# Patient Record
Sex: FEMALE | Race: BLACK OR AFRICAN AMERICAN | URBAN - METROPOLITAN AREA
[De-identification: names, ages, dates, MRNs, and addresses within clinical notes are randomized per-mention and may not be internally consistent; named-entity substitution may affect disease eponyms.]

---

## 2017-01-21 ENCOUNTER — INPATIENT (INPATIENT)
Facility: HOSPITAL | Age: 62
LOS: 1 days | Discharge: HOME | End: 2017-01-23
Attending: INTERNAL MEDICINE | Admitting: INTERNAL MEDICINE
Payer: MEDICARE

## 2017-01-21 VITALS
WEIGHT: 153 LBS | OXYGEN SATURATION: 100 % | TEMPERATURE: 98 F | HEART RATE: 78 BPM | SYSTOLIC BLOOD PRESSURE: 167 MMHG | RESPIRATION RATE: 210 BRPM | DIASTOLIC BLOOD PRESSURE: 70 MMHG | HEIGHT: 69 IN

## 2017-01-21 LAB
ALBUMIN SERPL ELPH-MCNC: 3.4 G/DL — SIGNIFICANT CHANGE UP (ref 3.3–5)
ALP SERPL-CCNC: 61 U/L — SIGNIFICANT CHANGE UP (ref 40–120)
ALT FLD-CCNC: 23 U/L — SIGNIFICANT CHANGE UP (ref 12–78)
ANION GAP SERPL CALC-SCNC: 8 MMOL/L — SIGNIFICANT CHANGE UP (ref 5–17)
APPEARANCE UR: CLEAR — SIGNIFICANT CHANGE UP
APTT BLD: 26.9 SEC — LOW (ref 27.5–37.4)
AST SERPL-CCNC: 15 U/L — SIGNIFICANT CHANGE UP (ref 15–37)
BACTERIA # UR AUTO: ABNORMAL
BASOPHILS # BLD AUTO: 0.1 K/UL — SIGNIFICANT CHANGE UP (ref 0–0.2)
BASOPHILS NFR BLD AUTO: 0.5 % — SIGNIFICANT CHANGE UP (ref 0–2)
BILIRUB SERPL-MCNC: 0.2 MG/DL — SIGNIFICANT CHANGE UP (ref 0.2–1.2)
BILIRUB UR-MCNC: NEGATIVE — SIGNIFICANT CHANGE UP
BUN SERPL-MCNC: 12 MG/DL — SIGNIFICANT CHANGE UP (ref 7–23)
CALCIUM SERPL-MCNC: 9 MG/DL — SIGNIFICANT CHANGE UP (ref 8.5–10.1)
CHLORIDE SERPL-SCNC: 110 MMOL/L — HIGH (ref 96–108)
CK MB BLD-MCNC: <0.6 % — SIGNIFICANT CHANGE UP (ref 0–3.5)
CK MB CFR SERPL CALC: <0.5 NG/ML — SIGNIFICANT CHANGE UP (ref 0.5–3.6)
CK MB CFR SERPL CALC: <0.5 NG/ML — SIGNIFICANT CHANGE UP (ref 0.5–3.6)
CK SERPL-CCNC: 83 U/L — SIGNIFICANT CHANGE UP (ref 26–192)
CO2 SERPL-SCNC: 26 MMOL/L — SIGNIFICANT CHANGE UP (ref 22–31)
COLOR SPEC: YELLOW — SIGNIFICANT CHANGE UP
COMMENT - URINE: SIGNIFICANT CHANGE UP
CREAT SERPL-MCNC: 0.84 MG/DL — SIGNIFICANT CHANGE UP (ref 0.5–1.3)
DIFF PNL FLD: ABNORMAL
EOSINOPHIL # BLD AUTO: 0.1 K/UL — SIGNIFICANT CHANGE UP (ref 0–0.5)
EOSINOPHIL NFR BLD AUTO: 0.5 % — SIGNIFICANT CHANGE UP (ref 0–6)
GLUCOSE SERPL-MCNC: 147 MG/DL — HIGH (ref 70–99)
GLUCOSE UR QL: NEGATIVE MG/DL — SIGNIFICANT CHANGE UP
HCT VFR BLD CALC: 37.7 % — SIGNIFICANT CHANGE UP (ref 34.5–45)
HGB BLD-MCNC: 11.7 G/DL — SIGNIFICANT CHANGE UP (ref 11.5–15.5)
INR BLD: 1.21 RATIO — HIGH (ref 0.88–1.16)
KETONES UR-MCNC: NEGATIVE — SIGNIFICANT CHANGE UP
LEUKOCYTE ESTERASE UR-ACNC: NEGATIVE — SIGNIFICANT CHANGE UP
LYMPHOCYTES # BLD AUTO: 1.2 K/UL — SIGNIFICANT CHANGE UP (ref 1–3.3)
LYMPHOCYTES # BLD AUTO: 10.1 % — LOW (ref 13–44)
MCHC RBC-ENTMCNC: 25.4 PG — LOW (ref 27–34)
MCHC RBC-ENTMCNC: 31.1 GM/DL — LOW (ref 32–36)
MCV RBC AUTO: 81.6 FL — SIGNIFICANT CHANGE UP (ref 80–100)
MONOCYTES # BLD AUTO: 0.8 K/UL — SIGNIFICANT CHANGE UP (ref 0–0.9)
MONOCYTES NFR BLD AUTO: 6.3 % — SIGNIFICANT CHANGE UP (ref 2–14)
NEUTROPHILS # BLD AUTO: 10 K/UL — HIGH (ref 1.8–7.4)
NEUTROPHILS NFR BLD AUTO: 82.5 % — HIGH (ref 43–77)
NITRITE UR-MCNC: NEGATIVE — SIGNIFICANT CHANGE UP
PH UR: 7 — SIGNIFICANT CHANGE UP (ref 4.8–8)
PLATELET # BLD AUTO: 193 K/UL — SIGNIFICANT CHANGE UP (ref 150–400)
POTASSIUM SERPL-MCNC: 3.8 MMOL/L — SIGNIFICANT CHANGE UP (ref 3.5–5.3)
POTASSIUM SERPL-SCNC: 3.8 MMOL/L — SIGNIFICANT CHANGE UP (ref 3.5–5.3)
PROT SERPL-MCNC: 8 GM/DL — SIGNIFICANT CHANGE UP (ref 6–8.3)
PROT UR-MCNC: 15 MG/DL
PROTHROM AB SERPL-ACNC: 13.6 SEC — HIGH (ref 10–13.1)
RBC # BLD: 4.62 M/UL — SIGNIFICANT CHANGE UP (ref 3.8–5.2)
RBC # FLD: 12.6 % — SIGNIFICANT CHANGE UP (ref 11–15)
RBC CASTS # UR COMP ASSIST: >50 /HPF (ref 0–4)
SODIUM SERPL-SCNC: 144 MMOL/L — SIGNIFICANT CHANGE UP (ref 135–145)
SP GR SPEC: 1.01 — SIGNIFICANT CHANGE UP (ref 1.01–1.02)
TROPONIN I SERPL-MCNC: <.015 NG/ML — SIGNIFICANT CHANGE UP (ref 0.01–0.04)
TROPONIN I SERPL-MCNC: <.015 NG/ML — SIGNIFICANT CHANGE UP (ref 0.01–0.04)
UROBILINOGEN FLD QL: NEGATIVE MG/DL — SIGNIFICANT CHANGE UP
WBC # BLD: 12.2 K/UL — HIGH (ref 3.8–10.5)
WBC # FLD AUTO: 12.2 K/UL — HIGH (ref 3.8–10.5)
WBC UR QL: ABNORMAL

## 2017-01-21 PROCEDURE — 71010: CPT | Mod: 26

## 2017-01-21 PROCEDURE — 70450 CT HEAD/BRAIN W/O DYE: CPT | Mod: 26

## 2017-01-21 PROCEDURE — 99285 EMERGENCY DEPT VISIT HI MDM: CPT

## 2017-01-21 RX ORDER — SIMVASTATIN 20 MG/1
20 TABLET, FILM COATED ORAL AT BEDTIME
Qty: 0 | Refills: 0 | Status: DISCONTINUED | OUTPATIENT
Start: 2017-01-21 | End: 2017-01-23

## 2017-01-21 RX ORDER — ACETAMINOPHEN 500 MG
650 TABLET ORAL ONCE
Qty: 0 | Refills: 0 | Status: COMPLETED | OUTPATIENT
Start: 2017-01-21 | End: 2017-01-21

## 2017-01-21 RX ADMIN — SIMVASTATIN 20 MILLIGRAM(S): 20 TABLET, FILM COATED ORAL at 22:42

## 2017-01-21 RX ADMIN — Medication 100 MILLIGRAM(S): at 17:55

## 2017-01-21 NOTE — ED PROVIDER NOTE - MEDICAL DECISION MAKING DETAILS
pt with syncope noted with aicha's chorea to admit for further care with Dr. Pina and Dr. Johnson and Dr. Tovar consulted.

## 2017-01-21 NOTE — ED PROVIDER NOTE - OBJECTIVE STATEMENT
61 year old female with PMH of HTN, [place] Chorea presenting to ED due to syncope noted at home  - as per  she was just sitting and then leaned to the side, unresponsive for 61 year old female with PMH of HTN, [place] Chorea presenting to ED due to syncope noted at home  - as per  she was just sitting and then leaned to the side, unresponsive for a minute now back to normal. Pt normaly has some R sided weakness, bilateral leg weakness at baseline.

## 2017-01-21 NOTE — CONSULT NOTE ADULT - SUBJECTIVE AND OBJECTIVE BOX
HPI:  HPI:      Chief Complaint:  Patient is a 61y old  Female who presents with a chief complaint of     Review of Systems:    General:  No wt loss, fevers, chills, night sweats  Eyes:  Good vision, no reported pain  ENT:  No sore throat, pain, runny nose, dysphagia  CV:  No pain, palpitations, hypo/hypertension, passed out  Resp:  No dyspnea, cough, tachypnea, wheezing  GI:  No pain, nausea, vomiting, diarrhea, constipation  :  No pain, bleeding, incontinence, nocturia  Muscle:  No pain, weakness           Social History/Family History  SOCHX:   tobacco,  -  alcohol    FMHX: FA/MO  - contributory       Discussed with:  PMD, Family    Physical Exam:    Vital Signs:  Vital Signs Last 24 Hrs  T(C): 37.9, Max: 37.9 ( @ 15:45)  T(F): 100.2, Max: 100.2 ( @ 15:45)  HR: 93 (78 - 93)  BP: 137/89 (133/78 - 167/70)  BP(mean): --  RR: 18 (18 - 20)  SpO2: 97% (97% - 100%)  Daily Height in cm: 175.26 (2017 09:56)    Daily   I&O's Summary      General:  Appears stated age, well-groomed, well-nourished, no distress  HEENT:  NC/AT, patent nares w/ pink mucosa, OP clear w/o lesions, PERRL, EOMI, conjunctivae clear, no thyromegaly, nodules, adenopathy, no JVD  Chest:  Full & symmetric excursion, no increased effort, breath sounds clear  Cardiovascular:  Regular rhythm, S1, S2, no murmur/rub/S3/S4, no carotid/femoral/abdominal bruit, radial/pedal pulses 2+, no edema  Abdomen:  Soft, non-tender, non-distended, normoactive bowel sounds, no HSM  Extremities:  Gait & station:   Digits:   Nails:   Joints, Bones, Muscles:   ROM:   Stability:  Skin:  No rash/erythema/ecchymoses/petechiae/wounds/abscess/warm/dry  Musculoskeletal:  Full ROM in all joints w/o swelling/tenderness/effusion  Neuro/Psych:  Alert, oriented, normal and symmetric strength in UEs, LEs, normal gait, sensation intact, affect:   mood:   appearance:       Laboratory:                          11.7   12.2  )-----------( 193      ( 2017 10:45 )             37.7     2017 10:45    144    |  110    |  12     ----------------------------<  147    3.8     |  26     |  0.84     Ca    9.0        2017 10:45    TPro  8.0    /  Alb  3.4    /  TBili  0.2    /  DBili  x      /  AST  15     /  ALT  23     /  AlkPhos  61     2017 10:45      CARDIAC MARKERS ( 2017 18:50 )  <.015 ng/mL / x     / x     / x     / <0.5 ng/mL  CARDIAC MARKERS ( 2017 10:45 )  <.015 ng/mL / x     / 83 U/L / x     / <0.5 ng/mL      CAPILLARY BLOOD GLUCOSE  121 (2017 10:03)    LIVER FUNCTIONS - ( 2017 10:45 )  Alb: 3.4 g/dL / Pro: 8.0 gm/dL / ALK PHOS: 61 U/L / ALT: 23 U/L / AST: 15 U/L / GGT: x           PT/INR - ( 2017 10:45 )   PT: 13.6 sec;   INR: 1.21 ratio         PTT - ( 2017 10:45 )  PTT:26.9 sec  Urinalysis Basic - ( 2017 14:52 )    Color: Yellow / Appearance: Clear / S.010 / pH: x  Gluc: x / Ketone: Negative  / Bili: Negative / Urobili: Negative mg/dL   Blood: x / Protein: 15 mg/dL / Nitrite: Negative   Leuk Esterase: Negative / RBC: >50 /HPF / WBC 11-25   Sq Epi: x / Non Sq Epi: x / Bacteria: Few        Assessment:  Syncope  History noted  ECho  Tele monitor  EKG noted  Serial CE

## 2017-01-21 NOTE — ED ADULT TRIAGE NOTE - CHIEF COMPLAINT QUOTE
patient BIBA , patient denied any chest pain or SOB , patient had an episode of syncope this morning as per boyfriend she had a fall 2 days ago hematoma R eye noted unknown LOC patient c/o of bilateral knee pain

## 2017-01-21 NOTE — ED PROVIDER NOTE - NIH STROKE SCALE: 10. DYSARTHRIA, QM
(1) Mild-to-moderate dysarthria; patient slurs at least some words and, at worst, can be understood with some difficulty

## 2017-01-22 DIAGNOSIS — E78.00 PURE HYPERCHOLESTEROLEMIA, UNSPECIFIED: ICD-10-CM

## 2017-01-22 DIAGNOSIS — R55 SYNCOPE AND COLLAPSE: ICD-10-CM

## 2017-01-22 LAB
CULTURE RESULTS: NO GROWTH — SIGNIFICANT CHANGE UP
HCT VFR BLD CALC: 34.7 % — SIGNIFICANT CHANGE UP (ref 34.5–45)
HGB BLD-MCNC: 11.9 G/DL — SIGNIFICANT CHANGE UP (ref 11.5–15.5)
MCHC RBC-ENTMCNC: 28.4 PG — SIGNIFICANT CHANGE UP (ref 27–34)
MCHC RBC-ENTMCNC: 34.2 GM/DL — SIGNIFICANT CHANGE UP (ref 32–36)
MCV RBC AUTO: 82.8 FL — SIGNIFICANT CHANGE UP (ref 80–100)
PLATELET # BLD AUTO: 175 K/UL — SIGNIFICANT CHANGE UP (ref 150–400)
RBC # BLD: 4.19 M/UL — SIGNIFICANT CHANGE UP (ref 3.8–5.2)
RBC # FLD: 12.9 % — SIGNIFICANT CHANGE UP (ref 11–15)
SPECIMEN SOURCE: SIGNIFICANT CHANGE UP
WBC # BLD: 7.7 K/UL — SIGNIFICANT CHANGE UP (ref 3.8–10.5)
WBC # FLD AUTO: 7.7 K/UL — SIGNIFICANT CHANGE UP (ref 3.8–10.5)

## 2017-01-22 PROCEDURE — 93880 EXTRACRANIAL BILAT STUDY: CPT | Mod: 26

## 2017-01-22 RX ORDER — DOCUSATE SODIUM 100 MG
100 CAPSULE ORAL
Qty: 0 | Refills: 0 | Status: DISCONTINUED | OUTPATIENT
Start: 2017-01-22 | End: 2017-01-23

## 2017-01-22 RX ORDER — ACETAMINOPHEN 500 MG
650 TABLET ORAL EVERY 6 HOURS
Qty: 0 | Refills: 0 | Status: DISCONTINUED | OUTPATIENT
Start: 2017-01-22 | End: 2017-01-23

## 2017-01-22 RX ADMIN — Medication 100 MILLIGRAM(S): at 05:49

## 2017-01-22 RX ADMIN — Medication 100 MILLIGRAM(S): at 23:51

## 2017-01-22 RX ADMIN — SIMVASTATIN 20 MILLIGRAM(S): 20 TABLET, FILM COATED ORAL at 21:53

## 2017-01-22 RX ADMIN — Medication 100 MILLIGRAM(S): at 18:27

## 2017-01-22 RX ADMIN — Medication 650 MILLIGRAM(S): at 00:24

## 2017-01-22 NOTE — PROGRESS NOTE ADULT - SUBJECTIVE AND OBJECTIVE BOX
Patient is a 61y old  Female who presents with a chief complaint of     INTERVAL HPI/OVERNIGHT EVENTS:    MEDICATIONS  (STANDING):  simvastatin 20milliGRAM(s) Oral at bedtime  doxycycline hyclate Capsule 100milliGRAM(s) Oral every 12 hours    MEDICATIONS  (PRN):  acetaminophen   Tablet 650milliGRAM(s) Oral every 6 hours PRN For Temp greater than 38 C (100.4 F)      Allergies    No Known Allergies    Intolerances        REVIEW OF SYSTEMS:  CONSTITUTIONAL: No fever, weight loss, or fatigue  EYES: No eye pain, visual disturbances, or discharge  ENMT:  No difficulty hearing, tinnitus, vertigo; No sinus or throat pain  NECK: No pain or stiffness  BREASTS: No pain, masses, or nipple discharge  RESPIRATORY: No cough, wheezing, chills or hemoptysis; No shortness of breath  CARDIOVASCULAR: No chest pain, palpitations, dizziness, or leg swelling  GASTROINTESTINAL: No abdominal or epigastric pain. No nausea, vomiting, or hematemesis; No diarrhea or constipation. No melena or hematochezia.  GENITOURINARY: No dysuria, frequency, hematuria, or incontinence  NEUROLOGICAL: No headaches, memory loss, loss of strength, numbness, or tremors  SKIN: No itching, burning, rashes, or lesions   LYMPH NODES: No enlarged glands  ENDOCRINE: No heat or cold intolerance; No hair loss  MUSCULOSKELETAL: No joint pain or swelling; No muscle, back, or extremity pain  PSYCHIATRIC: No depression, anxiety, mood swings, or difficulty sleeping  HEME/LYMPH: No easy bruising, or bleeding gums  ALLERGY AND IMMUNOLOGIC: No hives or eczema    Vital Signs Last 24 Hrs  T(C): 37.7, Max: 38.6 ( @ 22:50)  T(F): 99.8, Max: 101.4 ( @ 22:50)  HR: 84 (84 - 100)  BP: 125/76 (116/79 - 139/81)  BP(mean): --  RR: 18 (17 - 21)  SpO2: 97% (96% - 99%)    PHYSICAL EXAM:  GENERAL: NAD, well-groomed, well-developed  HEAD:  Atraumatic, Normocephalic  EYES: EOMI, PERRLA, conjunctiva and sclera clear  ENMT: No tonsillar erythema, exudates, or enlargement; Moist mucous membranes, Good dentition, No lesions  NECK: Supple, No JVD, Normal thyroid  NERVOUS SYSTEM:  Alert & Oriented X3, Good concentration; Motor Strength 5/5 B/L upper and lower extremities; DTRs 2+ intact and symmetric  CHEST/LUNG: Clear to percussion bilaterally; No rales, rhonchi, wheezing, or rubs  HEART: Regular rate and rhythm; No murmurs, rubs, or gallops  ABDOMEN: Soft, Nontender, Nondistended; Bowel sounds present  EXTREMITIES:  2+ Peripheral Pulses, No clubbing, cyanosis, or edema  LYMPH: No lymphadenopathy noted  SKIN: No rashes or lesions    LABS:                        11.9   7.7   )-----------( 175      ( 2017 06:34 )             34.7     2017 10:45    144    |  110    |  12     ----------------------------<  147    3.8     |  26     |  0.84     Ca    9.0        2017 10:45    TPro  8.0    /  Alb  3.4    /  TBili  0.2    /  DBili  x      /  AST  15     /  ALT  23     /  AlkPhos  61     2017 10:45    PT/INR - ( 2017 10:45 )   PT: 13.6 sec;   INR: 1.21 ratio         PTT - ( 2017 10:45 )  PTT:26.9 sec  Urinalysis Basic - ( 2017 14:52 )    Color: Yellow / Appearance: Clear / S.010 / pH: x  Gluc: x / Ketone: Negative  / Bili: Negative / Urobili: Negative mg/dL   Blood: x / Protein: 15 mg/dL / Nitrite: Negative   Leuk Esterase: Negative / RBC: >50 /HPF / WBC 11-25   Sq Epi: x / Non Sq Epi: x / Bacteria: Few      CAPILLARY BLOOD GLUCOSE    CULTURES:    HEMOGLOBIN A1C:    CHOLESTEROL:        RADIOLOGY & ADDITIONAL TESTS:

## 2017-01-23 VITALS
RESPIRATION RATE: 17 BRPM | SYSTOLIC BLOOD PRESSURE: 150 MMHG | DIASTOLIC BLOOD PRESSURE: 87 MMHG | OXYGEN SATURATION: 95 % | HEART RATE: 97 BPM | TEMPERATURE: 99 F

## 2017-01-23 DIAGNOSIS — R50.9 FEVER, UNSPECIFIED: ICD-10-CM

## 2017-01-23 DIAGNOSIS — G10 HUNTINGTON'S DISEASE: ICD-10-CM

## 2017-01-23 PROCEDURE — 93306 TTE W/DOPPLER COMPLETE: CPT | Mod: 26

## 2017-01-23 RX ADMIN — Medication 100 MILLIGRAM(S): at 06:02

## 2017-01-23 RX ADMIN — Medication 100 MILLIGRAM(S): at 17:24

## 2017-01-23 NOTE — PHYSICAL THERAPY INITIAL EVALUATION ADULT - ACTIVE RANGE OF MOTION EXAMINATION, REHAB EVAL
R Shoulder Flexion: 0 to 80, R Elbow Extension: 130 to 30, R Knee: 10 to 90/deficits as listed below

## 2017-01-23 NOTE — PHYSICAL THERAPY INITIAL EVALUATION ADULT - MODIFIED CLINICAL TEST OF SENSORY INTEGRATION IN BALANCE TEST
Barthel Index: Feeding Score _5__, Bathing Score _0__, Grooming Score _0__, Dressing Score _0__, Bowels Score _0__, Bladder Score _0__, Toilet Score _0__, Transfers Score __5_, Mobility Score _0__, Stairs Score _0__,     Total Score _10__

## 2017-01-23 NOTE — PHYSICAL THERAPY INITIAL EVALUATION ADULT - IMPAIRMENTS FOUND, PT EVAL
gross motor/posture/ROM/sensory integrity/gait, locomotion, and balance/fine motor/muscle strength/tone

## 2017-01-23 NOTE — PHYSICAL THERAPY INITIAL EVALUATION ADULT - TRANSFER SAFETY CONCERNS NOTED: SIT/STAND, REHAB EVAL
decreased weight-shifting ability/losing balance/decreased step length/decreased sequencing ability/decreased balance during turns

## 2017-01-23 NOTE — PHYSICAL THERAPY INITIAL EVALUATION ADULT - PLANNED THERAPY INTERVENTIONS, PT EVAL
transfer training/strengthening/motor coordination training/gait training/balance training/postural re-education/bed mobility training/ROM

## 2017-01-23 NOTE — PROGRESS NOTE ADULT - SUBJECTIVE AND OBJECTIVE BOX
Patient is a 61y old  Female who presents with a chief complaint of     INTERVAL HPI/OVERNIGHT EVENTS: fever    MEDICATIONS  (STANDING):  simvastatin 20milliGRAM(s) Oral at bedtime  doxycycline hyclate Capsule 100milliGRAM(s) Oral every 12 hours    MEDICATIONS  (PRN):  acetaminophen   Tablet 650milliGRAM(s) Oral every 6 hours PRN For Temp greater than 38 C (100.4 F)  docusate sodium 100milliGRAM(s) Oral two times a day PRN Constipation      Allergies    No Known Allergies    Intolerances        REVIEW OF SYSTEMS:  CONSTITUTIONAL: No fever, weight loss, or fatigue  EYES: No eye pain, visual disturbances, or discharge  ENMT:  No difficulty hearing, tinnitus, vertigo; No sinus or throat pain  NECK: No pain or stiffness  BREASTS: No pain, masses, or nipple discharge  RESPIRATORY: No cough, wheezing, chills or hemoptysis; No shortness of breath  CARDIOVASCULAR: No chest pain, palpitations, dizziness, or leg swelling  GASTROINTESTINAL: No abdominal or epigastric pain. No nausea, vomiting, or hematemesis; No diarrhea or constipation. No melena or hematochezia.  GENITOURINARY: No dysuria, frequency, hematuria, or incontinence  NEUROLOGICAL: No headaches, memory loss, loss of strength, numbness, or tremors  SKIN: No itching, burning, rashes, or lesions   LYMPH NODES: No enlarged glands  ENDOCRINE: No heat or cold intolerance; No hair loss  MUSCULOSKELETAL: No joint pain or swelling; No muscle, back, or extremity pain  PSYCHIATRIC: No depression, anxiety, mood swings, or difficulty sleeping  HEME/LYMPH: No easy bruising, or bleeding gums  ALLERGY AND IMMUNOLOGIC: No hives or eczema    Vital Signs Last 24 Hrs  T(C): 36.8, Max: 38.1 ( @ 17:50)  T(F): 98.2, Max: 100.6 ( @ 17:50)  HR: 81 (81 - 87)  BP: 122/76 (122/76 - 139/81)  BP(mean): --  RR: 18 (17 - 18)  SpO2: 98% (96% - 98%)    PHYSICAL EXAM:  GENERAL: NAD, well-groomed, well-developed  HEAD:  Atraumatic, Normocephalic  EYES: EOMI, PERRLA, conjunctiva and sclera clear  ENMT: No tonsillar erythema, exudates, or enlargement; Moist mucous membranes, Good dentition, No lesions  NECK: Supple, No JVD, Normal thyroid  NERVOUS SYSTEM:  Alert & Oriented X3, Good concentration; Motor Strength 5/5 B/L upper and lower extremities; DTRs 2+ intact and symmetric  CHEST/LUNG: Clear to percussion bilaterally; No rales, rhonchi, wheezing, or rubs  HEART: Regular rate and rhythm; No murmurs, rubs, or gallops  ABDOMEN: Soft, Nontender, Nondistended; Bowel sounds present  EXTREMITIES:  2+ Peripheral Pulses, No clubbing, cyanosis, or edema  LYMPH: No lymphadenopathy noted  SKIN: No rashes or lesions    LABS:                        11.9   7.7   )-----------( 175      ( 2017 06:34 )             34.7             Urinalysis Basic - ( 2017 14:52 )    Color: Yellow / Appearance: Clear / S.010 / pH: x  Gluc: x / Ketone: Negative  / Bili: Negative / Urobili: Negative mg/dL   Blood: x / Protein: 15 mg/dL / Nitrite: Negative   Leuk Esterase: Negative / RBC: >50 /HPF / WBC 11-25   Sq Epi: x / Non Sq Epi: x / Bacteria: Few      CAPILLARY BLOOD GLUCOSE    CULTURES:  Culture Results:   No growth to date. ( @ 10:31)  Culture Results:   No growth ( @ 18:29)    HEMOGLOBIN A1C:    CHOLESTEROL:        RADIOLOGY & ADDITIONAL TESTS:

## 2017-01-23 NOTE — PROGRESS NOTE ADULT - SUBJECTIVE AND OBJECTIVE BOX
Patient is a 61y old  Female who presents with a chief complaint of     INTERVAL HPI/OVERNIGHT EVENTS: no complaint wants to go home    MEDICATIONS  (STANDING):  simvastatin 20milliGRAM(s) Oral at bedtime  doxycycline hyclate Capsule 100milliGRAM(s) Oral every 12 hours    MEDICATIONS  (PRN):  acetaminophen   Tablet 650milliGRAM(s) Oral every 6 hours PRN For Temp greater than 38 C (100.4 F)  docusate sodium 100milliGRAM(s) Oral two times a day PRN Constipation      Allergies    No Known Allergies    Intolerances        REVIEW OF SYSTEMS:  CONSTITUTIONAL: No fever, weight loss, or fatigue  EYES: No eye pain, visual disturbances, or discharge  ENMT:  No difficulty hearing, tinnitus, vertigo; No sinus or throat pain  NECK: No pain or stiffness  BREASTS: No pain, masses, or nipple discharge  RESPIRATORY: No cough, wheezing, chills or hemoptysis; No shortness of breath  CARDIOVASCULAR: No chest pain, palpitations, dizziness, or leg swelling  GASTROINTESTINAL: No abdominal or epigastric pain. No nausea, vomiting, or hematemesis; No diarrhea or constipation. No melena or hematochezia.  GENITOURINARY: No dysuria, frequency, hematuria, or incontinence  NEUROLOGICAL: No headaches, memory loss, loss of strength, numbness, or tremors  SKIN: No itching, burning, rashes, or lesions   LYMPH NODES: No enlarged glands  ENDOCRINE: No heat or cold intolerance; No hair loss  MUSCULOSKELETAL: No joint pain or swelling; No muscle, back, or extremity pain  PSYCHIATRIC: No depression, anxiety, mood swings, or difficulty sleeping  HEME/LYMPH: No easy bruising, or bleeding gums  ALLERGY AND IMMUNOLOGIC: No hives or eczema    Vital Signs Last 24 Hrs  T(C): 37.2, Max: 38.1 ( @ 17:50)  T(F): 99, Max: 100.6 ( @ 17:50)  HR: 101 (81 - 101)  BP: 148/83 (122/76 - 148/83)  BP(mean): --  RR: 17 (17 - 18)  SpO2: 95% (95% - 98%)    PHYSICAL EXAM:  GENERAL: NAD, well-groomed, well-developed  HEAD:  Atraumatic, Normocephalic  EYES: EOMI, PERRLA, conjunctiva and sclera clear  ENMT: No tonsillar erythema, exudates, or enlargement; Moist mucous membranes, Good dentition, No lesions  NECK: Supple, No JVD, Normal thyroid  NERVOUS SYSTEM:  Alert & Oriented X3, Good concentration; Motor Strength 5/5 B/L upper and lower extremities; DTRs 2+ intact and symmetric  CHEST/LUNG: Clear to percussion bilaterally; No rales, rhonchi, wheezing, or rubs  HEART: Regular rate and rhythm; No murmurs, rubs, or gallops  ABDOMEN: Soft, Nontender, Nondistended; Bowel sounds present  EXTREMITIES:  2+ Peripheral Pulses, No clubbing, cyanosis, or edema  LYMPH: No lymphadenopathy noted  SKIN: No rashes or lesions    LABS:                        11.9   7.7   )-----------( 175      ( 2017 06:34 )             34.7             Urinalysis Basic - ( 2017 14:52 )    Color: Yellow / Appearance: Clear / S.010 / pH: x  Gluc: x / Ketone: Negative  / Bili: Negative / Urobili: Negative mg/dL   Blood: x / Protein: 15 mg/dL / Nitrite: Negative   Leuk Esterase: Negative / RBC: >50 /HPF / WBC 11-25   Sq Epi: x / Non Sq Epi: x / Bacteria: Few      CAPILLARY BLOOD GLUCOSE    CULTURES:  Culture Results:   No growth to date. ( @ 10:31)  Culture Results:   No growth ( @ 18:29)    HEMOGLOBIN A1C:    CHOLESTEROL:        RADIOLOGY & ADDITIONAL TESTS:

## 2017-01-23 NOTE — PROGRESS NOTE ADULT - SUBJECTIVE AND OBJECTIVE BOX
Assessment:  Syncope  History noted  ECho pending, await result  Tele monitor negative  EKG noted  Serial CE neg

## 2017-01-23 NOTE — DISCHARGE NOTE ADULT - HOSPITAL COURSE
61 year old female with PMH of HTN, [place] Chorea presenting to ED due to syncope noted at home  - as per  she was just sitting and then leaned to the side, unresponsive for a minute now back to normal. Pt normaly has some R sided weakness, bilateral leg weakness at baseline.

## 2017-01-23 NOTE — DISCHARGE NOTE ADULT - PATIENT PORTAL LINK FT
“You can access the [facility] Patient Portal, offered by [facility], by registering with the following website: [email or web address]”

## 2017-01-23 NOTE — DISCHARGE NOTE ADULT - CARE PLAN
Principal Discharge DX:	Syncope, unspecified syncope type  Goal:	follow up with pmd in 1 week  Instructions for follow-up, activity and diet:	take med as directed  Secondary Diagnosis:	[place] chorea

## 2017-01-23 NOTE — PHYSICAL THERAPY INITIAL EVALUATION ADULT - ADDITIONAL COMMENTS
Patient's spouse states that his spouse was not walking for approximately 1 year, however he also states that the patient was able to walk to the bathroom with assistance.

## 2017-01-23 NOTE — PHYSICAL THERAPY INITIAL EVALUATION ADULT - GENERAL OBSERVATIONS, REHAB EVAL
Patient seen supine in bed with no apparent distress with R hypertonia, choreatic movements, teeth grinding, and low phonation upon speaking.

## 2017-01-23 NOTE — PHYSICAL THERAPY INITIAL EVALUATION ADULT - TRANSFER SAFETY CONCERNS NOTED: BED/CHAIR, REHAB EVAL
decreased weight-shifting ability/decreased step length/decreased sequencing ability/decreased balance during turns/losing balance

## 2017-01-23 NOTE — PHYSICAL THERAPY INITIAL EVALUATION ADULT - BALANCE DISTURBANCE, IDENTIFIED IMPAIRMENT CONTRIBUTE, REHAB EVAL
decreased ROM/impaired postural control/abnormal muscle tone/decreased strength/impaired motor control/impaired coordination

## 2017-01-23 NOTE — PHYSICAL THERAPY INITIAL EVALUATION ADULT - IMPAIRMENTS CONTRIBUTING TO GAIT DEVIATIONS, PT EVAL
impaired coordination/narrow base of support/impaired motor control/abnormal muscle tone/decreased strength/scissoring/impaired balance/impaired postural control

## 2017-01-23 NOTE — H&P ADULT. - HISTORY OF PRESENT ILLNESS
61 year old female with PMH of HTN, [place] Chorea presenting to ED due to syncope noted at home  - as per  she was just sitting and then leaned to the side, unresponsive for a minute now back to normal. Pt normaly has some R sided weakness, bilateral leg weakness at baseline.

## 2017-01-23 NOTE — PHYSICAL THERAPY INITIAL EVALUATION ADULT - CRITERIA FOR SKILLED THERAPEUTIC INTERVENTIONS
predicted duration of therapy intervention/rehab potential/impairments found/Home with Home P.T., resumption of prior services/risk reduction/prevention/anticipated equipment needs at discharge/anticipated discharge recommendation/functional limitations in following categories/therapy frequency

## 2017-01-23 NOTE — PHYSICAL THERAPY INITIAL EVALUATION ADULT - IMPAIRED TRANSFERS: SIT/STAND, REHAB EVAL
impaired postural control/ataxic/impaired coordination/narrow base of support/impaired motor control/abnormal muscle tone/decreased strength/decreased ROM/impaired balance

## 2017-01-23 NOTE — DISCHARGE NOTE ADULT - MEDICATION SUMMARY - MEDICATIONS TO TAKE
I will START or STAY ON the medications listed below when I get home from the hospital:    simvastatin 20 mg oral tablet  -- 1 tab(s) by mouth once a day (at bedtime)  -- Indication: For High cholesterol

## 2017-01-23 NOTE — CONSULT NOTE ADULT - SUBJECTIVE AND OBJECTIVE BOX
Subjective Complaints:  Historian:       Consult requested by ER doctor:                  Attending:     HPI:  61 year old female with PMH of HTN, [place] Chorea presenting to ED due to syncope noted at home  - as per  she was just sitting and then leaned to the side, unresponsive for a minute now back to normal. Pt normaly has some R sided weakness, bilateral leg weakness at baseline. (2017 11:44)    LAISHA PALMER    PAST MEDICAL & SURGICAL HISTORY:  High cholesterol  Slope chorea  No pertinent past medical history  No significant past surgical history  61yFemale    MEDICATIONS  (STANDING):  simvastatin 20milliGRAM(s) Oral at bedtime  doxycycline hyclate Capsule 100milliGRAM(s) Oral every 12 hours    MEDICATIONS  (PRN):  acetaminophen   Tablet 650milliGRAM(s) Oral every 6 hours PRN For Temp greater than 38 C (100.4 F)  docusate sodium 100milliGRAM(s) Oral two times a day PRN Constipation      Allergies    No Known Allergies    Intolerances      FAMILY HISTORY:      REVIEW OF SYSTEMS:  General:  No wt loss, fevers, chills, night sweats  Eyes:  Good vision, no reported pain  ENT:  No sore throat, pain, runny nose, dysphagia  CV:  No pain, palpitatioins, hypo/hypertension  Resp:  No dyspnea, cough, tachypnea, wheezing  GI:  No pain, nausea, vomiting, diarrhea, constipatiion  :  No pain, bleeding, incontinence, nocturia  Muscle:  No pain, weakness  Breast:  No pain, abscess, mass, discharge  Neuro:  No weakness, tingling, memory problems  Psych:  No fatigue, insomnia, mood problems, depression  Endocrine:  No polyuria, polydypsia, cold/heat intolerance  Heme:  No petechiae, ecchymosis, easy bruisability  Skin:  No rash, tattoos, scars, edema      Vital Signs Last 24 Hrs  T(C): 37.2, Max: 38.1 ( @ 17:50)  T(F): 99, Max: 100.6 ( @ 17:50)  HR: 101 (81 - 101)  BP: 148/83 (122/76 - 148/83)  BP(mean): --  RR: 17 (17 - 18)  SpO2: 95% (95% - 98%)    GENERAL PHYSICAL EXAM:  General:  Appears stated age, well-groomed, well-nourished, no distress  HEENT:  NC/AT, patent nares w/ pink mucosa, OP clear w/o lesions, PERRL, EOMI, conjunctivae clear, no thyromegaly, nodules, adenopathy, no JVD  Chest:  Full & symmetric excursion, no increased effort, breath sounds clear  Cardiovascular:  Regular rhythm, S1, S2, no murmur/rub/S3/S4, no carotid/femoral/abdominal bruit, radial/pedal pulses 2+, no edema  Abdomen:  Soft, non-tender, non-distended, normoactive bowel sounds, no HSM  Extremities:  Gait & station:   Digits:   Nails:   Joints, Bones, Muscles:   ROM:   Stability:  Skin:  No rash/erythema/ecchymoses/petechiae/wounds/abscess/warm/dry  Musculoskeletal:  Full ROM in all joints w/o swelling/tenderness/effusion    NEUROLOGICAL EXAM:  HENT:  Normocephalic head; atraumatic head.  Neck supple.  ENT: normal looking.  Mental State:    Alert.   oriented to person only, .  Coherent.  Speech is dysarthric ,memory is impaired to long and short termst.  Cooperative. Cognition is impaired.    Cranial Nerves:  II-XII:   Pupils round and reactive to light and accommodation.  Extraocular movements full.  Visual fields full (no homonymous hemianopsia).  Visual acuity wnl.  Facial symmetry intact.  Tongue midline.  Motor Functions:  involuntary fast movements of all extremities right worse than left strength is 3-4/5    Sensory Functions:   Intact to touch and pinprick to face and extremities.    Reflexes:  Deep tendon reflexes normoactive to biceps, knees and ankles.  Babinski absent (present).  Cerebellar Testing:    Finger to nose intact. Gait: difficulty ambulating   Neurovascular: Carotid auscultation full without bruits.      LABS:                        11.9   7.7   )-----------( 175      ( 2017 06:34 )             34.7               Urinalysis Basic - ( 2017 14:52 )    Color: Yellow / Appearance: Clear / S.010 / pH: x  Gluc: x / Ketone: Negative  / Bili: Negative / Urobili: Negative mg/dL   Blood: x / Protein: 15 mg/dL / Nitrite: Negative   Leuk Esterase: Negative / RBC: >50 /HPF / WBC 11-25   Sq Epi: x / Non Sq Epi: x / Bacteria: Few        RADIOLOGY & ADDITIONAL STUDIES:    acetaminophen   Tablet: [Ordered as TYLENOL]  650 milliGRAM(s), Oral, every 6 hours, PRN for For Temp greater than 38 C (100.4 F)  Administration Instructions: MAX DAILY DOSE:  ADULT = 4,000 mG/Day  Provider&#x27;s Contact #: [phone] ( @ 19:32)  docusate sodium: [Ordered as COLACE]  100 milliGRAM(s), Oral, two times a day, PRN for Constipation  Provider&#x27;s Contact #: [phone] ( @ 20:52)  Consult- PT Evaluate and Treat:   *Reason for Consult - Must select at least one choice*     Short Term Rehab  Weight Bearing Restrictions: No ( @ 10:22)  Consult- OT Evaluate and Treat:   *Reason for Consult - Must select at least one choice*     ADL  Weight Bearing Restrictions: No ( @ 10:22) [discontinued]  Xray Chest 2 Views PA/Lat: Routine   Indication: fever and cough  Transport: Stretcher-Crib ( @ 11:32) [discontinued]  levoFLOXacin IVPB: [Ordered as LEVAQUIN IVPB]  500 milliGRAM(s) in dextrose 5% 100 milliLiter(s), IV Intermittent, every 24 hours, infuse over 60 Minute(s)  Indication: fever  Administration Instructions: DO NOT Refrigerate  Provider&#x27;s Contact #: [phone] ( @ 11:32) [discontinued]  Culture - Urine: Routine  Specimen Source: Clean Catch (Midstream) ( @ 11:32)  Complete Blood Count: AM Sched. Collection: 2017 05:00 ( @ 11:32)  Basic Metabolic Panel: AM Sched. Collection: 2017 05:00 ( @ 11:32)  Discharge Patient Now: Home  on: 2017    Time/Priority:  Routine ( @ 11:55)  Head ct: my reading atrophy of yhe head of the caudate nucleus    Assessment & Opinion:61 y o woman with history of Slope disease had an episode of loss of conciousness DX SYNCOPE     Recommendations:    Carotid doppler.  Echocardiogram.  EEG.   DVT prophylaxis as ordered.  Medications:

## 2017-01-23 NOTE — H&P ADULT. - ASSESSMENT
61 year old female with PMH of HTN, [place] Chorea presenting to ED due to syncope noted at home  - as per  she was just sitting and then leaned to the side, unresponsive for a minute now back to normal. Pt normaly has some R sided weakness, bilateral leg weakness at baseline.

## 2017-01-23 NOTE — PHYSICAL THERAPY INITIAL EVALUATION ADULT - IMPAIRED TRANSFERS: BED/CHAIR, REHAB EVAL
impaired motor control/abnormal muscle tone/impaired postural control/decreased strength/decreased ROM/narrow base of support/ataxic/scissoring/impaired coordination/impaired balance

## 2017-01-23 NOTE — PHYSICAL THERAPY INITIAL EVALUATION ADULT - GAIT DEVIATIONS NOTED, PT EVAL
decreased stride length/decreased weight-shifting ability/increased time in double stance/decreased step length/decreased christian

## 2017-01-26 DIAGNOSIS — R55 SYNCOPE AND COLLAPSE: ICD-10-CM

## 2017-01-26 DIAGNOSIS — I10 ESSENTIAL (PRIMARY) HYPERTENSION: ICD-10-CM

## 2017-01-26 DIAGNOSIS — E78.00 PURE HYPERCHOLESTEROLEMIA, UNSPECIFIED: ICD-10-CM

## 2017-01-26 DIAGNOSIS — G10 HUNTINGTON'S DISEASE: ICD-10-CM

## 2017-01-27 LAB
CULTURE RESULTS: SIGNIFICANT CHANGE UP
SPECIMEN SOURCE: SIGNIFICANT CHANGE UP

## 2017-01-30 ENCOUNTER — INPATIENT (INPATIENT)
Facility: HOSPITAL | Age: 62
LOS: 17 days | Discharge: NURSING, REHAB OR LONG-TERM CARE | End: 2017-02-17
Attending: INTERNAL MEDICINE | Admitting: INTERNAL MEDICINE
Payer: MEDICARE

## 2017-01-30 VITALS
OXYGEN SATURATION: 99 % | TEMPERATURE: 98 F | WEIGHT: 160.06 LBS | DIASTOLIC BLOOD PRESSURE: 77 MMHG | HEIGHT: 70 IN | HEART RATE: 96 BPM | RESPIRATION RATE: 18 BRPM | SYSTOLIC BLOOD PRESSURE: 133 MMHG

## 2017-01-30 DIAGNOSIS — R82.71 BACTERIURIA: ICD-10-CM

## 2017-01-30 DIAGNOSIS — R62.7 ADULT FAILURE TO THRIVE: ICD-10-CM

## 2017-01-30 DIAGNOSIS — E78.00 PURE HYPERCHOLESTEROLEMIA, UNSPECIFIED: ICD-10-CM

## 2017-01-30 DIAGNOSIS — M62.82 RHABDOMYOLYSIS: ICD-10-CM

## 2017-01-30 DIAGNOSIS — E87.0 HYPEROSMOLALITY AND HYPERNATREMIA: ICD-10-CM

## 2017-01-30 DIAGNOSIS — E86.0 DEHYDRATION: ICD-10-CM

## 2017-01-30 LAB
ALBUMIN SERPL ELPH-MCNC: 2.9 G/DL — LOW (ref 3.3–5)
ALP SERPL-CCNC: 51 U/L — SIGNIFICANT CHANGE UP (ref 40–120)
ALT FLD-CCNC: 48 U/L — SIGNIFICANT CHANGE UP (ref 12–78)
ANION GAP SERPL CALC-SCNC: 8 MMOL/L — SIGNIFICANT CHANGE UP (ref 5–17)
APPEARANCE UR: CLEAR — SIGNIFICANT CHANGE UP
AST SERPL-CCNC: 58 U/L — HIGH (ref 15–37)
BASOPHILS # BLD AUTO: 0.1 K/UL — SIGNIFICANT CHANGE UP (ref 0–0.2)
BASOPHILS NFR BLD AUTO: 0.7 % — SIGNIFICANT CHANGE UP (ref 0–2)
BILIRUB SERPL-MCNC: 0.2 MG/DL — SIGNIFICANT CHANGE UP (ref 0.2–1.2)
BILIRUB UR-MCNC: NEGATIVE — SIGNIFICANT CHANGE UP
BUN SERPL-MCNC: 31 MG/DL — HIGH (ref 7–23)
CALCIUM SERPL-MCNC: 9.3 MG/DL — SIGNIFICANT CHANGE UP (ref 8.5–10.1)
CHLORIDE SERPL-SCNC: 111 MMOL/L — HIGH (ref 96–108)
CK MB BLD-MCNC: 0.2 % — SIGNIFICANT CHANGE UP (ref 0–3.5)
CK MB CFR SERPL CALC: 2.2 NG/ML — SIGNIFICANT CHANGE UP (ref 0.5–3.6)
CK SERPL-CCNC: 1077 U/L — HIGH (ref 26–192)
CO2 SERPL-SCNC: 29 MMOL/L — SIGNIFICANT CHANGE UP (ref 22–31)
COLOR SPEC: YELLOW — SIGNIFICANT CHANGE UP
CREAT SERPL-MCNC: 0.67 MG/DL — SIGNIFICANT CHANGE UP (ref 0.5–1.3)
DIFF PNL FLD: ABNORMAL
EOSINOPHIL # BLD AUTO: 0.1 K/UL — SIGNIFICANT CHANGE UP (ref 0–0.5)
EOSINOPHIL NFR BLD AUTO: 0.7 % — SIGNIFICANT CHANGE UP (ref 0–6)
GLUCOSE SERPL-MCNC: 106 MG/DL — HIGH (ref 70–99)
GLUCOSE UR QL: NEGATIVE MG/DL — SIGNIFICANT CHANGE UP
HCT VFR BLD CALC: 35.3 % — SIGNIFICANT CHANGE UP (ref 34.5–45)
HGB BLD-MCNC: 11.9 G/DL — SIGNIFICANT CHANGE UP (ref 11.5–15.5)
KETONES UR-MCNC: NEGATIVE — SIGNIFICANT CHANGE UP
LEUKOCYTE ESTERASE UR-ACNC: NEGATIVE — SIGNIFICANT CHANGE UP
LYMPHOCYTES # BLD AUTO: 1.3 K/UL — SIGNIFICANT CHANGE UP (ref 1–3.3)
LYMPHOCYTES # BLD AUTO: 14 % — SIGNIFICANT CHANGE UP (ref 13–44)
MCHC RBC-ENTMCNC: 28.2 PG — SIGNIFICANT CHANGE UP (ref 27–34)
MCHC RBC-ENTMCNC: 33.7 GM/DL — SIGNIFICANT CHANGE UP (ref 32–36)
MCV RBC AUTO: 83.8 FL — SIGNIFICANT CHANGE UP (ref 80–100)
MONOCYTES # BLD AUTO: 0.8 K/UL — SIGNIFICANT CHANGE UP (ref 0–0.9)
MONOCYTES NFR BLD AUTO: 8.8 % — SIGNIFICANT CHANGE UP (ref 2–14)
NEUTROPHILS # BLD AUTO: 7.2 K/UL — SIGNIFICANT CHANGE UP (ref 1.8–7.4)
NEUTROPHILS NFR BLD AUTO: 75.9 % — SIGNIFICANT CHANGE UP (ref 43–77)
NITRITE UR-MCNC: NEGATIVE — SIGNIFICANT CHANGE UP
PH UR: 6 — SIGNIFICANT CHANGE UP (ref 4.8–8)
PLATELET # BLD AUTO: 154 K/UL — SIGNIFICANT CHANGE UP (ref 150–400)
POTASSIUM SERPL-MCNC: 4.1 MMOL/L — SIGNIFICANT CHANGE UP (ref 3.5–5.3)
POTASSIUM SERPL-SCNC: 4.1 MMOL/L — SIGNIFICANT CHANGE UP (ref 3.5–5.3)
PROT SERPL-MCNC: 8 GM/DL — SIGNIFICANT CHANGE UP (ref 6–8.3)
PROT UR-MCNC: 30 MG/DL
RBC # BLD: 4.21 M/UL — SIGNIFICANT CHANGE UP (ref 3.8–5.2)
RBC # FLD: 12.4 % — SIGNIFICANT CHANGE UP (ref 11–15)
SODIUM SERPL-SCNC: 148 MMOL/L — HIGH (ref 135–145)
SP GR SPEC: 1.01 — SIGNIFICANT CHANGE UP (ref 1.01–1.02)
TROPONIN I SERPL-MCNC: <.015 NG/ML — SIGNIFICANT CHANGE UP (ref 0.01–0.04)
UROBILINOGEN FLD QL: NEGATIVE MG/DL — SIGNIFICANT CHANGE UP
WBC # BLD: 9.5 K/UL — SIGNIFICANT CHANGE UP (ref 3.8–10.5)
WBC # FLD AUTO: 9.5 K/UL — SIGNIFICANT CHANGE UP (ref 3.8–10.5)

## 2017-01-30 PROCEDURE — 71010: CPT | Mod: 26

## 2017-01-30 PROCEDURE — 70450 CT HEAD/BRAIN W/O DYE: CPT | Mod: 26

## 2017-01-30 PROCEDURE — 99223 1ST HOSP IP/OBS HIGH 75: CPT

## 2017-01-30 PROCEDURE — 99284 EMERGENCY DEPT VISIT MOD MDM: CPT

## 2017-01-30 RX ORDER — SODIUM CHLORIDE 9 MG/ML
1000 INJECTION, SOLUTION INTRAVENOUS
Qty: 0 | Refills: 0 | Status: DISCONTINUED | OUTPATIENT
Start: 2017-01-30 | End: 2017-02-08

## 2017-01-30 RX ORDER — SODIUM CHLORIDE 9 MG/ML
1000 INJECTION INTRAMUSCULAR; INTRAVENOUS; SUBCUTANEOUS ONCE
Qty: 0 | Refills: 0 | Status: COMPLETED | OUTPATIENT
Start: 2017-01-30 | End: 2017-01-30

## 2017-01-30 RX ORDER — ENOXAPARIN SODIUM 100 MG/ML
40 INJECTION SUBCUTANEOUS DAILY
Qty: 0 | Refills: 0 | Status: DISCONTINUED | OUTPATIENT
Start: 2017-01-30 | End: 2017-02-08

## 2017-01-30 RX ORDER — SIMVASTATIN 20 MG/1
20 TABLET, FILM COATED ORAL AT BEDTIME
Qty: 0 | Refills: 0 | Status: DISCONTINUED | OUTPATIENT
Start: 2017-01-30 | End: 2017-02-08

## 2017-01-30 RX ORDER — SODIUM CHLORIDE 9 MG/ML
3 INJECTION INTRAMUSCULAR; INTRAVENOUS; SUBCUTANEOUS ONCE
Qty: 0 | Refills: 0 | Status: COMPLETED | OUTPATIENT
Start: 2017-01-30 | End: 2017-01-30

## 2017-01-30 RX ADMIN — SIMVASTATIN 20 MILLIGRAM(S): 20 TABLET, FILM COATED ORAL at 21:33

## 2017-01-30 RX ADMIN — SODIUM CHLORIDE 1000 MILLILITER(S): 9 INJECTION INTRAMUSCULAR; INTRAVENOUS; SUBCUTANEOUS at 17:33

## 2017-01-30 RX ADMIN — SODIUM CHLORIDE 3 MILLILITER(S): 9 INJECTION INTRAMUSCULAR; INTRAVENOUS; SUBCUTANEOUS at 15:15

## 2017-01-30 RX ADMIN — SODIUM CHLORIDE 100 MILLILITER(S): 9 INJECTION, SOLUTION INTRAVENOUS at 19:09

## 2017-01-30 RX ADMIN — ENOXAPARIN SODIUM 40 MILLIGRAM(S): 100 INJECTION SUBCUTANEOUS at 19:10

## 2017-01-30 NOTE — H&P ADULT. - ASSESSMENT
61 year old female with PMH of HTN, [place] Chorea bibems presenting to ED due to generalized weakness and family unable to take care of her.

## 2017-01-30 NOTE — H&P ADULT. - PROBLEM SELECTOR PLAN 1
-admit to med-sx  -will hydrate  -monitor bmp and electrolytes  -awaiting family to call back for more info  -sw and PT eval

## 2017-01-30 NOTE — H&P ADULT. - NEUROLOGICAL DETAILS
deep reflexes intact/strength decreased/responds to pain/cranial nerves intact/BL LE/superficial reflexes intact/sensation intact/responds to verbal commands

## 2017-01-30 NOTE — H&P ADULT. - RS GEN PE MLT RESP DETAILS PC
respirations non-labored/no chest wall tenderness/breath sounds equal/airway patent/clear to auscultation bilaterally/good air movement

## 2017-01-30 NOTE — H&P ADULT. - HISTORY OF PRESENT ILLNESS
61 year old female with PMH of HTN, [place] Chorea bibems presenting to ED due to generalized weakness and family unable to take care of her. Poor historian. History obtained from ED chart and EMS. as per previous record Pt has some R sided weakness, bilateral leg weakness at baseline.

## 2017-01-31 LAB
ANION GAP SERPL CALC-SCNC: 5 MMOL/L — SIGNIFICANT CHANGE UP (ref 5–17)
BACTERIA # UR AUTO: ABNORMAL
BUN SERPL-MCNC: 24 MG/DL — HIGH (ref 7–23)
CALCIUM SERPL-MCNC: 8.8 MG/DL — SIGNIFICANT CHANGE UP (ref 8.5–10.1)
CHLORIDE SERPL-SCNC: 115 MMOL/L — HIGH (ref 96–108)
CK SERPL-CCNC: 666 U/L — HIGH (ref 26–192)
CO2 SERPL-SCNC: 28 MMOL/L — SIGNIFICANT CHANGE UP (ref 22–31)
COMMENT - URINE: SIGNIFICANT CHANGE UP
CREAT SERPL-MCNC: 0.51 MG/DL — SIGNIFICANT CHANGE UP (ref 0.5–1.3)
EPI CELLS # UR: ABNORMAL
GLUCOSE SERPL-MCNC: 109 MG/DL — HIGH (ref 70–99)
HCT VFR BLD CALC: 31.7 % — LOW (ref 34.5–45)
HGB BLD-MCNC: 10.6 G/DL — LOW (ref 11.5–15.5)
MAGNESIUM SERPL-MCNC: 2.3 MG/DL — SIGNIFICANT CHANGE UP (ref 1.8–2.4)
MCHC RBC-ENTMCNC: 27.6 PG — SIGNIFICANT CHANGE UP (ref 27–34)
MCHC RBC-ENTMCNC: 33.5 GM/DL — SIGNIFICANT CHANGE UP (ref 32–36)
MCV RBC AUTO: 82.4 FL — SIGNIFICANT CHANGE UP (ref 80–100)
PLATELET # BLD AUTO: 170 K/UL — SIGNIFICANT CHANGE UP (ref 150–400)
POTASSIUM SERPL-MCNC: 3.6 MMOL/L — SIGNIFICANT CHANGE UP (ref 3.5–5.3)
POTASSIUM SERPL-SCNC: 3.6 MMOL/L — SIGNIFICANT CHANGE UP (ref 3.5–5.3)
RBC # BLD: 3.85 M/UL — SIGNIFICANT CHANGE UP (ref 3.8–5.2)
RBC # FLD: 11.9 % — SIGNIFICANT CHANGE UP (ref 11–15)
RBC CASTS # UR COMP ASSIST: ABNORMAL /HPF (ref 0–4)
SODIUM SERPL-SCNC: 148 MMOL/L — HIGH (ref 135–145)
WBC # BLD: 6.9 K/UL — SIGNIFICANT CHANGE UP (ref 3.8–10.5)
WBC # FLD AUTO: 6.9 K/UL — SIGNIFICANT CHANGE UP (ref 3.8–10.5)
WBC UR QL: SIGNIFICANT CHANGE UP

## 2017-01-31 PROCEDURE — 70551 MRI BRAIN STEM W/O DYE: CPT | Mod: 26

## 2017-01-31 PROCEDURE — 99232 SBSQ HOSP IP/OBS MODERATE 35: CPT

## 2017-01-31 RX ADMIN — SIMVASTATIN 20 MILLIGRAM(S): 20 TABLET, FILM COATED ORAL at 21:58

## 2017-01-31 RX ADMIN — SODIUM CHLORIDE 100 MILLILITER(S): 9 INJECTION, SOLUTION INTRAVENOUS at 10:02

## 2017-01-31 RX ADMIN — ENOXAPARIN SODIUM 40 MILLIGRAM(S): 100 INJECTION SUBCUTANEOUS at 13:52

## 2017-01-31 NOTE — DIETITIAN INITIAL EVALUATION ADULT. - NS AS NUTRI INTERV MEALS SNACK
Texture-modified diet/Regular diet soft consistency nectar thick liquids Texture-modified diet/Mechanical soft/nectar thick liquids

## 2017-01-31 NOTE — PROGRESS NOTE ADULT - SUBJECTIVE AND OBJECTIVE BOX
CHIEF COMPLAINT/INTERVAL HISTORY:    Patient is a 61y old  Female who presents with a chief complaint of Generalized weakness (2017 18:29)      HPI:  61 year old female with PMH of HTN, [place] Chorea bibems presenting to ED due to generalized weakness and family unable to take care of her. Poor historian. History obtained from ED chart and EMS. as per previous record Pt has some R sided weakness, bilateral leg weakness at baseline. (2017 18:29)    Overnight issues  as above-  patient brother says that wshe is too weak to take care of  neurology consult appreciated         SUBJECTIVE & OBJECTIVE: Pt seen and examined at bedside.   ROS:  cannot help with review of systems   ICU Vital Signs Last 24 Hrs  T(C): 36.9, Max: 37.6 ( @ 20:53)  T(F): 98.4, Max: 99.6 ( @ 20:53)  HR: 91 (80 - 96)  BP: 131/79 (118/70 - 137/77)  BP(mean): --  ABP: --  ABP(mean): --  RR: 15 (14 - 18)  SpO2: 98% (97% - 99%)        MEDICATIONS  (STANDING):  enoxaparin Injectable 40milliGRAM(s) SubCutaneous daily  simvastatin 20milliGRAM(s) Oral at bedtime  dextrose 5% + sodium chloride 0.45%. 1000milliLiter(s) IV Continuous <Continuous>    MEDICATIONS  (PRN):        PHYSICAL EXAM:    GENERAL: NAD,   HEAD:  Atraumatic, Normocephalic  EYES: EOMI, PERRLA, conjunctiva and sclera clear  ENMT: Moist mucous membranes  NECK: Supple, No JVD  NERVOUS SYSTEM:  Alert & Orientedx1 with right sided facial   CHEST/LUNG: Clear to auscultation bilaterally; No rales, rhonchi, wheezing, or rubs  HEART: Regular rate and rhythm; No murmurs, rubs, or gallops  ABDOMEN: Soft, Nontender, Nondistended; Bowel sounds present  EXTREMITIES:  2+ Peripheral Pulses, No clubbing, cyanosis, or edema    LABS:                        10.6   6.9   )-----------( 170      ( 2017 08:10 )             31.7     2017 08:10    148    |  115    |  24     ----------------------------<  109    3.6     |  28     |  0.51     Ca    8.8        2017 08:10  Mg     2.3       2017 08:10    TPro  8.0    /  Alb  2.9    /  TBili  0.2    /  DBili  x      /  AST  58     /  ALT  48     /  AlkPhos  51     2017 15:16      Urinalysis Basic - ( 2017 23:45 )    Color: Yellow / Appearance: Clear / S.015 / pH: x  Gluc: x / Ketone: Negative  / Bili: Negative / Urobili: Negative mg/dL   Blood: x / Protein: 30 mg/dL / Nitrite: Negative   Leuk Esterase: Negative / RBC: 25-50 /HPF / WBC 3-5   Sq Epi: x / Non Sq Epi: Moderate / Bacteria: Few        CAPILLARY BLOOD GLUCOSE      RECENT CULTURES:      RADIOLOGY & ADDITIONAL TESTS:  Imaging Personally Reviewed:  [ ] YES      Consultant(s) Notes Reviewed:  [ ] YES     Care Discussed with [ ] Consultants [X ] Patient [ ] Family  [x ]    [x ]  Other; RN  HEALTH ISSUES - PROBLEM Dx:  Bacteriuria, asymptomatic: Bacteriuria, asymptomatic  Non-traumatic rhabdomyolysis: Non-traumatic rhabdomyolysis  High cholesterol: High cholesterol  Failure to thrive in adult: Failure to thrive in adult  Acute hypernatremia: Acute hypernatremia  Severe dehydration: Severe dehydration        DVT/GI ppx  Discussed with pt @ bedside

## 2017-01-31 NOTE — SWALLOW BEDSIDE ASSESSMENT ADULT - COMMENTS
CXR 1/30/2017 impression: No acute pulmonary disease    CT Head 1/30/2017 impression: Diffuse cerebral volume loss, disproportionate for patient's age. No acute intracranial hemorrhage, mass effect, or evidence of acute territorial infarct.

## 2017-01-31 NOTE — DIETITIAN INITIAL EVALUATION ADULT. - ETIOLOGY
Inadequate protein-energy intake 2/2 [place] Chorea Disease Inadequate protein-energy intake related to Ashli Chorea Disease

## 2017-01-31 NOTE — SWALLOW BEDSIDE ASSESSMENT ADULT - SWALLOW EVAL: DIAGNOSIS
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 pt is a 61 y o female dx dehydration, failure to thrive, aicha's chorea presented with oropharyngeal phases of the swallow marked by reduced oral grading, delayed oral transport, lingual stasis, suspected delayed trigger of the swallow, and reduced laryngeal elevation. Inconsistent cough with thin liquids.

## 2017-01-31 NOTE — DIETITIAN INITIAL EVALUATION ADULT. - OTHER INFO
Pt was seen for failure to thrive consult.  Pt reports no N/V/D.  Treats long-term constipation c daily prune juice consumption.  Lives @ home c ;  does food shopping & cooking.  Home aid assists pt c ADL 5 days a week/8 hrs daily.  Reports to consuming 2 meals & 1 ensure daily.  Consumes soft foods @ home; chx, cooked broccoli, oatmeal, eggs, bread.  Friend reports large wt loss in pt x 3 months; unknown amount. Pt was seen for failure to thrive consult.  Pt reports no N/V/D.  Treats long-term constipation c daily prune juice consumption.  Reports chewing/swallowing difficulty 2/2 mechanical soft diet.  Lives @ home c ;  does food shopping & cooking.  Home aid assists pt c ADL 5 days a week/8 hrs daily.  Reports to consuming 2 meals & 1 ensure daily.  Consumes soft foods @ home; chx, cooked broccoli, oatmeal, eggs, bread.  Friend reports large wt loss in pt x 3 months; unknown amount. Pt was seen for RN consult 01/31/17 for failure to thrive.  Pt reports no N/V/D.  Treats long-term constipation c daily prune juice consumption.  Reports chewing/swallowing difficulty 2/2 mechanical soft diet.  Lives @ home c ;  does food shopping & cooking.  Home aid assists pt c ADL 5 days a week/8 hrs daily.  Reports to consuming 2 meals & 1 ensure daily.  Consumes soft foods @ home; chx, cooked broccoli, oatmeal, eggs, bread.  Friend reports large wt loss in pt x 3 months; unknown amount. Pt was seen for RN consult 01/31/17 for failure to thrive.  Pt reports no N/V/D.  Treats long-term constipation c daily prune juice consumption. Lives @ home c ;  does food shopping & cooking.  Home aid assists pt c ADL 5 days a week/8 hrs daily. Pt recently requires increased assist with meals due to decreased functional status & presents with +Rt sided generalized weakness & Bilateral leg weakness.   Pt consumed soft foods @ home; chx, cooked broccoli, oatmeal, eggs, bread.  Pt's sister reports large wt loss in pt x 3 months; unknown amount. S/p swallow eval 1/31 recommends Mechanical soft/Nectar thick liquids.  Pt c/o hx constipation last BM x 1(1/30).

## 2017-01-31 NOTE — DIETITIAN INITIAL EVALUATION ADULT. - PHYSICAL APPEARANCE
underweight/BMI= 20.4 (no edema noted) underweight/BMI= 20.4 (no edema noted); Nutrition focused physical exam conducted ; found signs of malnutrition [ ]absent [ ]present.  Subcutaneous fat loss: [ ] Orbital fat pads region, [ ]Buccal fat region, [ ]Triceps region,  [ ]Ribs region.  Muscle wasting: [ ]Temples region, [ ]Clavicle region, [ ]Shoulder region, [ ]Scapula region, [ ]Interosseous region,  [ ]thigh region, [ ]Calf region underweight/BMI= 20.4 (no edema noted); Nutrition focused physical exam conducted ; found signs of malnutrition [ ]absent [X ]present.  Subcutaneous fat loss: [WDL ] Orbital fat pads region, [Moderate]Buccal fat region, [WDL ]Triceps region,  [Severe ]Ribs region.  Muscle wasting: [Moderate ]Temples region, [Severe ]Clavicle region, [Severe ]Shoulder region, [Unable ]Scapula region, [WDL ]Interosseous region,  [WDL ]thigh region, [WDL ]Calf region debilitated/BMI= 20.4 (no edema noted); Nutrition focused physical exam conducted ; found signs of malnutrition [ ]absent [X ]present.  Subcutaneous fat loss: [WDL ] Orbital fat pads region, [Moderate]Buccal fat region, [WDL ]Triceps region,  [Severe ]Ribs region.  Muscle wasting: [Moderate ]Temples region, [Severe ]Clavicle region, [Severe ]Shoulder region, [Unable ]Scapula region, [WDL ]Interosseous region,  [WDL ]thigh region, [WDL ]Calf region/contracted/underweight

## 2017-01-31 NOTE — DIETITIAN INITIAL EVALUATION ADULT. - SOURCE
patient/other (specify)/family/significant other/Medical Chart other (specify)/patient/Spoke pt & pt's sister Yuli & Medical Chart/family/significant other family/significant other/other (specify)/Spoke pt & pt's sister Yuli & Medical Chart review/patient

## 2017-01-31 NOTE — DIETITIAN INITIAL EVALUATION ADULT. - ENERGY NEEDS
Ht: 69in   Wt: 62.4kg (01/31)   BMI: 20.4   IBW: 66kg +/- 10%   %IBW: 100%  UBW: unknown Ht: 69in   Wt: 62.4kg (01/31)   BMI: 20.3   IBW: 66kg +/- 10%   %IBW: 100%  UBW: unknown

## 2017-01-31 NOTE — SWALLOW BEDSIDE ASSESSMENT ADULT - PHARYNGEAL PHASE
Decreased laryngeal elevation/Delayed pharyngeal swallow Decreased laryngeal elevation Cough post oral intake

## 2017-01-31 NOTE — DIETITIAN INITIAL EVALUATION ADULT. - PT NOT SOURCE
other (specify)/Cognitive limitations 2/2 [place] Chorea disease forgetful @ times/other (specify)

## 2017-01-31 NOTE — DIETITIAN INITIAL EVALUATION ADULT. - FACTORS AFF FOOD INTAKE
other (specify)/persistent constipation/Chronic Ashli Chorea Disease/change in mental status persistent constipation/difficulty feeding self/other (specify)/difficulty chewing/difficulty swallowing/change in mental status/+ generalized weakness

## 2017-01-31 NOTE — SWALLOW BEDSIDE ASSESSMENT ADULT - SWALLOW EVAL: RECOMMENDED FEEDING/EATING TECHNIQUES
small sips/bites/maintain upright posture during/after eating for 30 mins/allow for swallow between intakes/position upright (90 degrees)

## 2017-01-31 NOTE — DIETITIAN INITIAL EVALUATION ADULT. - NS AS NUTRI INTERV MEDICAL AND FOOD SUPPLEMENTS
Modified food/Ensure pudding x 2 daily (provides 170kcal 4g pro per serving) Ensure pudding x 2 daily (provides 170kcal 4g pro per serving)/Commercial food

## 2017-01-31 NOTE — DIETITIAN INITIAL EVALUATION ADULT. - SIGNS/SYMPTOMS
severe fat wasting (ribs), severe muscle wasting (clavicle&shoulder), <75% nutritional needs >3 mo severe fat wasting (ribs), severe muscle wasting (clavicle & shoulder), <75% nutritional needs >3 mo

## 2017-01-31 NOTE — PATIENT PROFILE ADULT. - FUNCTIONAL SCREEN CURRENT LEVEL: COMMUNICATION, MLM
(0) understands/communicates without difficulty (2) difficulty speaking (not related to language barrier)

## 2017-01-31 NOTE — SWALLOW BEDSIDE ASSESSMENT ADULT - SLP PERTINENT HISTORY OF CURRENT PROBLEM
PMH of HTN, [place] Chorea bibems presenting to ED due to generalized weakness and family unable to take care of her. Poor historian. History obtained from ED chart and EMS. as per previous record Pt has some R sided weakness, bilateral leg weakness at baseline.

## 2017-01-31 NOTE — SWALLOW BEDSIDE ASSESSMENT ADULT - SLP GENERAL OBSERVATIONS
pt was seen bedside alert and oriented x2. pt followed one step directions and responded to questions in short utterances. noted involuntary movements of the oral musculature and teeth grinding. pt was seen bedside alert and oriented x2. pt followed one step directions and responded to questions with short utterances. noted involuntary movements of the oral musculature and teeth grinding.

## 2017-01-31 NOTE — CHART NOTE - NSCHARTNOTEFT_GEN_A_CORE
Upon Nutritional Assessment by the Registered Dietitian your patient was determined to meet criteria / has evidence of the following diagnosis/diagnoses:          [ ]  Mild Protein Calorie Malnutrition        [ ]  Moderate Protein Calorie Malnutrition        [x ] Severe Protein Calorie Malnutrition        [ ] Unspecified Protein Calorie Malnutrition        [ ] Underweight / BMI <19        [ ] Morbid Obesity / BMI > 40      Findings as based on:  •  Comprehensive nutrition assessment and consultation  •  Calorie counts (nutrient intake analysis)  •  Food acceptance and intake status from observations by staff  •  Follow up  •  Patient education  •  Intervention secondary to interdisciplinary rounds  •   concerns      Treatment:    The following diet has been recommended:  Mechanical soft/Nectar thick liquids/Ensure Pudding 2 x day    PROVIDER Section:     By signing this assessment you are acknowledging and agree with the diagnosis/diagnoses assigned by the Registered Dietitian    Comments:

## 2017-01-31 NOTE — DIETITIAN INITIAL EVALUATION ADULT. - PERTINENT LABORATORY DATA
01-31 Na 148 mmol/L<H> Glu 109 mg/dL<H> K+ 3.6 mmol/L Cr  0.51 mg/dL BUN 24 mg/dL<H> Phos n/a   Alb n/a   PAB n/a   Hgb 10.6 g/dL<L> Hct 31.7 %<L> WBC 6.9, CO2 28, Creat 0.51, Ca 8.8, , Mg 2.3 (01/30) AST 58 <H>, Albu 2.9 <L> 01-31 Na 148 mmol/L<H> Glu 109 mg/dL<H> K+ 3.6 mmol/L Cr  0.51 mg/dL BUN 24 mg/dL<H> Phos n/a   PAB n/a   Hgb 10.6 g/dL<L> Hct 31.7 %<L> WBC 6.9, CO2 28, Creat 0.51, Ca 8.8, , Mg 2.3 (01/30) AST 58 <H>, Albu 2.9 <L>

## 2017-02-01 ENCOUNTER — OUTPATIENT (OUTPATIENT)
Dept: OUTPATIENT SERVICES | Facility: HOSPITAL | Age: 62
LOS: 1 days | End: 2017-02-01
Payer: MEDICAID

## 2017-02-01 LAB
ANION GAP SERPL CALC-SCNC: 8 MMOL/L — SIGNIFICANT CHANGE UP (ref 5–17)
BUN SERPL-MCNC: 17 MG/DL — SIGNIFICANT CHANGE UP (ref 7–23)
CALCIUM SERPL-MCNC: 8.8 MG/DL — SIGNIFICANT CHANGE UP (ref 8.5–10.1)
CHLORIDE SERPL-SCNC: 112 MMOL/L — HIGH (ref 96–108)
CO2 SERPL-SCNC: 26 MMOL/L — SIGNIFICANT CHANGE UP (ref 22–31)
CREAT SERPL-MCNC: 0.55 MG/DL — SIGNIFICANT CHANGE UP (ref 0.5–1.3)
GLUCOSE SERPL-MCNC: 106 MG/DL — HIGH (ref 70–99)
HCT VFR BLD CALC: 31.7 % — LOW (ref 34.5–45)
HGB BLD-MCNC: 10.8 G/DL — LOW (ref 11.5–15.5)
MCHC RBC-ENTMCNC: 28.1 PG — SIGNIFICANT CHANGE UP (ref 27–34)
MCHC RBC-ENTMCNC: 34.2 GM/DL — SIGNIFICANT CHANGE UP (ref 32–36)
MCV RBC AUTO: 82.2 FL — SIGNIFICANT CHANGE UP (ref 80–100)
PLATELET # BLD AUTO: 186 K/UL — SIGNIFICANT CHANGE UP (ref 150–400)
POTASSIUM SERPL-MCNC: 3.5 MMOL/L — SIGNIFICANT CHANGE UP (ref 3.5–5.3)
POTASSIUM SERPL-SCNC: 3.5 MMOL/L — SIGNIFICANT CHANGE UP (ref 3.5–5.3)
RBC # BLD: 3.86 M/UL — SIGNIFICANT CHANGE UP (ref 3.8–5.2)
RBC # FLD: 11.8 % — SIGNIFICANT CHANGE UP (ref 11–15)
SODIUM SERPL-SCNC: 146 MMOL/L — HIGH (ref 135–145)
WBC # BLD: 9.1 K/UL — SIGNIFICANT CHANGE UP (ref 3.8–10.5)
WBC # FLD AUTO: 9.1 K/UL — SIGNIFICANT CHANGE UP (ref 3.8–10.5)

## 2017-02-01 PROCEDURE — 99233 SBSQ HOSP IP/OBS HIGH 50: CPT

## 2017-02-01 RX ORDER — ACETAMINOPHEN 500 MG
650 TABLET ORAL EVERY 6 HOURS
Qty: 0 | Refills: 0 | Status: DISCONTINUED | OUTPATIENT
Start: 2017-02-01 | End: 2017-02-08

## 2017-02-01 RX ADMIN — ENOXAPARIN SODIUM 40 MILLIGRAM(S): 100 INJECTION SUBCUTANEOUS at 11:35

## 2017-02-01 RX ADMIN — Medication 650 MILLIGRAM(S): at 17:57

## 2017-02-01 RX ADMIN — SIMVASTATIN 20 MILLIGRAM(S): 20 TABLET, FILM COATED ORAL at 22:09

## 2017-02-01 RX ADMIN — SODIUM CHLORIDE 100 MILLILITER(S): 9 INJECTION, SOLUTION INTRAVENOUS at 18:13

## 2017-02-01 RX ADMIN — SODIUM CHLORIDE 100 MILLILITER(S): 9 INJECTION, SOLUTION INTRAVENOUS at 05:42

## 2017-02-01 NOTE — PROGRESS NOTE ADULT - SUBJECTIVE AND OBJECTIVE BOX
CHIEF COMPLAINT/INTERVAL HISTORY:    Patient is a 61y old  Female who presents with a chief complaint of Generalized weakness (2017 18:29)      HPI:  61 year old female with PMH of HTN, [place] Chorea bibems presenting to ED due to generalized weakness and family unable to take care of her. Poor historian. History obtained from ED chart and EMS. as per previous record Pt has some R sided weakness, bilateral leg weakness at baseline. (2017 18:29)    Overnight issues  awaiting skilled nursing facility placement  neurology consult appreciated  remains on intravenous fluids for hydration  SUBJECTIVE & OBJECTIVE: Pt seen and examined at bedside.   ROS:  CONSTITUTIONAL: No fever, weight loss, or fatigue  RESPIRATORY: No cough, wheezing, chills or hemoptysis; No shortness of breath  CARDIOVASCULAR: No chest pain, palpitations, dizziness, or leg swelling  GASTROINTESTINAL: No abdominal or epigastric pain. No nausea, vomiting, or hematemesis; No diarrhea or constipation. No melena or hematochezia.  NEUROLOGICAL: No headaches, memory loss, loss of stren  ALLERGY AND IMMUNOLOGIC: No hives or eczema  ICU Vital Signs Last 24 Hrs  T(C): 36.7, Max: 36.9 ( @ 11:16)  T(F): 98, Max: 98.4 ( @ 11:16)  HR: 85 (80 - 91)  BP: 122/74 (107/67 - 135/75)  BP(mean): --  ABP: --  ABP(mean): --  RR: 15 (14 - 16)  SpO2: 97% (96% - 98%)        MEDICATIONS  (STANDING):  enoxaparin Injectable 40milliGRAM(s) SubCutaneous daily  simvastatin 20milliGRAM(s) Oral at bedtime  dextrose 5% + sodium chloride 0.45%. 1000milliLiter(s) IV Continuous <Continuous>    MEDICATIONS  (PRN):        PHYSICAL EXAM:    GENERAL: NAD, well-groomed, well-developed  HEAD:  Atraumatic, Normocephalic  EYES: EOMI, PERRLA, conjunctiva and sclera clear  ENMT: Moist mucous membranes  NECK: Supple, No JVD  NERVOUS SYSTEM:  Alert & Oriented X3, Motor Strength 5/5 B/L upper and lower extremities; DTRs 2+ intact and symmetric  CHEST/LUNG: Clear to auscultation bilaterally; No rales, rhonchi, wheezing, or rubs  HEART: Regular rate and rhythm; No murmurs, rubs, or gallops  ABDOMEN: Soft, Nontender, Nondistended; Bowel sounds present  EXTREMITIES:  2+ Peripheral Pulses, No clubbing, cyanosis, or edema    LABS:                        10.8   9.1   )-----------( 186      ( 2017 07:11 )             31.7     2017 07:11    146    |  112    |  17     ----------------------------<  106    3.5     |  26     |  0.55     Ca    8.8        2017 07:11  Mg     2.3       2017 08:10    TPro  8.0    /  Alb  2.9    /  TBili  0.2    /  DBili  x      /  AST  58     /  ALT  48     /  AlkPhos  51     2017 15:16      Urinalysis Basic - ( 2017 23:45 )    Color: Yellow / Appearance: Clear / S.015 / pH: x  Gluc: x / Ketone: Negative  / Bili: Negative / Urobili: Negative mg/dL   Blood: x / Protein: 30 mg/dL / Nitrite: Negative   Leuk Esterase: Negative / RBC: 25-50 /HPF / WBC 3-5   Sq Epi: x / Non Sq Epi: Moderate / Bacteria: Few        CAPILLARY BLOOD GLUCOSE      RECENT CULTURES:      RADIOLOGY & ADDITIONAL TESTS:  Imaging Personally Reviewed:  [ ] YES      Consultant(s) Notes Reviewed:  [ ] YES     Care Discussed with [ ] Consultants [X ] Patient [ ] Family  [x ]    [x ]  Other; RN  HEALTH ISSUES - PROBLEM Dx:  Bacteriuria, asymptomatic: Bacteriuria, asymptomatic  Non-traumatic rhabdomyolysis: Non-traumatic rhabdomyolysis  High cholesterol: High cholesterol  Failure to thrive in adult: Failure to thrive in adult  Acute hypernatremia: Acute hypernatremia  Severe dehydration: Severe dehydration        DVT/GI ppx  Discussed with pt @ bedside

## 2017-02-02 LAB
ANION GAP SERPL CALC-SCNC: 10 MMOL/L — SIGNIFICANT CHANGE UP (ref 5–17)
BUN SERPL-MCNC: 14 MG/DL — SIGNIFICANT CHANGE UP (ref 7–23)
CALCIUM SERPL-MCNC: 8.5 MG/DL — SIGNIFICANT CHANGE UP (ref 8.5–10.1)
CHLORIDE SERPL-SCNC: 108 MMOL/L — SIGNIFICANT CHANGE UP (ref 96–108)
CO2 SERPL-SCNC: 26 MMOL/L — SIGNIFICANT CHANGE UP (ref 22–31)
CREAT SERPL-MCNC: 0.52 MG/DL — SIGNIFICANT CHANGE UP (ref 0.5–1.3)
GLUCOSE SERPL-MCNC: 122 MG/DL — HIGH (ref 70–99)
HCT VFR BLD CALC: 31.4 % — LOW (ref 34.5–45)
HGB BLD-MCNC: 10.6 G/DL — LOW (ref 11.5–15.5)
MCHC RBC-ENTMCNC: 27.9 PG — SIGNIFICANT CHANGE UP (ref 27–34)
MCHC RBC-ENTMCNC: 34 GM/DL — SIGNIFICANT CHANGE UP (ref 32–36)
MCV RBC AUTO: 82.3 FL — SIGNIFICANT CHANGE UP (ref 80–100)
PLATELET # BLD AUTO: 187 K/UL — SIGNIFICANT CHANGE UP (ref 150–400)
POTASSIUM SERPL-MCNC: 3.7 MMOL/L — SIGNIFICANT CHANGE UP (ref 3.5–5.3)
POTASSIUM SERPL-SCNC: 3.7 MMOL/L — SIGNIFICANT CHANGE UP (ref 3.5–5.3)
PROCALCITONIN SERPL-MCNC: <0.05 NG/ML — SIGNIFICANT CHANGE UP (ref 0–0.05)
RBC # BLD: 3.81 M/UL — SIGNIFICANT CHANGE UP (ref 3.8–5.2)
RBC # FLD: 11.7 % — SIGNIFICANT CHANGE UP (ref 11–15)
SODIUM SERPL-SCNC: 144 MMOL/L — SIGNIFICANT CHANGE UP (ref 135–145)
TSH SERPL-MCNC: 0.18 UIU/ML — LOW (ref 0.36–3.74)
VIT B12 SERPL-MCNC: 1065 PG/ML — HIGH (ref 243–894)
WBC # BLD: 9.7 K/UL — SIGNIFICANT CHANGE UP (ref 3.8–10.5)
WBC # FLD AUTO: 9.7 K/UL — SIGNIFICANT CHANGE UP (ref 3.8–10.5)

## 2017-02-02 PROCEDURE — 99233 SBSQ HOSP IP/OBS HIGH 50: CPT

## 2017-02-02 PROCEDURE — 99221 1ST HOSP IP/OBS SF/LOW 40: CPT

## 2017-02-02 RX ORDER — ACETAMINOPHEN 500 MG
2 TABLET ORAL
Qty: 0 | Refills: 0 | COMMUNITY
Start: 2017-02-02

## 2017-02-02 RX ADMIN — Medication 650 MILLIGRAM(S): at 18:24

## 2017-02-02 RX ADMIN — SIMVASTATIN 20 MILLIGRAM(S): 20 TABLET, FILM COATED ORAL at 21:38

## 2017-02-02 RX ADMIN — ENOXAPARIN SODIUM 40 MILLIGRAM(S): 100 INJECTION SUBCUTANEOUS at 11:10

## 2017-02-02 NOTE — DISCHARGE NOTE ADULT - MEDICATION SUMMARY - MEDICATIONS TO TAKE
I will START or STAY ON the medications listed below when I get home from the hospital:    acetaminophen 325 mg oral tablet  -- 2 tab(s) by mouth every 6 hours, As needed, For Temp greater than 38 C (100.4 F)  -- Indication: For Bacteriuria, asymptomatic    simvastatin 20 mg oral tablet  -- 1 tab(s) by mouth once a day (at bedtime)  -- Indication: For High cholesterol I will START or STAY ON the medications listed below when I get home from the hospital:    metroNIDAZOLE 500 mg/100 mL intravenous solution  -- 500 milligram(s) intravenous every 8 hours x 14 days  -- Indication: For Antibiotic    acetaminophen 325 mg oral tablet  -- 2 tab(s) by mouth every 6 hours, As needed, For Temp greater than 38 C (100.4 F)  -- Indication: For pain, fever    acetaminophen-oxyCODONE 325 mg-5 mg oral tablet  -- 1 tab(s) by mouth every 6 hours, As needed, Moderate Pain (4 - 6)  -- Indication: For pain    acetaminophen-oxyCODONE 325 mg-5 mg oral tablet  -- 2 tab(s) by mouth every 6 hours, As needed, Severe Pain (7 - 10)  -- Indication: For pain    simvastatin 20 mg oral tablet  -- 1 tab(s) by mouth once a day (at bedtime)  -- Indication: For Cholesterol    piperacillin-tazobactam 2 g-0.25 g intravenous injection  -- 2.25 gram(s) intravenous every 8 hours x 14 days  -- Indication: For Antibiotic    lactobacillus acidophilus oral capsule  -- 1 tab(s) by mouth once a day  -- Indication: For Stomach

## 2017-02-02 NOTE — DISCHARGE NOTE ADULT - PATIENT PORTAL LINK FT
“You can access the [facility] Patient Portal, offered by [facility], by registering with the following website: [email or web address]”

## 2017-02-02 NOTE — DISCHARGE NOTE ADULT - HOSPITAL COURSE
61 year old female with PMH of HTN, [place] Chorea bibems presenting to ED due to generalized weakness and family unable to take care of her. Poor historian. History obtained from ED chart and EMS. as per previous record Pt has some R sided weakness, bilateral leg weakness at baseline.   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  Wound vac placed; midline placed, and plan is for additional 2 weeks of IV antibiotics (at least until 3/2/17) cvrg w/Zosyn and Flagyl.  Recurrent low-grade fevers despite resolution of leukocytosis was thought secondary to [place]'s effects, and less likely 2/2 acute infection.  Pt also w/urinary retention of 1100mL (2/4/17) s/p Mcfarland.  Urology consulted, and impression was likely neurogenic bladder; recomms were for continued Mcfarland catheter.  Pt also w/chronic anemia, with stable hemoglobin. Pt also w/nontraumatic rhabdomyolysis w/o associated renal dysfunction; CK measures normalized w/IVFs.  Pt eval by PT, and recomms were for SHANIQUE.  Family in agreement and arrangements made.               61 year old female with PMH of HTN, [place] Chorea bibems presenting to ED due to generalized weakness and family unable to take care of her. Poor historian. History obtained from ED chart and EMS. as per previous record Pt has some R sided weakness, bilateral leg weakness at baseline.   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  Wound vac placed; midline placed, and plan is for additional 2 weeks of IV antibiotics (at least until 3/2/17) cvrg w/Zosyn and Flagyl.  Recurrent low-grade fevers despite resolution of leukocytosis was thought secondary to [place]'s effects, and less likely 2/2 acute infection.  Pt also w/urinary retention of 1100mL (2/4/17) s/p Mcfarland.  Urology consulted, and impression was likely neurogenic bladder; recomms were for continued Mcfarland catheter.  Pt also w/chronic anemia, with stable hemoglobin. Pt also w/nontraumatic rhabdomyolysis w/o associated renal dysfunction; CK measures normalized w/IVFs.  Pt eval by PT, and recomms were for SHANIQUE.  Family in agreement and arrangements made.

## 2017-02-02 NOTE — DISCHARGE NOTE ADULT - PLAN OF CARE
resolved monitor diet likely due to [place]'s chorea; less likely due to infection; continue antibiotics for treatment of decub infection likely due to [place]'s chorea; less likely due to infection; continue antibiotics for treatment of decub infection continue Mcfarland catheter; followup with MD at rehab, and with Urology, Dr Cleaning after discharge followup with MD at rehab, and with Urology, Dr Cleaning after discharge continue IV antibiotics for another 2 weeks; continue wound vac; followup with MD at rehab, and ID Dr Sun after discharge

## 2017-02-02 NOTE — DISCHARGE NOTE ADULT - CARE PLAN
Principal Discharge DX:	Dehydration  Goal:	resolved  Instructions for follow-up, activity and diet:	monitor diet  Secondary Diagnosis:	Failure to thrive in adult  Secondary Diagnosis:	[place] chorea  Secondary Diagnosis:	Non-traumatic rhabdomyolysis Principal Discharge DX:	Dehydration  Goal:	resolved  Instructions for follow-up, activity and diet:	monitor diet  Secondary Diagnosis:	Failure to thrive in adult  Secondary Diagnosis:	[place] chorea  Secondary Diagnosis:	Non-traumatic rhabdomyolysis Principal Discharge DX:	Dehydration  Goal:	resolved  Instructions for follow-up, activity and diet:	monitor diet  Secondary Diagnosis:	Failure to thrive in adult  Secondary Diagnosis:	[place] chorea  Secondary Diagnosis:	Non-traumatic rhabdomyolysis Principal Discharge DX:	Decubitus ulcer, stage 4 with infection  Goal:	continue IV antibiotics for another 2 weeks; continue wound vac; followup with MD at rehab, and ID Dr Sun after discharge  Instructions for follow-up, activity and diet:	continue IV antibiotics for another 2 weeks; continue wound vac; followup with MD at rehab, and ID Dr Sun after discharge  Secondary Diagnosis:	Leukocytosis, unspecified type  Goal:	resolved  Instructions for follow-up, activity and diet:	resolved  Secondary Diagnosis:	Recurrent fever  Goal:	likely due to [place]'s chorea; less likely due to infection; continue antibiotics for treatment of decub infection  Instructions for follow-up, activity and diet:	likely due to [place]'s chorea; less likely due to infection; continue antibiotics for treatment of decub infection  Secondary Diagnosis:	Urinary retention  Goal:	continue Mcfarland catheter; followup with MD at rehab, and with UrologyDr Cleaning after discharge  Instructions for follow-up, activity and diet:	continue Mcfarland catheter; followup with MD at rehab, and with UrologyDr Cleaning after discharge  Secondary Diagnosis:	Mcfarland catheter in place  Goal:	followup with MD at rehab, and with UrologyDr Cleaning after discharge  Instructions for follow-up, activity and diet:	followup with MD at rehab, and with UrologyDr Cleaning after discharge  Secondary Diagnosis:	Non-traumatic rhabdomyolysis  Goal:	resolved  Instructions for follow-up, activity and diet:	resolved  Secondary Diagnosis:	Volume depletion  Goal:	resolved  Instructions for follow-up, activity and diet:	resolved Principal Discharge DX:	Decubitus ulcer, stage 4 with infection  Goal:	continue IV antibiotics for another 2 weeks; continue wound vac; followup with MD at rehab, and ID Dr Sun after discharge  Instructions for follow-up, activity and diet:	continue IV antibiotics for another 2 weeks; continue wound vac; followup with MD at rehab, and ID Dr Sun after discharge  Secondary Diagnosis:	Leukocytosis, unspecified type  Goal:	resolved  Instructions for follow-up, activity and diet:	resolved  Secondary Diagnosis:	Recurrent fever  Goal:	likely due to [place]'s chorea; less likely due to infection; continue antibiotics for treatment of decub infection  Instructions for follow-up, activity and diet:	likely due to [place]'s chorea; less likely due to infection; continue antibiotics for treatment of decub infection  Secondary Diagnosis:	Urinary retention  Goal:	continue Mcfarland catheter; followup with MD at rehab, and with UrologyDr Cleaning after discharge  Instructions for follow-up, activity and diet:	continue Mcfarland catheter; followup with MD at rehab, and with UrologyDr Cleaning after discharge  Secondary Diagnosis:	Mcfarland catheter in place  Goal:	followup with MD at rehab, and with UrologyDr Cleaning after discharge  Instructions for follow-up, activity and diet:	followup with MD at rehab, and with UrologyDr Cleaning after discharge  Secondary Diagnosis:	Non-traumatic rhabdomyolysis  Goal:	resolved  Instructions for follow-up, activity and diet:	resolved  Secondary Diagnosis:	Volume depletion  Goal:	resolved  Instructions for follow-up, activity and diet:	resolved Principal Discharge DX:	Decubitus ulcer, stage 4 with infection  Goal:	continue IV antibiotics for another 2 weeks; continue wound vac; followup with MD at rehab, and ID Dr Sun after discharge  Instructions for follow-up, activity and diet:	continue IV antibiotics for another 2 weeks; continue wound vac; followup with MD at rehab, and ID Dr Sun after discharge  Secondary Diagnosis:	Leukocytosis, unspecified type  Goal:	resolved  Instructions for follow-up, activity and diet:	resolved  Secondary Diagnosis:	Recurrent fever  Goal:	likely due to [place]'s chorea; less likely due to infection; continue antibiotics for treatment of decub infection  Instructions for follow-up, activity and diet:	likely due to [place]'s chorea; less likely due to infection; continue antibiotics for treatment of decub infection  Secondary Diagnosis:	Urinary retention  Goal:	continue Mcfarland catheter; followup with MD at rehab, and with UrologyDr Cleaning after discharge  Instructions for follow-up, activity and diet:	continue Mcfarland catheter; followup with MD at rehab, and with UrologyDr Cleaning after discharge  Secondary Diagnosis:	Mcfarland catheter in place  Goal:	followup with MD at rehab, and with UrologyDr Cleaning after discharge  Instructions for follow-up, activity and diet:	followup with MD at rehab, and with UrologyDr Cleaning after discharge  Secondary Diagnosis:	Non-traumatic rhabdomyolysis  Goal:	resolved  Instructions for follow-up, activity and diet:	resolved  Secondary Diagnosis:	Volume depletion  Goal:	resolved  Instructions for follow-up, activity and diet:	resolved Principal Discharge DX:	Decubitus ulcer, stage 4 with infection  Goal:	continue IV antibiotics for another 2 weeks; continue wound vac; followup with MD at rehab, and ID Dr Sun after discharge  Instructions for follow-up, activity and diet:	continue IV antibiotics for another 2 weeks; continue wound vac; followup with MD at rehab, and ID Dr Sun after discharge  Secondary Diagnosis:	Leukocytosis, unspecified type  Goal:	resolved  Instructions for follow-up, activity and diet:	resolved  Secondary Diagnosis:	Recurrent fever  Goal:	likely due to [place]'s chorea; less likely due to infection; continue antibiotics for treatment of decub infection  Instructions for follow-up, activity and diet:	likely due to [place]'s chorea; less likely due to infection; continue antibiotics for treatment of decub infection  Secondary Diagnosis:	Urinary retention  Goal:	continue Mcfarland catheter; followup with MD at rehab, and with UrologyDr Cleaning after discharge  Instructions for follow-up, activity and diet:	continue Mcfarland catheter; followup with MD at rehab, and with UrologyDr Cleaning after discharge  Secondary Diagnosis:	Mcfarland catheter in place  Goal:	followup with MD at rehab, and with UrologyDr Cleaning after discharge  Instructions for follow-up, activity and diet:	followup with MD at rehab, and with UrologyDr Cleaning after discharge  Secondary Diagnosis:	Non-traumatic rhabdomyolysis  Goal:	resolved  Instructions for follow-up, activity and diet:	resolved  Secondary Diagnosis:	Volume depletion  Goal:	resolved  Instructions for follow-up, activity and diet:	resolved

## 2017-02-02 NOTE — DISCHARGE NOTE ADULT - PROVIDER TOKENS
FREE:[LAST:[Followup with MD at rehab; followup with ID, Dr Sun, and Urology, Dr Cleaning after discharge],PHONE:[(   )    -],FAX:[(   )    -]],TOKEN:'[id number]',TOKEN:'[id number]'

## 2017-02-02 NOTE — DISCHARGE NOTE ADULT - CARE PROVIDER_API CALL
Followup with MD at rehab; followup with ID, Dr Sun, and Urology, Dr Cleaning after discharge,   Phone: (   )    -  Fax: (   )    -    Ainsley Sun (GIOVANY), Infectious Disease; Internal Medicine  [address] [place] [address]  Phone: [phone]  Fax: [phone]    Tushar Cleaning), Urology  [address] [place] [address]  Phone: [phone]  Fax: [phone]

## 2017-02-02 NOTE — DISCHARGE NOTE ADULT - SECONDARY DIAGNOSIS.
Failure to thrive in adult [place] chorea Non-traumatic rhabdomyolysis Mcfarland catheter in place Volume depletion Leukocytosis, unspecified type Recurrent fever Urinary retention

## 2017-02-03 DIAGNOSIS — R50.9 FEVER, UNSPECIFIED: ICD-10-CM

## 2017-02-03 DIAGNOSIS — L89.150 PRESSURE ULCER OF SACRAL REGION, UNSTAGEABLE: ICD-10-CM

## 2017-02-03 DIAGNOSIS — G10 HUNTINGTON'S DISEASE: ICD-10-CM

## 2017-02-03 PROCEDURE — 99233 SBSQ HOSP IP/OBS HIGH 50: CPT

## 2017-02-03 RX ORDER — PIPERACILLIN AND TAZOBACTAM 4; .5 G/20ML; G/20ML
3.38 INJECTION, POWDER, LYOPHILIZED, FOR SOLUTION INTRAVENOUS EVERY 8 HOURS
Qty: 0 | Refills: 0 | Status: DISCONTINUED | OUTPATIENT
Start: 2017-02-03 | End: 2017-02-05

## 2017-02-03 RX ORDER — VANCOMYCIN HCL 1 G
1250 VIAL (EA) INTRAVENOUS ONCE
Qty: 0 | Refills: 0 | Status: COMPLETED | OUTPATIENT
Start: 2017-02-03 | End: 2017-02-03

## 2017-02-03 RX ORDER — PIPERACILLIN AND TAZOBACTAM 4; .5 G/20ML; G/20ML
3.38 INJECTION, POWDER, LYOPHILIZED, FOR SOLUTION INTRAVENOUS ONCE
Qty: 0 | Refills: 0 | Status: COMPLETED | OUTPATIENT
Start: 2017-02-03 | End: 2017-02-03

## 2017-02-03 RX ORDER — VANCOMYCIN HCL 1 G
1250 VIAL (EA) INTRAVENOUS EVERY 12 HOURS
Qty: 0 | Refills: 0 | Status: DISCONTINUED | OUTPATIENT
Start: 2017-02-04 | End: 2017-02-08

## 2017-02-03 RX ORDER — VANCOMYCIN HCL 1 G
VIAL (EA) INTRAVENOUS
Qty: 0 | Refills: 0 | Status: DISCONTINUED | OUTPATIENT
Start: 2017-02-03 | End: 2017-02-08

## 2017-02-03 RX ORDER — VANCOMYCIN HCL 1 G
VIAL (EA) INTRAVENOUS
Qty: 0 | Refills: 0 | Status: DISCONTINUED | OUTPATIENT
Start: 2017-02-03 | End: 2017-02-03

## 2017-02-03 RX ADMIN — SIMVASTATIN 20 MILLIGRAM(S): 20 TABLET, FILM COATED ORAL at 21:59

## 2017-02-03 RX ADMIN — PIPERACILLIN AND TAZOBACTAM 200 GRAM(S): 4; .5 INJECTION, POWDER, LYOPHILIZED, FOR SOLUTION INTRAVENOUS at 17:30

## 2017-02-03 RX ADMIN — ENOXAPARIN SODIUM 40 MILLIGRAM(S): 100 INJECTION SUBCUTANEOUS at 11:20

## 2017-02-03 RX ADMIN — Medication 166.67 MILLIGRAM(S): at 17:30

## 2017-02-03 RX ADMIN — PIPERACILLIN AND TAZOBACTAM 25 GRAM(S): 4; .5 INJECTION, POWDER, LYOPHILIZED, FOR SOLUTION INTRAVENOUS at 21:59

## 2017-02-03 NOTE — PROGRESS NOTE ADULT - SUBJECTIVE AND OBJECTIVE BOX
CHIEF COMPLAINT/INTERVAL HISTORY:    Patient is a 61y old  Female who presents with a chief complaint of Generalized weakness (02 Feb 2017 12:58)      HPI:  61 year old female with PMH of HTN, [place] Chorea bibems presenting to ED due to generalized weakness and family unable to take care of her. Poor historian. History obtained from ED chart and EMS. as per previous record Pt has some R sided weakness, bilateral leg weakness at baseline. (30 Jan 2017 18:29)    Overnight issues  had an isolated temp last niight     seen by infectious disease who feels that it is not infectious but from aicha chorea   SUBJECTIVE & OBJECTIVE: Pt seen and examined at bedside.   ROS:  no new complaints   ICU Vital Signs Last 24 Hrs  T(C): 37.6, Max: 38.3 (02-02 @ 17:40)  T(F): 99.7, Max: 101 (02-02 @ 17:40)  HR: 86 (81 - 86)  BP: 109/68 (109/68 - 140/84)  BP(mean): --  ABP: --  ABP(mean): --  RR: 15 (15 - 18)  SpO2: 97% (97% - 99%)        MEDICATIONS  (STANDING):  enoxaparin Injectable 40milliGRAM(s) SubCutaneous daily  simvastatin 20milliGRAM(s) Oral at bedtime  dextrose 5% + sodium chloride 0.45%. 1000milliLiter(s) IV Continuous <Continuous>    MEDICATIONS  (PRN):  acetaminophen   Tablet 650milliGRAM(s) Oral every 6 hours PRN For Temp greater than 38 C (100.4 F)        PHYSICAL EXAM:    GENERAL: NAD, well-groomed, well-developed  HEAD:  Atraumatic, Normocephalic  EYES: EOMI, PERRLA, conjunctiva and sclera clear  ENMT: Moist mucous membranes  NECK: Supple, No JVD  NERVOUS SYSTEM:  Alert & Orientedx1  Motor Strength4/5 B/L upper and lower extremities; DTRs 2+ intact and symmetric  CHEST/LUNG: Clear to auscultation bilaterally; No rales, rhonchi, wheezing, or rubs  HEART: Regular rate and rhythm; No murmurs, rubs, or gallops  ABDOMEN: Soft, Nontender, Nondistended; Bowel sounds present  EXTREMITIES:  2+ Peripheral Pulses, No clubbing, cyanosis, or edema    LABS:                        10.6   9.7   )-----------( 187      ( 02 Feb 2017 06:26 )             31.4     02 Feb 2017 06:26    144    |  108    |  14     ----------------------------<  122    3.7     |  26     |  0.52     Ca    8.5        02 Feb 2017 06:26            CAPILLARY BLOOD GLUCOSE      RECENT CULTURES:      RADIOLOGY & ADDITIONAL TESTS:  Imaging Personally Reviewed:  [ ] YES      Consultant(s) Notes Reviewed:  [ ] YES     Care Discussed with [ ] Consultants [X ] Patient [ ] Family  [x ]    [x ]  Other; RN  HEALTH ISSUES - PROBLEM Dx:  Bacteriuria, asymptomatic: Bacteriuria, asymptomatic  Non-traumatic rhabdomyolysis: Non-traumatic rhabdomyolysis  High cholesterol: High cholesterol  Failure to thrive in adult: Failure to thrive in adult  Acute hypernatremia: Acute hypernatremia  Severe dehydration: Severe dehydration        DVT/GI ppx  Discussed with pt @ bedside

## 2017-02-03 NOTE — CONSULT NOTE ADULT - ATTENDING COMMENTS
start vanco + zosyn  wound care consult for sacral decub infection for a possible debridement  vanco level

## 2017-02-04 DIAGNOSIS — E86.9 VOLUME DEPLETION, UNSPECIFIED: ICD-10-CM

## 2017-02-04 DIAGNOSIS — R33.9 RETENTION OF URINE, UNSPECIFIED: ICD-10-CM

## 2017-02-04 LAB
ANION GAP SERPL CALC-SCNC: 10 MMOL/L — SIGNIFICANT CHANGE UP (ref 5–17)
BUN SERPL-MCNC: 10 MG/DL — SIGNIFICANT CHANGE UP (ref 7–23)
CALCIUM SERPL-MCNC: 9.4 MG/DL — SIGNIFICANT CHANGE UP (ref 8.5–10.1)
CHLORIDE SERPL-SCNC: 107 MMOL/L — SIGNIFICANT CHANGE UP (ref 96–108)
CO2 SERPL-SCNC: 25 MMOL/L — SIGNIFICANT CHANGE UP (ref 22–31)
CREAT SERPL-MCNC: 0.67 MG/DL — SIGNIFICANT CHANGE UP (ref 0.5–1.3)
GLUCOSE SERPL-MCNC: 126 MG/DL — HIGH (ref 70–99)
POTASSIUM SERPL-MCNC: 3.7 MMOL/L — SIGNIFICANT CHANGE UP (ref 3.5–5.3)
POTASSIUM SERPL-SCNC: 3.7 MMOL/L — SIGNIFICANT CHANGE UP (ref 3.5–5.3)
SODIUM SERPL-SCNC: 142 MMOL/L — SIGNIFICANT CHANGE UP (ref 135–145)

## 2017-02-04 PROCEDURE — 99233 SBSQ HOSP IP/OBS HIGH 50: CPT

## 2017-02-04 RX ADMIN — Medication 650 MILLIGRAM(S): at 00:24

## 2017-02-04 RX ADMIN — SODIUM CHLORIDE 100 MILLILITER(S): 9 INJECTION, SOLUTION INTRAVENOUS at 05:51

## 2017-02-04 RX ADMIN — PIPERACILLIN AND TAZOBACTAM 25 GRAM(S): 4; .5 INJECTION, POWDER, LYOPHILIZED, FOR SOLUTION INTRAVENOUS at 13:23

## 2017-02-04 RX ADMIN — PIPERACILLIN AND TAZOBACTAM 25 GRAM(S): 4; .5 INJECTION, POWDER, LYOPHILIZED, FOR SOLUTION INTRAVENOUS at 21:35

## 2017-02-04 RX ADMIN — Medication 166.67 MILLIGRAM(S): at 17:18

## 2017-02-04 RX ADMIN — PIPERACILLIN AND TAZOBACTAM 25 GRAM(S): 4; .5 INJECTION, POWDER, LYOPHILIZED, FOR SOLUTION INTRAVENOUS at 05:51

## 2017-02-04 RX ADMIN — Medication 650 MILLIGRAM(S): at 23:20

## 2017-02-04 RX ADMIN — Medication 650 MILLIGRAM(S): at 11:50

## 2017-02-04 RX ADMIN — SIMVASTATIN 20 MILLIGRAM(S): 20 TABLET, FILM COATED ORAL at 21:35

## 2017-02-04 RX ADMIN — ENOXAPARIN SODIUM 40 MILLIGRAM(S): 100 INJECTION SUBCUTANEOUS at 11:50

## 2017-02-04 RX ADMIN — Medication 166.67 MILLIGRAM(S): at 05:51

## 2017-02-04 NOTE — CONSULT NOTE ADULT - PROBLEM SELECTOR RECOMMENDATION 3
in view of fever may not be asymptomatic and causing fever with urinary retention
UC neg,pt denies symptoms

## 2017-02-04 NOTE — CONSULT NOTE ADULT - ASSESSMENT
co nsult dict lethargic arousable open eyes hx opf htn huntingtons disease  ct head no acute apth  hydrocephalus  bed bound for eeg tsh b12 roppr mri brain r/p nph  discuss wioth family
urinary retention most likely secondary to neurological disorder affecting lower extremities, continue Mcfarland catheter will get CT scan to evaluate kidneys
61 yr old bedridden female seen with

## 2017-02-04 NOTE — CONSULT NOTE ADULT - PROBLEM SELECTOR RECOMMENDATION 9
most likely neurogenic bladder will require Mcfarland Catheter  will get CT sacn to evaluate kidneys
can be sec to sacral decub infection   HC also affects hypothalmus and thus pt's can have fever due to that reason   will stat vVanco /zoysn  wound care md consult for debridement  follw temp curve  vanco level  wound care

## 2017-02-04 NOTE — PROGRESS NOTE ADULT - SUBJECTIVE AND OBJECTIVE BOX
CC/F/U for:    INTERVAL HPI/OVERNIGHT EVENTS:  Pt seen and examined at bedside.     Allergies/Intolerance: No Known Allergies      MEDICATIONS  (STANDING):  enoxaparin Injectable 40milliGRAM(s) SubCutaneous daily  simvastatin 20milliGRAM(s) Oral at bedtime  dextrose 5% + sodium chloride 0.45%. 1000milliLiter(s) IV Continuous <Continuous>  piperacillin/tazobactam IVPB. 3.375Gram(s) IV Intermittent every 8 hours  vancomycin  IVPB  IV Intermittent   vancomycin  IVPB 1250milliGRAM(s) IV Intermittent every 12 hours    MEDICATIONS  (PRN):  acetaminophen   Tablet 650milliGRAM(s) Oral every 6 hours PRN For Temp greater than 38 C (100.4 F)        ROS: all systems reviewed and wnl      PHYSICAL EXAMINATION:  Vital Signs Last 24 Hrs  T(C): 37.7, Max: 38.9 (02-03 @ 23:57)  T(F): 99.8, Max: 102.1 (02-03 @ 23:57)  HR: 103 (84 - 105)  BP: 143/85 (106/81 - 156/97)  BP(mean): --  RR: 17 (16 - 18)  SpO2: 98% (96% - 98%)  CAPILLARY BLOOD GLUCOSE      GENERAL: NAD, well-groomed, well-developed  HEAD:  atraumatic, normocephalic  EYES: sclera anicteric  ENMT: mucous membranes moist  NECK: supple, No JVD  CHEST/LUNG: clear to auscultation bilaterally; no rales, rhonchi, or wheezing b/l  HEART: normal S1, S2  ABDOMEN: BS+, soft, ND, NT   EXTREMITIES:  pulses palpable; no clubbing, cyanosis, or edema b/l LEs  NEURO: awake, alert, interactive; moves all extremities  SKIN: no rashes or lesions      LABS:    04 Feb 2017 07:52    142    |  107    |  10     ----------------------------<  126    3.7     |  25     |  0.67     Ca    9.4        04 Feb 2017 07:52              RADIOLOGY & ADDITIONAL TESTS:      ASSESSMENT AND PLAN: CC/F/U for: Wickliffe's chorea, sacral decub, fevers    INTERVAL HPI/OVERNIGHT EVENTS:  Pt seen and examined at bedside. Had abd pain, lower abd fullness on exam -> bladder scan ordered and showed >999mL urine; Mcfarland placed, and returned 1100mL urine    Allergies/Intolerance: No Known Allergies      MEDICATIONS  (STANDING):  enoxaparin Injectable 40milliGRAM(s) SubCutaneous daily  simvastatin 20milliGRAM(s) Oral at bedtime  dextrose 5% + sodium chloride 0.45%. 1000milliLiter(s) IV Continuous <Continuous>  piperacillin/tazobactam IVPB. 3.375Gram(s) IV Intermittent every 8 hours  vancomycin  IVPB  IV Intermittent   vancomycin  IVPB 1250milliGRAM(s) IV Intermittent every 12 hours    MEDICATIONS  (PRN):  acetaminophen   Tablet 650milliGRAM(s) Oral every 6 hours PRN For Temp greater than 38 C (100.4 F)        ROS: all systems reviewed and wnl      PHYSICAL EXAMINATION:  Vital Signs Last 24 Hrs  T(C): 37.7, Max: 38.9 (02-03 @ 23:57)  T(F): 99.8, Max: 102.1 (02-03 @ 23:57)  HR: 103 (84 - 105)  BP: 143/85 (106/81 - 156/97)  BP(mean): --  RR: 17 (16 - 18)  SpO2: 98% (96% - 98%)  CAPILLARY BLOOD GLUCOSE      GENERAL: frail, chronically-ill appearing, middle-aged female  HEAD:  atraumatic, normocephalic  EYES: sclera anicteric  ENMT: mucous membranes dry  NECK: supple, No JVD  CHEST/LUNG: respirations unlabored; BS decr bases, air entry symmetric  HEART: normal S1, S2  ABDOMEN: BS+, soft, firmness lower abd, +ttp over area  EXTREMITIES:  pulses palpable; no clubbing, cyanosis, or edema b/l LEs  NEURO: awake, alert, interactive; moves all extremities  SKIN: no rashes or lesions      LABS:    04 Feb 2017 07:52    142    |  107    |  10     ----------------------------<  126    3.7     |  25     |  0.67     Ca    9.4        04 Feb 2017 07:52        ASSESSMENT AND PLAN:  61F, HTN, HL, Ashli's chorea, sacral decub, being tx'd for generalized weakness in setting of adult failure to thrive, volume depletion, and possible acute infection 2/2 sacral decub; now also w/urinary retention    ID/MSK:  -IV abxs cvrg as per ID consult w/zosyn/vanco  -follow vancomycin levels  -f/u blood cxs  -eval of sacral decub by Wound Care/Dr Galeana pending    : urinary retention - Mcfarland placed; consult Urology for input    CV: cardioprotective statin for hyperlipidemia    OTHER:  -dvt prophylaxis  -PT eval/SW for likely STR placement CC/F/U for: Hemphill's chorea, sacral decub, fevers    INTERVAL HPI/OVERNIGHT EVENTS:  Pt seen and examined at bedside. Had abd pain, lower abd fullness on exam -> bladder scan ordered and showed >999mL urine; Mcfarland placed, and returned 1100mL urine    Allergies/Intolerance: No Known Allergies      MEDICATIONS  (STANDING):  enoxaparin Injectable 40milliGRAM(s) SubCutaneous daily  simvastatin 20milliGRAM(s) Oral at bedtime  dextrose 5% + sodium chloride 0.45%. 1000milliLiter(s) IV Continuous <Continuous>  piperacillin/tazobactam IVPB. 3.375Gram(s) IV Intermittent every 8 hours  vancomycin  IVPB  IV Intermittent   vancomycin  IVPB 1250milliGRAM(s) IV Intermittent every 12 hours    MEDICATIONS  (PRN):  acetaminophen   Tablet 650milliGRAM(s) Oral every 6 hours PRN For Temp greater than 38 C (100.4 F)        ROS: all systems reviewed and wnl      PHYSICAL EXAMINATION:  Vital Signs Last 24 Hrs  T(C): 37.7, Max: 38.9 (02-03 @ 23:57)  T(F): 99.8, Max: 102.1 (02-03 @ 23:57)  HR: 103 (84 - 105)  BP: 143/85 (106/81 - 156/97)  BP(mean): --  RR: 17 (16 - 18)  SpO2: 98% (96% - 98%)  CAPILLARY BLOOD GLUCOSE      GENERAL: frail, chronically-ill appearing, middle-aged female  HEAD:  atraumatic, normocephalic  EYES: sclera anicteric  ENMT: mucous membranes dry  NECK: supple, No JVD  CHEST/LUNG: respirations unlabored; BS decr bases, air entry symmetric  HEART: normal S1, S2  ABDOMEN: BS+, soft, firmness lower abd, +ttp over area  EXTREMITIES:  pulses palpable; no clubbing, cyanosis, or edema b/l LEs  NEURO: awake, alert, interactive; moves all extremities  SKIN: no rashes or lesions      LABS:    04 Feb 2017 07:52    142    |  107    |  10     ----------------------------<  126    3.7     |  25     |  0.67     Ca    9.4        04 Feb 2017 07:52        ASSESSMENT AND PLAN:  61F, HTN, HL, Ashli's chorea, sacral decub, being tx'd for generalized weakness in setting of adult failure to thrive, volume depletion, and recurrent fevers in setting of possible acute infection 2/2 sacral decub on IV abxs vs 2/2 Hemphill chorea; UA neg; now also w/urinary retention    ID/MSK:  -IV abxs cvrg as per ID consult w/zosyn/vanco  -follow vancomycin levels  -f/u blood cxs  -eval of sacral decub by Wound Care/Dr Galeana pending    : urinary retention - Mcfarland placed; consult Urology for input    CV: cardioprotective statin for hyperlipidemia    OTHER:  -dvt prophylaxis  -PT eval/SW for likely STR placement CC/F/U for: Albany's chorea, sacral decub, fevers    INTERVAL HPI/OVERNIGHT EVENTS:  Pt seen and examined at bedside. Had abd pain, lower abd fullness on exam -> bladder scan ordered and showed >999mL urine; Mcfarland placed, and returned 1100mL urine    Allergies/Intolerance: No Known Allergies      MEDICATIONS  (STANDING):  enoxaparin Injectable 40milliGRAM(s) SubCutaneous daily  simvastatin 20milliGRAM(s) Oral at bedtime  dextrose 5% + sodium chloride 0.45%. 1000milliLiter(s) IV Continuous <Continuous>  piperacillin/tazobactam IVPB. 3.375Gram(s) IV Intermittent every 8 hours  vancomycin  IVPB  IV Intermittent   vancomycin  IVPB 1250milliGRAM(s) IV Intermittent every 12 hours    MEDICATIONS  (PRN):  acetaminophen   Tablet 650milliGRAM(s) Oral every 6 hours PRN For Temp greater than 38 C (100.4 F)        ROS: all systems reviewed and wnl      PHYSICAL EXAMINATION:  Vital Signs Last 24 Hrs  T(C): 37.7, Max: 38.9 (02-03 @ 23:57)  T(F): 99.8, Max: 102.1 (02-03 @ 23:57)  HR: 103 (84 - 105)  BP: 143/85 (106/81 - 156/97)  BP(mean): --  RR: 17 (16 - 18)  SpO2: 98% (96% - 98%)  CAPILLARY BLOOD GLUCOSE      GENERAL: frail, chronically-ill appearing, middle-aged female  HEAD:  atraumatic, normocephalic  EYES: sclera anicteric  ENMT: mucous membranes dry  NECK: supple, No JVD  CHEST/LUNG: respirations unlabored; BS decr bases, air entry symmetric  HEART: normal S1, S2  ABDOMEN: BS+, soft, firmness lower abd, +ttp over area  EXTREMITIES:  pulses palpable; no clubbing, cyanosis, or edema b/l LEs  NEURO: awake, alert, interactive; moves all extremities  SKIN: no rashes or lesions      LABS:    04 Feb 2017 07:52    142    |  107    |  10     ----------------------------<  126    3.7     |  25     |  0.67     Ca    9.4        04 Feb 2017 07:52        ASSESSMENT AND PLAN:  61F, HTN, HL, Ashli's chorea, sacral decub, being tx'd for generalized weakness in setting of adult failure to thrive, volume depletion, rhabdo, and recurrent fevers in setting of possible acute infection 2/2 sacral decub on IV abxs vs 2/2 [place] chorea; UA neg; now also w/urinary retention    ID/MSK:  -IV abxs cvrg as per ID consult w/zosyn/vanco  -follow vancomycin levels  -f/u blood cxs  -eval of sacral decub by Wound Care/Dr Galeana pending  -rhabdo - CK downtrending 1077->666; no e/o renal dysfunction; continue IVFs    : urinary retention - Mcfarland placed; consult Urology for input    CV: cardioprotective statin for hyperlipidemia    OTHER:  -dvt prophylaxis  -PT eval/SW for likely STR placement

## 2017-02-05 LAB
ANION GAP SERPL CALC-SCNC: 8 MMOL/L — SIGNIFICANT CHANGE UP (ref 5–17)
BUN SERPL-MCNC: 16 MG/DL — SIGNIFICANT CHANGE UP (ref 7–23)
CALCIUM SERPL-MCNC: 9.1 MG/DL — SIGNIFICANT CHANGE UP (ref 8.5–10.1)
CHLORIDE SERPL-SCNC: 109 MMOL/L — HIGH (ref 96–108)
CO2 SERPL-SCNC: 28 MMOL/L — SIGNIFICANT CHANGE UP (ref 22–31)
CREAT SERPL-MCNC: 1 MG/DL — SIGNIFICANT CHANGE UP (ref 0.5–1.3)
GLUCOSE SERPL-MCNC: 124 MG/DL — HIGH (ref 70–99)
GRAM STN FLD: SIGNIFICANT CHANGE UP
POTASSIUM SERPL-MCNC: 4 MMOL/L — SIGNIFICANT CHANGE UP (ref 3.5–5.3)
POTASSIUM SERPL-SCNC: 4 MMOL/L — SIGNIFICANT CHANGE UP (ref 3.5–5.3)
SODIUM SERPL-SCNC: 145 MMOL/L — SIGNIFICANT CHANGE UP (ref 135–145)
SPECIMEN SOURCE: SIGNIFICANT CHANGE UP
VANCOMYCIN TROUGH SERPL-MCNC: 18.2 UG/ML — SIGNIFICANT CHANGE UP (ref 10–20)

## 2017-02-05 PROCEDURE — 74176 CT ABD & PELVIS W/O CONTRAST: CPT | Mod: 26

## 2017-02-05 PROCEDURE — 99233 SBSQ HOSP IP/OBS HIGH 50: CPT

## 2017-02-05 PROCEDURE — 99232 SBSQ HOSP IP/OBS MODERATE 35: CPT

## 2017-02-05 RX ORDER — MEROPENEM 1 G/30ML
2000 INJECTION INTRAVENOUS EVERY 8 HOURS
Qty: 0 | Refills: 0 | Status: DISCONTINUED | OUTPATIENT
Start: 2017-02-05 | End: 2017-02-08

## 2017-02-05 RX ORDER — IBUPROFEN 200 MG
200 TABLET ORAL EVERY 8 HOURS
Qty: 0 | Refills: 0 | Status: DISCONTINUED | OUTPATIENT
Start: 2017-02-05 | End: 2017-02-08

## 2017-02-05 RX ADMIN — PIPERACILLIN AND TAZOBACTAM 25 GRAM(S): 4; .5 INJECTION, POWDER, LYOPHILIZED, FOR SOLUTION INTRAVENOUS at 05:55

## 2017-02-05 RX ADMIN — Medication 166.67 MILLIGRAM(S): at 05:55

## 2017-02-05 RX ADMIN — PIPERACILLIN AND TAZOBACTAM 25 GRAM(S): 4; .5 INJECTION, POWDER, LYOPHILIZED, FOR SOLUTION INTRAVENOUS at 14:00

## 2017-02-05 RX ADMIN — Medication 650 MILLIGRAM(S): at 17:44

## 2017-02-05 RX ADMIN — MEROPENEM 200 MILLIGRAM(S): 1 INJECTION INTRAVENOUS at 22:01

## 2017-02-05 RX ADMIN — Medication 166.67 MILLIGRAM(S): at 17:11

## 2017-02-05 RX ADMIN — SODIUM CHLORIDE 100 MILLILITER(S): 9 INJECTION, SOLUTION INTRAVENOUS at 05:55

## 2017-02-05 RX ADMIN — SIMVASTATIN 20 MILLIGRAM(S): 20 TABLET, FILM COATED ORAL at 22:01

## 2017-02-05 NOTE — PROGRESS NOTE ADULT - SUBJECTIVE AND OBJECTIVE BOX
CC/F/U for:    INTERVAL HPI/OVERNIGHT EVENTS:  Pt seen and examined at bedside.     Allergies/Intolerance: No Known Allergies      MEDICATIONS  (STANDING):  enoxaparin Injectable 40milliGRAM(s) SubCutaneous daily  simvastatin 20milliGRAM(s) Oral at bedtime  dextrose 5% + sodium chloride 0.45%. 1000milliLiter(s) IV Continuous <Continuous>  piperacillin/tazobactam IVPB. 3.375Gram(s) IV Intermittent every 8 hours  vancomycin  IVPB  IV Intermittent   vancomycin  IVPB 1250milliGRAM(s) IV Intermittent every 12 hours    MEDICATIONS  (PRN):  acetaminophen   Tablet 650milliGRAM(s) Oral every 6 hours PRN For Temp greater than 38 C (100.4 F)        ROS: all systems reviewed and wnl      PHYSICAL EXAMINATION:  Vital Signs Last 24 Hrs  T(C): 37.1, Max: 38.8 (02-04 @ 23:10)  T(F): 98.8, Max: 101.8 (02-04 @ 23:10)  HR: 89 (89 - 106)  BP: 132/75 (103/60 - 132/75)  BP(mean): --  RR: 16 (16 - 16)  SpO2: 98% (97% - 98%)  CAPILLARY BLOOD GLUCOSE      GENERAL: NAD, well-groomed, well-developed  HEAD:  atraumatic, normocephalic  EYES: sclera anicteric  ENMT: mucous membranes moist  NECK: supple, No JVD  CHEST/LUNG: clear to auscultation bilaterally; no rales, rhonchi, or wheezing b/l  HEART: normal S1, S2  ABDOMEN: BS+, soft, ND, NT   EXTREMITIES:  pulses palpable; no clubbing, cyanosis, or edema b/l LEs  NEURO: awake, alert, interactive; moves all extremities  SKIN: no rashes or lesions      LABS:    05 Feb 2017 04:11    145    |  109    |  16     ----------------------------<  124    4.0     |  28     |  1.00     Ca    9.1        05 Feb 2017 04:11              RADIOLOGY & ADDITIONAL TESTS:      ASSESSMENT AND PLAN: CC/F/U for: Rincon's chorea, sacral decub, fevers    INTERVAL HPI/OVERNIGHT EVENTS:  Pt seen and examined at bedside. Feels better today; no abd pain.    Allergies/Intolerance: No Known Allergies      MEDICATIONS  (STANDING):  enoxaparin Injectable 40milliGRAM(s) SubCutaneous daily  simvastatin 20milliGRAM(s) Oral at bedtime  dextrose 5% + sodium chloride 0.45%. 1000milliLiter(s) IV Continuous <Continuous>  piperacillin/tazobactam IVPB. 3.375Gram(s) IV Intermittent every 8 hours  vancomycin  IVPB  IV Intermittent   vancomycin  IVPB 1250milliGRAM(s) IV Intermittent every 12 hours    MEDICATIONS  (PRN):  acetaminophen   Tablet 650milliGRAM(s) Oral every 6 hours PRN For Temp greater than 38 C (100.4 F)        ROS: all systems reviewed and wnl      PHYSICAL EXAMINATION:  Vital Signs Last 24 Hrs  T(C): 37.1, Max: 38.8 (02-04 @ 23:10)  T(F): 98.8, Max: 101.8 (02-04 @ 23:10)  HR: 89 (89 - 106)  BP: 132/75 (103/60 - 132/75)  BP(mean): --  RR: 16 (16 - 16)  SpO2: 98% (97% - 98%)  CAPILLARY BLOOD GLUCOSE      GENERAL: frail, chronically-ill appearing, middle-aged female  HEAD:  atraumatic, normocephalic  EYES: sclera anicteric  ENMT: mucous membranes dry  NECK: supple, No JVD  CHEST/LUNG: respirations unlabored; BS decr bases, air entry symmetric  HEART: normal S1, S2  ABDOMEN: BS+, soft, ND, NT  EXTREMITIES:  pulses palpable; no clubbing, cyanosis, or edema b/l LEs  NEURO: awake, alert, interactive; moves all extremities  SKIN: no rashes or lesions      LABS:    05 Feb 2017 04:11    145    |  109    |  16     ----------------------------<  124    4.0     |  28     |  1.00     Ca    9.1        05 Feb 2017 04:11        ASSESSMENT AND PLAN:  61F, HTN, HL, Rincon's chorea, sacral decub, being tx'd for generalized weakness in setting of adult failure to thrive, volume depletion, rhabdo, and recurrent fevers in setting of possible acute infection 2/2 sacral decub on IV abxs vs 2/2 Rincon chorea; UA neg; also w/urinary retention of 1100mL (2/4/17) now s/p Mcfarland     ID/MSK:  -IV abxs cvrg as per ID consult w/zosyn/vanco  -follow vancomycin levels  -f/u blood cxs  -sacral decub to be eval/debrided by Wound Care/Dr Galeana  -rhabdo - CK downtrending 1077->666; no e/o renal dysfunction; continue IVFs    : urinary retention, s/p Mcfarland placement; per  (Dr Cleaning), likely neurogenic bladder  - continue Mcfarland     CV:   -cardioprotective statin for hyperlipidemia  -volume depletion - continue IVFs  -monitor CK levels, if increases, will d/c statin    OTHER:  -dvt prophylaxis  --generalized weakness/failure to thrive in setting of acute infection, debility; PT eval/SW for likely rehab placement at discharge

## 2017-02-05 NOTE — PROGRESS NOTE ADULT - SUBJECTIVE AND OBJECTIVE BOX
Patient seen and examined bedside resting comfortably. Patient is without complaints. Admits to fever overnight.   Denies abdominal pain, chest pain, dyspnea, cough.    T(F): 100, Max: 101.8 (02-04 @ 23:10)  HR: 96 (89 - 106)  BP: 102/63 (102/63 - 132/75)  RR: 17 (16 - 17)  SpO2: 98% (97% - 98%)    General: Awake, NAD  CV: +S1S2 regular rate and rhythm  Lung: respirations nonlabored  Abdomen: soft, NTND. Normactive BS  Extremities: no pedal edema or calf tenderness noted   : obrien in situ draining clear, yellow urine, output: 2750cc/24hrs, no suprapubic tenderness     LABS:    05 Feb 2017 04:11    145    |  109    |  16     ----------------------------<  124    4.0     |  28     |  1.00     Ca    9.1        05 Feb 2017 04:11    CT A/P 2/5: IMPRESSION: No evidence of renal calculus or hydronephrosis. Nondistended urinary bladder with a Obrien catheter in place. Correlation with urinalysis is recommended. Thickening of the wall of the retrorectal/presacral space as well as stranding of the perirectal fat,, more pronounced posteriorly, worrisome for proctitis. Please correlate clinically.     Impression: 61 year old female PMH aicha chorea, HLD a/w dehydration, urinary retention likely 2/2 neurogenic bladder, fever likely 2/2 sacral ulcer now s/p bedside debridement  Plan:    - continue with obrien catheter, monitor output  - f/u labs, trend renal function  - monitor vitals   - continue with merrem and vanco per ID  - pain management PRN  - c/w DVT ppx, lovenox  - continue with all medical management  - will discuss with Dr Cleaning for further recommendations

## 2017-02-05 NOTE — PROGRESS NOTE ADULT - SUBJECTIVE AND OBJECTIVE BOX
Patient is a 61y old  Female who presents with a chief complaint of Generalized weakness (02 Feb 2017 12:58)      INTERVAL HPI / OVERNIGHT EVENTS: s/p debridement of sacral decub  today, fever +     MEDICATIONS  (STANDING):  enoxaparin Injectable 40milliGRAM(s) SubCutaneous daily  simvastatin 20milliGRAM(s) Oral at bedtime  dextrose 5% + sodium chloride 0.45%. 1000milliLiter(s) IV Continuous <Continuous>  vancomycin  IVPB  IV Intermittent   vancomycin  IVPB 1250milliGRAM(s) IV Intermittent every 12 hours  meropenem IVPB 2000milliGRAM(s) IV Intermittent every 8 hours    MEDICATIONS  (PRN):  acetaminophen   Tablet 650milliGRAM(s) Oral every 6 hours PRN For Temp greater than 38 C (100.4 F)      Vital Signs Last 24 Hrs  T(C): 38.9, Max: 38.9 (02-05 @ 17:38)  T(F): 102, Max: 102 (02-05 @ 17:38)  HR: 104 (89 - 106)  BP: 125/71 (102/63 - 132/75)  BP(mean): --  RR: 16 (16 - 17)  SpO2: 98% (97% - 98%)    PHYSICAL EXAM:    Constitutional:NAD.thin built.cachectic  Respiratory: CTAB/L  Cardiovascular: S1 and S2, RRR, no M/G/R  Gastrointestinal: BS+, soft, NT/ND  Extremities: No peripheral edema  Vascular: 2+ peripheral pulses  Skin: s/p debridement of sacral decub with removal od pus pockets. dressing +      LABS:    05 Feb 2017 04:11    145    |  109    |  16     ----------------------------<  124    4.0     |  28     |  1.00     Ca    9.1        05 Feb 2017 04:11              MICROBIOLOGY:  RECENT CULTURES:  02-02 .Blood Blood XXXX XXXX   No growth to date.          RADIOLOGY & ADDITIONAL STUDIES:

## 2017-02-06 DIAGNOSIS — Z92.89 PERSONAL HISTORY OF OTHER MEDICAL TREATMENT: ICD-10-CM

## 2017-02-06 LAB
ANION GAP SERPL CALC-SCNC: 7 MMOL/L — SIGNIFICANT CHANGE UP (ref 5–17)
APTT BLD: 26.4 SEC — LOW (ref 27.5–37.4)
BUN SERPL-MCNC: 14 MG/DL — SIGNIFICANT CHANGE UP (ref 7–23)
CALCIUM SERPL-MCNC: 8.9 MG/DL — SIGNIFICANT CHANGE UP (ref 8.5–10.1)
CHLORIDE SERPL-SCNC: 108 MMOL/L — SIGNIFICANT CHANGE UP (ref 96–108)
CK SERPL-CCNC: 34 U/L — SIGNIFICANT CHANGE UP (ref 26–192)
CO2 SERPL-SCNC: 28 MMOL/L — SIGNIFICANT CHANGE UP (ref 22–31)
CREAT SERPL-MCNC: 0.87 MG/DL — SIGNIFICANT CHANGE UP (ref 0.5–1.3)
CULTURE RESULTS: NO GROWTH — SIGNIFICANT CHANGE UP
GLUCOSE SERPL-MCNC: 116 MG/DL — HIGH (ref 70–99)
HCT VFR BLD CALC: 29.8 % — LOW (ref 34.5–45)
HGB BLD-MCNC: 10.3 G/DL — LOW (ref 11.5–15.5)
INR BLD: 1.45 RATIO — HIGH (ref 0.88–1.16)
MCHC RBC-ENTMCNC: 28 PG — SIGNIFICANT CHANGE UP (ref 27–34)
MCHC RBC-ENTMCNC: 34.4 GM/DL — SIGNIFICANT CHANGE UP (ref 32–36)
MCV RBC AUTO: 81.5 FL — SIGNIFICANT CHANGE UP (ref 80–100)
PLATELET # BLD AUTO: 263 K/UL — SIGNIFICANT CHANGE UP (ref 150–400)
POTASSIUM SERPL-MCNC: 4 MMOL/L — SIGNIFICANT CHANGE UP (ref 3.5–5.3)
POTASSIUM SERPL-SCNC: 4 MMOL/L — SIGNIFICANT CHANGE UP (ref 3.5–5.3)
PROTHROM AB SERPL-ACNC: 16.3 SEC — HIGH (ref 10–13.1)
RBC # BLD: 3.66 M/UL — LOW (ref 3.8–5.2)
RBC # FLD: 11.4 % — SIGNIFICANT CHANGE UP (ref 11–15)
SODIUM SERPL-SCNC: 143 MMOL/L — SIGNIFICANT CHANGE UP (ref 135–145)
SPECIMEN SOURCE: SIGNIFICANT CHANGE UP
WBC # BLD: 10.2 K/UL — SIGNIFICANT CHANGE UP (ref 3.8–10.5)
WBC # FLD AUTO: 10.2 K/UL — SIGNIFICANT CHANGE UP (ref 3.8–10.5)

## 2017-02-06 PROCEDURE — 99232 SBSQ HOSP IP/OBS MODERATE 35: CPT

## 2017-02-06 PROCEDURE — 99233 SBSQ HOSP IP/OBS HIGH 50: CPT

## 2017-02-06 RX ADMIN — Medication 200 MILLIGRAM(S): at 17:36

## 2017-02-06 RX ADMIN — Medication 166.67 MILLIGRAM(S): at 05:39

## 2017-02-06 RX ADMIN — ENOXAPARIN SODIUM 40 MILLIGRAM(S): 100 INJECTION SUBCUTANEOUS at 11:16

## 2017-02-06 RX ADMIN — Medication 200 MILLIGRAM(S): at 17:38

## 2017-02-06 RX ADMIN — SIMVASTATIN 20 MILLIGRAM(S): 20 TABLET, FILM COATED ORAL at 21:45

## 2017-02-06 RX ADMIN — Medication 200 MILLIGRAM(S): at 00:19

## 2017-02-06 RX ADMIN — Medication 166.67 MILLIGRAM(S): at 17:36

## 2017-02-06 RX ADMIN — MEROPENEM 200 MILLIGRAM(S): 1 INJECTION INTRAVENOUS at 21:44

## 2017-02-06 RX ADMIN — MEROPENEM 200 MILLIGRAM(S): 1 INJECTION INTRAVENOUS at 14:08

## 2017-02-06 RX ADMIN — SODIUM CHLORIDE 100 MILLILITER(S): 9 INJECTION, SOLUTION INTRAVENOUS at 05:40

## 2017-02-06 RX ADMIN — MEROPENEM 200 MILLIGRAM(S): 1 INJECTION INTRAVENOUS at 05:39

## 2017-02-06 NOTE — PROGRESS NOTE ADULT - SUBJECTIVE AND OBJECTIVE BOX
Patient is a 61y old  Female who presents with a chief complaint of Generalized weakness (02 Feb 2017 12:58)      INTERVAL HPI / OVERNIGHT EVENTS:fever,pt jane any c/o    MEDICATIONS  (STANDING):  enoxaparin Injectable 40milliGRAM(s) SubCutaneous daily  simvastatin 20milliGRAM(s) Oral at bedtime  dextrose 5% + sodium chloride 0.45%. 1000milliLiter(s) IV Continuous <Continuous>  vancomycin  IVPB  IV Intermittent   vancomycin  IVPB 1250milliGRAM(s) IV Intermittent every 12 hours  meropenem IVPB 2000milliGRAM(s) IV Intermittent every 8 hours    MEDICATIONS  (PRN):  acetaminophen   Tablet 650milliGRAM(s) Oral every 6 hours PRN For Temp greater than 38 C (100.4 F)  ibuprofen  Tablet 200milliGRAM(s) Oral every 8 hours PRN Temp >/=102      Vital Signs Last 24 Hrs  Tmax -102.5    PHYSICAL EXAM:    Constitutional: NAD cachectic  Respiratory: CTAB/L  Cardiovascular: S1 and S2, RRR, no M/G/R  Gastrointestinal: BS+, soft, NT/ND  Extremities: No peripheral edema  Vascular: 2+ peripheral pulses  Skin: sacral decub ,s/p debridement with dressing      LABS:             Vanco level 18.2  WBC 10.2  Cr 0.87           MICROBIOLOGY:  RECENT CULTURES:  02-05 .Tissue Other, sacrum XXXX   Upon re-evaluation of gram stain:  Rare White blood cells  Numerous Gram positive cocci in pairs, chains and clusters  Moderate Gram Negative Rods   Moderate Gram Negative Rods          02-05 .Urine Catheterized XXXX XXXX   No growth    02-02 .Blood Blood XXXX XXXX   No growth at 5 days.          RADIOLOGY & ADDITIONAL STUDIES:

## 2017-02-06 NOTE — PROGRESS NOTE ADULT - SUBJECTIVE AND OBJECTIVE BOX
CC/F/U for:    INTERVAL HPI/OVERNIGHT EVENTS:  Pt seen and examined at bedside.     Allergies/Intolerance: No Known Allergies      MEDICATIONS  (STANDING):  enoxaparin Injectable 40milliGRAM(s) SubCutaneous daily  simvastatin 20milliGRAM(s) Oral at bedtime  dextrose 5% + sodium chloride 0.45%. 1000milliLiter(s) IV Continuous <Continuous>  vancomycin  IVPB  IV Intermittent   vancomycin  IVPB 1250milliGRAM(s) IV Intermittent every 12 hours  meropenem IVPB 2000milliGRAM(s) IV Intermittent every 8 hours    MEDICATIONS  (PRN):  acetaminophen   Tablet 650milliGRAM(s) Oral every 6 hours PRN For Temp greater than 38 C (100.4 F)  ibuprofen  Tablet 200milliGRAM(s) Oral every 8 hours PRN Temp >/=102        ROS: as above; all other systems reviewed and wnl      PHYSICAL EXAMINATION:  Vital Signs Last 24 Hrs  T(C): 37.2, Max: 39.3 (02-05 @ 19:49)  T(F): 99, Max: 102.8 (02-05 @ 19:49)  HR: 86 (81 - 104)  BP: 123/77 (97/67 - 125/71)  BP(mean): --  RR: 18 (16 - 18)  SpO2: 98% (97% - 99%)  CAPILLARY BLOOD GLUCOSE      GENERAL: NAD, well-groomed, well-developed  HEAD:  atraumatic, normocephalic  EYES: sclera anicteric  ENMT: mucous membranes moist  NECK: supple, No JVD  CHEST/LUNG: clear to auscultation bilaterally; no rales, rhonchi, or wheezing b/l  HEART: normal S1, S2  ABDOMEN: BS+, soft, ND, NT   EXTREMITIES:  pulses palpable; no clubbing, cyanosis, or edema b/l LEs  NEURO: awake, alert, interactive; moves all extremities  SKIN: no rashes or lesions      LABS:                        10.3   10.2  )-----------( 263      ( 06 Feb 2017 06:49 )             29.8     06 Feb 2017 06:49    143    |  108    |  14     ----------------------------<  116    4.0     |  28     |  0.87     Ca    8.9        06 Feb 2017 06:49      PT/INR - ( 06 Feb 2017 06:49 )   PT: 16.3 sec;   INR: 1.45 ratio         PTT - ( 06 Feb 2017 06:49 )  PTT:26.4 sec        RADIOLOGY & ADDITIONAL TESTS:      ASSESSMENT AND PLAN: CC/F/U for: Becker's chorea, sacral decub, fevers    INTERVAL HPI/OVERNIGHT EVENTS:  Pt seen and examined at bedside. No complaints; no pain. Pt is NPO for ?procedure    Allergies/Intolerance: No Known Allergies      MEDICATIONS  (STANDING):  enoxaparin Injectable 40milliGRAM(s) SubCutaneous daily  simvastatin 20milliGRAM(s) Oral at bedtime  dextrose 5% + sodium chloride 0.45%. 1000milliLiter(s) IV Continuous <Continuous>  vancomycin  IVPB  IV Intermittent   vancomycin  IVPB 1250milliGRAM(s) IV Intermittent every 12 hours  meropenem IVPB 2000milliGRAM(s) IV Intermittent every 8 hours    MEDICATIONS  (PRN):  acetaminophen   Tablet 650milliGRAM(s) Oral every 6 hours PRN For Temp greater than 38 C (100.4 F)  ibuprofen  Tablet 200milliGRAM(s) Oral every 8 hours PRN Temp >/=102        ROS: as above; all other systems reviewed and wnl      PHYSICAL EXAMINATION:  Vital Signs Last 24 Hrs  T(C): 37.2, Max: 39.3 (02-05 @ 19:49)  T(F): 99, Max: 102.8 (02-05 @ 19:49)  HR: 86 (81 - 104)  BP: 123/77 (97/67 - 125/71)  BP(mean): --  RR: 18 (16 - 18)  SpO2: 98% (97% - 99%)  CAPILLARY BLOOD GLUCOSE      GENERAL: frail, chronically-ill appearing, middle-aged female  HEAD:  atraumatic, normocephalic  EYES: sclera anicteric  ENMT: mucous membranes dry  NECK: supple, No JVD  CHEST/LUNG: respirations unlabored; BS decr bases, air entry symmetric  HEART: normal S1, S2  ABDOMEN: BS+, soft, ND, NT  EXTREMITIES:  pulses palpable; no clubbing, cyanosis, or edema b/l LEs  NEURO: awake, alert, interactive; moves all extremities  SKIN: no rashes or lesions      LABS:                        10.3   10.2  )-----------( 263      ( 06 Feb 2017 06:49 )             29.8     06 Feb 2017 06:49    143    |  108    |  14     ----------------------------<  116    4.0     |  28     |  0.87     Ca    8.9        06 Feb 2017 06:49      PT/INR - ( 06 Feb 2017 06:49 )   PT: 16.3 sec;   INR: 1.45 ratio         PTT - ( 06 Feb 2017 06:49 )  PTT:26.4 sec      ASSESSMENT AND PLAN:  61F, HTN, HL, Becker's chorea, sacral decub, being tx'd for generalized weakness in setting of adult failure to thrive, volume depletion, rhabdo, and recurrent fevers in setting of possible acute infection 2/2 sacral decub on IV abxs vs 2/2 Becker chorea; UA neg; also w/urinary retention of 1100mL (2/4/17) s/p Mcfarland; s/p bedside debridement of sacral decub 2/5/17    ID/MSK:   -IV abxs cvrg as per ID consult w/zosyn/vanco  -following vancomycin levels  -blood cxs neg x1 (2/2), urine cx neg  -sacral decub debrided by Wound Care/Dr Galeana; f/u wound cxs  -rhabdo - CK downtrending 1077->666; no e/o renal dysfunction; continue IVFs; repeat CK level, add on to labs from today    : urinary retention, s/p Mcfarland placement; per  (Dr Cleaning), likely neurogenic bladder  - continue Mcfarland     CV:   -cardioprotective statin for hyperlipidemia  -volume depletion - continue IVFs as currently  -monitor CK levels, if increases, will d/c statin    OTHER:  -dvt prophylaxis  --generalized weakness/failure to thrive in setting of acute infection, debility; PT eval/SW for likely rehab placement at discharge

## 2017-02-06 NOTE — CONSULT NOTE ADULT - SUBJECTIVE AND OBJECTIVE BOX
HPI:  61 year old female with PMH of HTN, Ash[place] Chorea bibems presenting to ED due to generalized weakness and family unable to take care of her. Poor historian. History obtained from ED chart and EMS. as per previous record Pt has some R sided weakness, bilateral leg weakness at baseline. (30 Jan 2017 18:29).    today pt was noted to have distension of lower abdomen and urinary retention of more than 1100 ccs of urine , managed with Mcfarland Catheter.      PAST MEDICAL & SURGICAL HISTORY:  High cholesterol  [place] chorea    Scar of surgery in lower mid abdomen , nature of surgery not known. pt cannot give history.      Allergies    No Known Allergies            SOCIAL HISTORY    · Marital Status		  · Lives With	spouse	    Substance Use History:  · Substance Use	never used	    Alcohol Use History:  · Have you ever consumed alcohol	never	    Tobacco Usage:  · Tobacco Usage: Never smoker	    Passive Smoke Exposure:  · Passive Smoke Exposure	Unknown	      FAMILY HISTORY:  No pertinent family history in first degree relatives      MEDICATIONS  (STANDING):  enoxaparin Injectable 40milliGRAM(s) SubCutaneous daily  simvastatin 20milliGRAM(s) Oral at bedtime  dextrose 5% + sodium chloride 0.45%. 1000milliLiter(s) IV Continuous <Continuous>  piperacillin/tazobactam IVPB. 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 No wt loss, fevers, chills, night sweats  Eyes:  Good vision, no reported pain  ENT:  No  runny nose, dysphagia  CV:  No palpitatioins, hypo/hypertension  Resp:  No dyspnea, cough, tachypnea, wheezing  GI:  No  nausea, vomiting, diarrhea, constipatiion  :  No bleeding,Has Mcfarland Catheter for urinary retention  Muscle:   weakness both lower exremities  Neuro:  +weakness, +memory problems  Endocrine:  No polyuria, polydypsia, cold/heat intolerance  Heme:  No petechiae, ecchymosis, easy bruisability  Skin:  No rash, tattoos, scars, edema      Physical Exam:    Vital Signs:  Vital Signs Last 24 Hrs  T(C): 36.8, Max: 38.9 (02-03 @ 23:57)  T(F): 98.2, Max: 102.1 (02-03 @ 23:57)  HR: 102 (94 - 105)  BP: 103/60 (103/60 - 156/97)  BP(mean): --  RR: 16 (16 - 18)  SpO2: 98% (96% - 98%)  Daily     Daily   I&O's Summary  I & Os for 24h ending 04 Feb 2017 07:00  =============================================  IN: 4025 ml / OUT: 0 ml / NET: 4025 ml    I & Os for current day (as of 04 Feb 2017 21:20)  =============================================  IN: 1720 ml / OUT: 0 ml / NET: 1720 ml      General:  Appears stated age,  well-nourished, no distress  HEENT:  NC/AT,conjunctivae clear, no thyromegaly, nodules, adenopathy, no JVD  Chest:  Full & symmetric excursion, no increased effort.   Cardiovascular:  Regular rhythm, S1, S2,   Abdomen:  Soft, non-tender, non-distended, normoactive bowel sounds.old healed lower midline scar  Pelvic Exam: deferrred.  Rectal Examination: Deferred.  Extremities:  no edema, pedal pusation are present, no calf tenderness. marked weakness+  Skin:  No rash/erythema/ecchymoses/petechiae/wounds/abscess/warm/dry  Musculoskeletal:  Marked weakness both lower extremities  Neuro/Psych:  [place]'s chorea  LABS:    04 Feb 2017 07:52    142    |  107    |  10     ----------------------------<  126    3.7     |  25     |  0.67     Ca    9.4        04 Feb 2017 07:52            RADIOLOGY & ADDITIONAL STUDIES:
Vascular Attending:        HPI:  61 year old female with PMH of HTN, [place] Chorea bibems presenting to ED due to generalized weakness and family unable to take care of her. Poor historian. History obtained from ED chart and EMS. as per previous record Pt has some R sided weakness, bilateral leg weakness at baseline. (30 Jan 2017 18:29)      PAST MEDICAL & SURGICAL HISTORY:  High cholesterol  [place] chorea  No pertinent past medical history  No significant past surgical history        MEDICATIONS  (STANDING):  enoxaparin Injectable 40milliGRAM(s) SubCutaneous daily  simvastatin 20milliGRAM(s) Oral at bedtime  dextrose 5% + sodium chloride 0.45%. 1000milliLiter(s) IV Continuous <Continuous>  vancomycin  IVPB  IV Intermittent   vancomycin  IVPB 1250milliGRAM(s) IV Intermittent every 12 hours  meropenem IVPB 2000milliGRAM(s) IV Intermittent every 8 hours    MEDICATIONS  (PRN):  acetaminophen   Tablet 650milliGRAM(s) Oral every 6 hours PRN For Temp greater than 38 C (100.4 F)  ibuprofen  Tablet 200milliGRAM(s) Oral every 8 hours PRN Temp >/=102      Allergies    No Known Allergies    Intolerances        SOCIAL HISTORY:      Vital Signs Last 24 Hrs  T(C): 37.2, Max: 39.3 (02-05 @ 19:49)  T(F): 99, Max: 102.8 (02-05 @ 19:49)  HR: 86 (81 - 104)  BP: 123/77 (97/67 - 125/71)  BP(mean): --  RR: 18 (16 - 18)  SpO2: 98% (97% - 99%)    P/E:-   CAROTIDS:- Bilateral carotids with no Bruits. No scars of previous catheterisation.  UPPER EXTREMITIES:- Bilateral radial artery pulses are normal and no ischemia of the Hands. No edema of the arms.  ABDOMEN:- No pulsatile mass in the abdomen and no ascites.  LOWER EXTREMITIES:- Bilateral LE with No Edema and no CVI, No varicose veins, no ulcers.The arterial pulse are examined with palpation and Dopplers and the findings are as follows,    Wounds:- The pt has following wounds and measurements.  Sacrum:- Stage   Unstageable   12x5x0 cms, malodorous, central softening and, suspicion of underlying necrosis  and tissue liquefecation.  Gluteal:-  Legs:-  Hips:-   Feet:-     Pulses:   Right:                                                                          Left:  FEM [ ]2+ [ ]1+ [ ]doppler                                             FEM [ ]2+ [ ]1+ [ ]doppler    POP [ ]2+ [ ]1+ [ ]doppler                                             POP [ ]2+ [ ]1+ [ ]doppler    DP [ ]2+ [ ]1+ [ ]doppler                                                DP [ ]2+ [ ]1+ [ ]doppler  PT[ ]2+ [ ]1+ [ ]doppler                                                  PT [ ]2+ [ ]1+ [ ]doppler      LABS:                        10.3   10.2  )-----------( 263      ( 06 Feb 2017 06:49 )             29.8     06 Feb 2017 06:49    143    |  108    |  14     ----------------------------<  116    4.0     |  28     |  0.87     Ca    8.9        06 Feb 2017 06:49      PT/INR - ( 06 Feb 2017 06:49 )   PT: 16.3 sec;   INR: 1.45 ratio         PTT - ( 06 Feb 2017 06:49 )  PTT:26.4 sec      RADIOLOGY & ADDITIONAL STUDIES    Impression and Plan: pt has unstageable sacral pressure ulcer with infn and cellulitis, needs I&D and debridement.
Infectious Diseases - Attending at Dr. Alfaro    HPI:  61 year old female with PMH of HTN, [place] Chorea bibems presenting to ED due to generalized weakness and family unable to take care of her. Poor historian. History obtained from ED chart and EMS. as per previous record Pt has some R sided weakness, bilateral leg weakness at baseline. (30 Jan 2017 18:29)  pt is having intermittent fever in the last few days. Blood culture, wbc, pct all wnl .she denies cough,cheat pain,no vomitting diarrhea  abdominla pain ,dysuira or shortness of breath.She has a sacral decub.      PAST MEDICAL & SURGICAL HISTORY:  High cholesterol  [place] chorea  No pertinent past medical history  No significant past surgical history      Allergies    No Known Allergies    Intolerances        FAMILY HISTORY:  No pertinent family history in first degree relatives      SOCIAL HISTORY: no smoking,alcohol and substance abuse    REVIEW OF SYSTEMS:    pt's speech not clear as per [place]    MEDICATIONS  (STANDING):  enoxaparin Injectable 40milliGRAM(s) SubCutaneous daily  simvastatin 20milliGRAM(s) Oral at bedtime  dextrose 5% + sodium chloride 0.45%. 1000milliLiter(s) IV Continuous <Continuous>  piperacillin/tazobactam IVPB. 3.375Gram(s) IV Intermittent every 8 hours  vancomycin  IVPB  IV Intermittent     MEDICATIONS  (PRN):  acetaminophen   Tablet 650milliGRAM(s) Oral every 6 hours PRN For Temp greater than 38 C (100.4 F)      Vital Signs Last 24 Hrs  T(C): 37.6, Max: 37.9 (02-02 @ 22:51)  T(F): 99.6, Max: 100.2 (02-02 @ 22:51)  HR: 84 (81 - 86)  BP: 106/81 (106/81 - 135/77)  BP(mean): --  RR: 16 (15 - 17)  SpO2: 98% (97% - 99%)    PHYSICAL EXAM:    Constitutional: NAD, well-groomed, well-developed  HEENT: PERRLA, EOMI, Normal Hearing, MMM  Neck: No LAD, No JVD  Back: Normal spine flexure, No CVA tenderness  Respiratory: CTAB/L  Cardiovascular: S1 and S2, RRR, no M/G/R  Gastrointestinal: BS+, soft, NT/ND  Extremities: No peripheral edema  Vascular: 2+ peripheral pulses  Skin: unstable sacral decub with fluctuance+      LABS:                        10.6   9.7   )-----------( 187      ( 02 Feb 2017 06:26 )             31.4     02 Feb 2017 06:26    144    |  108    |  14     ----------------------------<  122    3.7     |  26     |  0.52     Ca    8.5        02 Feb 2017 06:26            MICROBIOLOGY:  RECENT CULTURES:  02-02 .Blood Blood XXXX XXXX   No growth to date.          RADIOLOGY & ADDITIONAL STUDIES:

## 2017-02-06 NOTE — PROGRESS NOTE ADULT - SUBJECTIVE AND OBJECTIVE BOX
SURGERY PROGRESS HPI:  Pt seen and examined at bedside. Pain is improved. Pt denies complaints.  No acute events overnight.  Pt tolerating diet. Pt denies nausea and vomiting.   Pt denies chest pain, SOB, dizziness.      Vital Signs Last 24 Hrs  T(C): 37.2, Max: 39.3 (02-05 @ 19:49)  T(F): 99, Max: 102.8 (02-05 @ 19:49)  HR: 86 (81 - 104)  BP: 123/77 (97/67 - 125/71)  BP(mean): --  RR: 18 (16 - 18)  SpO2: 98% (97% - 99%)      PHYSICAL EXAM:    GENERAL: NAD  HEAD:  Atraumatic, Normocephalic  CHEST/LUNG: Clear to ausculation, bilaterally   HEART: RRR S1S2  ABDOMEN: non distended, +BS, soft, non tender, no guarding  : Obrien intact draining clear, yellow urine, output: 2100cc/24hrs, no suprapubic tenderness  BACK: Midline sacram ulcer and dressing clean/dry/intact. No pus/blood. No active drainage. Minimal tenderness. Dressing removed and irrigated with Normal Saline. Packing replaced and 4x4 placed over. Abdominal pads place on top and tape placed for pressure. Pt tolerated dressing change well.   EXTREMITIES:  calf soft, non tender     I&O's Detail  I & Os for 24h ending 06 Feb 2017 07:00  =============================================  IN:    dextrose 5% + sodium chloride 0.45%.: 2400 ml    Solution: 500 ml    Oral Fluid: 486 ml    Solution: 200 ml    Solution: 100 ml    Total IN: 3686 ml  ---------------------------------------------  OUT:    Indwelling Catheter - Urethral: 2100 ml    Total OUT: 2100 ml  ---------------------------------------------  Total NET: 1586 ml    I & Os for current day (as of 06 Feb 2017 12:25)  =============================================  IN:    Total IN: 0 ml  ---------------------------------------------  OUT:    Total OUT: 0 ml  ---------------------------------------------  Total NET: 0 ml      LABS:                        10.3   10.2  )-----------( 263      ( 06 Feb 2017 06:49 )             29.8     06 Feb 2017 06:49    143    |  108    |  14     ----------------------------<  116    4.0     |  28     |  0.87     Ca    8.9        06 Feb 2017 06:49      PT/INR - ( 06 Feb 2017 06:49 )   PT: 16.3 sec;   INR: 1.45 ratio         PTT - ( 06 Feb 2017 06:49 )  PTT:26.4 sec    Sacrum cx: gram + cocci in clusters, gram negative rods      Assessment:61 year old female PMH [place]ich[place] chorea, HLD a/w dehydration, urinary retention likely 2/2 neurogenic bladder s/p bedside debridement of sacral ulcer 02/05/17. Fever resolved after debridement.     Plan:  - continue with obrien catheter, monitor output  - f/u labs, trend renal function  - monitor vitals   - continue with merrem and vanco per ID  - pain management PRN  - c/w DVT ppx, lovenox  - continue with all medical management  -will discuss pt with surgical attending

## 2017-02-07 DIAGNOSIS — A68.9 RELAPSING FEVER, UNSPECIFIED: ICD-10-CM

## 2017-02-07 LAB
-  AMIKACIN: SIGNIFICANT CHANGE UP
-  AMPICILLIN/SULBACTAM: SIGNIFICANT CHANGE UP
-  AMPICILLIN: SIGNIFICANT CHANGE UP
-  AZTREONAM: SIGNIFICANT CHANGE UP
-  CEFAZOLIN: SIGNIFICANT CHANGE UP
-  CEFEPIME: SIGNIFICANT CHANGE UP
-  CEFOXITIN: SIGNIFICANT CHANGE UP
-  CEFTAZIDIME: SIGNIFICANT CHANGE UP
-  CEFTRIAXONE: SIGNIFICANT CHANGE UP
-  CIPROFLOXACIN: SIGNIFICANT CHANGE UP
-  ERTAPENEM: SIGNIFICANT CHANGE UP
-  GENTAMICIN: SIGNIFICANT CHANGE UP
-  IMIPENEM: SIGNIFICANT CHANGE UP
-  LEVOFLOXACIN: SIGNIFICANT CHANGE UP
-  MEROPENEM: SIGNIFICANT CHANGE UP
-  PIPERACILLIN/TAZOBACTAM: SIGNIFICANT CHANGE UP
-  TOBRAMYCIN: SIGNIFICANT CHANGE UP
-  TRIMETHOPRIM/SULFAMETHOXAZOLE: SIGNIFICANT CHANGE UP
ALBUMIN SERPL ELPH-MCNC: 2 G/DL — LOW (ref 3.3–5)
ALP SERPL-CCNC: 47 U/L — SIGNIFICANT CHANGE UP (ref 40–120)
ALT FLD-CCNC: 30 U/L — SIGNIFICANT CHANGE UP (ref 12–78)
ANION GAP SERPL CALC-SCNC: 7 MMOL/L — SIGNIFICANT CHANGE UP (ref 5–17)
AST SERPL-CCNC: 24 U/L — SIGNIFICANT CHANGE UP (ref 15–37)
BILIRUB SERPL-MCNC: 0.2 MG/DL — SIGNIFICANT CHANGE UP (ref 0.2–1.2)
BLD GP AB SCN SERPL QL: SIGNIFICANT CHANGE UP
BUN SERPL-MCNC: 10 MG/DL — SIGNIFICANT CHANGE UP (ref 7–23)
CALCIUM SERPL-MCNC: 8.8 MG/DL — SIGNIFICANT CHANGE UP (ref 8.5–10.1)
CHLORIDE SERPL-SCNC: 105 MMOL/L — SIGNIFICANT CHANGE UP (ref 96–108)
CO2 SERPL-SCNC: 28 MMOL/L — SIGNIFICANT CHANGE UP (ref 22–31)
CREAT SERPL-MCNC: 0.77 MG/DL — SIGNIFICANT CHANGE UP (ref 0.5–1.3)
CULTURE RESULTS: SIGNIFICANT CHANGE UP
GLUCOSE SERPL-MCNC: 114 MG/DL — HIGH (ref 70–99)
HCT VFR BLD CALC: 29.6 % — LOW (ref 34.5–45)
HGB BLD-MCNC: 10.1 G/DL — LOW (ref 11.5–15.5)
MCHC RBC-ENTMCNC: 27.5 PG — SIGNIFICANT CHANGE UP (ref 27–34)
MCHC RBC-ENTMCNC: 34.1 GM/DL — SIGNIFICANT CHANGE UP (ref 32–36)
MCV RBC AUTO: 80.6 FL — SIGNIFICANT CHANGE UP (ref 80–100)
METHOD TYPE: SIGNIFICANT CHANGE UP
PLATELET # BLD AUTO: 305 K/UL — SIGNIFICANT CHANGE UP (ref 150–400)
POTASSIUM SERPL-MCNC: 3.9 MMOL/L — SIGNIFICANT CHANGE UP (ref 3.5–5.3)
POTASSIUM SERPL-SCNC: 3.9 MMOL/L — SIGNIFICANT CHANGE UP (ref 3.5–5.3)
PROT SERPL-MCNC: 6.8 GM/DL — SIGNIFICANT CHANGE UP (ref 6–8.3)
RBC # BLD: 3.67 M/UL — LOW (ref 3.8–5.2)
RBC # FLD: 11.5 % — SIGNIFICANT CHANGE UP (ref 11–15)
SODIUM SERPL-SCNC: 140 MMOL/L — SIGNIFICANT CHANGE UP (ref 135–145)
SPECIMEN SOURCE: SIGNIFICANT CHANGE UP
VANCOMYCIN TROUGH SERPL-MCNC: 17.4 UG/ML — SIGNIFICANT CHANGE UP (ref 10–20)
WBC # BLD: 8.9 K/UL — SIGNIFICANT CHANGE UP (ref 3.8–10.5)
WBC # FLD AUTO: 8.9 K/UL — SIGNIFICANT CHANGE UP (ref 3.8–10.5)

## 2017-02-07 PROCEDURE — 99232 SBSQ HOSP IP/OBS MODERATE 35: CPT

## 2017-02-07 PROCEDURE — 99233 SBSQ HOSP IP/OBS HIGH 50: CPT

## 2017-02-07 RX ADMIN — SODIUM CHLORIDE 100 MILLILITER(S): 9 INJECTION, SOLUTION INTRAVENOUS at 05:22

## 2017-02-07 RX ADMIN — Medication 200 MILLIGRAM(S): at 17:08

## 2017-02-07 RX ADMIN — MEROPENEM 200 MILLIGRAM(S): 1 INJECTION INTRAVENOUS at 13:43

## 2017-02-07 RX ADMIN — Medication 166.67 MILLIGRAM(S): at 17:08

## 2017-02-07 RX ADMIN — SIMVASTATIN 20 MILLIGRAM(S): 20 TABLET, FILM COATED ORAL at 21:48

## 2017-02-07 RX ADMIN — ENOXAPARIN SODIUM 40 MILLIGRAM(S): 100 INJECTION SUBCUTANEOUS at 11:21

## 2017-02-07 RX ADMIN — Medication 166.67 MILLIGRAM(S): at 05:21

## 2017-02-07 RX ADMIN — Medication 200 MILLIGRAM(S): at 17:11

## 2017-02-07 RX ADMIN — MEROPENEM 200 MILLIGRAM(S): 1 INJECTION INTRAVENOUS at 05:21

## 2017-02-07 RX ADMIN — MEROPENEM 200 MILLIGRAM(S): 1 INJECTION INTRAVENOUS at 21:48

## 2017-02-07 NOTE — PROGRESS NOTE ADULT - SUBJECTIVE AND OBJECTIVE BOX
Patient is a 61y old  Female who presents with a chief complaint of Generalized weakness (02 Feb 2017 12:58)      INTERVAL HPI / OVERNIGHT EVENTS:fever resolving    MEDICATIONS  (STANDING):  enoxaparin Injectable 40milliGRAM(s) SubCutaneous daily  simvastatin 20milliGRAM(s) Oral at bedtime  dextrose 5% + sodium chloride 0.45%. 1000milliLiter(s) IV Continuous <Continuous>  vancomycin  IVPB  IV Intermittent   vancomycin  IVPB 1250milliGRAM(s) IV Intermittent every 12 hours  meropenem IVPB 2000milliGRAM(s) IV Intermittent every 8 hours    MEDICATIONS  (PRN):  acetaminophen   Tablet 650milliGRAM(s) Oral every 6 hours PRN For Temp greater than 38 C (100.4 F)  ibuprofen  Tablet 200milliGRAM(s) Oral every 8 hours PRN Temp >/=102      Vital Signs Last 24 Hrs  T(C): 39.1, Max: 39.1 (02-07 @ 17:15)  T(F): 102.4, Max: 102.4 (02-07 @ 17:15)  HR: 100 (87 - 100)  BP: 155/95 (111/67 - 155/95)  BP(mean): --  RR: 18 (16 - 19)  SpO2: 100% (95% - 100%)    PHYSICAL EXAM:    Constitutional: NAD, well-groomed, well-developed  Respiratory: CTAB/L  Cardiovascular: S1 and S2, RRR, no M/G/R  Gastrointestinal: BS+, soft, NT/ND  Extremities: No peripheral edema  Vascular: 2+ peripheral pulses  Skin: sacral decub s/p debridement      LABS:                        10.1   8.9   )-----------( 305      ( 07 Feb 2017 08:03 )             29.6     07 Feb 2017 08:04    140    |  105    |  10     ----------------------------<  114    3.9     |  28     |  0.77     Ca    8.8        07 Feb 2017 08:04    TPro  6.8    /  Alb  2.0    /  TBili  0.2    /  DBili  x      /  AST  24     /  ALT  30     /  AlkPhos  47     07 Feb 2017 08:04    PT/INR - ( 06 Feb 2017 06:49 )   PT: 16.3 sec;   INR: 1.45 ratio         PTT - ( 06 Feb 2017 06:49 )  PTT:26.4 sec        MICROBIOLOGY:  RECENT CULTURES:  02-05 .Tissue Other, sacrum Escherichia coli   Upon re-evaluation of gram stain:  Rare White blood cells  Numerous Gram positive cocci in pairs, chains and clusters  Moderate Gram Negative Rods   Moderate Escherichia coli  Moderate Streptococcus agalactiae (Group B)    02-05 .Surgical Swab sacrum XXXX XXXX   Few Escherichia coli  Few Streptococcus agalactiae (Group B)  See previous culture [id number]    02-05 .Urine Catheterized XXXX XXXX   No growth    02-02 .Blood Blood XXXX XXXX   No growth at 5 days.          RADIOLOGY & ADDITIONAL STUDIES:

## 2017-02-07 NOTE — PROGRESS NOTE ADULT - SUBJECTIVE AND OBJECTIVE BOX
CC/F/U for:    INTERVAL HPI/OVERNIGHT EVENTS:  Pt seen and examined at bedside.     Allergies/Intolerance: No Known Allergies      MEDICATIONS  (STANDING):  enoxaparin Injectable 40milliGRAM(s) SubCutaneous daily  simvastatin 20milliGRAM(s) Oral at bedtime  dextrose 5% + sodium chloride 0.45%. 1000milliLiter(s) IV Continuous <Continuous>  vancomycin  IVPB  IV Intermittent   vancomycin  IVPB 1250milliGRAM(s) IV Intermittent every 12 hours  meropenem IVPB 2000milliGRAM(s) IV Intermittent every 8 hours    MEDICATIONS  (PRN):  acetaminophen   Tablet 650milliGRAM(s) Oral every 6 hours PRN For Temp greater than 38 C (100.4 F)  ibuprofen  Tablet 200milliGRAM(s) Oral every 8 hours PRN Temp >/=102        ROS: as above; all other systems reviewed and wnl      PHYSICAL EXAMINATION:  Vital Signs Last 24 Hrs  T(C): 37.2, Max: 38.8 (02-06 @ 18:43)  T(F): 99, Max: 101.8 (02-06 @ 18:43)  HR: 91 (87 - 102)  BP: 111/67 (110/71 - 139/85)  BP(mean): --  RR: 19 (16 - 19)  SpO2: 95% (95% - 100%)  CAPILLARY BLOOD GLUCOSE      GENERAL: NAD, well-groomed, well-developed  HEAD:  atraumatic, normocephalic  EYES: sclera anicteric  ENMT: mucous membranes moist  NECK: supple, No JVD  CHEST/LUNG: clear to auscultation bilaterally; no rales, rhonchi, or wheezing b/l  HEART: normal S1, S2  ABDOMEN: BS+, soft, ND, NT   EXTREMITIES:  pulses palpable; no clubbing, cyanosis, or edema b/l LEs  NEURO: awake, alert, interactive; moves all extremities  SKIN: no rashes or lesions      LABS:                        10.1   8.9   )-----------( 305      ( 07 Feb 2017 08:03 )             29.6     07 Feb 2017 08:04    140    |  105    |  10     ----------------------------<  114    3.9     |  28     |  0.77     Ca    8.8        07 Feb 2017 08:04    TPro  6.8    /  Alb  2.0    /  TBili  0.2    /  DBili  x      /  AST  24     /  ALT  30     /  AlkPhos  47     07 Feb 2017 08:04    PT/INR - ( 06 Feb 2017 06:49 )   PT: 16.3 sec;   INR: 1.45 ratio         PTT - ( 06 Feb 2017 06:49 )  PTT:26.4 sec        RADIOLOGY & ADDITIONAL TESTS:      ASSESSMENT AND PLAN: CC/F/U for: [place]'s chorea, sacral decub, fevers    INTERVAL HPI/OVERNIGHT EVENTS:  Pt seen and examined at bedside. No complaints; surgery did not debride yesterday; febrile yesterday to 101.8, and 101.4    Allergies/Intolerance: No Known Allergies      MEDICATIONS  (STANDING):  enoxaparin Injectable 40milliGRAM(s) SubCutaneous daily  simvastatin 20milliGRAM(s) Oral at bedtime  dextrose 5% + sodium chloride 0.45%. 1000milliLiter(s) IV Continuous <Continuous>  vancomycin  IVPB  IV Intermittent   vancomycin  IVPB 1250milliGRAM(s) IV Intermittent every 12 hours  meropenem IVPB 2000milliGRAM(s) IV Intermittent every 8 hours    MEDICATIONS  (PRN):  acetaminophen   Tablet 650milliGRAM(s) Oral every 6 hours PRN For Temp greater than 38 C (100.4 F)  ibuprofen  Tablet 200milliGRAM(s) Oral every 8 hours PRN Temp >/=102        ROS: as above; all other systems reviewed and wnl      PHYSICAL EXAMINATION:  Vital Signs Last 24 Hrs  T(C): 37.2, Max: 38.8 (02-06 @ 18:43)  T(F): 99, Max: 101.8 (02-06 @ 18:43)  HR: 91 (87 - 102)  BP: 111/67 (110/71 - 139/85)  BP(mean): --  RR: 19 (16 - 19)  SpO2: 95% (95% - 100%)  CAPILLARY BLOOD GLUCOSE      GENERAL: frail, chronically-ill appearing, middle-aged female  HEAD:  atraumatic, normocephalic  EYES: sclera anicteric  ENMT: mucous membranes dry  NECK: supple, No JVD  CHEST/LUNG: respirations unlabored; BS decr bases, air entry symmetric  HEART: normal S1, S2  ABDOMEN: BS+, soft, ND, NT  EXTREMITIES:  pulses palpable; no clubbing, cyanosis, or edema b/l LEs  NEURO: awake, alert, interactive; moves all extremities  SKIN: sacral decub      LABS:                        10.1   8.9   )-----------( 305      ( 07 Feb 2017 08:03 )             29.6     07 Feb 2017 08:04    140    |  105    |  10     ----------------------------<  114    3.9     |  28     |  0.77     Ca    8.8        07 Feb 2017 08:04    TPro  6.8    /  Alb  2.0    /  TBili  0.2    /  DBili  x      /  AST  24     /  ALT  30     /  AlkPhos  47     07 Feb 2017 08:04    PT/INR - ( 06 Feb 2017 06:49 )   PT: 16.3 sec;   INR: 1.45 ratio         PTT - ( 06 Feb 2017 06:49 )  PTT:26.4 sec        ASSESSMENT AND PLAN:  61F, HTN, HL, [place]'s chorea, sacral decub, being tx'd for generalized weakness in setting of adult failure to thrive, volume depletion, rhabdo w/CK 1077 on admit, and recurrent fevers in setting of possible acute infection 2/2 sacral decub on IV abxs vs 2/2 [place] chorea; UA neg; also w/urinary retention of 1100mL (2/4/17) s/p Mcfarland; s/p bedside debridement of sacral decub 2/5/17; pending possible further debridement as having recurrent fevers    ID/MSK: sacral decub infection  -IV abxs cvrg as per ID consult w/zosyn/vanco  -following vancomycin levels, last 17.4 (2/7)  -blood cxs neg x1 (2/2), urine cx neg (2/5); obtain repeat blood cxs today given recurrent fevers  -sacral decub debrided by Wound Care/Dr Galeana at bedside 2/5; f/u wound cxs; may need further debridement given ongoing fevers  -rhabdo, now resolved; no e/o renal dysfunction    : urinary retention, s/p Mcfarland placement; per  (Dr Cleaning), likely neurogenic bladder  - continue Mcfarland     CV:   -cardioprotective statin for hyperlipidemia; rhabdo resolved, CKs have normalized  -volume depletion - continue IVFs as currently    OTHER:  -dvt prophylaxis  --generalized weakness/failure to thrive in setting of acute infection, debility; PT eval/SW for likely rehab placement at discharge

## 2017-02-07 NOTE — PROGRESS NOTE ADULT - SUBJECTIVE AND OBJECTIVE BOX
61y Female admitted with DEHYDRATION, FALIURE TO THRIVE IN ADULT, [place] CHOREA  DEHYDRATION, FALIURE TO THRIVE IN ADULT, [place] CHOREA  BODY PAIN    Patient seen and examined bedside resting comfortably.  No complaints offered.   Obrien catheter in place.    T(F): 99.6, Max: 101.8 (02-06 @ 18:43)  HR: 89 (89 - 102)  BP: 139/85 (110/71 - 139/85)  RR: 16 (16 - 16)  SpO2: 96% (96% - 100%)    PHYSICAL EXAM:  General: NAD, WDWN, alert  HEENT: NCAT, EOMI, conjunctiva clear  CV: +S1S2 regular rate and rhythm  Lung: clear to ausculation bilaterally, respirations nonlabored, good inspiratory effort  Abdomen: soft, NTND. Normactive BS  Sacrum: Dressings over midline sacral decubitus ulcer removed. Wound was deep, open, moist, and malodorous with gray slough firmly adherent to tissue. No active bleeding visualized. Surrounding skin edge eschar. Wound was irrigated with normal saline and then dried. Packed with 1" iodoform and covered with sacral foam dressing.  Extremities: no pedal edema or calf tenderness noted   : Obrien catheter in place draining clear yellow urine.    LABS:                        10.1   8.9   )-----------( 305      ( 07 Feb 2017 08:03 )             29.6     07 Feb 2017 08:04    140    |  105    |  10     ----------------------------<  114    3.9     |  28     |  0.77     Ca    8.8        07 Feb 2017 08:04    TPro  6.8    /  Alb  2.0    /  TBili  0.2    /  DBili  x      /  AST  24     /  ALT  30     /  AlkPhos  47     07 Feb 2017 08:04    PT/INR - ( 06 Feb 2017 06:49 )   PT: 16.3 sec;   INR: 1.45 ratio      PTT - ( 06 Feb 2017 06:49 )  PTT:26.4 sec    I&O's Detail  I & Os for 24h ending 07 Feb 2017 07:00  =============================================  IN:    Oral Fluid: 450 ml    Total IN: 450 ml  ---------------------------------------------  OUT:    Total OUT: 0 ml  ---------------------------------------------  Total NET: 450 ml    I & Os for current day (as of 07 Feb 2017 11:11)  =============================================  IN:    Oral Fluid: 350 ml    Total IN: 350 ml  ---------------------------------------------  OUT:    Total OUT: 0 ml  ---------------------------------------------  Total NET: 350 ml      Impression: 61y Female with PMHx [place] chorea and HLD a/w dehydration, urinary retention likely 2/2 neurogenic bladder s/p bedside debridement of sacral ulcer 2/5/17    Plan:  -possible repeat debridement of sacral ulcer vs. collagenase  - continue local wound care  -continue abx as per ID  -continue obrien catheter, urine output monitoring and trend renal function  -continue VTE with lovenox  -continue medical management/supportive care  -will discuss with surgical attendings for further recommendations

## 2017-02-07 NOTE — PROVIDER CONTACT NOTE (CRITICAL VALUE NOTIFICATION) - TEST AND RESULT REPORTED:
[id number]
Surgical tissuecx gram stain: rare WBC, numerous gram (+) cocci in clusters, moderate gram (-) rods
tissue cx from 2/5 gram stain: rare WBC, numerous gram (+) cocci in pairs, chains and clusters, moderate gram (-) rods

## 2017-02-07 NOTE — PROGRESS NOTE ADULT - PROBLEM SELECTOR PLAN 1
sec to sacral decub infection  repaet vanco level in am  cont vanco and meropenem  F/u on c/s and change to oral antibiotics soon

## 2017-02-08 ENCOUNTER — RESULT REVIEW (OUTPATIENT)
Age: 62
End: 2017-02-08

## 2017-02-08 ENCOUNTER — TRANSCRIPTION ENCOUNTER (OUTPATIENT)
Age: 62
End: 2017-02-08

## 2017-02-08 DIAGNOSIS — T14.8 OTHER INJURY OF UNSPECIFIED BODY REGION: ICD-10-CM

## 2017-02-08 LAB
-  AMPICILLIN: SIGNIFICANT CHANGE UP
-  CIPROFLOXACIN: SIGNIFICANT CHANGE UP
-  ERYTHROMYCIN: SIGNIFICANT CHANGE UP
-  TETRACYCLINE: SIGNIFICANT CHANGE UP
-  VANCOMYCIN: SIGNIFICANT CHANGE UP
ANION GAP SERPL CALC-SCNC: 8 MMOL/L — SIGNIFICANT CHANGE UP (ref 5–17)
BUN SERPL-MCNC: 9 MG/DL — SIGNIFICANT CHANGE UP (ref 7–23)
CALCIUM SERPL-MCNC: 8.6 MG/DL — SIGNIFICANT CHANGE UP (ref 8.5–10.1)
CHLORIDE SERPL-SCNC: 104 MMOL/L — SIGNIFICANT CHANGE UP (ref 96–108)
CO2 SERPL-SCNC: 27 MMOL/L — SIGNIFICANT CHANGE UP (ref 22–31)
CREAT SERPL-MCNC: 0.72 MG/DL — SIGNIFICANT CHANGE UP (ref 0.5–1.3)
GLUCOSE SERPL-MCNC: 116 MG/DL — HIGH (ref 70–99)
HCG SERPL-ACNC: 5 MIU/ML — SIGNIFICANT CHANGE UP
HCT VFR BLD CALC: 29 % — LOW (ref 34.5–45)
HGB BLD-MCNC: 10 G/DL — LOW (ref 11.5–15.5)
MCHC RBC-ENTMCNC: 28.3 PG — SIGNIFICANT CHANGE UP (ref 27–34)
MCHC RBC-ENTMCNC: 34.6 GM/DL — SIGNIFICANT CHANGE UP (ref 32–36)
MCV RBC AUTO: 81.6 FL — SIGNIFICANT CHANGE UP (ref 80–100)
METHOD TYPE: SIGNIFICANT CHANGE UP
PLATELET # BLD AUTO: 289 K/UL — SIGNIFICANT CHANGE UP (ref 150–400)
POTASSIUM SERPL-MCNC: 3.7 MMOL/L — SIGNIFICANT CHANGE UP (ref 3.5–5.3)
POTASSIUM SERPL-SCNC: 3.7 MMOL/L — SIGNIFICANT CHANGE UP (ref 3.5–5.3)
RBC # BLD: 3.56 M/UL — LOW (ref 3.8–5.2)
RBC # FLD: 11.6 % — SIGNIFICANT CHANGE UP (ref 11–15)
SODIUM SERPL-SCNC: 139 MMOL/L — SIGNIFICANT CHANGE UP (ref 135–145)
WBC # BLD: 8.4 K/UL — SIGNIFICANT CHANGE UP (ref 3.8–10.5)
WBC # FLD AUTO: 8.4 K/UL — SIGNIFICANT CHANGE UP (ref 3.8–10.5)

## 2017-02-08 PROCEDURE — 99232 SBSQ HOSP IP/OBS MODERATE 35: CPT

## 2017-02-08 PROCEDURE — 99233 SBSQ HOSP IP/OBS HIGH 50: CPT

## 2017-02-08 RX ORDER — ONDANSETRON 8 MG/1
4 TABLET, FILM COATED ORAL ONCE
Qty: 0 | Refills: 0 | Status: DISCONTINUED | OUTPATIENT
Start: 2017-02-08 | End: 2017-02-08

## 2017-02-08 RX ORDER — ACETAMINOPHEN 500 MG
650 TABLET ORAL ONCE
Qty: 0 | Refills: 0 | Status: COMPLETED | OUTPATIENT
Start: 2017-02-08 | End: 2017-02-08

## 2017-02-08 RX ORDER — SODIUM CHLORIDE 9 MG/ML
1000 INJECTION, SOLUTION INTRAVENOUS
Qty: 0 | Refills: 0 | Status: DISCONTINUED | OUTPATIENT
Start: 2017-02-08 | End: 2017-02-08

## 2017-02-08 RX ORDER — FENTANYL CITRATE 50 UG/ML
50 INJECTION INTRAVENOUS
Qty: 0 | Refills: 0 | Status: DISCONTINUED | OUTPATIENT
Start: 2017-02-08 | End: 2017-02-08

## 2017-02-08 RX ORDER — ENOXAPARIN SODIUM 100 MG/ML
40 INJECTION SUBCUTANEOUS DAILY
Qty: 0 | Refills: 0 | Status: DISCONTINUED | OUTPATIENT
Start: 2017-02-09 | End: 2017-02-17

## 2017-02-08 RX ORDER — SIMVASTATIN 20 MG/1
20 TABLET, FILM COATED ORAL AT BEDTIME
Qty: 0 | Refills: 0 | Status: DISCONTINUED | OUTPATIENT
Start: 2017-02-08 | End: 2017-02-17

## 2017-02-08 RX ORDER — ACETAMINOPHEN 500 MG
650 TABLET ORAL EVERY 6 HOURS
Qty: 0 | Refills: 0 | Status: DISCONTINUED | OUTPATIENT
Start: 2017-02-08 | End: 2017-02-17

## 2017-02-08 RX ORDER — MEROPENEM 1 G/30ML
2000 INJECTION INTRAVENOUS EVERY 8 HOURS
Qty: 0 | Refills: 0 | Status: DISCONTINUED | OUTPATIENT
Start: 2017-02-08 | End: 2017-02-09

## 2017-02-08 RX ORDER — VANCOMYCIN HCL 1 G
1250 VIAL (EA) INTRAVENOUS EVERY 12 HOURS
Qty: 0 | Refills: 0 | Status: DISCONTINUED | OUTPATIENT
Start: 2017-02-08 | End: 2017-02-10

## 2017-02-08 RX ORDER — SODIUM CHLORIDE 9 MG/ML
1000 INJECTION, SOLUTION INTRAVENOUS
Qty: 0 | Refills: 0 | Status: DISCONTINUED | OUTPATIENT
Start: 2017-02-08 | End: 2017-02-17

## 2017-02-08 RX ORDER — IBUPROFEN 200 MG
200 TABLET ORAL EVERY 8 HOURS
Qty: 0 | Refills: 0 | Status: DISCONTINUED | OUTPATIENT
Start: 2017-02-08 | End: 2017-02-17

## 2017-02-08 RX ADMIN — Medication 166.67 MILLIGRAM(S): at 06:01

## 2017-02-08 RX ADMIN — Medication 650 MILLIGRAM(S): at 11:47

## 2017-02-08 RX ADMIN — SODIUM CHLORIDE 100 MILLILITER(S): 9 INJECTION, SOLUTION INTRAVENOUS at 18:27

## 2017-02-08 RX ADMIN — MEROPENEM 200 MILLIGRAM(S): 1 INJECTION INTRAVENOUS at 06:01

## 2017-02-08 RX ADMIN — Medication 650 MILLIGRAM(S): at 20:11

## 2017-02-08 RX ADMIN — SODIUM CHLORIDE 100 MILLILITER(S): 9 INJECTION, SOLUTION INTRAVENOUS at 04:22

## 2017-02-08 RX ADMIN — SIMVASTATIN 20 MILLIGRAM(S): 20 TABLET, FILM COATED ORAL at 21:56

## 2017-02-08 RX ADMIN — SODIUM CHLORIDE 75 MILLILITER(S): 9 INJECTION, SOLUTION INTRAVENOUS at 15:32

## 2017-02-08 RX ADMIN — MEROPENEM 200 MILLIGRAM(S): 1 INJECTION INTRAVENOUS at 21:56

## 2017-02-08 RX ADMIN — Medication 166.67 MILLIGRAM(S): at 18:25

## 2017-02-08 NOTE — PROGRESS NOTE ADULT - SUBJECTIVE AND OBJECTIVE BOX
Patient is a 61y old  Female who presents with a chief complaint of Generalized weakness (02 Feb 2017 12:58)      INTERVAL HPI / OVERNIGHT EVENTS: fever +, denies any c/o    MEDICATIONS  (STANDING):  enoxaparin Injectable 40milliGRAM(s) SubCutaneous daily  simvastatin 20milliGRAM(s) Oral at bedtime  dextrose 5% + sodium chloride 0.45%. 1000milliLiter(s) IV Continuous <Continuous>  vancomycin  IVPB  IV Intermittent   vancomycin  IVPB 1250milliGRAM(s) IV Intermittent every 12 hours  meropenem IVPB 2000milliGRAM(s) IV Intermittent every 8 hours    MEDICATIONS  (PRN):  acetaminophen   Tablet 650milliGRAM(s) Oral every 6 hours PRN For Temp greater than 38 C (100.4 F)  ibuprofen  Tablet 200milliGRAM(s) Oral every 8 hours PRN Temp >/=102      Vital Signs Last 24 Hrs  T(C): 37.9, Max: 39.1 (02-07 @ 17:15)  T(F): 100.2, Max: 102.4 (02-07 @ 17:15)  HR: 92 (83 - 100)  BP: 135/80 (115/64 - 155/95)  BP(mean): --  RR: 18 (16 - 18)  SpO2: 97% (97% - 100%)    PHYSICAL EXAM:    Constitutional: NAD, well-groomed, well-developed  Respiratory: CTAB/L  Cardiovascular: S1 and S2, RRR, no M/G/R  Gastrointestinal: BS+, soft, NT/ND  Extremities: No peripheral edema  Vascular: 2+ peripheral pulses  Skin: sacral decub+      LABS:                        10.0   8.4   )-----------( 289      ( 08 Feb 2017 08:35 )             29.0     08 Feb 2017 08:35    139    |  104    |  9      ----------------------------<  116    3.7     |  27     |  0.72     Ca    8.6        08 Feb 2017 08:35    TPro  6.8    /  Alb  2.0    /  TBili  0.2    /  DBili  x      /  AST  24     /  ALT  30     /  AlkPhos  47     07 Feb 2017 08:04            MICROBIOLOGY:  RECENT CULTURES:  02-05 .Tissue Other, sacrum Escherichia coli   Upon re-evaluation of gram stain:  Rare White blood cells  Numerous Gram positive cocci in pairs, chains and clusters  Moderate Gram Negative Rods   Moderate Escherichia coli  Moderate Streptococcus agalactiae (Group B)    02-05 .Surgical Swab sacrum XXXX XXXX   Few Escherichia coli  Few Streptococcus agalactiae (Group B)  See previous culture [id number]    02-05 .Urine Catheterized XXXX XXXX   No growth    02-02 .Blood Blood XXXX XXXX   No growth at 5 days.          RADIOLOGY & ADDITIONAL STUDIES:

## 2017-02-08 NOTE — PROGRESS NOTE ADULT - SUBJECTIVE AND OBJECTIVE BOX
CC/F/U for:    INTERVAL HPI/OVERNIGHT EVENTS:  Pt seen and examined at bedside.     Allergies/Intolerance: No Known Allergies      MEDICATIONS  (STANDING):  enoxaparin Injectable 40milliGRAM(s) SubCutaneous daily  simvastatin 20milliGRAM(s) Oral at bedtime  dextrose 5% + sodium chloride 0.45%. 1000milliLiter(s) IV Continuous <Continuous>  vancomycin  IVPB  IV Intermittent   vancomycin  IVPB 1250milliGRAM(s) IV Intermittent every 12 hours  meropenem IVPB 2000milliGRAM(s) IV Intermittent every 8 hours    MEDICATIONS  (PRN):  acetaminophen   Tablet 650milliGRAM(s) Oral every 6 hours PRN For Temp greater than 38 C (100.4 F)  ibuprofen  Tablet 200milliGRAM(s) Oral every 8 hours PRN Temp >/=102        ROS: as above; all other systems reviewed and wnl      PHYSICAL EXAMINATION:  Vital Signs Last 24 Hrs  T(C): 37.9, Max: 39.1 (02-07 @ 17:15)  T(F): 100.2, Max: 102.4 (02-07 @ 17:15)  HR: 92 (83 - 100)  BP: 135/80 (115/64 - 155/95)  BP(mean): --  RR: 18 (16 - 18)  SpO2: 97% (97% - 100%)  CAPILLARY BLOOD GLUCOSE      GENERAL: NAD, well-groomed, well-developed  HEAD:  atraumatic, normocephalic  EYES: sclera anicteric  ENMT: mucous membranes moist  NECK: supple, No JVD  CHEST/LUNG: clear to auscultation bilaterally; no rales, rhonchi, or wheezing b/l  HEART: normal S1, S2  ABDOMEN: BS+, soft, ND, NT   EXTREMITIES:  pulses palpable; no clubbing, cyanosis, or edema b/l LEs  NEURO: awake, alert, interactive; moves all extremities  SKIN: no rashes or lesions      LABS:                        10.0   8.4   )-----------( 289      ( 08 Feb 2017 08:35 )             29.0     08 Feb 2017 08:35    139    |  104    |  9      ----------------------------<  116    3.7     |  27     |  0.72     Ca    8.6        08 Feb 2017 08:35    TPro  6.8    /  Alb  2.0    /  TBili  0.2    /  DBili  x      /  AST  24     /  ALT  30     /  AlkPhos  47     07 Feb 2017 08:04            RADIOLOGY & ADDITIONAL TESTS:      ASSESSMENT AND PLAN: CC/F/U for: Nobles's chorea, sacral decub, fevers    INTERVAL HPI/OVERNIGHT EVENTS:  Pt seen and examined at bedside. No complaints; febrile yesterday to 102.4; for debridement sacral wound today.    Allergies/Intolerance: No Known Allergies      MEDICATIONS  (STANDING):  enoxaparin Injectable 40milliGRAM(s) SubCutaneous daily  simvastatin 20milliGRAM(s) Oral at bedtime  dextrose 5% + sodium chloride 0.45%. 1000milliLiter(s) IV Continuous <Continuous>  vancomycin  IVPB  IV Intermittent   vancomycin  IVPB 1250milliGRAM(s) IV Intermittent every 12 hours  meropenem IVPB 2000milliGRAM(s) IV Intermittent every 8 hours    MEDICATIONS  (PRN):  acetaminophen   Tablet 650milliGRAM(s) Oral every 6 hours PRN For Temp greater than 38 C (100.4 F)  ibuprofen  Tablet 200milliGRAM(s) Oral every 8 hours PRN Temp >/=102        ROS: as above; all other systems reviewed and wnl      PHYSICAL EXAMINATION:  Vital Signs Last 24 Hrs  T(C): 37.9, Max: 39.1 (02-07 @ 17:15)  T(F): 100.2, Max: 102.4 (02-07 @ 17:15)  HR: 92 (83 - 100)  BP: 135/80 (115/64 - 155/95)  BP(mean): --  RR: 18 (16 - 18)  SpO2: 97% (97% - 100%)  CAPILLARY BLOOD GLUCOSE      GENERAL: frail, chronically-ill appearing, middle-aged female  HEAD:  atraumatic, normocephalic  EYES: sclera anicteric  ENMT: mucous membranes dry  NECK: supple, No JVD  CHEST/LUNG: respirations unlabored; BS decr bases, air entry symmetric  HEART: normal S1, S2  ABDOMEN: BS+, soft, ND, NT  EXTREMITIES:  pulses palpable; no clubbing, cyanosis, or edema b/l LEs  NEURO: awake, alert, interactive; verbal output efforful  SKIN: sacral decub      LABS:                        10.0   8.4   )-----------( 289      ( 08 Feb 2017 08:35 )             29.0     08 Feb 2017 08:35    139    |  104    |  9      ----------------------------<  116    3.7     |  27     |  0.72     Ca    8.6        08 Feb 2017 08:35    TPro  6.8    /  Alb  2.0    /  TBili  0.2    /  DBili  x      /  AST  24     /  ALT  30     /  AlkPhos  47     07 Feb 2017 08:04        ASSESSMENT AND PLAN:  61F, HTN, HL, Ashli's chorea, sacral decub, being tx'd for generalized weakness in setting of adult failure to thrive, volume depletion, rhabdo w/CK 1077 on admit, and recurrent fevers in setting of possible acute infection 2/2 sacral decub on IV abxs vs 2/2 Nobles chorea; UA neg; also w/urinary retention of 1100mL (2/4/17) s/p Mcfarland; s/p bedside debridement of sacral decub 2/5/17; for further debridement as having recurrent fevers    ID/MSK: sacral decub infection  -IV abxs cvrg as per ID consult w/zosyn/vanco  -following vancomycin levels, last 17.4 (2/7)  -blood cxs neg x1 (2/2), urine cx neg (2/5); obtain repeat blood cxs today given recurrent fevers  -sacral decub debrided by Wound Care/Dr Galeana at bedside 2/5; f/u wound cxs; may need further debridement given ongoing fevers  -rhabdo, now resolved; no e/o renal dysfunction    : urinary retention, s/p Mcfarland placement; per  (Dr Cleaning), likely neurogenic bladder  - continue Mcfarland     CV:   -cardioprotective statin for hyperlipidemia; rhabdo resolved, CKs have normalized  -volume depletion - continue IVFs as currently    OTHER:  -dvt prophylaxis  --generalized weakness/failure to thrive in setting of acute infection, debility; PT eval/SW for likely rehab placement at [facility]/F/U for: Austin's chorea, sacral decub, fevers    INTERVAL HPI/OVERNIGHT EVENTS:  Pt seen and examined at bedside. No complaints; febrile yesterday to 102.4; for debridement sacral wound today.    Allergies/Intolerance: No Known Allergies      MEDICATIONS  (STANDING):  enoxaparin Injectable 40milliGRAM(s) SubCutaneous daily  simvastatin 20milliGRAM(s) Oral at bedtime  dextrose 5% + sodium chloride 0.45%. 1000milliLiter(s) IV Continuous <Continuous>  vancomycin  IVPB  IV Intermittent   vancomycin  IVPB 1250milliGRAM(s) IV Intermittent every 12 hours  meropenem IVPB 2000milliGRAM(s) IV Intermittent every 8 hours    MEDICATIONS  (PRN):  acetaminophen   Tablet 650milliGRAM(s) Oral every 6 hours PRN For Temp greater than 38 C (100.4 F)  ibuprofen  Tablet 200milliGRAM(s) Oral every 8 hours PRN Temp >/=102        ROS: as above; all other systems reviewed and wnl      PHYSICAL EXAMINATION:  Vital Signs Last 24 Hrs  T(C): 37.9, Max: 39.1 (02-07 @ 17:15)  T(F): 100.2, Max: 102.4 (02-07 @ 17:15)  HR: 92 (83 - 100)  BP: 135/80 (115/64 - 155/95)  BP(mean): --  RR: 18 (16 - 18)  SpO2: 97% (97% - 100%)  CAPILLARY BLOOD GLUCOSE      GENERAL: frail, chronically-ill appearing, middle-aged female  HEAD:  atraumatic, normocephalic  EYES: sclera anicteric  ENMT: mucous membranes dry  NECK: supple, No JVD  CHEST/LUNG: respirations unlabored; BS decr bases, air entry symmetric  HEART: normal S1, S2  ABDOMEN: BS+, soft, ND, NT  EXTREMITIES:  pulses palpable; no clubbing, cyanosis, or edema b/l LEs  NEURO: awake, alert, interactive; verbal output efforful  SKIN: sacral decub      LABS:                        10.0   8.4   )-----------( 289      ( 08 Feb 2017 08:35 )             29.0     08 Feb 2017 08:35    139    |  104    |  9      ----------------------------<  116    3.7     |  27     |  0.72     Ca    8.6        08 Feb 2017 08:35    TPro  6.8    /  Alb  2.0    /  TBili  0.2    /  DBili  x      /  AST  24     /  ALT  30     /  AlkPhos  47     07 Feb 2017 08:04        ASSESSMENT AND PLAN:  61F, HTN, HL, Ashli's chorea, sacral decub, being tx'd for generalized weakness in setting of adult failure to thrive, volume depletion, rhabdo w/CK 1077 on admit, and recurrent fevers in setting of possible acute infection 2/2 sacral decub on IV abxs vs 2/2 Austin chorea; UA neg; also w/urinary retention of 1100mL (2/4/17) s/p Mcfarland; s/p bedside debridement of sacral decub 2/5/17; for further debridement as having recurrent fevers    ID/MSK: sacral decub infection  -IV abxs cvrg as per ID consult w/zosyn/vanco  -following vancomycin levels, last 17.4 (2/7)  -blood cxs neg x1 (2/2), urine cx neg (2/5)  -sacral decub debrided by Wound Care/Dr Galeana at bedside 2/5; wound cxs w/Ecoli and Group B Strep; for further debridement today  -rhabdo, now resolved; no e/o renal dysfunction    : urinary retention, s/p Mcfarland placement; per  (Dr Cleaning), likely neurogenic bladder - continue Mcfarland as currently    CV:   -cardioprotective statin for hyperlipidemia; rhabdo resolved, CKs normalized  -volume depletion - continue IVFs as currently    OTHER:  -dvt prophylaxis w/lovenox SQ  --generalized weakness/failure to thrive in setting of acute infection, debility; PT eval[facility]

## 2017-02-08 NOTE — PROGRESS NOTE ADULT - SUBJECTIVE AND OBJECTIVE BOX
Patient seen and examined bedside resting comfortably.  Want to eat s/p debridement of sacral decub  Mcfarland draining clear urine  Denies nausea and vomiting. Tolerating diet.  Denies chest pain, dyspnea, cough.    T(F): 98.6, Max: 102.4 (02-07 @ 17:15)  HR: 82 (78 - 100)  BP: 132/79 (115/64 - 155/95)  RR: 20 (12 - 20)  SpO2: 97% (97% - 100%)  Wt(kg): --  CAPILLARY BLOOD GLUCOSE      PHYSICAL EXAM:  General: NAD, WDWN  Neuro:  Alert & oriented x 3  HEENT: NCAT, EOMI, conjunctiva clear  CV: +S1S2 regular rate and rhythm  Lung:  respirations nonlabored, good inspiratory effort  Abdomen: soft, NTND. Normactive BS  Extremities: no pedal edema or calf tenderness noted     LABS:                        10.0   8.4   )-----------( 289      ( 08 Feb 2017 08:35 )             29.0     08 Feb 2017 08:35    139    |  104    |  9      ----------------------------<  116    3.7     |  27     |  0.72     Ca    8.6        08 Feb 2017 08:35    TPro  6.8    /  Alb  2.0    /  TBili  0.2    /  DBili  x      /  AST  24     /  ALT  30     /  AlkPhos  47     07 Feb 2017 08:04      I&O's Detail  I & Os for 24h ending 08 Feb 2017 07:00  =============================================  IN:    dextrose 5% + sodium chloride 0.45%.: 1200 ml    Oral Fluid: 1040 ml    Solution: 250 ml    Solution: 200 ml    Total IN: 2690 ml  ---------------------------------------------  OUT:    Indwelling Catheter - Urethral: 3300 ml    Total OUT: 3300 ml  ---------------------------------------------  Total NET: -610 ml    I & Os for current day (as of 08 Feb 2017 16:59)  =============================================  IN:    lactated ringers.: 75 ml    Total IN: 75 ml  ---------------------------------------------  OUT:    Indwelling Catheter - Urethral: 1775 ml    Total OUT: 1775 ml  ---------------------------------------------  Total NET: -1700 ml

## 2017-02-09 LAB
ALBUMIN SERPL ELPH-MCNC: 2 G/DL — LOW (ref 3.3–5)
ALP SERPL-CCNC: 44 U/L — SIGNIFICANT CHANGE UP (ref 40–120)
ALT FLD-CCNC: 40 U/L — SIGNIFICANT CHANGE UP (ref 12–78)
ANION GAP SERPL CALC-SCNC: 8 MMOL/L — SIGNIFICANT CHANGE UP (ref 5–17)
AST SERPL-CCNC: 34 U/L — SIGNIFICANT CHANGE UP (ref 15–37)
BILIRUB SERPL-MCNC: 0.3 MG/DL — SIGNIFICANT CHANGE UP (ref 0.2–1.2)
BUN SERPL-MCNC: 11 MG/DL — SIGNIFICANT CHANGE UP (ref 7–23)
CALCIUM SERPL-MCNC: 8.8 MG/DL — SIGNIFICANT CHANGE UP (ref 8.5–10.1)
CHLORIDE SERPL-SCNC: 104 MMOL/L — SIGNIFICANT CHANGE UP (ref 96–108)
CO2 SERPL-SCNC: 27 MMOL/L — SIGNIFICANT CHANGE UP (ref 22–31)
CREAT SERPL-MCNC: 0.7 MG/DL — SIGNIFICANT CHANGE UP (ref 0.5–1.3)
GLUCOSE SERPL-MCNC: 97 MG/DL — SIGNIFICANT CHANGE UP (ref 70–99)
HCT VFR BLD CALC: 28.9 % — LOW (ref 34.5–45)
HGB BLD-MCNC: 9.8 G/DL — LOW (ref 11.5–15.5)
MAGNESIUM SERPL-MCNC: 2.3 MG/DL — SIGNIFICANT CHANGE UP (ref 1.8–2.4)
MCHC RBC-ENTMCNC: 27.1 PG — SIGNIFICANT CHANGE UP (ref 27–34)
MCHC RBC-ENTMCNC: 34 GM/DL — SIGNIFICANT CHANGE UP (ref 32–36)
MCV RBC AUTO: 79.9 FL — LOW (ref 80–100)
PHOSPHATE SERPL-MCNC: 2.3 MG/DL — LOW (ref 2.5–4.5)
PLATELET # BLD AUTO: 313 K/UL — SIGNIFICANT CHANGE UP (ref 150–400)
POTASSIUM SERPL-MCNC: 3.9 MMOL/L — SIGNIFICANT CHANGE UP (ref 3.5–5.3)
POTASSIUM SERPL-SCNC: 3.9 MMOL/L — SIGNIFICANT CHANGE UP (ref 3.5–5.3)
PROT SERPL-MCNC: 7 GM/DL — SIGNIFICANT CHANGE UP (ref 6–8.3)
RBC # BLD: 3.62 M/UL — LOW (ref 3.8–5.2)
RBC # FLD: 11.2 % — SIGNIFICANT CHANGE UP (ref 11–15)
SODIUM SERPL-SCNC: 139 MMOL/L — SIGNIFICANT CHANGE UP (ref 135–145)
WBC # BLD: 9.8 K/UL — SIGNIFICANT CHANGE UP (ref 3.8–10.5)
WBC # FLD AUTO: 9.8 K/UL — SIGNIFICANT CHANGE UP (ref 3.8–10.5)

## 2017-02-09 PROCEDURE — 88304 TISSUE EXAM BY PATHOLOGIST: CPT | Mod: 26

## 2017-02-09 PROCEDURE — 99232 SBSQ HOSP IP/OBS MODERATE 35: CPT

## 2017-02-09 PROCEDURE — 99233 SBSQ HOSP IP/OBS HIGH 50: CPT

## 2017-02-09 RX ORDER — PIPERACILLIN AND TAZOBACTAM 4; .5 G/20ML; G/20ML
3.38 INJECTION, POWDER, LYOPHILIZED, FOR SOLUTION INTRAVENOUS EVERY 8 HOURS
Qty: 0 | Refills: 0 | Status: DISCONTINUED | OUTPATIENT
Start: 2017-02-09 | End: 2017-02-17

## 2017-02-09 RX ADMIN — SIMVASTATIN 20 MILLIGRAM(S): 20 TABLET, FILM COATED ORAL at 22:25

## 2017-02-09 RX ADMIN — Medication 200 MILLIGRAM(S): at 11:40

## 2017-02-09 RX ADMIN — Medication 166.67 MILLIGRAM(S): at 05:43

## 2017-02-09 RX ADMIN — Medication 166.67 MILLIGRAM(S): at 17:45

## 2017-02-09 RX ADMIN — Medication 63.75 MILLIMOLE(S): at 12:00

## 2017-02-09 RX ADMIN — ENOXAPARIN SODIUM 40 MILLIGRAM(S): 100 INJECTION SUBCUTANEOUS at 11:40

## 2017-02-09 RX ADMIN — SODIUM CHLORIDE 100 MILLILITER(S): 9 INJECTION, SOLUTION INTRAVENOUS at 17:49

## 2017-02-09 RX ADMIN — Medication 650 MILLIGRAM(S): at 22:25

## 2017-02-09 RX ADMIN — Medication 200 MILLIGRAM(S): at 12:20

## 2017-02-09 RX ADMIN — PIPERACILLIN AND TAZOBACTAM 25 GRAM(S): 4; .5 INJECTION, POWDER, LYOPHILIZED, FOR SOLUTION INTRAVENOUS at 22:25

## 2017-02-09 RX ADMIN — MEROPENEM 200 MILLIGRAM(S): 1 INJECTION INTRAVENOUS at 05:43

## 2017-02-09 RX ADMIN — MEROPENEM 200 MILLIGRAM(S): 1 INJECTION INTRAVENOUS at 14:06

## 2017-02-09 NOTE — PROGRESS NOTE ADULT - SUBJECTIVE AND OBJECTIVE BOX
SURGERY PROGRESS HPI:  Pt seen and examined at bedside. Pt states she feels better and pain improved after the OR debridement yesterday.  No acute events overnight. Pt denies complaints.   Pt tolerating diet. Pt denies nausea and vomiting.  +BM/flatus. Voiding well.  Pt denies chest pain, SOB, dizziness.      Vital Signs Last 24 Hrs  T(C): 37.8, Max: 38.3 (02-08 @ 20:00)  T(F): 100, Max: 101 (02-08 @ 20:00)  HR: 89 (78 - 96)  BP: 107/68 (107/68 - 134/86)  BP(mean): --  RR: 18 (12 - 20)  SpO2: 100% (96% - 100%)      PHYSICAL EXAM:    GENERAL: NAD  HEAD:  Atraumatic, Normocephalic  CHEST/LUNG: Clear to ausculation, bilaterally   HEART: RRR S1S2  ABDOMEN: non distended, +BS, soft, non tender, no guarding  BACK: Midline dressing intact.   EXTREMITIES:  calf soft, non tender     I&O's Detail  I & Os for 24h ending 09 Feb 2017 07:00  =============================================  IN:    dextrose 5% + sodium chloride 0.45%.: 1200 ml    Solution: 250 ml    lactated ringers.: 75 ml    Total IN: 1525 ml  ---------------------------------------------  OUT:    Indwelling Catheter - Urethral: 3325 ml    Total OUT: 3325 ml  ---------------------------------------------  Total NET: -1800 ml    I & Os for current day (as of 09 Feb 2017 13:17)  =============================================  IN:    Oral Fluid: 60 ml    Total IN: 60 ml  ---------------------------------------------  OUT:    Total OUT: 0 ml  ---------------------------------------------  Total NET: 60 ml      LABS:                        9.8    9.8   )-----------( 313      ( 09 Feb 2017 08:33 )             28.9     09 Feb 2017 08:33    139    |  104    |  11     ----------------------------<  97     3.9     |  27     |  0.70     Ca    8.8        09 Feb 2017 08:33  Phos  2.3       09 Feb 2017 08:33  Mg     2.3       09 Feb 2017 08:33    TPro  7.0    /  Alb  2.0    /  TBili  0.3    /  DBili  x      /  AST  34     /  ALT  40     /  AlkPhos  44     09 Feb 2017 08:33      Assessment: 61 year old female s/p sacral ulcer debridement in OR POD#1 PMH aicha chorea, HLD a/w dehydration, urinary retention likely 2/2 neurogenic bladder s/p bedside debridement of sacral ulcer 02/05/17. Fever improving after debridement.       Plan:  - dressing change tomorrow  - continue with obrien catheter, monitor output  - f/u labs, trend renal function  - monitor vitals   - continue with merrem and vanco per ID  - pain management PRN  - c/w DVT ppx, lovenox SURGERY PROGRESS HPI:  Pt seen and examined at bedside. Pt states she feels better and pain improved after the OR debridement yesterday.  No acute events overnight. Pt denies complaints.   Pt tolerating diet. Pt denies nausea and vomiting.  +BM/flatus. Voiding well.  Pt denies chest pain, SOB, dizziness.      Vital Signs Last 24 Hrs  T(C): 37.8, Max: 38.3 (02-08 @ 20:00)  T(F): 100, Max: 101 (02-08 @ 20:00)  HR: 89 (78 - 96)  BP: 107/68 (107/68 - 134/86)  BP(mean): --  RR: 18 (12 - 20)  SpO2: 100% (96% - 100%)      PHYSICAL EXAM:    GENERAL: NAD  HEAD:  Atraumatic, Normocephalic  CHEST/LUNG: Clear to ausculation, bilaterally   HEART: RRR S1S2  ABDOMEN: non distended, +BS, soft, non tender, no guarding  BACK: Midline dressing intact.   EXTREMITIES:  calf soft, non tender     I&O's Detail  I & Os for 24h ending 09 Feb 2017 07:00  =============================================  IN:    dextrose 5% + sodium chloride 0.45%.: 1200 ml    Solution: 250 ml    lactated ringers.: 75 ml    Total IN: 1525 ml  ---------------------------------------------  OUT:    Indwelling Catheter - Urethral: 3325 ml    Total OUT: 3325 ml  ---------------------------------------------  Total NET: -1800 ml    I & Os for current day (as of 09 Feb 2017 13:17)  =============================================  IN:    Oral Fluid: 60 ml    Total IN: 60 ml  ---------------------------------------------  OUT:    Total OUT: 0 ml  ---------------------------------------------  Total NET: 60 ml      LABS:                        9.8    9.8   )-----------( 313      ( 09 Feb 2017 08:33 )             28.9     09 Feb 2017 08:33    139    |  104    |  11     ----------------------------<  97     3.9     |  27     |  0.70     Ca    8.8        09 Feb 2017 08:33  Phos  2.3       09 Feb 2017 08:33  Mg     2.3       09 Feb 2017 08:33    TPro  7.0    /  Alb  2.0    /  TBili  0.3    /  DBili  x      /  AST  34     /  ALT  40     /  AlkPhos  44     09 Feb 2017 08:33      Assessment: 61 year old female s/p sacral ulcer debridement in OR POD#1 PMH aicha chorea, HLD a/w dehydration, urinary retention likely 2/2 neurogenic bladder s/p bedside debridement of sacral ulcer 02/05/17. Fever improving after debridement.       Plan:  - dressing change tomorrow  - continue with obrien catheter, monitor output  - f/u labs, trend renal function  - pending blood culture results  - monitor vitals   - continue medical management  - continue with merrem and vanco per ID  - pain management PRN  - c/w DVT ppx, lovenox

## 2017-02-09 NOTE — PROGRESS NOTE ADULT - PROBLEM SELECTOR PLAN 1
S/P OR debridement yesterday   T amx 100.4 so improving  Cont Vanco   change meropenem to zosyn  May require rehab for sacral decub healing and IV antibiotics, spoke with family  Will discuss with Team tomorrow

## 2017-02-09 NOTE — PROGRESS NOTE ADULT - SUBJECTIVE AND OBJECTIVE BOX
Patient is a 61y old  Female who presents with a chief complaint of Generalized weakness (02 Feb 2017 12:58)      INTERVAL HPI / OVERNIGHT EVENTS:fever spikes reducing ,pt denies c/o    MEDICATIONS  (STANDING):  simvastatin 20milliGRAM(s) Oral at bedtime  dextrose 5% + sodium chloride 0.45%. 1000milliLiter(s) IV Continuous <Continuous>  vancomycin  IVPB 1250milliGRAM(s) IV Intermittent every 12 hours  enoxaparin Injectable 40milliGRAM(s) SubCutaneous daily  piperacillin/tazobactam IVPB. 3.375Gram(s) IV Intermittent every 8 hours    MEDICATIONS  (PRN):  acetaminophen   Tablet 650milliGRAM(s) Oral every 6 hours PRN For Temp greater than 38 C (100.4 F)  ibuprofen  Tablet 200milliGRAM(s) Oral every 8 hours PRN Temp >/=102  oxyCODONE  5 mG/acetaminophen 325 mG 1Tablet(s) Oral every 6 hours PRN Moderate Pain (4 - 6)  oxyCODONE  5 mG/acetaminophen 325 mG 2Tablet(s) Oral every 6 hours PRN Severe Pain (7 - 10)      Vital Signs Last 24 Hrs  T(C): 38, Max: 38 (02-08 @ 23:51)  T(F): 100.4, Max: 100.4 (02-08 @ 23:51)  HR: 87 (86 - 93)  BP: 100/61 (100/61 - 121/73)  BP(mean): --  RR: 16 (16 - 18)  SpO2: 100% (97% - 100%)    PHYSICAL EXAM:    Constitutional: NAD, well-groomed, well-developed  Respiratory: CTAB/L  Cardiovascular: S1 and S2, RRR, no M/G/R  Gastrointestinal: BS+, soft, NT/ND  Extremities: No peripheral edema  Vascular: 2+ peripheral pulses  Skin: sacral decub with dressing+      LABS:                        9.8    9.8   )-----------( 313      ( 09 Feb 2017 08:33 )             28.9     09 Feb 2017 08:33    139    |  104    |  11     ----------------------------<  97     3.9     |  27     |  0.70     Ca    8.8        09 Feb 2017 08:33  Phos  2.3       09 Feb 2017 08:33  Mg     2.3       09 Feb 2017 08:33    TPro  7.0    /  Alb  2.0    /  TBili  0.3    /  DBili  x      /  AST  34     /  ALT  40     /  AlkPhos  44     09 Feb 2017 08:33            MICROBIOLOGY:  RECENT CULTURES:  02-09 .Surgical Swab SACRAL ULCER XXXX XXXX   No growth    02-05 .Tissue Other, sacrum Escherichia coli  Enterococcus faecalis   Upon re-evaluation of gram stain:  Rare White blood cells  Numerous Gram positive cocci in pairs, chains and clusters  Moderate Gram Negative Rods   Moderate Escherichia coli  Moderate Streptococcus agalactiae (Group B)  Growth in fluid media only Enterococcus faecalis  Rule Out Anaerobes    02-05 .Surgical Swab sacrum XXXX XXXX   Few Escherichia coli  Few Streptococcus agalactiae (Group B)  Few Enterococcus faecalis  Rule Out Anaerobes  See previous culture [id number]    02-05 .Urine Catheterized XXXX XXXX   No growth          RADIOLOGY & ADDITIONAL STUDIES:

## 2017-02-09 NOTE — PROGRESS NOTE ADULT - SUBJECTIVE AND OBJECTIVE BOX
CC/F/U for: Floyd's chorea, sacral decub, fevers    INTERVAL HPI/OVERNIGHT EVENTS:  Pt seen and examined at bedside; s/p debridement sacral decub yesterday, but postop still w/recurrent fevers. No complaints this AM.    Allergies/Intolerance: No Known Allergies      MEDICATIONS  (STANDING):  simvastatin 20milliGRAM(s) Oral at bedtime  dextrose 5% + sodium chloride 0.45%. 1000milliLiter(s) IV Continuous <Continuous>  meropenem IVPB 2000milliGRAM(s) IV Intermittent every 8 hours  vancomycin  IVPB 1250milliGRAM(s) IV Intermittent every 12 hours  enoxaparin Injectable 40milliGRAM(s) SubCutaneous daily    MEDICATIONS  (PRN):  acetaminophen   Tablet 650milliGRAM(s) Oral every 6 hours PRN For Temp greater than 38 C (100.4 F)  ibuprofen  Tablet 200milliGRAM(s) Oral every 8 hours PRN Temp >/=102  oxyCODONE  5 mG/acetaminophen 325 mG 1Tablet(s) Oral every 6 hours PRN Moderate Pain (4 - 6)  oxyCODONE  5 mG/acetaminophen 325 mG 2Tablet(s) Oral every 6 hours PRN Severe Pain (7 - 10)        ROS: as above; all other systems reviewed and wnl      PHYSICAL EXAMINATION:  Vital Signs Last 24 Hrs  T(C): 37.8, Max: 38.3 (02-08 @ 20:00)  T(F): 100, Max: 101 (02-08 @ 20:00)  HR: 89 (78 - 96)  BP: 107/68 (107/68 - 134/86)  BP(mean): --  RR: 18 (12 - 20)  SpO2: 100% (96% - 100%)  CAPILLARY BLOOD GLUCOSE      GENERAL: frail, chronically-ill appearing, middle-aged female  HEAD:  atraumatic, normocephalic  EYES: sclera anicteric  ENMT: mucous membranes dry  NECK: supple, No JVD  CHEST/LUNG: respirations unlabored; BS decr bases, air entry symmetric  HEART: normal S1, S2  ABDOMEN: BS+, soft, ND, NT; +Mcfarland in place  EXTREMITIES:  pulses palpable; no clubbing, cyanosis, or edema b/l LEs  NEURO: awake, alert, interactive; verbal output efforful  SKIN: sacral decub      LABS:                        9.8    9.8   )-----------( 313      ( 09 Feb 2017 08:33 )             28.9     09 Feb 2017 08:33    139    |  104    |  11     ----------------------------<  97     3.9     |  27     |  0.70     Ca    8.8        09 Feb 2017 08:33  Phos  2.3       09 Feb 2017 08:33  Mg     2.3       09 Feb 2017 08:33    TPro  7.0    /  Alb  2.0    /  TBili  0.3    /  DBili  x      /  AST  34     /  ALT  40     /  AlkPhos  44     09 Feb 2017 08:33      ASSESSMENT AND PLAN:  61F, HTN, HL, Ashli's chorea, sacral decub, being tx'd for generalized weakness in setting of adult failure to thrive, volume depletion, rhabdo w/CK 1077 on admit, and recurrent fevers in setting of possible acute infection 2/2 sacral decub on IV abxs vs 2/2 [place] chorea; UA neg; also w/urinary retention of 1100mL (2/4/17) s/p Mcfarland; s/p bedside debridement of sacral decub 2/5/17, then further debridement 2/8/17; but still w/recurrent fevers    ID/MSK: sacral decub infection  -continue IV abxs cvrg as per ID consult w/zosyn/vanco  -following vancomycin levels, last 17.4 (2/7); repeat w/AM labs  -repeat blood cxs today given ongoing fevers; blood cxs neg x1 (2/2), urine cx neg (2/5)  -sacral decub debrided by Wound Care/Dr Galeana at bedside 2/5, then again 2/7; wound cxs 2/5 w/Ecoli, Group B Strep, and Enterococcus  -rhabdo, resolved; no e/o renal dysfunction    : urinary retention, s/p Mcfarland placement; per  (Dr Cleaning), likely neurogenic bladder - continue Mcfarland as currently    HEME: chronic anemia, likely 2/2 chronic dz - hgb stable    CV:   -cardioprotective statin for hyperlipidemia; rhabdo resolved, CKs normalized  -volume depletion - continue IVFs for volume repletion    OTHER:  -dvt prophylaxis w/lovenox SQ  --generalized weakness/failure to thrive in setting of acute infection, debility; PT eval/SW for likely rehab placement at dispo

## 2017-02-10 DIAGNOSIS — D72.829 ELEVATED WHITE BLOOD CELL COUNT, UNSPECIFIED: ICD-10-CM

## 2017-02-10 DIAGNOSIS — A41.9 SEPSIS, UNSPECIFIED ORGANISM: ICD-10-CM

## 2017-02-10 LAB
ALBUMIN SERPL ELPH-MCNC: 2 G/DL — LOW (ref 3.3–5)
ALP SERPL-CCNC: 45 U/L — SIGNIFICANT CHANGE UP (ref 40–120)
ALT FLD-CCNC: 37 U/L — SIGNIFICANT CHANGE UP (ref 12–78)
ANION GAP SERPL CALC-SCNC: 9 MMOL/L — SIGNIFICANT CHANGE UP (ref 5–17)
AST SERPL-CCNC: 27 U/L — SIGNIFICANT CHANGE UP (ref 15–37)
BILIRUB SERPL-MCNC: 0.3 MG/DL — SIGNIFICANT CHANGE UP (ref 0.2–1.2)
BUN SERPL-MCNC: 13 MG/DL — SIGNIFICANT CHANGE UP (ref 7–23)
CALCIUM SERPL-MCNC: 9 MG/DL — SIGNIFICANT CHANGE UP (ref 8.5–10.1)
CHLORIDE SERPL-SCNC: 102 MMOL/L — SIGNIFICANT CHANGE UP (ref 96–108)
CO2 SERPL-SCNC: 27 MMOL/L — SIGNIFICANT CHANGE UP (ref 22–31)
CREAT SERPL-MCNC: 0.78 MG/DL — SIGNIFICANT CHANGE UP (ref 0.5–1.3)
GLUCOSE SERPL-MCNC: 105 MG/DL — HIGH (ref 70–99)
HCT VFR BLD CALC: 29.8 % — LOW (ref 34.5–45)
HGB BLD-MCNC: 10.2 G/DL — LOW (ref 11.5–15.5)
MCHC RBC-ENTMCNC: 27.8 PG — SIGNIFICANT CHANGE UP (ref 27–34)
MCHC RBC-ENTMCNC: 34.1 GM/DL — SIGNIFICANT CHANGE UP (ref 32–36)
MCV RBC AUTO: 81.6 FL — SIGNIFICANT CHANGE UP (ref 80–100)
PLATELET # BLD AUTO: 310 K/UL — SIGNIFICANT CHANGE UP (ref 150–400)
POTASSIUM SERPL-MCNC: 3.9 MMOL/L — SIGNIFICANT CHANGE UP (ref 3.5–5.3)
POTASSIUM SERPL-SCNC: 3.9 MMOL/L — SIGNIFICANT CHANGE UP (ref 3.5–5.3)
PROT SERPL-MCNC: 7.1 GM/DL — SIGNIFICANT CHANGE UP (ref 6–8.3)
RBC # BLD: 3.65 M/UL — LOW (ref 3.8–5.2)
RBC # FLD: 11.7 % — SIGNIFICANT CHANGE UP (ref 11–15)
SODIUM SERPL-SCNC: 138 MMOL/L — SIGNIFICANT CHANGE UP (ref 135–145)
WBC # BLD: 12.1 K/UL — HIGH (ref 3.8–10.5)
WBC # FLD AUTO: 12.1 K/UL — HIGH (ref 3.8–10.5)

## 2017-02-10 PROCEDURE — 99232 SBSQ HOSP IP/OBS MODERATE 35: CPT

## 2017-02-10 PROCEDURE — 99233 SBSQ HOSP IP/OBS HIGH 50: CPT

## 2017-02-10 RX ORDER — LINEZOLID 600 MG/300ML
600 INJECTION, SOLUTION INTRAVENOUS EVERY 12 HOURS
Qty: 0 | Refills: 0 | Status: DISCONTINUED | OUTPATIENT
Start: 2017-02-10 | End: 2017-02-15

## 2017-02-10 RX ADMIN — SODIUM CHLORIDE 100 MILLILITER(S): 9 INJECTION, SOLUTION INTRAVENOUS at 04:19

## 2017-02-10 RX ADMIN — PIPERACILLIN AND TAZOBACTAM 25 GRAM(S): 4; .5 INJECTION, POWDER, LYOPHILIZED, FOR SOLUTION INTRAVENOUS at 06:05

## 2017-02-10 RX ADMIN — SIMVASTATIN 20 MILLIGRAM(S): 20 TABLET, FILM COATED ORAL at 22:03

## 2017-02-10 RX ADMIN — PIPERACILLIN AND TAZOBACTAM 25 GRAM(S): 4; .5 INJECTION, POWDER, LYOPHILIZED, FOR SOLUTION INTRAVENOUS at 22:03

## 2017-02-10 RX ADMIN — SODIUM CHLORIDE 100 MILLILITER(S): 9 INJECTION, SOLUTION INTRAVENOUS at 11:37

## 2017-02-10 RX ADMIN — PIPERACILLIN AND TAZOBACTAM 25 GRAM(S): 4; .5 INJECTION, POWDER, LYOPHILIZED, FOR SOLUTION INTRAVENOUS at 14:06

## 2017-02-10 RX ADMIN — ENOXAPARIN SODIUM 40 MILLIGRAM(S): 100 INJECTION SUBCUTANEOUS at 11:37

## 2017-02-10 RX ADMIN — Medication 650 MILLIGRAM(S): at 06:04

## 2017-02-10 RX ADMIN — LINEZOLID 600 MILLIGRAM(S): 600 INJECTION, SOLUTION INTRAVENOUS at 17:42

## 2017-02-10 RX ADMIN — Medication 166.67 MILLIGRAM(S): at 06:04

## 2017-02-10 RX ADMIN — Medication 650 MILLIGRAM(S): at 23:37

## 2017-02-10 NOTE — PROGRESS NOTE ADULT - PROBLEM SELECTOR PLAN 1
S/P OR debridement on friday  change vanco to Zyvox as pt still spiking fever ,tmax 101 last night with leukocytosis   cot zosyn  plan for wound vac placement  Follow temp curve

## 2017-02-10 NOTE — PROGRESS NOTE ADULT - SUBJECTIVE AND OBJECTIVE BOX
Postoperative Day #: 2 s/p debridement of sacral decubitus ulcer.      Patient seen and examined bedside resting comfortably.  No complaints offered.   Dressing of the back soaked with serosanguenous fluid.    T(F): 100.6, Max: 101 (02-09 @ 22:55)  HR: 96 (87 - 104)  BP: 116/73 (100/61 - 116/77)  RR: 18 (16 - 18)  SpO2: 98% (96% - 100%)  Wt(kg): --  CAPILLARY BLOOD GLUCOSE      PHYSICAL EXAM:  General: NAD, WDWN  Neuro:  Alert & oriented x 3  Dressing removed from sacral area. No active bleeding noted. No necrotic tissue seen. Wound cleansed with NS. DSD reapplied. Would was shown to [facility].      LABS:                        10.2   12.1  )-----------( 310      ( 10 Feb 2017 08:52 )             29.8     10 Feb 2017 08:52    138    |  102    |  13     ----------------------------<  105    3.9     |  27     |  0.78     Ca    9.0        10 Feb 2017 08:52  Phos  2.3       09 Feb 2017 08:33  Mg     2.3       09 Feb 2017 08:33    TPro  7.1    /  Alb  2.0    /  TBili  0.3    /  DBili  x      /  AST  27     /  ALT  37     /  AlkPhos  45     10 Feb 2017 08:52      I&O's Detail  I & Os for 24h ending 10 Feb 2017 07:00  =============================================  IN:    dextrose 5% + sodium chloride 0.45%.: 2400 ml    Solution: 500 ml    Solution: 200 ml    Oral Fluid: 100 ml    Solution: 100 ml    Total IN: 3300 ml  ---------------------------------------------  OUT:    Indwelling Catheter - Urethral: 1600 ml    Voided: 1200 ml    Total OUT: 2800 ml  ---------------------------------------------  Total NET: 500 ml    I & Os for current day (as of 10 Feb 2017 12:41)  =============================================  IN:    Oral Fluid: 100 ml    Total IN: 100 ml  ---------------------------------------------  OUT:    Voided: 200 ml    Total OUT: 200 ml  ---------------------------------------------  Total NET: -100 ml      Impression: 61y Female admitted with DEHYDRATION, FALIURE TO THRIVE IN ADULT, [place] CHOREA  DEHYDRATION, FALIURE TO THRIVE IN ADULT, [place] CHOREA  2 days s/p debridement of sacral decubitus/        Plan:   - continue IVAB per ID  - continue medical f/u  -continue local wound care : Dr. Gibson would like a Wound Vac to be applied, starting in AM. Order entered.  will continue to follow.

## 2017-02-10 NOTE — PROGRESS NOTE ADULT - SUBJECTIVE AND OBJECTIVE BOX
CC/F/U for: Ashli's chorea, sacral decub, fevers    INTERVAL HPI/OVERNIGHT EVENTS:  Pt seen and examined at bedside. Still w/recurrent fevers; denies pain, sob.    Allergies/Intolerance: No Known Allergies      MEDICATIONS  (STANDING):  simvastatin 20milliGRAM(s) Oral at bedtime  dextrose 5% + sodium chloride 0.45%. 1000milliLiter(s) IV Continuous <Continuous>  vancomycin  IVPB 1250milliGRAM(s) IV Intermittent every 12 hours  enoxaparin Injectable 40milliGRAM(s) SubCutaneous daily  piperacillin/tazobactam IVPB. 3.375Gram(s) IV Intermittent every 8 hours    MEDICATIONS  (PRN):  acetaminophen   Tablet 650milliGRAM(s) Oral every 6 hours PRN For Temp greater than 38 C (100.4 F)  ibuprofen  Tablet 200milliGRAM(s) Oral every 8 hours PRN Temp >/=102  oxyCODONE  5 mG/acetaminophen 325 mG 1Tablet(s) Oral every 6 hours PRN Moderate Pain (4 - 6)  oxyCODONE  5 mG/acetaminophen 325 mG 2Tablet(s) Oral every 6 hours PRN Severe Pain (7 - 10)        ROS: as above; all other systems reviewed and wnl      PHYSICAL EXAMINATION:  Vital Signs Last 24 Hrs  T(C): 38.1, Max: 38.3 (02-09 @ 22:55)  T(F): 100.6, Max: 101 (02-09 @ 22:55)  HR: 96 (87 - 104)  BP: 116/73 (100/61 - 116/77)  BP(mean): --  RR: 18 (16 - 18)  SpO2: 98% (96% - 100%)  CAPILLARY BLOOD GLUCOSE      GENERAL: frail, chronically-ill appearing, middle-aged female  HEAD:  atraumatic, normocephalic  EYES: sclera anicteric  ENMT: mucous membranes dry  NECK: supple, No JVD  CHEST/LUNG: respirations unlabored; BS decr bases, air entry symmetric  HEART: normal S1, S2  ABDOMEN: BS+, soft, ND, NT; +Mcfarland in place  EXTREMITIES:  pulses palpable; no clubbing, cyanosis, or edema b/l LEs  NEURO: awake, alert, interactive; verbal output efforful  SKIN: sacral decub      LABS:                        10.2   12.1  )-----------( 310      ( 10 Feb 2017 08:52 )             29.8     10 Feb 2017 08:52    138    |  102    |  13     ----------------------------<  105    3.9     |  27     |  0.78     Ca    9.0        10 Feb 2017 08:52  Phos  2.3       09 Feb 2017 08:33  Mg     2.3       09 Feb 2017 08:33    TPro  7.1    /  Alb  2.0    /  TBili  0.3    /  DBili  x      /  AST  27     /  ALT  37     /  AlkPhos  45     10 Feb 2017 08:52            RADIOLOGY & ADDITIONAL TESTS:      ASSESSMENT AND PLAN: CC/F/U for: [place]'s chorea, sacral decub, fevers    INTERVAL HPI/OVERNIGHT EVENTS:  Pt seen and examined at bedside. Still w/recurrent fevers; denies pain, sob.    Allergies/Intolerance: No Known Allergies      MEDICATIONS  (STANDING):  simvastatin 20milliGRAM(s) Oral at bedtime  dextrose 5% + sodium chloride 0.45%. 1000milliLiter(s) IV Continuous <Continuous>  vancomycin  IVPB 1250milliGRAM(s) IV Intermittent every 12 hours  enoxaparin Injectable 40milliGRAM(s) SubCutaneous daily  piperacillin/tazobactam IVPB. 3.375Gram(s) IV Intermittent every 8 hours    MEDICATIONS  (PRN):  acetaminophen   Tablet 650milliGRAM(s) Oral every 6 hours PRN For Temp greater than 38 C (100.4 F)  ibuprofen  Tablet 200milliGRAM(s) Oral every 8 hours PRN Temp >/=102  oxyCODONE  5 mG/acetaminophen 325 mG 1Tablet(s) Oral every 6 hours PRN Moderate Pain (4 - 6)  oxyCODONE  5 mG/acetaminophen 325 mG 2Tablet(s) Oral every 6 hours PRN Severe Pain (7 - 10)        ROS: as above; all other systems reviewed and wnl      PHYSICAL EXAMINATION:  Vital Signs Last 24 Hrs  T(C): 38.1, Max: 38.3 (02-09 @ 22:55)  T(F): 100.6, Max: 101 (02-09 @ 22:55)  HR: 96 (87 - 104)  BP: 116/73 (100/61 - 116/77)  BP(mean): --  RR: 18 (16 - 18)  SpO2: 98% (96% - 100%)  CAPILLARY BLOOD GLUCOSE      GENERAL: frail, chronically-ill appearing, middle-aged female  HEAD:  atraumatic, normocephalic  EYES: sclera anicteric  ENMT: mucous membranes dry  NECK: supple, No JVD  CHEST/LUNG: respirations unlabored; BS decr bases, air entry symmetric  HEART: normal S1, S2  ABDOMEN: BS+, soft, ND, NT; +Mcfarland in place  EXTREMITIES:  pulses palpable; no clubbing, cyanosis, or edema b/l LEs  NEURO: awake, alert, interactive; verbal output efforful  SKIN: sacral decub      LABS:                        10.2   12.1  )-----------( 310      ( 10 Feb 2017 08:52 )             29.8     10 Feb 2017 08:52    138    |  102    |  13     ----------------------------<  105    3.9     |  27     |  0.78     Ca    9.0        10 Feb 2017 08:52  Phos  2.3       09 Feb 2017 08:33  Mg     2.3       09 Feb 2017 08:33    TPro  7.1    /  Alb  2.0    /  TBili  0.3    /  DBili  x      /  AST  27     /  ALT  37     /  AlkPhos  45     10 Feb 2017 08:52      ASSESSMENT AND PLAN:  61F, HTN, HL, Ashli's chorea, sacral decub, being tx'd for generalized weakness in setting of adult failure to thrive, volume depletion, rhabdo w/CK 1077 on admit, and recurrent fevers in setting of possible acute infection 2/2 sacral decub on IV abxs vs 2/2 [place] chorea; UA neg; also w/urinary retention of 1100mL (2/4/17) s/p Mcfarland; s/p bedside debridement of sacral decub 2/5/17, then further debridement 2/8/17; but still w/recurrent fevers    ID/MSK: sacral decub infection, leukocytosis, recurrent fevers  -continue IV abxs cvrg as per ID consult w/zosyn/vanco; d/w ID re adjusting abxs given ongoing fevers  -following vancomycin levels  -repeat blood cxs pending; blood cxs neg x1 (2/2), urine cx neg (2/5)  -sacral decub debrided by Wound Care/Dr Galeana at bedside 2/5, then again 2/7; wound cxs 2/5 w/Ecoli, Group B Strep, and Enterococcus  -rhabdo, resolved; no e/o renal dysfunction    : urinary retention, s/p Mcfarland placement; per  (Dr Cleaning), likely neurogenic bladder - continue Mcfarland as currently    HEME: chronic anemia, likely 2/2 chronic dz - hgb stable    CV:   -cardioprotective statin for hyperlipidemia; rhabdo resolved, CKs normalized  -volume depletion - continue IVFs for volume repletion    OTHER:  -dvt prophylaxis w/lovenox SQ  --generalized weakness/failure to thrive in setting of acute infection, debility; PT eval/SW for likely rehab placement at dispo

## 2017-02-10 NOTE — PROGRESS NOTE ADULT - SUBJECTIVE AND OBJECTIVE BOX
Patient is a 61y old  Female who presents with a chief complaint of Generalized weakness (02 Feb 2017 12:58)      INTERVAL HPI / OVERNIGHT EVENTS: fever spikes again today    MEDICATIONS  (STANDING):  simvastatin 20milliGRAM(s) Oral at bedtime  dextrose 5% + sodium chloride 0.45%. 1000milliLiter(s) IV Continuous <Continuous>  vancomycin  IVPB 1250milliGRAM(s) IV Intermittent every 12 hours  enoxaparin Injectable 40milliGRAM(s) SubCutaneous daily  piperacillin/tazobactam IVPB. 3.375Gram(s) IV Intermittent every 8 hours    MEDICATIONS  (PRN):  acetaminophen   Tablet 650milliGRAM(s) Oral every 6 hours PRN For Temp greater than 38 C (100.4 F)  ibuprofen  Tablet 200milliGRAM(s) Oral every 8 hours PRN Temp >/=102  oxyCODONE  5 mG/acetaminophen 325 mG 1Tablet(s) Oral every 6 hours PRN Moderate Pain (4 - 6)  oxyCODONE  5 mG/acetaminophen 325 mG 2Tablet(s) Oral every 6 hours PRN Severe Pain (7 - 10)      Vital Signs Last 24 Hrs  T(C): 37.3, Max: 38.3 (02-09 @ 22:55)  T(F): 99.2, Max: 101 (02-09 @ 22:55)  HR: 96 (87 - 104)  BP: 116/73 (100/61 - 116/77)  BP(mean): --  RR: 16 (16 - 18)  SpO2: 97% (96% - 100%)    PHYSICAL EXAM:    Constitutional: NAD, well-groomed, well-developed  Respiratory: CTAB/L  Cardiovascular: S1 and S2, RRR, no M/G/R  Gastrointestinal: BS+, soft, NT/ND  Extremities: No peripheral edema  Vascular: 2+ peripheral pulses  Skin: sacral decub, s/p debridment -granular base ,wound clean, surrounding area - no cellulitis.    LABS:                        10.2   12.1  )-----------( 310      ( 10 Feb 2017 08:52 )             29.8     10 Feb 2017 08:52    138    |  102    |  13     ----------------------------<  105    3.9     |  27     |  0.78     Ca    9.0        10 Feb 2017 08:52  Phos  2.3       09 Feb 2017 08:33  Mg     2.3       09 Feb 2017 08:33    TPro  7.1    /  Alb  2.0    /  TBili  0.3    /  DBili  x      /  AST  27     /  ALT  37     /  AlkPhos  45     10 Feb 2017 08:52            MICROBIOLOGY:  RECENT CULTURES:  02-09 .Surgical Swab SACRAL ULCER XXXX XXXX   No growth    02-05 .Tissue Other, sacrum Escherichia coli  Enterococcus faecalis   Upon re-evaluation of gram stain:  Rare White blood cells  Numerous Gram positive cocci in pairs, chains and clusters  Moderate Gram Negative Rods   Moderate Escherichia coli  Moderate Streptococcus agalactiae (Group B)  Growth in fluid media only Enterococcus faecalis  Rule Out Anaerobes    02-05 .Surgical Swab sacrum XXXX XXXX   Few Escherichia coli  Few Streptococcus agalactiae (Group B)  Few Enterococcus faecalis  Rule Out Anaerobes  See previous culture [id number]    02-05 .Urine Catheterized XXXX XXXX   No growth          RADIOLOGY & ADDITIONAL STUDIES:

## 2017-02-11 DIAGNOSIS — E78.2 MIXED HYPERLIPIDEMIA: ICD-10-CM

## 2017-02-11 DIAGNOSIS — L89.94 PRESSURE ULCER OF UNSPECIFIED SITE, STAGE 4: ICD-10-CM

## 2017-02-11 DIAGNOSIS — R13.19 OTHER DYSPHAGIA: ICD-10-CM

## 2017-02-11 DIAGNOSIS — R33.8 OTHER RETENTION OF URINE: ICD-10-CM

## 2017-02-11 DIAGNOSIS — L89.154 PRESSURE ULCER OF SACRAL REGION, STAGE 4: ICD-10-CM

## 2017-02-11 LAB
HCT VFR BLD CALC: 28.3 % — LOW (ref 34.5–45)
HGB BLD-MCNC: 9.7 G/DL — LOW (ref 11.5–15.5)
MCHC RBC-ENTMCNC: 27.3 PG — SIGNIFICANT CHANGE UP (ref 27–34)
MCHC RBC-ENTMCNC: 34.1 GM/DL — SIGNIFICANT CHANGE UP (ref 32–36)
MCV RBC AUTO: 79.8 FL — LOW (ref 80–100)
PLATELET # BLD AUTO: 321 K/UL — SIGNIFICANT CHANGE UP (ref 150–400)
RBC # BLD: 3.55 M/UL — LOW (ref 3.8–5.2)
RBC # FLD: 11.1 % — SIGNIFICANT CHANGE UP (ref 11–15)
WBC # BLD: 9.7 K/UL — SIGNIFICANT CHANGE UP (ref 3.8–10.5)
WBC # FLD AUTO: 9.7 K/UL — SIGNIFICANT CHANGE UP (ref 3.8–10.5)

## 2017-02-11 PROCEDURE — 99233 SBSQ HOSP IP/OBS HIGH 50: CPT

## 2017-02-11 RX ADMIN — Medication 650 MILLIGRAM(S): at 17:19

## 2017-02-11 RX ADMIN — PIPERACILLIN AND TAZOBACTAM 25 GRAM(S): 4; .5 INJECTION, POWDER, LYOPHILIZED, FOR SOLUTION INTRAVENOUS at 05:35

## 2017-02-11 RX ADMIN — LINEZOLID 600 MILLIGRAM(S): 600 INJECTION, SOLUTION INTRAVENOUS at 05:35

## 2017-02-11 RX ADMIN — LINEZOLID 600 MILLIGRAM(S): 600 INJECTION, SOLUTION INTRAVENOUS at 17:19

## 2017-02-11 RX ADMIN — SODIUM CHLORIDE 100 MILLILITER(S): 9 INJECTION, SOLUTION INTRAVENOUS at 05:35

## 2017-02-11 RX ADMIN — ENOXAPARIN SODIUM 40 MILLIGRAM(S): 100 INJECTION SUBCUTANEOUS at 11:17

## 2017-02-11 RX ADMIN — PIPERACILLIN AND TAZOBACTAM 25 GRAM(S): 4; .5 INJECTION, POWDER, LYOPHILIZED, FOR SOLUTION INTRAVENOUS at 13:26

## 2017-02-11 RX ADMIN — SODIUM CHLORIDE 100 MILLILITER(S): 9 INJECTION, SOLUTION INTRAVENOUS at 16:20

## 2017-02-11 RX ADMIN — SIMVASTATIN 20 MILLIGRAM(S): 20 TABLET, FILM COATED ORAL at 22:20

## 2017-02-11 RX ADMIN — PIPERACILLIN AND TAZOBACTAM 25 GRAM(S): 4; .5 INJECTION, POWDER, LYOPHILIZED, FOR SOLUTION INTRAVENOUS at 22:20

## 2017-02-11 NOTE — PROGRESS NOTE ADULT - SUBJECTIVE AND OBJECTIVE BOX
Patient seen and examined bedside resting comfortably.  No complaints offered.     T(F): 99, Max: 101.8 (02-10 @ 23:34)  HR: 95 (88 - 100)  BP: 128/79 (118/70 - 148/80)  RR: 16 (16 - 17)  SpO2: 97% (97% - 99%)  Wt(kg): --  CAPILLARY BLOOD GLUCOSE      PHYSICAL EXAM:  General: NAD, WDWN.   Neuro:  Alert & oriented x 3  HEENT: NCAT, EOMI, conjunctiva clear  CV: +S1+S2 regular rate and rhythm  Lung: clear to ausculation bilaterally, respirations nonlabored, good inspiratory effort  Abdomen: soft, NTND.  : No CVA or SP tenderness, obrien catheter in SITU   Sacral Wound vac in SITU on suction  LABS:                        9.7    9.7   )-----------( 321      ( 11 Feb 2017 08:02 )             28.3     10 Feb 2017 08:52    138    |  102    |  13     ----------------------------<  105    3.9     |  27     |  0.78     Ca    9.0        10 Feb 2017 08:52    TPro  7.1    /  Alb  2.0    /  TBili  0.3    /  DBili  x      /  AST  27     /  ALT  37     /  AlkPhos  45     10 Feb 2017 08:52      I&O's Detail  I & Os for 24h ending 11 Feb 2017 07:00  =============================================  IN:    dextrose 5% + sodium chloride 0.45%.: 2400 ml    Solution: 300 ml    Oral Fluid: 100 ml    Total IN: 2800 ml  ---------------------------------------------  OUT:    Indwelling Catheter - Urethral: 2200 ml    Voided: 200 ml    Total OUT: 2400 ml  ---------------------------------------------  Total NET: 400 ml    I & Os for current day (as of 11 Feb 2017 14:29)  =============================================  IN:    Oral Fluid: 360 ml    Total IN: 360 ml  ---------------------------------------------  OUT:    Indwelling Catheter - Urethral: 1000 ml    Total OUT: 1000 ml  ---------------------------------------------  Total NET: -640 ml          Impression: 61y Female admitted with DEHYDRATION, FALIURE TO THRIVE IN ADULT, [place] CHOREA  Urinary retention 2/2 neurogenic bladder 2 days s/p debridement of sacral decubitus/now with wound Vac        Plan:   - Continue obrien catheter  - continue IVAB per ID  - continue medical f/u  -continue Wound Vac managed by physical therapy  -will discuss with attending for further intervention

## 2017-02-11 NOTE — PHYSICAL THERAPY INITIAL EVALUATION ADULT - BED MOBILITY LIMITATIONS, REHAB EVAL
decreased ability to use arms for pushing/pulling/decreased ability to use legs for bridging/pushing/impaired ability to control trunk for mobility
decreased ability to use legs for bridging/pushing/impaired ability to control trunk for mobility/decreased ability to use arms for pushing/pulling

## 2017-02-11 NOTE — PHYSICAL THERAPY INITIAL EVALUATION ADULT - ADDITIONAL COMMENTS
AS per last evaluation 1/23/17, spouse states that his spouse was not walking for approximately 1 year, however he also states that the patient was able to walk to [facility]essed[facility] bathroom with assistance.
AS per last evaluation 1/23/17, spouse states that his spouse was not walking for approximately 1 year, however he also states that the patient was able to walk to the bathroom with assistance.

## 2017-02-11 NOTE — PROGRESS NOTE ADULT - PROBLEM SELECTOR PLAN 1
-cont on wound care with wound vac  -change position Q2 hrs  -piperacillin/tazobactam IVPB. 3.375Gram(s) IV Intermittent every 8 hours  -linezolid    Tablet 600milliGRAM(s) Oral every 12 hours  -follow up ID

## 2017-02-11 NOTE — PHYSICAL THERAPY INITIAL EVALUATION ADULT - IMPAIRMENTS FOUND, PT EVAL
tone/gait, locomotion, and balance/sensory integrity/muscle strength/gross motor/fine motor/ROM/posture
gait, locomotion, and balance/gross motor/fine motor/tone/posture/ROM/sensory integrity/muscle strength

## 2017-02-11 NOTE — PHYSICAL THERAPY INITIAL EVALUATION ADULT - REFERRAL TO ANOTHER SERVICE NEEDED, PT EVAL
neurology/psychology/speech language pathology/occupational therapy/social work
social work/Wound Care

## 2017-02-11 NOTE — PHYSICAL THERAPY INITIAL EVALUATION ADULT - ACTIVE RANGE OF MOTION EXAMINATION, REHAB EVAL
deficits as listed below/R Shoulder Flexion: 0 to 80, R Elbow Extension: 130 to 30, R Knee: 10 to 90
R Shoulder Flexion: 0 to 80, R Elbow Extension: 130 to 30, R Knee: 10 to 90/deficits as listed below

## 2017-02-11 NOTE — PHYSICAL THERAPY INITIAL EVALUATION ADULT - DIAGNOSIS, PT EVAL
Decreased functional mobility secondary to progression of aicha's disease
Decreased functional mobility s/p sacral debridement

## 2017-02-11 NOTE — PHYSICAL THERAPY INITIAL EVALUATION ADULT - PHYSICAL ASSIST/NONPHYSICAL ASSIST, REHAB EVAL
1 person assist/verbal cues/nonverbal cues (demo/gestures)
verbal cues/1 person assist/nonverbal cues (demo/gestures)

## 2017-02-11 NOTE — PHYSICAL THERAPY INITIAL EVALUATION ADULT - MODIFIED CLINICAL TEST OF SENSORY INTEGRATION IN BALANCE TEST
Barthel Index: Feeding Score _5__, Bathing Score _0__, Grooming Score _0__, Dressing Score _0__, Bowels Score _0__, Bladder Score _0__, Toilet Score _0__, Transfers Score __0_, Mobility Score _0__, Stairs Score _0__,     Total Score _5__
Barthel Index: Feeding Score _5__, Bathing Score _0__, Grooming Score _0__, Dressing Score _0__, Bowels Score _0__, Bladder Score _0__, Toilet Score _0__, Transfers Score __5_, Mobility Score _0__, Stairs Score _0__,     Total Score _10__

## 2017-02-11 NOTE — PHYSICAL THERAPY INITIAL EVALUATION ADULT - IMPAIRMENTS CONTRIBUTING IMPAIRED BED MOBILITY, REHAB EVAL
impaired postural control/decreased strength/impaired balance
impaired postural control/abnormal muscle tone/impaired balance/impaired motor control/decreased strength

## 2017-02-11 NOTE — PROGRESS NOTE ADULT - SUBJECTIVE AND OBJECTIVE BOX
Patient is a 61y old  Female who presents with a chief complaint of Generalized weakness (02 Feb 2017 12:58)      OVERNIGHT EVENTS: no acute overnight event. Sacral decubiti dressing done by wound care team this am. No acute pain or distress noted    REVIEW OF SYSTEMS: denies chest pain/SOB, diaphoresis, cough, dizziness, headache, blurry vision, nausea, vomiting, abdominal pain. Rest unremarkable     MEDICATIONS  (STANDING):  simvastatin 20milliGRAM(s) Oral at bedtime  dextrose 5% + sodium chloride 0.45%. 1000milliLiter(s) IV Continuous <Continuous>  enoxaparin Injectable 40milliGRAM(s) SubCutaneous daily  piperacillin/tazobactam IVPB. 3.375Gram(s) IV Intermittent every 8 hours  linezolid    Tablet 600milliGRAM(s) Oral every 12 hours    MEDICATIONS  (PRN):  acetaminophen   Tablet 650milliGRAM(s) Oral every 6 hours PRN For Temp greater than 38 C (100.4 F)  ibuprofen  Tablet 200milliGRAM(s) Oral every 8 hours PRN Temp >/=102  oxyCODONE  5 mG/acetaminophen 325 mG 1Tablet(s) Oral every 6 hours PRN Moderate Pain (4 - 6)  oxyCODONE  5 mG/acetaminophen 325 mG 2Tablet(s) Oral every 6 hours PRN Severe Pain (7 - 10)      Allergies    No Known Allergies    Intolerances        T(F): 98.2, Max: 101.8 (02-10 @ 23:34)  HR: 88 (88 - 100)  BP: 148/80 (116/73 - 148/80)  RR: 16 (16 - 18)  SpO2: 97% (97% - 99%)  Wt(kg): --    PHYSICAL EXAM:  GENERAL: NAD, frail, chronically-ill appearing  HEAD:  Atraumatic, Normocephalic  EYES: EOMI, PERRLA, conjunctiva and sclera clear  ENMT: Moist mucous membranes  NECK: Supple, No JVD, Normal thyroid  NERVOUS SYSTEM:  awake, Alert, Good concentration; slurred speech, Motor Strength 3/5 B/L upper and lower extremities  CHEST/LUNG: Clear to percussion bilaterally; No rales, rhonchi, wheezing, or rubs BL  HEART: Regular rate and rhythm; No murmurs, rubs, or gallops  ABDOMEN: Soft, Nontender, Nondistended; Bowel sounds present  EXTREMITIES:  2+ Peripheral Pulses, No clubbing, cyanosis, or edema BL LE  LYMPH: No lymphadenopathy noted  SKIN: sacral stage 4 decubiti with wound vac in situ     LABS:                        9.7    9.7   )-----------( 321      ( 11 Feb 2017 08:02 )             28.3     10 Feb 2017 08:52    138    |  102    |  13     ----------------------------<  105    3.9     |  27     |  0.78     Ca    9.0        10 Feb 2017 08:52    TPro  7.1    /  Alb  2.0    /  TBili  0.3    /  DBili  x      /  AST  27     /  ALT  37     /  AlkPhos  45     10 Feb 2017 08:52        Cultures;   bcx- Culture Results:   No growth to date. (02.09.17 @ 16:56)    wound cx- Culture Results:   Moderate Escherichia coli  Moderate Streptococcus agalactiae (Group B)  Growth in fluid media only Enterococcus faecalis  Rule Out Anaerobes (02.05.17 @ 18:00)      CAPILLARY BLOOD GLUCOSE    Lipid panel:       RADIOLOGY & ADDITIONAL TESTS:    Imaging Personally Reviewed:  [X ] YES      Consultant(s) Notes Reviewed:  [ X] YES     Care Discussed with [X ] Consultants [X ] Patient [ ] Family  [x ]    [x ]  Other; RN

## 2017-02-11 NOTE — PHYSICAL THERAPY INITIAL EVALUATION ADULT - PHYSICAL ASSIST/NONPHYSICAL ASSIST: SCOOT/BRIDGE, REHAB EVAL
1 person assist/nonverbal cues (demo/gestures)/verbal cues
nonverbal cues (demo/gestures)/1 person assist/verbal cues

## 2017-02-11 NOTE — PHYSICAL THERAPY INITIAL EVALUATION ADULT - PLANNED THERAPY INTERVENTIONS, PT EVAL
strengthening/transfer training/gait training/motor coordination training/balance training/postural re-education/ROM/bed mobility training
motor coordination training/strengthening/postural re-education/bed mobility training/gait training/balance training/ROM/transfer training

## 2017-02-11 NOTE — PHYSICAL THERAPY INITIAL EVALUATION ADULT - CRITERIA FOR SKILLED THERAPEUTIC INTERVENTIONS
functional limitations in following categories/Home with Home P.T., resumption of prior services/impairments found/rehab potential/anticipated discharge recommendation/therapy frequency/anticipated equipment needs at discharge/risk reduction/prevention/predicted duration of therapy intervention
risk reduction/prevention/anticipated discharge recommendation/anticipated equipment needs at discharge/impairments found/rehab potential/therapy frequency/predicted duration of therapy intervention/functional limitations in following categories/Subacute Rehab

## 2017-02-11 NOTE — PHYSICAL THERAPY INITIAL EVALUATION ADULT - PHYSICAL ASSIST/NONPHYSICAL ASSIST: SUPINE/SIT, REHAB EVAL
nonverbal cues (demo/gestures)/1 person assist/verbal cues
1 person assist/nonverbal cues (demo/gestures)/verbal cues

## 2017-02-11 NOTE — PHYSICAL THERAPY INITIAL EVALUATION ADULT - MUSCLE TONE ASSESSMENT, REHAB EVAL
severely increased tone/bilateral upper extremities/bilateral lower extremities
bilateral lower extremities/severely increased tone/bilateral upper extremities

## 2017-02-11 NOTE — PHYSICAL THERAPY INITIAL EVALUATION ADULT - PERTINENT HX OF CURRENT PROBLEM, REHAB EVAL
Patient admitted to hospital secondary to dehydration and failure to thrive
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

## 2017-02-11 NOTE — PHYSICAL THERAPY INITIAL EVALUATION ADULT - PATIENT/FAMILY AGREES WITH PLAN
no/Home with Home P.T. and resumption of prior services, however according to documentation pt's family is unable to care for her
no/Subacute Rehab

## 2017-02-11 NOTE — PHYSICAL THERAPY INITIAL EVALUATION ADULT - GENERAL OBSERVATIONS, REHAB EVAL
Patient seen supine in bed with no apparent distress with R hypertonia, choreatic movements, teeth grinding, and low phonation upon speaking.
"I want to go home."

## 2017-02-12 PROCEDURE — 99232 SBSQ HOSP IP/OBS MODERATE 35: CPT

## 2017-02-12 RX ORDER — IBUPROFEN 200 MG
400 TABLET ORAL ONCE
Qty: 0 | Refills: 0 | Status: COMPLETED | OUTPATIENT
Start: 2017-02-12 | End: 2017-02-12

## 2017-02-12 RX ADMIN — LINEZOLID 600 MILLIGRAM(S): 600 INJECTION, SOLUTION INTRAVENOUS at 05:47

## 2017-02-12 RX ADMIN — ENOXAPARIN SODIUM 40 MILLIGRAM(S): 100 INJECTION SUBCUTANEOUS at 11:24

## 2017-02-12 RX ADMIN — LINEZOLID 600 MILLIGRAM(S): 600 INJECTION, SOLUTION INTRAVENOUS at 17:31

## 2017-02-12 RX ADMIN — SIMVASTATIN 20 MILLIGRAM(S): 20 TABLET, FILM COATED ORAL at 22:00

## 2017-02-12 RX ADMIN — SODIUM CHLORIDE 100 MILLILITER(S): 9 INJECTION, SOLUTION INTRAVENOUS at 17:37

## 2017-02-12 RX ADMIN — PIPERACILLIN AND TAZOBACTAM 25 GRAM(S): 4; .5 INJECTION, POWDER, LYOPHILIZED, FOR SOLUTION INTRAVENOUS at 05:47

## 2017-02-12 RX ADMIN — Medication 650 MILLIGRAM(S): at 17:36

## 2017-02-12 RX ADMIN — Medication 400 MILLIGRAM(S): at 22:00

## 2017-02-12 RX ADMIN — PIPERACILLIN AND TAZOBACTAM 25 GRAM(S): 4; .5 INJECTION, POWDER, LYOPHILIZED, FOR SOLUTION INTRAVENOUS at 22:01

## 2017-02-12 RX ADMIN — SODIUM CHLORIDE 100 MILLILITER(S): 9 INJECTION, SOLUTION INTRAVENOUS at 05:50

## 2017-02-12 RX ADMIN — PIPERACILLIN AND TAZOBACTAM 25 GRAM(S): 4; .5 INJECTION, POWDER, LYOPHILIZED, FOR SOLUTION INTRAVENOUS at 14:25

## 2017-02-12 NOTE — PROGRESS NOTE ADULT - SUBJECTIVE AND OBJECTIVE BOX
Patient seen and examined bedside resting comfortably.  No complaints offered.     T(F): 97.3, Max: 101 (02-11 @ 17:17)  HR: 93 (88 - 100)  BP: 131/65 (116/72 - 143/86)  RR: 16 (16 - 18)  SpO2: 98% (96% - 98%)  Wt(kg): --  CAPILLARY BLOOD GLUCOSE    PHYSICAL EXAM:  General: NAD, WDWN.   Neuro:  Alert & oriented x 3  HEENT: NCAT, EOMI, conjunctiva clear  CV: +S1+S2 regular rate and rhythm  Lung: clear to ausculation bilaterally, respirations nonlabored, good inspiratory effort  Abdomen: soft, NTND.  : No CVA or SP tenderness, obrien catheter in SITU   Sacral Wound vac in SITU on suction  LABS:                 LABS:                        9.7    9.7   )-----------( 321      ( 11 Feb 2017 08:02 )             28.3             I&O's Detail  I & Os for 24h ending 12 Feb 2017 07:00  =============================================  IN:    dextrose 5% + sodium chloride 0.45%.: 1200 ml    Oral Fluid: 600 ml    Solution: 100 ml    Total IN: 1900 ml  ---------------------------------------------  OUT:    Indwelling Catheter - Urethral: 2700 ml    Total OUT: 2700 ml  ---------------------------------------------  Total NET: -800 ml    I & Os for current day (as of 12 Feb 2017 13:51)  =============================================  IN:    Total IN: 0 ml  ---------------------------------------------  OUT:    Indwelling Catheter - Urethral: 1000 ml    Total OUT: 1000 ml  ---------------------------------------------  Total NET: -1000 ml        Impression: 61y Female admitted with DEHYDRATION, FALIURE TO THRIVE IN ADULT, [place] CHOREA  Urinary retention 2/2 neurogenic bladder 2 days s/p debridement of sacral decubitus/now with wound Vac    Plan:   - Continue obrien catheter  - continue IVAB per ID  - continue medical f/u  -continue Wound Vac managed by physical therapy  -will discuss with attending for further intervention Patient seen and examined bedside resting comfortably.  No complaints offered.     T(F): 97.3, Max: 101 (02-11 @ 17:17)  HR: 93 (88 - 100)  BP: 131/65 (116/72 - 143/86)  RR: 16 (16 - 18)  SpO2: 98% (96% - 98%)  Wt(kg): --  CAPILLARY BLOOD GLUCOSE    PHYSICAL EXAM:  General: NAD, WDWN.   Neuro:  Alert & oriented x 3  HEENT: NCAT, EOMI, conjunctiva clear  CV: +S1+S2 regular rate and rhythm  Lung: clear to ausculation bilaterally, respirations nonlabored, good inspiratory effort  Abdomen: soft, NTND.  : No CVA or SP tenderness, obrien catheter in SITU   Sacral Wound vac in SITU on suction    LABS:                        9.7    9.7   )-----------( 321      ( 11 Feb 2017 08:02 )             28.3     I&O's Detail  I & Os for 24h ending 12 Feb 2017 07:00  =============================================  IN:    dextrose 5% + sodium chloride 0.45%.: 1200 ml    Oral Fluid: 600 ml    Solution: 100 ml    Total IN: 1900 ml  ---------------------------------------------  OUT:    Indwelling Catheter - Urethral: 2700 ml    Total OUT: 2700 ml  ---------------------------------------------  Total NET: -800 ml    I & Os for current day (as of 12 Feb 2017 13:51)  =============================================  IN:    Total IN: 0 ml  ---------------------------------------------  OUT:    Indwelling Catheter - Urethral: 1000 ml    Total OUT: 1000 ml  ---------------------------------------------  Total NET: -1000 ml        Impression: 61y Female admitted with DEHYDRATION, FALIURE TO THRIVE IN ADULT, [place] CHOREA  Urinary retention 2/2 neurogenic bladder  s/p debridement of sacral decubitus/now with wound Vac    Plan:   - Continue obrien catheter  - continue IVAB per ID  - continue medical f/u  -continue Wound Vac managed by physical therapy  -will discuss with attending for further intervention

## 2017-02-12 NOTE — PROGRESS NOTE ADULT - SUBJECTIVE AND OBJECTIVE BOX
Patient is a 61y old  Female who presents with a chief complaint of Generalized weakness (02 Feb 2017 12:58)      OVERNIGHT EVENTS: none    REVIEW OF SYSTEMS: denies chest pain/SOB, diaphoresis, no F/C, cough, dizziness, headache, blurry vision, nausea, vomiting, abdominal pain. Rest unremarkable     MEDICATIONS  (STANDING):  simvastatin 20milliGRAM(s) Oral at bedtime  dextrose 5% + sodium chloride 0.45%. 1000milliLiter(s) IV Continuous <Continuous>  enoxaparin Injectable 40milliGRAM(s) SubCutaneous daily  piperacillin/tazobactam IVPB. 3.375Gram(s) IV Intermittent every 8 hours  linezolid    Tablet 600milliGRAM(s) Oral every 12 hours    MEDICATIONS  (PRN):  acetaminophen   Tablet 650milliGRAM(s) Oral every 6 hours PRN For Temp greater than 38 C (100.4 F)  ibuprofen  Tablet 200milliGRAM(s) Oral every 8 hours PRN Temp >/=102  oxyCODONE  5 mG/acetaminophen 325 mG 1Tablet(s) Oral every 6 hours PRN Moderate Pain (4 - 6)  oxyCODONE  5 mG/acetaminophen 325 mG 2Tablet(s) Oral every 6 hours PRN Severe Pain (7 - 10)      Allergies    No Known Allergies    Intolerances        T(F): 99.9, Max: 101 (02-11 @ 17:17)  HR: 91 (88 - 100)  BP: 116/72 (116/72 - 143/86)  RR: 16 (16 - 18)  SpO2: 96% (96% - 98%)  Wt(kg): --    PHYSICAL EXAM:  GENERAL: NAD, frail, chronically-ill appearing  HEAD:  Atraumatic, Normocephalic  EYES: EOMI, PERRLA, conjunctiva and sclera clear  ENMT: Moist mucous membranes  NECK: Supple, No JVD, Normal thyroid  NERVOUS SYSTEM:  awake, Alert, Good concentration; slurred speech, Motor Strength 3/5 B/L upper and lower extremities  CHEST/LUNG: Clear to percussion bilaterally; No rales, rhonchi, wheezing, or rubs BL  HEART: Regular rate and rhythm; No murmurs, rubs, or gallops  ABDOMEN: Soft, Nontender, Nondistended; Bowel sounds present  EXTREMITIES:  2+ Peripheral Pulses, No clubbing, cyanosis, or edema BL LE  LYMPH: No lymphadenopathy noted  SKIN: sacral stage 4 decubiti with wound vac in situ     LABS:                        9.7    9.7   )-----------( 321      ( 11 Feb 2017 08:02 )             28.3     Cultures;   bcx- Culture Results:   No growth to date. (02.09.17 @ 16:56)    wound cx- Culture Results:   Moderate Escherichia coli  Moderate Streptococcus agalactiae (Group B)  Growth in fluid media only Enterococcus faecalis  Rule Out Anaerobes (02.05.17 @ 18:00)      CAPILLARY BLOOD GLUCOSE    Lipid panel:       RADIOLOGY & ADDITIONAL TESTS:    Imaging Personally Reviewed:  [X ] YES      Consultant(s) Notes Reviewed:  [ X] YES     Care Discussed with [X ] Consultants [X ] Patient [ ] Family  [x ]    [x ]  Other; RN

## 2017-02-12 NOTE — PROGRESS NOTE ADULT - PROBLEM SELECTOR PLAN 8
-on Dysphagia diet  -aspiration precaution maintained
-on Dysphagia diet  -aspiration precaution maintained

## 2017-02-13 DIAGNOSIS — D64.9 ANEMIA, UNSPECIFIED: ICD-10-CM

## 2017-02-13 LAB
ANION GAP SERPL CALC-SCNC: 8 MMOL/L — SIGNIFICANT CHANGE UP (ref 5–17)
BASOPHILS # BLD AUTO: 0.1 K/UL — SIGNIFICANT CHANGE UP (ref 0–0.2)
BASOPHILS NFR BLD AUTO: 1.3 % — SIGNIFICANT CHANGE UP (ref 0–2)
BUN SERPL-MCNC: 17 MG/DL — SIGNIFICANT CHANGE UP (ref 7–23)
CALCIUM SERPL-MCNC: 8.6 MG/DL — SIGNIFICANT CHANGE UP (ref 8.5–10.1)
CHLORIDE SERPL-SCNC: 106 MMOL/L — SIGNIFICANT CHANGE UP (ref 96–108)
CO2 SERPL-SCNC: 27 MMOL/L — SIGNIFICANT CHANGE UP (ref 22–31)
CREAT SERPL-MCNC: 0.8 MG/DL — SIGNIFICANT CHANGE UP (ref 0.5–1.3)
CULTURE RESULTS: SIGNIFICANT CHANGE UP
EOSINOPHIL # BLD AUTO: 0.2 K/UL — SIGNIFICANT CHANGE UP (ref 0–0.5)
EOSINOPHIL NFR BLD AUTO: 3.3 % — SIGNIFICANT CHANGE UP (ref 0–6)
GLUCOSE SERPL-MCNC: 97 MG/DL — SIGNIFICANT CHANGE UP (ref 70–99)
GRAM STN FLD: SIGNIFICANT CHANGE UP
HCT VFR BLD CALC: 27.5 % — LOW (ref 34.5–45)
HGB BLD-MCNC: 9.5 G/DL — LOW (ref 11.5–15.5)
LYMPHOCYTES # BLD AUTO: 1.7 K/UL — SIGNIFICANT CHANGE UP (ref 1–3.3)
LYMPHOCYTES # BLD AUTO: 33 % — SIGNIFICANT CHANGE UP (ref 13–44)
MAGNESIUM SERPL-MCNC: 2.4 MG/DL — SIGNIFICANT CHANGE UP (ref 1.8–2.4)
MCHC RBC-ENTMCNC: 27.4 PG — SIGNIFICANT CHANGE UP (ref 27–34)
MCHC RBC-ENTMCNC: 34.4 GM/DL — SIGNIFICANT CHANGE UP (ref 32–36)
MCV RBC AUTO: 79.5 FL — LOW (ref 80–100)
MONOCYTES # BLD AUTO: 0.8 K/UL — SIGNIFICANT CHANGE UP (ref 0–0.9)
MONOCYTES NFR BLD AUTO: 15 % — HIGH (ref 2–14)
NEUTROPHILS # BLD AUTO: 2.4 K/UL — SIGNIFICANT CHANGE UP (ref 1.8–7.4)
NEUTROPHILS NFR BLD AUTO: 47.3 % — SIGNIFICANT CHANGE UP (ref 43–77)
ORGANISM # SPEC MICROSCOPIC CNT: SIGNIFICANT CHANGE UP
PHOSPHATE SERPL-MCNC: 3 MG/DL — SIGNIFICANT CHANGE UP (ref 2.5–4.5)
PLATELET # BLD AUTO: 301 K/UL — SIGNIFICANT CHANGE UP (ref 150–400)
POTASSIUM SERPL-MCNC: 3.8 MMOL/L — SIGNIFICANT CHANGE UP (ref 3.5–5.3)
POTASSIUM SERPL-SCNC: 3.8 MMOL/L — SIGNIFICANT CHANGE UP (ref 3.5–5.3)
RBC # BLD: 3.46 M/UL — LOW (ref 3.8–5.2)
RBC # FLD: 11.3 % — SIGNIFICANT CHANGE UP (ref 11–15)
SODIUM SERPL-SCNC: 141 MMOL/L — SIGNIFICANT CHANGE UP (ref 135–145)
SPECIMEN SOURCE: SIGNIFICANT CHANGE UP
SURGICAL PATHOLOGY FINAL REPORT - CH: SIGNIFICANT CHANGE UP
WBC # BLD: 5.2 K/UL — SIGNIFICANT CHANGE UP (ref 3.8–10.5)
WBC # FLD AUTO: 5.2 K/UL — SIGNIFICANT CHANGE UP (ref 3.8–10.5)

## 2017-02-13 PROCEDURE — 99233 SBSQ HOSP IP/OBS HIGH 50: CPT

## 2017-02-13 PROCEDURE — 99232 SBSQ HOSP IP/OBS MODERATE 35: CPT

## 2017-02-13 RX ORDER — METRONIDAZOLE 500 MG
500 TABLET ORAL EVERY 8 HOURS
Qty: 0 | Refills: 0 | Status: DISCONTINUED | OUTPATIENT
Start: 2017-02-13 | End: 2017-02-17

## 2017-02-13 RX ORDER — METRONIDAZOLE 500 MG
TABLET ORAL
Qty: 0 | Refills: 0 | Status: DISCONTINUED | OUTPATIENT
Start: 2017-02-13 | End: 2017-02-17

## 2017-02-13 RX ORDER — METRONIDAZOLE 500 MG
500 TABLET ORAL ONCE
Qty: 0 | Refills: 0 | Status: COMPLETED | OUTPATIENT
Start: 2017-02-13 | End: 2017-02-13

## 2017-02-13 RX ADMIN — SIMVASTATIN 20 MILLIGRAM(S): 20 TABLET, FILM COATED ORAL at 22:13

## 2017-02-13 RX ADMIN — ENOXAPARIN SODIUM 40 MILLIGRAM(S): 100 INJECTION SUBCUTANEOUS at 11:32

## 2017-02-13 RX ADMIN — SODIUM CHLORIDE 100 MILLILITER(S): 9 INJECTION, SOLUTION INTRAVENOUS at 22:13

## 2017-02-13 RX ADMIN — SODIUM CHLORIDE 100 MILLILITER(S): 9 INJECTION, SOLUTION INTRAVENOUS at 11:32

## 2017-02-13 RX ADMIN — Medication 100 MILLIGRAM(S): at 22:13

## 2017-02-13 RX ADMIN — PIPERACILLIN AND TAZOBACTAM 25 GRAM(S): 4; .5 INJECTION, POWDER, LYOPHILIZED, FOR SOLUTION INTRAVENOUS at 05:40

## 2017-02-13 RX ADMIN — Medication 650 MILLIGRAM(S): at 17:39

## 2017-02-13 RX ADMIN — PIPERACILLIN AND TAZOBACTAM 25 GRAM(S): 4; .5 INJECTION, POWDER, LYOPHILIZED, FOR SOLUTION INTRAVENOUS at 13:40

## 2017-02-13 RX ADMIN — Medication 10 MILLIGRAM(S): at 17:38

## 2017-02-13 RX ADMIN — SODIUM CHLORIDE 100 MILLILITER(S): 9 INJECTION, SOLUTION INTRAVENOUS at 05:40

## 2017-02-13 RX ADMIN — LINEZOLID 600 MILLIGRAM(S): 600 INJECTION, SOLUTION INTRAVENOUS at 05:40

## 2017-02-13 RX ADMIN — Medication 100 MILLIGRAM(S): at 11:40

## 2017-02-13 RX ADMIN — PIPERACILLIN AND TAZOBACTAM 25 GRAM(S): 4; .5 INJECTION, POWDER, LYOPHILIZED, FOR SOLUTION INTRAVENOUS at 22:13

## 2017-02-13 RX ADMIN — LINEZOLID 600 MILLIGRAM(S): 600 INJECTION, SOLUTION INTRAVENOUS at 17:39

## 2017-02-13 NOTE — PROGRESS NOTE ADULT - SUBJECTIVE AND OBJECTIVE BOX
CC/F/U for: [place]'s chorea, sacral decub infection, fevers    INTERVAL HPI/OVERNIGHT EVENTS:  Pt seen and examined at bedside. Still w/fevers, Tm yesterday 102.6; c/o constipation    Allergies/Intolerance: No Known Allergies      MEDICATIONS  (STANDING):  simvastatin 20milliGRAM(s) Oral at bedtime  dextrose 5% + sodium chloride 0.45%. 1000milliLiter(s) IV Continuous <Continuous>  enoxaparin Injectable 40milliGRAM(s) SubCutaneous daily  piperacillin/tazobactam IVPB. 3.375Gram(s) IV Intermittent every 8 hours  linezolid    Tablet 600milliGRAM(s) Oral every 12 hours  metroNIDAZOLE  IVPB  IV Intermittent   metroNIDAZOLE  IVPB 500milliGRAM(s) IV Intermittent once  metroNIDAZOLE  IVPB 500milliGRAM(s) IV Intermittent every 8 hours    MEDICATIONS  (PRN):  acetaminophen   Tablet 650milliGRAM(s) Oral every 6 hours PRN For Temp greater than 38 C (100.4 F)  ibuprofen  Tablet 200milliGRAM(s) Oral every 8 hours PRN Temp >/=102  oxyCODONE  5 mG/acetaminophen 325 mG 1Tablet(s) Oral every 6 hours PRN Moderate Pain (4 - 6)  oxyCODONE  5 mG/acetaminophen 325 mG 2Tablet(s) Oral every 6 hours PRN Severe Pain (7 - 10)        ROS: as above; all other systems reviewed and wnl      PHYSICAL EXAMINATION:  Vital Signs Last 24 Hrs  T(C): 36.1, Max: 39.2 (02-12 @ 18:18)  T(F): 97, Max: 102.6 (02-12 @ 18:18)  HR: 87 (76 - 99)  BP: 118/66 (96/59 - 132/70)  BP(mean): --  RR: 14 (14 - 17)  SpO2: 98% (95% - 99%)  CAPILLARY BLOOD GLUCOSE      GENERAL: frail, chronically-ill appearing, middle-aged female  HEAD:  atraumatic, normocephalic  EYES: sclera anicteric  ENMT: mucous membranes dry  NECK: supple, No JVD  CHEST/LUNG: respirations unlabored; BS decr bases, air entry symmetric  HEART: normal S1, S2  ABDOMEN: BS+, soft, ND, NT; +Mcfarland in place  EXTREMITIES:  pulses palpable; no clubbing, cyanosis, or edema b/l LEs  NEURO: awake, alert, interactive; verbal output efforful  SKIN: sacral decub      LABS:                        9.5    5.2   )-----------( 301      ( 13 Feb 2017 07:09 )             27.5     13 Feb 2017 07:10    141    |  106    |  17     ----------------------------<  97     3.8     |  27     |  0.80     Ca    8.6        13 Feb 2017 07:10  Phos  3.0       13 Feb 2017 07:10  Mg     2.4       13 Feb 2017 07:10      ASSESSMENT AND PLAN:  61F, HTN, HL, Ashli's chorea, sacral decub, being tx'd for generalized weakness in setting of adult failure to thrive, volume depletion, rhabdo w/CK 1077 on admit, and recurrent fevers in setting of possible acute infection 2/2 sacral decub on IV abxs vs 2/2 [place] chorea; UA neg; also w/urinary retention of 1100mL (2/4/17) s/p Mcfarland; s/p bedside debridement of sacral decub 2/5/17, then further debridement 2/8/17; but still w/recurrent fevers    ID/MSK: sacral decub infection, leukocytosis, recurrent fevers  -continue IV abxs cvrg as per ID consult w/zosyn/vanco; d/w ID re adjusting abxs given ongoing fevers  -following vancomycin levels  -repeat blood cxs pending; blood cxs neg x1 (2/2), urine cx neg (2/5)  -sacral decub debrided by Wound Care/Dr Galeana at bedside 2/5, then again 2/7; wound cxs 2/5 w/Ecoli, Group B Strep, and Enterococcus  -rhabdo, resolved; no e/o renal dysfunction    : urinary retention, s/p Mcfarland placement; per  (Dr Cleaning), likely neurogenic bladder - continue Mcfarland as currently    HEME: chronic anemia, likely 2/2 chronic dz - hgb stable    CV:   -cardioprotective statin for hyperlipidemia; rhabdo resolved, CKs normalized  -volume depletion - continue IVFs for volume repletion    OTHER:  -dvt prophylaxis w/lovenox SQ  --generalized weakness/failure to thrive in setting of acute infection, debility; PT eval/SW for likely rehab placement at dispo CC/F/U for: [place]'s chorea, sacral decub infection, fevers    INTERVAL HPI/OVERNIGHT EVENTS:  Pt seen and examined at bedside. Still w/fevers, Tm yesterday 102.6; c/o constipation    Allergies/Intolerance: No Known Allergies      MEDICATIONS  (STANDING):  simvastatin 20milliGRAM(s) Oral at bedtime  dextrose 5% + sodium chloride 0.45%. 1000milliLiter(s) IV Continuous <Continuous>  enoxaparin Injectable 40milliGRAM(s) SubCutaneous daily  piperacillin/tazobactam IVPB. 3.375Gram(s) IV Intermittent every 8 hours  linezolid    Tablet 600milliGRAM(s) Oral every 12 hours  metroNIDAZOLE  IVPB  IV Intermittent   metroNIDAZOLE  IVPB 500milliGRAM(s) IV Intermittent once  metroNIDAZOLE  IVPB 500milliGRAM(s) IV Intermittent every 8 hours    MEDICATIONS  (PRN):  acetaminophen   Tablet 650milliGRAM(s) Oral every 6 hours PRN For Temp greater than 38 C (100.4 F)  ibuprofen  Tablet 200milliGRAM(s) Oral every 8 hours PRN Temp >/=102  oxyCODONE  5 mG/acetaminophen 325 mG 1Tablet(s) Oral every 6 hours PRN Moderate Pain (4 - 6)  oxyCODONE  5 mG/acetaminophen 325 mG 2Tablet(s) Oral every 6 hours PRN Severe Pain (7 - 10)        ROS: as above; all other systems reviewed and wnl      PHYSICAL EXAMINATION:  Vital Signs Last 24 Hrs  T(C): 36.1, Max: 39.2 (02-12 @ 18:18)  T(F): 97, Max: 102.6 (02-12 @ 18:18)  HR: 87 (76 - 99)  BP: 118/66 (96/59 - 132/70)  BP(mean): --  RR: 14 (14 - 17)  SpO2: 98% (95% - 99%)  CAPILLARY BLOOD GLUCOSE      GENERAL: frail, chronically-ill appearing, middle-aged female  HEAD:  atraumatic, normocephalic  EYES: sclera anicteric  ENMT: mucous membranes dry  NECK: supple, No JVD  CHEST/LUNG: respirations unlabored; BS decr bases, air entry symmetric  HEART: normal S1, S2  ABDOMEN: BS+, soft, ND, NT; +Mcfarland in place  EXTREMITIES:  pulses palpable; no clubbing, cyanosis, or edema b/l LEs  NEURO: awake, alert, interactive; verbal output efforful  SKIN: sacral decub      LABS:                        9.5    5.2   )-----------( 301      ( 13 Feb 2017 07:09 )             27.5     13 Feb 2017 07:10    141    |  106    |  17     ----------------------------<  97     3.8     |  27     |  0.80     Ca    8.6        13 Feb 2017 07:10  Phos  3.0       13 Feb 2017 07:10  Mg     2.4       13 Feb 2017 07:10      ASSESSMENT AND PLAN:  61F, HTN, HL, Ashli's chorea, sacral decub, being tx'd for generalized weakness in setting of adult failure to thrive, volume depletion, rhabdo w/CK 1077 on admit, and recurrent fevers in setting of possible acute infection 2/2 sacral decub on IV abxs vs 2/2 [place] chorea; UA neg; also w/urinary retention of 1100mL (2/4/17) s/p Mcfarland; s/p bedside debridement of sacral decub 2/5/17, then further debridement 2/8/17; still w/recurrent fevers    ID/MSK: sacral decub infection, leukocytosis, recurrent fevers  -IV abxs cvrg as per ID consult, initially w/zosyn/vanco, then vanco d/c'd, changed to Linezolid; still w/recurrent fevers, now Flagyl added  -repeat blood cxs pending; blood cxs neg 2/2, 2/9 x2, urine cx neg 2/5  -sacral decub debrided by Wound Care/Dr Galeana at bedside 2/5, then again 2/7; wound cxs 2/5 w/Ecoli, Group B Strep, and Enterococcus  -rhabdo, resolved; no renal dysfunction    : urinary retention, Mcfarland placed; per  (Dr Cleaning), likely neurogenic bladder - continue Mcfarland     HEME: chronic anemia, likely 2/2 chronic dz - hgb stable    CV:   -cardioprotective statin for hyperlipidemia; rhabdo resolved, CKs normalized  -volume depletion - continue IVFs for volume repletion    OTHER:  -dvt prophylaxis w/lovenox SQ  --generalized weakness/failure to thrive in setting of acute infection, debility; PT eval/SW for likely rehab placement at dispo

## 2017-02-13 NOTE — PROGRESS NOTE ADULT - PROBLEM SELECTOR PLAN 1
S/P OR debridement and now wound vac placement  cont zyvox   cont zosyn  add flagyl  f/u on temp curve  Follow temp curve

## 2017-02-13 NOTE — PROGRESS NOTE ADULT - SUBJECTIVE AND OBJECTIVE BOX
Patient seen and examined bedside resting comfortably.  Admits to continued sacral pain 2/2 to sacral decubitus ulcer. Pain is tolerable.  Denies flatus/BM for past two days. Denies abdominal pain.  Obrien catheter in place.   Denies nausea and vomiting. Tolerating diet.  Denies chest pain, dyspnea, cough.    T(F): 97, Max: 102.6 (02-12 @ 18:18)  HR: 87 (76 - 99)  BP: 118/66 (96/59 - 132/70)  RR: 14 (14 - 17)  SpO2: 98% (95% - 99%)    PHYSICAL EXAM:  General: NAD  Neuro:  Alert  HEENT: NCAT, EOMI, conjunctiva clear  CV: +S1+S2 regular rate and rhythm  Lung: clear to ausculation bilaterally, respirations nonlabored, good inspiratory effort  Abdomen: soft, NTND. Normoactive BS  Sacral: wound vac in situ, on suction. Dressing clean/dry/intact. Wound suction draining serosanguinous fluid, ~125cc.   Extremities: no pedal edema or calf tenderness noted. Contracted  : no suprapubic tenderness or CVA tenderness. Obrien catheter in place, draining clear yellow urine. output: 2000cc/24hr    LABS:                        9.5    5.2   )-----------( 301      ( 13 Feb 2017 07:09 )             27.5     13 Feb 2017 07:10    141    |  106    |  17     ----------------------------<  97     3.8     |  27     |  0.80     Ca    8.6        13 Feb 2017 07:10  Phos  3.0       13 Feb 2017 07:10  Mg     2.4       13 Feb 2017 07:10    I&O 1875cc/2000c    Culture Results:   No growth to date. (02-09 @ 16:56)  Culture Results:   No growth to date. (02-09 @ 16:56)  Culture Results:   No growth (02-09 @ 00:52)    Impression: 61y Female with PMHx HLD, [place] chorea, now with urinary retention, stage 4 sacral decubitus ulcer s/p debridement on 2/8/17, s/p wound vac placement 2/11/17, and recurrent fevers.    Plan:  -continue obrien catheter for urinary retention per Dr Cleaning, with output monitoring  -continue wound vac and local wound care by PT  -H/H trending down. continue trending CBC  -recurrent fevers controlled with tylenol PRN  -IV ABX per ID   -DVT prophylaxis c lovenox  -continue medical management/supportive care  -will discuss with Dr Cleaning for further recommendation    KILO Kendrick-S    I have seen and examined the patient and agree with exam and plan above.

## 2017-02-13 NOTE — PROGRESS NOTE ADULT - SUBJECTIVE AND OBJECTIVE BOX
Patient is a 61y old  Female who presents with a chief complaint of Generalized weakness (02 Feb 2017 12:58)      INTERVAL HPI / OVERNIGHT EVENTS: pt still having high fever,deneis complains ,no diarrhea,no cough,no vomitting    MEDICATIONS  (STANDING):  simvastatin 20milliGRAM(s) Oral at bedtime  dextrose 5% + sodium chloride 0.45%. 1000milliLiter(s) IV Continuous <Continuous>  enoxaparin Injectable 40milliGRAM(s) SubCutaneous daily  piperacillin/tazobactam IVPB. 3.375Gram(s) IV Intermittent every 8 hours  linezolid    Tablet 600milliGRAM(s) Oral every 12 hours  metroNIDAZOLE  IVPB  IV Intermittent   metroNIDAZOLE  IVPB 500milliGRAM(s) IV Intermittent every 8 hours    MEDICATIONS  (PRN):  acetaminophen   Tablet 650milliGRAM(s) Oral every 6 hours PRN For Temp greater than 38 C (100.4 F)  ibuprofen  Tablet 200milliGRAM(s) Oral every 8 hours PRN Temp >/=102  oxyCODONE  5 mG/acetaminophen 325 mG 1Tablet(s) Oral every 6 hours PRN Moderate Pain (4 - 6)  oxyCODONE  5 mG/acetaminophen 325 mG 2Tablet(s) Oral every 6 hours PRN Severe Pain (7 - 10)  bisacodyl 10milliGRAM(s) Oral every 1 hour PRN Constipation      Vital Signs Last 24 Hrs  T(C): 37.6, Max: 38.6 (02-13 @ 20:58)  T(F): 99.6, Max: 101.4 (02-13 @ 20:58)  HR: 102 (76 - 102)  BP: 125/82 (96/59 - 125/82)  BP(mean): --  RR: 16 (14 - 16)  SpO2: 96% (96% - 99%)    PHYSICAL EXAM:    Constitutional: NAD, well-groomed, well-developed  Respiratory: CTAB/L  Cardiovascular: S1 and S2, RRR, no M/G/R  Gastrointestinal: BS+, soft, NT/ND  Extremities: No peripheral edema  Vascular: 2+ peripheral pulses  Skin: sacral decubwound with  wound vac      LABS:                        9.5    5.2   )-----------( 301      ( 13 Feb 2017 07:09 )             27.5     13 Feb 2017 07:10    141    |  106    |  17     ----------------------------<  97     3.8     |  27     |  0.80     Ca    8.6        13 Feb 2017 07:10  Phos  3.0       13 Feb 2017 07:10  Mg     2.4       13 Feb 2017 07:10              MICROBIOLOGY:  RECENT CULTURES:  02-09 .Blood Blood-Peripheral XXXX XXXX   No growth to date.    02-09 .Surgical Swab SACRAL ULCER XXXX XXXX   No growth at 5 days          RADIOLOGY & ADDITIONAL STUDIES:

## 2017-02-14 DIAGNOSIS — K59.00 CONSTIPATION, UNSPECIFIED: ICD-10-CM

## 2017-02-14 LAB
ALBUMIN SERPL ELPH-MCNC: 2.1 G/DL — LOW (ref 3.3–5)
ALP SERPL-CCNC: 43 U/L — SIGNIFICANT CHANGE UP (ref 40–120)
ALT FLD-CCNC: 37 U/L — SIGNIFICANT CHANGE UP (ref 12–78)
ANION GAP SERPL CALC-SCNC: 9 MMOL/L — SIGNIFICANT CHANGE UP (ref 5–17)
AST SERPL-CCNC: 25 U/L — SIGNIFICANT CHANGE UP (ref 15–37)
BILIRUB SERPL-MCNC: 0.3 MG/DL — SIGNIFICANT CHANGE UP (ref 0.2–1.2)
BUN SERPL-MCNC: 13 MG/DL — SIGNIFICANT CHANGE UP (ref 7–23)
CALCIUM SERPL-MCNC: 8.6 MG/DL — SIGNIFICANT CHANGE UP (ref 8.5–10.1)
CHLORIDE SERPL-SCNC: 105 MMOL/L — SIGNIFICANT CHANGE UP (ref 96–108)
CO2 SERPL-SCNC: 27 MMOL/L — SIGNIFICANT CHANGE UP (ref 22–31)
CREAT SERPL-MCNC: 0.82 MG/DL — SIGNIFICANT CHANGE UP (ref 0.5–1.3)
CULTURE RESULTS: SIGNIFICANT CHANGE UP
CULTURE RESULTS: SIGNIFICANT CHANGE UP
GLUCOSE SERPL-MCNC: 97 MG/DL — SIGNIFICANT CHANGE UP (ref 70–99)
HCT VFR BLD CALC: 28.8 % — LOW (ref 34.5–45)
HGB BLD-MCNC: 9.9 G/DL — LOW (ref 11.5–15.5)
MAGNESIUM SERPL-MCNC: 2.3 MG/DL — SIGNIFICANT CHANGE UP (ref 1.8–2.4)
MCHC RBC-ENTMCNC: 28.1 PG — SIGNIFICANT CHANGE UP (ref 27–34)
MCHC RBC-ENTMCNC: 34.5 GM/DL — SIGNIFICANT CHANGE UP (ref 32–36)
MCV RBC AUTO: 81.3 FL — SIGNIFICANT CHANGE UP (ref 80–100)
PHOSPHATE SERPL-MCNC: 2.5 MG/DL — SIGNIFICANT CHANGE UP (ref 2.5–4.5)
PLATELET # BLD AUTO: 281 K/UL — SIGNIFICANT CHANGE UP (ref 150–400)
POTASSIUM SERPL-MCNC: 3.8 MMOL/L — SIGNIFICANT CHANGE UP (ref 3.5–5.3)
POTASSIUM SERPL-SCNC: 3.8 MMOL/L — SIGNIFICANT CHANGE UP (ref 3.5–5.3)
PROT SERPL-MCNC: 7 GM/DL — SIGNIFICANT CHANGE UP (ref 6–8.3)
RBC # BLD: 3.54 M/UL — LOW (ref 3.8–5.2)
RBC # FLD: 11.6 % — SIGNIFICANT CHANGE UP (ref 11–15)
SODIUM SERPL-SCNC: 141 MMOL/L — SIGNIFICANT CHANGE UP (ref 135–145)
SPECIMEN SOURCE: SIGNIFICANT CHANGE UP
SPECIMEN SOURCE: SIGNIFICANT CHANGE UP
WBC # BLD: 6.4 K/UL — SIGNIFICANT CHANGE UP (ref 3.8–10.5)
WBC # FLD AUTO: 6.4 K/UL — SIGNIFICANT CHANGE UP (ref 3.8–10.5)

## 2017-02-14 PROCEDURE — 99233 SBSQ HOSP IP/OBS HIGH 50: CPT

## 2017-02-14 PROCEDURE — 99232 SBSQ HOSP IP/OBS MODERATE 35: CPT

## 2017-02-14 RX ADMIN — Medication 100 MILLIGRAM(S): at 22:15

## 2017-02-14 RX ADMIN — PIPERACILLIN AND TAZOBACTAM 25 GRAM(S): 4; .5 INJECTION, POWDER, LYOPHILIZED, FOR SOLUTION INTRAVENOUS at 13:34

## 2017-02-14 RX ADMIN — LINEZOLID 600 MILLIGRAM(S): 600 INJECTION, SOLUTION INTRAVENOUS at 17:35

## 2017-02-14 RX ADMIN — SODIUM CHLORIDE 100 MILLILITER(S): 9 INJECTION, SOLUTION INTRAVENOUS at 22:18

## 2017-02-14 RX ADMIN — Medication 100 MILLIGRAM(S): at 05:50

## 2017-02-14 RX ADMIN — PIPERACILLIN AND TAZOBACTAM 25 GRAM(S): 4; .5 INJECTION, POWDER, LYOPHILIZED, FOR SOLUTION INTRAVENOUS at 22:15

## 2017-02-14 RX ADMIN — SIMVASTATIN 20 MILLIGRAM(S): 20 TABLET, FILM COATED ORAL at 22:15

## 2017-02-14 RX ADMIN — LINEZOLID 600 MILLIGRAM(S): 600 INJECTION, SOLUTION INTRAVENOUS at 05:50

## 2017-02-14 RX ADMIN — Medication 100 MILLIGRAM(S): at 13:34

## 2017-02-14 RX ADMIN — ENOXAPARIN SODIUM 40 MILLIGRAM(S): 100 INJECTION SUBCUTANEOUS at 12:24

## 2017-02-14 RX ADMIN — PIPERACILLIN AND TAZOBACTAM 25 GRAM(S): 4; .5 INJECTION, POWDER, LYOPHILIZED, FOR SOLUTION INTRAVENOUS at 05:50

## 2017-02-14 RX ADMIN — Medication 650 MILLIGRAM(S): at 17:35

## 2017-02-14 NOTE — PROGRESS NOTE ADULT - SUBJECTIVE AND OBJECTIVE BOX
Patient is a 61y old  Female who presents with a chief complaint of Generalized weakness (02 Feb 2017 12:58)      INTERVAL HPI / OVERNIGHT EVENTS:no new events,jane complaints,fever still present    MEDICATIONS  (STANDING):  simvastatin 20milliGRAM(s) Oral at bedtime  dextrose 5% + sodium chloride 0.45%. 1000milliLiter(s) IV Continuous <Continuous>  enoxaparin Injectable 40milliGRAM(s) SubCutaneous daily  piperacillin/tazobactam IVPB. 3.375Gram(s) IV Intermittent every 8 hours  linezolid    Tablet 600milliGRAM(s) Oral every 12 hours  metroNIDAZOLE  IVPB  IV Intermittent   metroNIDAZOLE  IVPB 500milliGRAM(s) IV Intermittent every 8 hours    MEDICATIONS  (PRN):  acetaminophen   Tablet 650milliGRAM(s) Oral every 6 hours PRN For Temp greater than 38 C (100.4 F)  ibuprofen  Tablet 200milliGRAM(s) Oral every 8 hours PRN Temp >/=102  oxyCODONE  5 mG/acetaminophen 325 mG 1Tablet(s) Oral every 6 hours PRN Moderate Pain (4 - 6)  oxyCODONE  5 mG/acetaminophen 325 mG 2Tablet(s) Oral every 6 hours PRN Severe Pain (7 - 10)  bisacodyl 10milliGRAM(s) Oral every 1 hour PRN Constipation      Vital Signs Last 24 Hrs  T(C): 38, Max: 38.6 (02-13 @ 20:58)  T(F): 100.4, Max: 101.4 (02-13 @ 20:58)  HR: 97 (87 - 97)  BP: 109/67 (107/64 - 131/81)  BP(mean): --  RR: 18 (16 - 18)  SpO2: 96% (96% - 98%)    PHYSICAL EXAM:    Constitutional: NAD, well-groomed, well-developed  Respiratory: CTAB/L  Cardiovascular: S1 and S2, RRR, no M/G/R  Gastrointestinal: BS+, soft, NT/ND  Extremities: No peripheral edema  back:sacral decub+      LABS:                        9.9    6.4   )-----------( 281      ( 14 Feb 2017 07:44 )             28.8     14 Feb 2017 07:44    141    |  105    |  13     ----------------------------<  97     3.8     |  27     |  0.82     Ca    8.6        14 Feb 2017 07:44  Phos  2.5       14 Feb 2017 07:44  Mg     2.3       14 Feb 2017 07:44    TPro  7.0    /  Alb  2.1    /  TBili  0.3    /  DBili  x      /  AST  25     /  ALT  37     /  AlkPhos  43     14 Feb 2017 07:44            MICROBIOLOGY:  RECENT CULTURES:  02-09 .Blood Blood-Peripheral XXXX XXXX   No growth at 5 days.    02-09 .Surgical Swab SACRAL ULCER XXXX XXXX   No growth at 5 days          RADIOLOGY & ADDITIONAL STUDIES:

## 2017-02-14 NOTE — PROGRESS NOTE ADULT - SUBJECTIVE AND OBJECTIVE BOX
CC/F/U for: [place]'s chorea, sacral decub infection, fevers    INTERVAL HPI/OVERNIGHT EVENTS:  Pt seen and examined at bedside.     Allergies/Intolerance: No Known Allergies      MEDICATIONS  (STANDING):  simvastatin 20milliGRAM(s) Oral at bedtime  dextrose 5% + sodium chloride 0.45%. 1000milliLiter(s) IV Continuous <Continuous>  enoxaparin Injectable 40milliGRAM(s) SubCutaneous daily  piperacillin/tazobactam IVPB. 3.375Gram(s) IV Intermittent every 8 hours  linezolid    Tablet 600milliGRAM(s) Oral every 12 hours  metroNIDAZOLE  IVPB  IV Intermittent   metroNIDAZOLE  IVPB 500milliGRAM(s) IV Intermittent every 8 hours    MEDICATIONS  (PRN):  acetaminophen   Tablet 650milliGRAM(s) Oral every 6 hours PRN For Temp greater than 38 C (100.4 F)  ibuprofen  Tablet 200milliGRAM(s) Oral every 8 hours PRN Temp >/=102  oxyCODONE  5 mG/acetaminophen 325 mG 1Tablet(s) Oral every 6 hours PRN Moderate Pain (4 - 6)  oxyCODONE  5 mG/acetaminophen 325 mG 2Tablet(s) Oral every 6 hours PRN Severe Pain (7 - 10)  bisacodyl 10milliGRAM(s) Oral every 1 hour PRN Constipation        ROS: as above; all other systems reviewed and wnl      PHYSICAL EXAMINATION:  Vital Signs Last 24 Hrs  T(C): 37.1, Max: 38.6 (02-13 @ 20:58)  T(F): 98.8, Max: 101.4 (02-13 @ 20:58)  HR: 91 (79 - 102)  BP: 123/76 (107/64 - 131/81)  BP(mean): --  RR: 18 (15 - 18)  SpO2: 97% (96% - 98%)  CAPILLARY BLOOD GLUCOSE      NERAL: frail, chronically-ill appearing, middle-aged female  HEAD:  atraumatic, normocephalic  EYES: sclera anicteric  ENMT: mucous membranes dry  NECK: supple, No JVD  CHEST/LUNG: respirations unlabored; BS decr bases, air entry symmetric  HEART: normal S1, S2  ABDOMEN: BS+, soft, ND, NT; +Mcfarland in place  EXTREMITIES:  pulses palpable; no clubbing, cyanosis, or edema b/l LEs  NEURO: awake, alert, interactive; verbal output efforful  SKIN: sacral decub      LABS:                        9.9    6.4   )-----------( 281      ( 14 Feb 2017 07:44 )             28.8     14 Feb 2017 07:44    141    |  105    |  13     ----------------------------<  97     3.8     |  27     |  0.82     Ca    8.6        14 Feb 2017 07:44  Phos  2.5       14 Feb 2017 07:44  Mg     2.3       14 Feb 2017 07:44    TPro  7.0    /  Alb  2.1    /  TBili  0.3    /  DBili  x      /  AST  25     /  ALT  37     /  AlkPhos  43     14 Feb 2017 07:44            RADIOLOGY & ADDITIONAL TESTS:      ASSESSMENT AND PLAN: CC/F/U for: [place]'s chorea, sacral decub infection, fevers    INTERVAL HPI/OVERNIGHT EVENTS:  Pt seen and examined at bedside.     Allergies/Intolerance: No Known Allergies      MEDICATIONS  (STANDING):  simvastatin 20milliGRAM(s) Oral at bedtime  dextrose 5% + sodium chloride 0.45%. 1000milliLiter(s) IV Continuous <Continuous>  enoxaparin Injectable 40milliGRAM(s) SubCutaneous daily  piperacillin/tazobactam IVPB. 3.375Gram(s) IV Intermittent every 8 hours  linezolid    Tablet 600milliGRAM(s) Oral every 12 hours  metroNIDAZOLE  IVPB  IV Intermittent   metroNIDAZOLE  IVPB 500milliGRAM(s) IV Intermittent every 8 hours    MEDICATIONS  (PRN):  acetaminophen   Tablet 650milliGRAM(s) Oral every 6 hours PRN For Temp greater than 38 C (100.4 F)  ibuprofen  Tablet 200milliGRAM(s) Oral every 8 hours PRN Temp >/=102  oxyCODONE  5 mG/acetaminophen 325 mG 1Tablet(s) Oral every 6 hours PRN Moderate Pain (4 - 6)  oxyCODONE  5 mG/acetaminophen 325 mG 2Tablet(s) Oral every 6 hours PRN Severe Pain (7 - 10)  bisacodyl 10milliGRAM(s) Oral every 1 hour PRN Constipation        ROS: as above; all other systems reviewed and wnl      PHYSICAL EXAMINATION:  Vital Signs Last 24 Hrs  T(C): 37.1, Max: 38.6 (02-13 @ 20:58)  T(F): 98.8, Max: 101.4 (02-13 @ 20:58)  HR: 91 (79 - 102)  BP: 123/76 (107/64 - 131/81)  BP(mean): --  RR: 18 (15 - 18)  SpO2: 97% (96% - 98%)  CAPILLARY BLOOD GLUCOSE      NERAL: frail, chronically-ill appearing, middle-aged female  HEAD:  atraumatic, normocephalic  EYES: sclera anicteric  ENMT: mucous membranes dry  NECK: supple, No JVD  CHEST/LUNG: respirations unlabored; BS decr bases, air entry symmetric  HEART: normal S1, S2  ABDOMEN: BS+, soft, ND, NT; +Mcfarland in place  EXTREMITIES:  pulses palpable; no clubbing, cyanosis, or edema b/l LEs  NEURO: awake, alert, interactive; verbal output efforful  SKIN: sacral decub      LABS:                        9.9    6.4   )-----------( 281      ( 14 Feb 2017 07:44 )             28.8     14 Feb 2017 07:44    141    |  105    |  13     ----------------------------<  97     3.8     |  27     |  0.82     Ca    8.6        14 Feb 2017 07:44  Phos  2.5       14 Feb 2017 07:44  Mg     2.3       14 Feb 2017 07:44    TPro  7.0    /  Alb  2.1    /  TBili  0.3    /  DBili  x      /  AST  25     /  ALT  37     /  AlkPhos  43     14 Feb 2017 07:44      ASSESSMENT AND PLAN:  61F, HTN, HL, [place]'s chorea, sacral decub, being tx'd for generalized weakness in setting of adult failure to thrive, volume depletion, rhabdo w/CK 1077 on admit, and recurrent fevers in setting of possible acute infection 2/2 sacral decub on IV abxs vs 2/2 [place] chorea; UA neg; also w/urinary retention of 1100mL (2/4/17) s/p Mcfarland; s/p bedside debridement of sacral decub 2/5/17, then further debridement 2/8/17; still w/recurrent fevers    ID/MSK: sacral decub infection, leukocytosis, recurrent fevers  -IV abxs cvrg as per ID consult, initially w/zosyn/vanco, then vanco d/c'd, changed to Linezolid; still w/recurrent fevers, now Flagyl added  -repeat blood cxs pending; blood cxs neg 2/2, 2/9 x2, urine cx neg 2/5  -sacral decub debrided by Wound Care/Dr Galeana at bedside 2/5, then again 2/7; wound cxs 2/5 w/Ecoli, Group B Strep, and Enterococcus  -rhabdo, resolved; no renal dysfunction    : urinary retention, Mcfarland placed; per  (Dr Cleaning), likely neurogenic bladder - continue Mcfarland     HEME: chronic anemia, likely 2/2 chronic dz - hgb stable    CV:   -cardioprotective statin for hyperlipidemia; rhabdo resolved, CKs normalized  -volume depletion - continue IVFs for volume repletion    OTHER:  -dvt prophylaxis w/lovenox SQ  --generalized weakness/failure to thrive in setting of acute infection, debility; PT eval/SW for likely rehab placement at dispoASSESSMENT AND PLAN: CC/F/U for: [place]'s chorea, sacral decub infection, fevers    INTERVAL HPI/OVERNIGHT EVENTS:  Pt seen and examined at bedside. Still w/fevers, Tm yesterday 101.4; also still c/o constipation    Allergies/Intolerance: No Known Allergies      MEDICATIONS  (STANDING):  simvastatin 20milliGRAM(s) Oral at bedtime  dextrose 5% + sodium chloride 0.45%. 1000milliLiter(s) IV Continuous <Continuous>  enoxaparin Injectable 40milliGRAM(s) SubCutaneous daily  piperacillin/tazobactam IVPB. 3.375Gram(s) IV Intermittent every 8 hours  linezolid    Tablet 600milliGRAM(s) Oral every 12 hours  metroNIDAZOLE  IVPB  IV Intermittent   metroNIDAZOLE  IVPB 500milliGRAM(s) IV Intermittent every 8 hours    MEDICATIONS  (PRN):  acetaminophen   Tablet 650milliGRAM(s) Oral every 6 hours PRN For Temp greater than 38 C (100.4 F)  ibuprofen  Tablet 200milliGRAM(s) Oral every 8 hours PRN Temp >/=102  oxyCODONE  5 mG/acetaminophen 325 mG 1Tablet(s) Oral every 6 hours PRN Moderate Pain (4 - 6)  oxyCODONE  5 mG/acetaminophen 325 mG 2Tablet(s) Oral every 6 hours PRN Severe Pain (7 - 10)  bisacodyl 10milliGRAM(s) Oral every 1 hour PRN Constipation        ROS: as above; all other systems reviewed and wnl      PHYSICAL EXAMINATION:  Vital Signs Last 24 Hrs  T(C): 37.1, Max: 38.6 (02-13 @ 20:58)  T(F): 98.8, Max: 101.4 (02-13 @ 20:58)  HR: 91 (79 - 102)  BP: 123/76 (107/64 - 131/81)  BP(mean): --  RR: 18 (15 - 18)  SpO2: 97% (96% - 98%)  CAPILLARY BLOOD GLUCOSE      NERAL: frail, chronically-ill appearing, middle-aged female  HEAD:  atraumatic, normocephalic  EYES: sclera anicteric  ENMT: mucous membranes dry  NECK: supple, No JVD  CHEST/LUNG: respirations unlabored; BS decr bases, air entry symmetric  HEART: normal S1, S2  ABDOMEN: BS+, soft, ND, NT; +Mcfarland in place  EXTREMITIES:  pulses palpable; no clubbing, cyanosis, or edema b/l LEs  NEURO: awake, alert, interactive; verbal output effortful  SKIN: sacral decub      LABS:                        9.9    6.4   )-----------( 281      ( 14 Feb 2017 07:44 )             28.8     14 Feb 2017 07:44    141    |  105    |  13     ----------------------------<  97     3.8     |  27     |  0.82     Ca    8.6        14 Feb 2017 07:44  Phos  2.5       14 Feb 2017 07:44  Mg     2.3       14 Feb 2017 07:44    TPro  7.0    /  Alb  2.1    /  TBili  0.3    /  DBili  x      /  AST  25     /  ALT  37     /  AlkPhos  43     14 Feb 2017 07:44      ASSESSMENT AND PLAN:  61F, HTN, HL, [place]'s chorea, sacral decub, being tx'd for generalized weakness in setting of adult failure to thrive, volume depletion, rhabdo w/CK 1077 on admit, and recurrent fevers in setting of possible acute infection 2/2 sacral decub on IV abxs vs 2/2 [place] chorea; UA neg; also w/urinary retention of 1100mL (2/4/17) s/p Mcfarland; s/p bedside debridement of sacral decub 2/5/17, then further debridement 2/8/17; still w/recurrent fevers    ID/MSK: sacral decub infection, leukocytosis, recurrent fevers  -continues on triple abxs cvrg as per ID consult w/zosyn, Linezolid, Flagyl; vanco d/c'd. still w/recurrent fevers  -blood cxs neg 2/2, 2/9 x2, urine cx neg 2/5  -sacral decub debrided by Wound Care/Dr Galeana at bedside 2/5, then again 2/7; wound cxs 2/5 w/Ecoli, Group B Strep, and Enterococcus  -rhabdo, resolved; no renal dysfunction    : urinary retention, Mcfarland placed; per  (Dr Cleaning), likely neurogenic bladder - continue Mcfarland     GI: constipation - prescribe laxative, then place on maintenance bowel regimen    HEME: chronic anemia, likely 2/2 chronic dz - hgb stable    CV:   -cardioprotective statin for hyperlipidemia; rhabdo resolved, CKs normalized  -volume depletion - continue IVFs for volume repletion    OTHER:  -dvt prophylaxis w/lovenox SQ  --generalized weakness/failure to thrive in setting of acute infection, debility; PT eval/SW for likely rehab placement at [facility] [place]

## 2017-02-14 NOTE — PROGRESS NOTE ADULT - PROBLEM SELECTOR PLAN 1
S/P Or debridement and now wound vac placement  improving  cont zyvox   cont zosyn and flagyl  Awaiting midline placement    add flagyl  f/u on temp curve  Follow temp curve

## 2017-02-14 NOTE — PROGRESS NOTE ADULT - PROBLEM SELECTOR PROBLEM 8
Dysphagia, neurologic
Dysphagia, neurologic
Anemia, unspecified type
[place] chorea
[place] chorea
Urinary retention

## 2017-02-14 NOTE — PROGRESS NOTE ADULT - SUBJECTIVE AND OBJECTIVE BOX
Patient seen and examined bedside resting comfortably.  No complaints offered.   Tolerating diet.    T(F): 98.8, Max: 101.4 (02-13 @ 20:58)  HR: 91 (79 - 102)  BP: 123/76 (107/64 - 131/81)  RR: 18 (14 - 18)  SpO2: 97% (96% - 98%)    PHYSICAL EXAM:  General: NAD  HEENT: NCAT, EOMI, conjunctiva clear  CV: +S1S2 regular rate and rhythm  Lung: clear to ausculation bilaterally, respirations nonlabored, good inspiratory effort  Abdomen: soft, NTND. Normoactive BS  : obrien catheter indwelling with clear yellow urine    LABS:                        9.9    6.4   )-----------( 281      ( 14 Feb 2017 07:44 )             28.8   14 Feb 2017 07:44    141    |  105    |  13     ----------------------------<  97     3.8     |  27     |  0.82     Ca    8.6        14 Feb 2017 07:44  Phos  2.5       14 Feb 2017 07:44  Mg     2.3       14 Feb 2017 07:44    TPro  7.0    /  Alb  2.1    /  TBili  0.3    /  DBili  x      /  AST  25     /  ALT  37     /  AlkPhos  43     14 Feb 2017 07:44    I&O's 2700/3300    Impression: 61y Female with PMHx HLD, [place] chorea, now with urinary retention, stage 4 sacral decubitus ulcer s/p debridement on 2/8/17, s/p wound vac placement 2/11/17, and recurrent fevers.    Plan:  -continue obrien catheter for urinary retention per Dr Cleaning, with output monitoring  -continue wound vac and local wound care by PT  -H/H trending down. continue trending CBC  -recurrent fevers controlled with tylenol PRN  -IV ABX per ID   -DVT prophylaxis c lovenox  -continue medical management/supportive care  -will discuss with Dr Cleaning for further recommendation Patient seen and examined bedside resting comfortably.  No complaints offered.   Tolerating diet.    T(F): 98.8, Max: 101.4 (02-13 @ 20:58)  HR: 91 (79 - 102)  BP: 123/76 (107/64 - 131/81)  RR: 18 (14 - 18)  SpO2: 97% (96% - 98%)    PHYSICAL EXAM:  General: NAD  HEENT: NCAT, EOMI, conjunctiva clear  CV: +S1S2 regular rate and rhythm  Lung: clear to ausculation bilaterally, respirations nonlabored, good inspiratory effort  Abdomen: soft, NTND. Normoactive BS  : obrien catheter indwelling with clear yellow urine    LABS:                        9.9    6.4   )-----------( 281      ( 14 Feb 2017 07:44 )             28.8   14 Feb 2017 07:44    141    |  105    |  13     ----------------------------<  97     3.8     |  27     |  0.82     Ca    8.6        14 Feb 2017 07:44  Phos  2.5       14 Feb 2017 07:44  Mg     2.3       14 Feb 2017 07:44    TPro  7.0    /  Alb  2.1    /  TBili  0.3    /  DBili  x      /  AST  25     /  ALT  37     /  AlkPhos  43     14 Feb 2017 07:44    I&O's 2700/3300    Impression: 61y Female with PMHx HLD, [place] chorea, with urinary retention, stage 4 sacral decubitus ulcer s/p debridement on 2/8/17, s/p wound vac placement 2/11/17, and recurrent fevers.    Plan:  -continue obrien catheter for urinary retention per Dr Cleaning, with output monitoring  -continue wound vac and local wound care by PT  -IV ABX per ID   -continue medical management/supportive care  -will discuss with Dr Cleaning for further recommendations  -will attempt to obtain consent for midline from pt's significant other

## 2017-02-15 PROCEDURE — 99233 SBSQ HOSP IP/OBS HIGH 50: CPT

## 2017-02-15 RX ORDER — LACTOBACILLUS ACIDOPHILUS 100MM CELL
1 CAPSULE ORAL DAILY
Qty: 0 | Refills: 0 | Status: DISCONTINUED | OUTPATIENT
Start: 2017-02-15 | End: 2017-02-17

## 2017-02-15 RX ADMIN — SIMVASTATIN 20 MILLIGRAM(S): 20 TABLET, FILM COATED ORAL at 21:08

## 2017-02-15 RX ADMIN — Medication 100 MILLIGRAM(S): at 21:08

## 2017-02-15 RX ADMIN — PIPERACILLIN AND TAZOBACTAM 25 GRAM(S): 4; .5 INJECTION, POWDER, LYOPHILIZED, FOR SOLUTION INTRAVENOUS at 21:08

## 2017-02-15 RX ADMIN — PIPERACILLIN AND TAZOBACTAM 25 GRAM(S): 4; .5 INJECTION, POWDER, LYOPHILIZED, FOR SOLUTION INTRAVENOUS at 05:31

## 2017-02-15 RX ADMIN — ENOXAPARIN SODIUM 40 MILLIGRAM(S): 100 INJECTION SUBCUTANEOUS at 11:57

## 2017-02-15 RX ADMIN — PIPERACILLIN AND TAZOBACTAM 25 GRAM(S): 4; .5 INJECTION, POWDER, LYOPHILIZED, FOR SOLUTION INTRAVENOUS at 14:23

## 2017-02-15 RX ADMIN — Medication 100 MILLIGRAM(S): at 05:30

## 2017-02-15 RX ADMIN — LINEZOLID 600 MILLIGRAM(S): 600 INJECTION, SOLUTION INTRAVENOUS at 05:31

## 2017-02-15 RX ADMIN — SODIUM CHLORIDE 100 MILLILITER(S): 9 INJECTION, SOLUTION INTRAVENOUS at 21:08

## 2017-02-15 RX ADMIN — Medication 100 MILLIGRAM(S): at 14:17

## 2017-02-15 RX ADMIN — Medication 650 MILLIGRAM(S): at 17:17

## 2017-02-15 RX ADMIN — Medication 1 TABLET(S): at 21:08

## 2017-02-15 NOTE — PROGRESS NOTE ADULT - PROBLEM SELECTOR PLAN 1
S/P Or debridement and now wound vac placement  resolved   cont zosyn and flagyl  D/c zyvox and monitor.  Midline couldn't be placed, planned for Midline retrial vs PICC line  anticipating 2 more weeks of IV antibiotics   Cleared to transfer to rehab once iv access done  Awaiting midline placement    add flagyl  f/u on temp curve  Follow temp curve

## 2017-02-15 NOTE — PROGRESS NOTE ADULT - SUBJECTIVE AND OBJECTIVE BOX
Patient is a 61y old  Female who presents with a chief complaint of Generalized weakness (02 Feb 2017 12:58)      INTERVAL HPI / OVERNIGHT EVENTS:no new events,jane complaints,afebrile    MEDICATIONS  (STANDING):  simvastatin 20milliGRAM(s) Oral at bedtime  dextrose 5% + sodium chloride 0.45%. 1000milliLiter(s) IV Continuous <Continuous>  enoxaparin Injectable 40milliGRAM(s) SubCutaneous daily  piperacillin/tazobactam IVPB. 3.375Gram(s) IV Intermittent every 8 hours  linezolid    Tablet 600milliGRAM(s) Oral every 12 hours  metroNIDAZOLE  IVPB  IV Intermittent   metroNIDAZOLE  IVPB 500milliGRAM(s) IV Intermittent every 8 hours    MEDICATIONS  (PRN):  acetaminophen   Tablet 650milliGRAM(s) Oral every 6 hours PRN For Temp greater than 38 C (100.4 F)  ibuprofen  Tablet 200milliGRAM(s) Oral every 8 hours PRN Temp >/=102  oxyCODONE  5 mG/acetaminophen 325 mG 1Tablet(s) Oral every 6 hours PRN Moderate Pain (4 - 6)  oxyCODONE  5 mG/acetaminophen 325 mG 2Tablet(s) Oral every 6 hours PRN Severe Pain (7 - 10)  bisacodyl 10milliGRAM(s) Oral every 1 hour PRN Constipation      Vital Signs Last 24 Hrs  Tmax -afebrile    PHYSICAL EXAM:    Constitutional: NAD, well-groomed, well-developed  Respiratory: CTAB/L  Cardiovascular: S1 and S2, RRR, no M/G/R  Gastrointestinal: BS+, soft, NT/ND  Extremities: No peripheral edema  back:sacral decub+      LABS:                        9.9    6.4   )-----------( 281      ( 14 Feb 2017 07:44 )             28.8     14 Feb 2017 07:44    141    |  105    |  13     ----------------------------<  97     3.8     |  27     |  0.82     Ca    8.6        14 Feb 2017 07:44  Phos  2.5       14 Feb 2017 07:44  Mg     2.3       14 Feb 2017 07:44    TPro  7.0    /  Alb  2.1    /  TBili  0.3    /  DBili  x      /  AST  25     /  ALT  37     /  AlkPhos  43     14 Feb 2017 07:44            MICROBIOLOGY:  RECENT CULTURES:  02-09 .Blood Blood-Peripheral XXXX XXXX   No growth at 5 days.    02-09 .Surgical Swab SACRAL ULCER XXXX XXXX   No growth at 5 days          RADIOLOGY & ADDITIONAL STUDIES:

## 2017-02-15 NOTE — PROGRESS NOTE ADULT - SUBJECTIVE AND OBJECTIVE BOX
CC/F/U for: [place]'s chorea, sacral decub infection, fevers    INTERVAL HPI/OVERNIGHT EVENTS:  Pt seen and examined at bedside. Afebrile past 24h; this AM, no complaints    Allergies/Intolerance: No Known Allergies      MEDICATIONS  (STANDING):  simvastatin 20milliGRAM(s) Oral at bedtime  dextrose 5% + sodium chloride 0.45%. 1000milliLiter(s) IV Continuous <Continuous>  enoxaparin Injectable 40milliGRAM(s) SubCutaneous daily  piperacillin/tazobactam IVPB. 3.375Gram(s) IV Intermittent every 8 hours  linezolid    Tablet 600milliGRAM(s) Oral every 12 hours  metroNIDAZOLE  IVPB  IV Intermittent   metroNIDAZOLE  IVPB 500milliGRAM(s) IV Intermittent every 8 hours    MEDICATIONS  (PRN):  acetaminophen   Tablet 650milliGRAM(s) Oral every 6 hours PRN For Temp greater than 38 C (100.4 F)  ibuprofen  Tablet 200milliGRAM(s) Oral every 8 hours PRN Temp >/=102  oxyCODONE  5 mG/acetaminophen 325 mG 1Tablet(s) Oral every 6 hours PRN Moderate Pain (4 - 6)  oxyCODONE  5 mG/acetaminophen 325 mG 2Tablet(s) Oral every 6 hours PRN Severe Pain (7 - 10)  bisacodyl 10milliGRAM(s) Oral every 1 hour PRN Constipation        ROS: as above; all other systems reviewed and wnl      PHYSICAL EXAMINATION:  Vital Signs Last 24 Hrs  T(C): 37.4, Max: 38 (02-14 @ 17:20)  T(F): 99.4, Max: 100.4 (02-14 @ 17:20)  HR: 98 (79 - 98)  BP: 137/72 (99/60 - 143/98)  BP(mean): --  RR: 18 (15 - 18)  SpO2: 98% (96% - 98%)  CAPILLARY BLOOD GLUCOSE      GENERAL: frail, chronically-ill appearing, middle-aged female  HEAD:  atraumatic, normocephalic  EYES: sclera anicteric  ENMT: mucous membranes dry  NECK: supple, No JVD  CHEST/LUNG: respirations unlabored; BS decr bases, air entry symmetric  HEART: normal S1, S2  ABDOMEN: BS+, soft, ND, NT; +Mcfarland in place  EXTREMITIES:  pulses palpable; no clubbing, cyanosis, or edema b/l LEs  NEURO: awake, alert, interactive; verbal output effortful  SKIN: sacral decub      LABS:                        9.9    6.4   )-----------( 281      ( 14 Feb 2017 07:44 )             28.8     14 Feb 2017 07:44    141    |  105    |  13     ----------------------------<  97     3.8     |  27     |  0.82     Ca    8.6        14 Feb 2017 07:44  Phos  2.5       14 Feb 2017 07:44  Mg     2.3       14 Feb 2017 07:44    TPro  7.0    /  Alb  2.1    /  TBili  0.3    /  DBili  x      /  AST  25     /  ALT  37     /  AlkPhos  43     14 Feb 2017 07:44            RADIOLOGY & ADDITIONAL TESTS:      ASSESSMENT AND PLAN: CC/F/U for: [place]'s chorea, sacral decub infection, fevers    INTERVAL HPI/OVERNIGHT EVENTS:  Pt seen and examined at bedside. Afebrile past 24h; this AM, no complaints. Midline catheter attempted, but unsuccessful.    Allergies/Intolerance: No Known Allergies      MEDICATIONS  (STANDING):  simvastatin 20milliGRAM(s) Oral at bedtime  dextrose 5% + sodium chloride 0.45%. 1000milliLiter(s) IV Continuous <Continuous>  enoxaparin Injectable 40milliGRAM(s) SubCutaneous daily  piperacillin/tazobactam IVPB. 3.375Gram(s) IV Intermittent every 8 hours  linezolid    Tablet 600milliGRAM(s) Oral every 12 hours  metroNIDAZOLE  IVPB  IV Intermittent   metroNIDAZOLE  IVPB 500milliGRAM(s) IV Intermittent every 8 hours    MEDICATIONS  (PRN):  acetaminophen   Tablet 650milliGRAM(s) Oral every 6 hours PRN For Temp greater than 38 C (100.4 F)  ibuprofen  Tablet 200milliGRAM(s) Oral every 8 hours PRN Temp >/=102  oxyCODONE  5 mG/acetaminophen 325 mG 1Tablet(s) Oral every 6 hours PRN Moderate Pain (4 - 6)  oxyCODONE  5 mG/acetaminophen 325 mG 2Tablet(s) Oral every 6 hours PRN Severe Pain (7 - 10)  bisacodyl 10milliGRAM(s) Oral every 1 hour PRN Constipation        ROS: as above; all other systems reviewed and wnl      PHYSICAL EXAMINATION:  Vital Signs Last 24 Hrs  T(C): 37.4, Max: 38 (02-14 @ 17:20)  T(F): 99.4, Max: 100.4 (02-14 @ 17:20)  HR: 98 (79 - 98)  BP: 137/72 (99/60 - 143/98)  BP(mean): --  RR: 18 (15 - 18)  SpO2: 98% (96% - 98%)  CAPILLARY BLOOD GLUCOSE      GENERAL: frail, chronically-ill appearing, middle-aged female  HEAD:  atraumatic, normocephalic  EYES: sclera anicteric  ENMT: mucous membranes dry  NECK: supple, No JVD  CHEST/LUNG: respirations unlabored; BS decr bases, air entry symmetric  HEART: normal S1, S2  ABDOMEN: BS+, soft, ND, NT; +Mcfarland in place  EXTREMITIES:  pulses palpable; no clubbing, cyanosis, or edema b/l LEs  NEURO: awake, alert, interactive; verbal output effortful  SKIN: sacral decub      LABS:                        9.9    6.4   )-----------( 281      ( 14 Feb 2017 07:44 )             28.8     14 Feb 2017 07:44    141    |  105    |  13     ----------------------------<  97     3.8     |  27     |  0.82     Ca    8.6        14 Feb 2017 07:44  Phos  2.5       14 Feb 2017 07:44  Mg     2.3       14 Feb 2017 07:44    TPro  7.0    /  Alb  2.1    /  TBili  0.3    /  DBili  x      /  AST  25     /  ALT  37     /  AlkPhos  43     14 Feb 2017 07:44      ASSESSMENT AND PLAN:  61F, HTN, HL, [place]'s chorea, sacral decub, being tx'd for generalized weakness in setting of adult failure to thrive, volume depletion, rhabdo w/CK 1077 on admit, and recurrent fevers in setting of possible acute infection 2/2 sacral decub on IV abxs vs 2/2 [place] chorea; UA neg; also w/urinary retention of 1100mL (2/4/17) s/p Mcfarland; s/p bedside debridement of sacral decub 2/5/17, then further debridement 2/8/17; IV abxs expanded for recurrent fevers, neg blood cxs (2/2, 2/9x2), neg Ucx; wound vac placed; planned for midline placement for longterm abxs    ID/MSK: sacral decub infection, leukocytosis, recurrent fevers  -continues on triple abxs cvrg as per ID consult w/zosyn, Linezolid, Flagyl; vanco d/c'd; planned for midline catheter placement; unsuccessful this AM; may need placement by IR of midline or PICC  -sacral decub debrided by Wound Care/Dr Galeana at bedside 2/5, then again in OR 2/7; wound cxs 2/5 w/Ecoli, Group B Strep, and Enterococcus  -continue wound vac as per Surgery team  -rhabdo, resolved; no renal dysfunction    : urinary retention, Mcfarland placed; per  (Dr Cleaning), likely neurogenic bladder - continue Mcfarland     GI: constipation - prescribe laxative, then place on maintenance bowel regimen    HEME: chronic anemia, likely 2/2 chronic dz - hgb stable    CV:   -cardioprotective statin for hyperlipidemia; rhabdo resolved, CKs normalized  -volume depletion - continue IVFs for volume repletion, henry given losses via wound vac    OTHER:  -dvt prophylaxis w/lovenox SQ  --generalized weakness/failure to thrive in setting of acute infection, debility; PT eval[facility]

## 2017-02-15 NOTE — PROGRESS NOTE ADULT - SUBJECTIVE AND OBJECTIVE BOX
Attempted to insert midline cath into pt's left arm.  Very difficult to position arm because of her contractures.  Under aseptic technique, was unable to pass catheter after being able to pass guidewire.  Will attempt again later.  Pt is saying that she does not want to have this done again.

## 2017-02-16 LAB
ALBUMIN SERPL ELPH-MCNC: 2.1 G/DL — LOW (ref 3.3–5)
ALP SERPL-CCNC: 37 U/L — LOW (ref 40–120)
ALT FLD-CCNC: 36 U/L — SIGNIFICANT CHANGE UP (ref 12–78)
ANION GAP SERPL CALC-SCNC: 6 MMOL/L — SIGNIFICANT CHANGE UP (ref 5–17)
AST SERPL-CCNC: 27 U/L — SIGNIFICANT CHANGE UP (ref 15–37)
BILIRUB SERPL-MCNC: 0.2 MG/DL — SIGNIFICANT CHANGE UP (ref 0.2–1.2)
BUN SERPL-MCNC: 10 MG/DL — SIGNIFICANT CHANGE UP (ref 7–23)
CALCIUM SERPL-MCNC: 8.6 MG/DL — SIGNIFICANT CHANGE UP (ref 8.5–10.1)
CHLORIDE SERPL-SCNC: 108 MMOL/L — SIGNIFICANT CHANGE UP (ref 96–108)
CO2 SERPL-SCNC: 27 MMOL/L — SIGNIFICANT CHANGE UP (ref 22–31)
CREAT SERPL-MCNC: 0.67 MG/DL — SIGNIFICANT CHANGE UP (ref 0.5–1.3)
GLUCOSE SERPL-MCNC: 104 MG/DL — HIGH (ref 70–99)
HCT VFR BLD CALC: 27.5 % — LOW (ref 34.5–45)
HGB BLD-MCNC: 9.5 G/DL — LOW (ref 11.5–15.5)
MAGNESIUM SERPL-MCNC: 2.1 MG/DL — SIGNIFICANT CHANGE UP (ref 1.8–2.4)
MCHC RBC-ENTMCNC: 28.1 PG — SIGNIFICANT CHANGE UP (ref 27–34)
MCHC RBC-ENTMCNC: 34.7 GM/DL — SIGNIFICANT CHANGE UP (ref 32–36)
MCV RBC AUTO: 80.9 FL — SIGNIFICANT CHANGE UP (ref 80–100)
PHOSPHATE SERPL-MCNC: 2.5 MG/DL — SIGNIFICANT CHANGE UP (ref 2.5–4.5)
PLATELET # BLD AUTO: 264 K/UL — SIGNIFICANT CHANGE UP (ref 150–400)
POTASSIUM SERPL-MCNC: 3.7 MMOL/L — SIGNIFICANT CHANGE UP (ref 3.5–5.3)
POTASSIUM SERPL-SCNC: 3.7 MMOL/L — SIGNIFICANT CHANGE UP (ref 3.5–5.3)
PROT SERPL-MCNC: 6.8 GM/DL — SIGNIFICANT CHANGE UP (ref 6–8.3)
RBC # BLD: 3.4 M/UL — LOW (ref 3.8–5.2)
RBC # FLD: 11.8 % — SIGNIFICANT CHANGE UP (ref 11–15)
SODIUM SERPL-SCNC: 141 MMOL/L — SIGNIFICANT CHANGE UP (ref 135–145)
WBC # BLD: 6.8 K/UL — SIGNIFICANT CHANGE UP (ref 3.8–10.5)
WBC # FLD AUTO: 6.8 K/UL — SIGNIFICANT CHANGE UP (ref 3.8–10.5)

## 2017-02-16 PROCEDURE — 36571 INSERT PICVAD CATH: CPT | Mod: AS

## 2017-02-16 PROCEDURE — 99233 SBSQ HOSP IP/OBS HIGH 50: CPT

## 2017-02-16 PROCEDURE — 76937 US GUIDE VASCULAR ACCESS: CPT | Mod: 26

## 2017-02-16 RX ORDER — PIPERACILLIN AND TAZOBACTAM 4; .5 G/20ML; G/20ML
2.25 INJECTION, POWDER, LYOPHILIZED, FOR SOLUTION INTRAVENOUS
Qty: 0 | Refills: 0 | COMMUNITY
Start: 2017-02-16 | End: 2017-03-02

## 2017-02-16 RX ORDER — OXYCODONE HYDROCHLORIDE 5 MG/1
2 TABLET ORAL
Qty: 0 | Refills: 0 | COMMUNITY
Start: 2017-02-16

## 2017-02-16 RX ORDER — OXYCODONE HYDROCHLORIDE 5 MG/1
1 TABLET ORAL
Qty: 0 | Refills: 0 | COMMUNITY
Start: 2017-02-16

## 2017-02-16 RX ORDER — LACTOBACILLUS ACIDOPHILUS 100MM CELL
1 CAPSULE ORAL
Qty: 0 | Refills: 0 | COMMUNITY
Start: 2017-02-16

## 2017-02-16 RX ORDER — METRONIDAZOLE 500 MG
500 TABLET ORAL
Qty: 0 | Refills: 0 | COMMUNITY
Start: 2017-02-16 | End: 2017-03-02

## 2017-02-16 RX ADMIN — SODIUM CHLORIDE 100 MILLILITER(S): 9 INJECTION, SOLUTION INTRAVENOUS at 21:35

## 2017-02-16 RX ADMIN — Medication 200 MILLIGRAM(S): at 17:06

## 2017-02-16 RX ADMIN — Medication 200 MILLIGRAM(S): at 17:08

## 2017-02-16 RX ADMIN — Medication 100 MILLIGRAM(S): at 13:37

## 2017-02-16 RX ADMIN — Medication 1 TABLET(S): at 12:01

## 2017-02-16 RX ADMIN — ENOXAPARIN SODIUM 40 MILLIGRAM(S): 100 INJECTION SUBCUTANEOUS at 12:00

## 2017-02-16 RX ADMIN — SIMVASTATIN 20 MILLIGRAM(S): 20 TABLET, FILM COATED ORAL at 21:34

## 2017-02-16 RX ADMIN — Medication 100 MILLIGRAM(S): at 21:34

## 2017-02-16 RX ADMIN — PIPERACILLIN AND TAZOBACTAM 25 GRAM(S): 4; .5 INJECTION, POWDER, LYOPHILIZED, FOR SOLUTION INTRAVENOUS at 06:10

## 2017-02-16 RX ADMIN — PIPERACILLIN AND TAZOBACTAM 25 GRAM(S): 4; .5 INJECTION, POWDER, LYOPHILIZED, FOR SOLUTION INTRAVENOUS at 13:38

## 2017-02-16 RX ADMIN — PIPERACILLIN AND TAZOBACTAM 25 GRAM(S): 4; .5 INJECTION, POWDER, LYOPHILIZED, FOR SOLUTION INTRAVENOUS at 21:34

## 2017-02-16 RX ADMIN — Medication 100 MILLIGRAM(S): at 06:10

## 2017-02-16 NOTE — PROGRESS NOTE ADULT - PROBLEM SELECTOR PLAN 5
-maintain Mcfarland
-maintain Mcfarland
obrien present

## 2017-02-16 NOTE — PROGRESS NOTE ADULT - SUBJECTIVE AND OBJECTIVE BOX
Patient is a 61y old  Female who presents with a chief complaint of Generalized weakness (02 Feb 2017 12:58)      INTERVAL HPI / OVERNIGHT EVENTS:Had fever X 1 yesterday    MEDICATIONS  (STANDING):  simvastatin 20milliGRAM(s) Oral at bedtime  dextrose 5% + sodium chloride 0.45%. 1000milliLiter(s) IV Continuous <Continuous>  enoxaparin Injectable 40milliGRAM(s) SubCutaneous daily  piperacillin/tazobactam IVPB. 3.375Gram(s) IV Intermittent every 8 hours  metroNIDAZOLE  IVPB  IV Intermittent   metroNIDAZOLE  IVPB 500milliGRAM(s) IV Intermittent every 8 hours  lactobacillus acidophilus 1Tablet(s) Oral daily    MEDICATIONS  (PRN):  acetaminophen   Tablet 650milliGRAM(s) Oral every 6 hours PRN For Temp greater than 38 C (100.4 F)  ibuprofen  Tablet 200milliGRAM(s) Oral every 8 hours PRN Temp >/=102  oxyCODONE  5 mG/acetaminophen 325 mG 1Tablet(s) Oral every 6 hours PRN Moderate Pain (4 - 6)  oxyCODONE  5 mG/acetaminophen 325 mG 2Tablet(s) Oral every 6 hours PRN Severe Pain (7 - 10)  bisacodyl 10milliGRAM(s) Oral every 1 hour PRN Constipation      Vital Signs Last 24 Hrs  T(C): 36.8, Max: 38.2 (02-15 @ 17:32)  T(F): 98.2, Max: 100.8 (02-15 @ 17:32)  HR: 84 (84 - 95)  BP: 119/77 (11/74 - 119/77)  BP(mean): --  RR: 16 (15 - 18)  SpO2: 98% (97% - 98%)    PHYSICAL EXAM:    Constitutional: NAD, well-groomed, well-developed  Respiratory: CTAB/L  Cardiovascular: S1 and S2, RRR, no M/G/R  Gastrointestinal: BS+, soft, NT/ND  Extremities: No peripheral edema  Vascular: 2+ peripheral pulses  Skin: sacral decub +    LABS:                        9.5    6.8   )-----------( 264      ( 16 Feb 2017 08:18 )             27.5     16 Feb 2017 08:18    141    |  108    |  10     ----------------------------<  104    3.7     |  27     |  0.67     Ca    8.6        16 Feb 2017 08:18  Phos  2.5       16 Feb 2017 08:18  Mg     2.1       16 Feb 2017 08:18    TPro  6.8    /  Alb  2.1    /  TBili  0.2    /  DBili  x      /  AST  27     /  ALT  36     /  AlkPhos  37     16 Feb 2017 08:18            MICROBIOLOGY:  RECENT CULTURES:  02-09 .Blood Blood-Peripheral XXXX XXXX   No growth at 5 days.          RADIOLOGY & ADDITIONAL STUDIES:

## 2017-02-16 NOTE — PROCEDURE NOTE - NSPOSTCAREGUIDE_GEN_A_CORE
Keep the cast/splint/dressing clean and dry/Verbal/written post procedure instructions were given to patient/caregiver
Verbal/written post procedure instructions were given to patient/caregiver

## 2017-02-16 NOTE — PROGRESS NOTE ADULT - SUBJECTIVE AND OBJECTIVE BOX
CC/F/U for:     INTERVAL HPI/OVERNIGHT EVENTS:  Pt seen and examined at bedside.     Allergies/Intolerance: No Known Allergies      MEDICATIONS  (STANDING):  simvastatin 20milliGRAM(s) Oral at bedtime  dextrose 5% + sodium chloride 0.45%. 1000milliLiter(s) IV Continuous <Continuous>  enoxaparin Injectable 40milliGRAM(s) SubCutaneous daily  piperacillin/tazobactam IVPB. 3.375Gram(s) IV Intermittent every 8 hours  metroNIDAZOLE  IVPB  IV Intermittent   metroNIDAZOLE  IVPB 500milliGRAM(s) IV Intermittent every 8 hours  lactobacillus acidophilus 1Tablet(s) Oral daily    MEDICATIONS  (PRN):  acetaminophen   Tablet 650milliGRAM(s) Oral every 6 hours PRN For Temp greater than 38 C (100.4 F)  ibuprofen  Tablet 200milliGRAM(s) Oral every 8 hours PRN Temp >/=102  oxyCODONE  5 mG/acetaminophen 325 mG 1Tablet(s) Oral every 6 hours PRN Moderate Pain (4 - 6)  oxyCODONE  5 mG/acetaminophen 325 mG 2Tablet(s) Oral every 6 hours PRN Severe Pain (7 - 10)  bisacodyl 10milliGRAM(s) Oral every 1 hour PRN Constipation    ROS: as above; all other systems reviewed and wnl    PHYSICAL EXAMINATION:  Vital Signs Last 24 Hrs  T(C): 36.8, Max: 38.2 (02-15 @ 17:32)  T(F): 98.2, Max: 100.8 (02-15 @ 17:32)  HR: 84 (84 - 95)  BP: 119/77 (11/74 - 119/77)  BP(mean): --  RR: 16 (15 - 18)  SpO2: 98% (97% - 98%)  CAPILLARY BLOOD GLUCOSE    GENERAL: frail, chronically-ill appearing, middle-aged female  HEAD:  atraumatic, normocephalic  EYES: sclera anicteric  ENMT: mucous membranes dry  NECK: supple, No JVD  CHEST/LUNG: respirations unlabored; BS decr bases, air entry symmetric  HEART: normal S1, S2  ABDOMEN: BS+, soft, ND, NT; +Mcfarland in place  EXTREMITIES:  pulses palpable; no clubbing, cyanosis, or edema b/l LEs  NEURO: awake, alert, interactive; verbal output effortful  SKIN: sacral decub    LABS:                        9.5    6.8   )-----------( 264      ( 16 Feb 2017 08:18 )             27.5     16 Feb 2017 08:18    141    |  108    |  10     ----------------------------<  104    3.7     |  27     |  0.67     Ca    8.6        16 Feb 2017 08:18  Phos  2.5       16 Feb 2017 08:18  Mg     2.1       16 Feb 2017 08:18    TPro  6.8    /  Alb  2.1    /  TBili  0.2    /  DBili  x      /  AST  27     /  ALT  36     /  AlkPhos  37     16 Feb 2017 08:18      ASSESSMENT AND PLAN:  61F, HTN, HL, 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  Wound vac placed; midline placed, and plan is for additional 2 weeks of IV antibiotics (at least until 3/2/17) cvrg w/Zosyn and Flagyl.  Recurrent low-grade fevers despite resolution of leukocytosis was thought secondary to McRae [place]'s effects, and less likely 2/2 acute infection.  Pt also w/urinary retention of 1100mL (2/4/17) s/p Mcfarland.  Urology consulted, and impression was likely neurogenic bladder; recomms were for continued Mcfarland catheter.  Pt also w/chronic anemia, with stable hemoglobin. Pt also w/nontraumatic rhabdomyolysis w/o associated renal dysfunction; CK measures normalized w/IVFs.  Pt eval by PT, and recomms were for SHANIQUE.  Family in agreement and arrangements made. For d/c today. CC/F/U for: [place]cl[place]'s chorea, sacral decub infection, fevers    INTERVAL HPI/OVERNIGHT EVENTS:  Pt seen and examined at bedside. Midline placed this AM.    Allergies/Intolerance: No Known Allergies      MEDICATIONS  (STANDING):  simvastatin 20milliGRAM(s) Oral at bedtime  dextrose 5% + sodium chloride 0.45%. 1000milliLiter(s) IV Continuous <Continuous>  enoxaparin Injectable 40milliGRAM(s) SubCutaneous daily  piperacillin/tazobactam IVPB. 3.375Gram(s) IV Intermittent every 8 hours  metroNIDAZOLE  IVPB  IV Intermittent   metroNIDAZOLE  IVPB 500milliGRAM(s) IV Intermittent every 8 hours  lactobacillus acidophilus 1Tablet(s) Oral daily    MEDICATIONS  (PRN):  acetaminophen   Tablet 650milliGRAM(s) Oral every 6 hours PRN For Temp greater than 38 C (100.4 F)  ibuprofen  Tablet 200milliGRAM(s) Oral every 8 hours PRN Temp >/=102  oxyCODONE  5 mG/acetaminophen 325 mG 1Tablet(s) Oral every 6 hours PRN Moderate Pain (4 - 6)  oxyCODONE  5 mG/acetaminophen 325 mG 2Tablet(s) Oral every 6 hours PRN Severe Pain (7 - 10)  bisacodyl 10milliGRAM(s) Oral every 1 hour PRN Constipation    ROS: as above; all other systems reviewed and wnl    PHYSICAL EXAMINATION:  Vital Signs Last 24 Hrs  T(C): 36.8, Max: 38.2 (02-15 @ 17:32)  T(F): 98.2, Max: 100.8 (02-15 @ 17:32)  HR: 84 (84 - 95)  BP: 119/77 (11/74 - 119/77)  BP(mean): --  RR: 16 (15 - 18)  SpO2: 98% (97% - 98%)  CAPILLARY BLOOD GLUCOSE    GENERAL: frail, chronically-ill appearing, middle-aged female  HEAD:  atraumatic, normocephalic  EYES: sclera anicteric  ENMT: mucous membranes dry  NECK: supple, No JVD  CHEST/LUNG: respirations unlabored; BS decr bases, air entry symmetric  HEART: normal S1, S2  ABDOMEN: BS+, soft, ND, NT; +Mcfarland in place  EXTREMITIES:  pulses palpable; no clubbing, cyanosis, or edema b/l LEs  NEURO: awake, alert, interactive; verbal output effortful  SKIN: sacral decub    LABS:                        9.5    6.8   )-----------( 264      ( 16 Feb 2017 08:18 )             27.5     16 Feb 2017 08:18    141    |  108    |  10     ----------------------------<  104    3.7     |  27     |  0.67     Ca    8.6        16 Feb 2017 08:18  Phos  2.5       16 Feb 2017 08:18  Mg     2.1       16 Feb 2017 08:18    TPro  6.8    /  Alb  2.1    /  TBili  0.2    /  DBili  x      /  AST  27     /  ALT  36     /  AlkPhos  37     16 Feb 2017 08:18      ASSESSMENT AND PLAN:  61F, HTN, HL, [place]'s chorea w/baseline R-sided and b/l LE weakness, sacral decub, tx'd for generalized weakness in setting of adult failure to thrive, volume depletion, rhabdo w/CK 1077 on admit, and recurrent fevers; found to have acute infection of stage 4 sacral decub; ID, Wound Care/Vascular Surgery consults obtained; pt managed w/IV abxs, and s/p bedside debridement of sacral decub 2/5/17, then further debridement in OR 2/8/17; IV abxs expanded to zosyn, flagyl, linezolid for recurrent fevers; cx results showed neg blood cxs (2/2, 2/9x2), neg UA, neg Ucx; wound cxs 2/5 w/Ecoli, Group B Strep, and Enterococcus.  Wound vac placed; midline placed, and plan is for additional 2 weeks of IV antibiotics (at least until 3/2/17) cvrg w/Zosyn and Flagyl.  Recurrent low-grade fevers despite resolution of leukocytosis was thought secondary to Ashli's effects, and less likely 2/2 acute infection.  Pt also w/urinary retention of 1100mL (2/4/17) s/p Mcfarland.  Urology consulted, and impression was likely neurogenic bladder; recomms were for continued Mcfarland catheter.  Pt also w/chronic anemia, with stable hemoglobin. Pt also w/nontraumatic rhabdomyolysis w/o associated renal dysfunction; CK measures normalized w/IVFs.  Pt eval by PT, and recomms were for SHANIQUE.  Family in agreement and arrangements made. For d/c today.

## 2017-02-16 NOTE — PROGRESS NOTE ADULT - PROBLEM SELECTOR PLAN 4
-sp Mcfarland catheter  -monitor urine output
-sp Mcfarland catheter  -monitor urine output
could be contributing to fever as it affects hypothalmus
secondary to huntingtons chorea
infectious disease consult appreciated   wound consult  empiric antibiotics

## 2017-02-16 NOTE — PROCEDURE NOTE - ADDITIONAL PROCEDURE DETAILS
Patient seen at bedside for midline catheter placement.  Consent obtained from patient after risks/benefits/alternatives explained.   Upper extremity prepped and draped in usual sterile fashion. Time out performed with RN. 4Fr 1 cm single lumen midline catheter placed using ultrasound guided seldinger technique, R basilic vein. 13cm inserted. Flushes well, with good venous return. Patient tolerated procedure well without complication and was left in no acute distress. Upper arm circumference noted to be 24cm

## 2017-02-16 NOTE — PROGRESS NOTE ADULT - PROBLEM SELECTOR PLAN 2
-as above  -acetaminophen   Tablet 650milliGRAM(s) Oral every 6 hours PRN For Temp greater than 38 C (100.4 F)  -ibuprofen  Tablet 200milliGRAM(s) Oral every 8 hours PRN Temp >/=102  -oxyCODONE  5 mG/acetaminophen 325 mG 1Tablet(s) Oral every 6 hours PRN Moderate Pain (4 - 6)  -oxyCODONE  5 mG/acetaminophen 325 mG 2Tablet(s) Oral every 6 hours PRN Severe Pain (7 - 10)
stable
stable
-as above  -acetaminophen   Tablet 650milliGRAM(s) Oral every 6 hours PRN For Temp greater than 38 C (100.4 F)  -ibuprofen  Tablet 200milliGRAM(s) Oral every 8 hours PRN Temp >/=102  -oxyCODONE  5 mG/acetaminophen 325 mG 1Tablet(s) Oral every 6 hours PRN Moderate Pain (4 - 6)  -oxyCODONE  5 mG/acetaminophen 325 mG 2Tablet(s) Oral every 6 hours PRN Severe Pain (7 - 10)
as above  F/U on surgical cultures  wound care per wound care MD recommendations
as above  F/U on surgical cultures  wound care per wound care MD recommendations
cont antibiotics  Plan as above
cont antibiotics  Plan as above
s/p debirdement and wound vac placement   cont antibiotics  Plan as above
s/p debridement and wound vac placement   cont antibiotics  Plan as above
stable
cont antibiotics
as above  F/U on surgical cultures  wound care per wound care MD recommendations

## 2017-02-16 NOTE — PROGRESS NOTE ADULT - PROBLEM SELECTOR PLAN 1
S/P Or debridement and now wound vac placement  resolved   cont zosyn and flagyl x 2 weeks  Midline placed   D/c zyvox and monitor.  Midline couldn't be placed, planned for Midline retrial vs PICC line  anticipating 2 more weeks of IV antibiotics   Cleared to transfer to rehab once iv access done  Awaiting midline placement    add flagyl  f/u on temp curve  Follow temp curve

## 2017-02-16 NOTE — PROGRESS NOTE ADULT - PROBLEM SELECTOR PLAN 3
-monitor vitals and pulse oxymetry  -Tylenol PRN for fever, cooling blanket
intravenous hydration
-monitor vitals and pulse oxymetry  -Tylenol PRN for fever, cooling blanket
intravenous hydration
intravenous hydration
leukocytosis resolved but still having high fever
leukocytosis resolved but still having high fever
obrien present
resolved
resolved
sec to above  plan as above
obrien present

## 2017-02-17 VITALS
RESPIRATION RATE: 16 BRPM | DIASTOLIC BLOOD PRESSURE: 767 MMHG | HEART RATE: 80 BPM | OXYGEN SATURATION: 96 % | SYSTOLIC BLOOD PRESSURE: 130 MMHG | TEMPERATURE: 98 F

## 2017-02-17 DIAGNOSIS — N31.9 NEUROMUSCULAR DYSFUNCTION OF BLADDER, UNSPECIFIED: ICD-10-CM

## 2017-02-17 LAB
ALBUMIN SERPL ELPH-MCNC: 2.1 G/DL — LOW (ref 3.3–5)
ALP SERPL-CCNC: 41 U/L — SIGNIFICANT CHANGE UP (ref 40–120)
ALT FLD-CCNC: 46 U/L — SIGNIFICANT CHANGE UP (ref 12–78)
ANION GAP SERPL CALC-SCNC: 7 MMOL/L — SIGNIFICANT CHANGE UP (ref 5–17)
AST SERPL-CCNC: 45 U/L — HIGH (ref 15–37)
BILIRUB SERPL-MCNC: 0.3 MG/DL — SIGNIFICANT CHANGE UP (ref 0.2–1.2)
BUN SERPL-MCNC: 10 MG/DL — SIGNIFICANT CHANGE UP (ref 7–23)
CALCIUM SERPL-MCNC: 9 MG/DL — SIGNIFICANT CHANGE UP (ref 8.5–10.1)
CHLORIDE SERPL-SCNC: 110 MMOL/L — HIGH (ref 96–108)
CO2 SERPL-SCNC: 28 MMOL/L — SIGNIFICANT CHANGE UP (ref 22–31)
CREAT SERPL-MCNC: 0.66 MG/DL — SIGNIFICANT CHANGE UP (ref 0.5–1.3)
GLUCOSE SERPL-MCNC: 94 MG/DL — SIGNIFICANT CHANGE UP (ref 70–99)
HCT VFR BLD CALC: 27.7 % — LOW (ref 34.5–45)
HGB BLD-MCNC: 9.4 G/DL — LOW (ref 11.5–15.5)
MAGNESIUM SERPL-MCNC: 2.1 MG/DL — SIGNIFICANT CHANGE UP (ref 1.8–2.4)
MCHC RBC-ENTMCNC: 27 PG — SIGNIFICANT CHANGE UP (ref 27–34)
MCHC RBC-ENTMCNC: 34.1 GM/DL — SIGNIFICANT CHANGE UP (ref 32–36)
MCV RBC AUTO: 79.2 FL — LOW (ref 80–100)
PHOSPHATE SERPL-MCNC: 2.7 MG/DL — SIGNIFICANT CHANGE UP (ref 2.5–4.5)
PLATELET # BLD AUTO: 276 K/UL — SIGNIFICANT CHANGE UP (ref 150–400)
POTASSIUM SERPL-MCNC: 3.7 MMOL/L — SIGNIFICANT CHANGE UP (ref 3.5–5.3)
POTASSIUM SERPL-SCNC: 3.7 MMOL/L — SIGNIFICANT CHANGE UP (ref 3.5–5.3)
PROT SERPL-MCNC: 7.1 GM/DL — SIGNIFICANT CHANGE UP (ref 6–8.3)
RBC # BLD: 3.5 M/UL — LOW (ref 3.8–5.2)
RBC # FLD: 11.6 % — SIGNIFICANT CHANGE UP (ref 11–15)
SODIUM SERPL-SCNC: 145 MMOL/L — SIGNIFICANT CHANGE UP (ref 135–145)
WBC # BLD: 7.5 K/UL — SIGNIFICANT CHANGE UP (ref 3.8–10.5)
WBC # FLD AUTO: 7.5 K/UL — SIGNIFICANT CHANGE UP (ref 3.8–10.5)

## 2017-02-17 PROCEDURE — 99233 SBSQ HOSP IP/OBS HIGH 50: CPT

## 2017-02-17 RX ADMIN — PIPERACILLIN AND TAZOBACTAM 25 GRAM(S): 4; .5 INJECTION, POWDER, LYOPHILIZED, FOR SOLUTION INTRAVENOUS at 05:41

## 2017-02-17 RX ADMIN — ENOXAPARIN SODIUM 40 MILLIGRAM(S): 100 INJECTION SUBCUTANEOUS at 11:38

## 2017-02-17 RX ADMIN — Medication 100 MILLIGRAM(S): at 13:45

## 2017-02-17 RX ADMIN — PIPERACILLIN AND TAZOBACTAM 25 GRAM(S): 4; .5 INJECTION, POWDER, LYOPHILIZED, FOR SOLUTION INTRAVENOUS at 13:45

## 2017-02-17 RX ADMIN — Medication 1 TABLET(S): at 11:38

## 2017-02-17 RX ADMIN — Medication 100 MILLIGRAM(S): at 05:42

## 2017-02-17 NOTE — PROGRESS NOTE ADULT - ASSESSMENT
awaearle alert speech fluent arm 3/5 l leg weakness hx of dementia for sub acute  rehab  hx of huntingtons disease
awaek alert speech fluen t arm 3.5 stiffness legs weaker  hx of huintingtons disease  for sub acute rehab
awaek alert speech fluent arm 3/5 leg moves to stimuli hx of huntingtons disease    for sub acute rehab no mike
awaek alert speech fluent arm 3/5 leg weakness for sub acute rehab hx of huntingtons disease
awaek alert speechnfleunt  arm 3/5 leg weakness  dementia for sub acute rehab
awake alert speech fluent  arm 3/5 legs weakness hx of huntingtons disaese  dementia for subacutre rehab
awake alert speechfleunt arm 3/5 leh weakness hx of huntingtons  disease  for sub acute rehab
awake laert spech fluent arm 3/5 l;eg weakness  dementia hx of hunting tons  disease  no chorea for sub acute rehab
awake open eyes hx if hunting tons disease  no seziure depressed legs weakness
lethargic arousable open eyes arm 3/5 leg weakness hx of huntingtons dosease  for sub acute rehab .
lethargic arousable open eyes hx of huntingtons disease no seziure for sub acute rehab
lethargic arousable openeyes  arm 3./5 leg moves to stimuli hx of huntingtons disease  for rehab  depression
open eyes mri brain noted hydrocephalus hx of dementia leg weakness not walking  for sub acute rehab tsh b12
pt is sleeping  arousable open eyes   arm 3/5 leg weakness hx of huntingtons disease  for sub acute rehab
pt is sleeping dementia   for sib acute rehab
pt is sleeping hx of huntingtons diesease arm 3/5 leg weakness no seziure for rehab
pt is sleeping hx of huntingtons disease  dementia arm 3/5 leg weakness  for sub acute rejhab
61 yr old bedridden female seen with
61 yr old female seen with
61 yrs old female has urinary retention most likely secondary to hypotonic bladder secondary Chorea. Will continue Mcfarland.
61F, HTN, HL, [place]'s chorea, sacral decub, being tx'd for generalized weakness, infected Sacral decubiti, rhabdo and recurrent fevers, also w/urinary retention s/p Mcfarland; s/p bedside debridement of sacral decub 2/5/17, then further debridement 2/8/17. She is still febrile
s/p second debridement of sacral decubitus because of fever.   Urinary retention has Mcfarland catheter most likely secondary to neurogenic bladder
61F, HTN, HL, [place]'s chorea, sacral decub, being tx'd for generalized weakness, infected Sacral decubiti, rhabdo and recurrent fevers, also w/urinary retention s/p Mcfarland; s/p bedside debridement of sacral decub 2/5/17, then further debridement 2/8/17

## 2017-02-17 NOTE — PROGRESS NOTE ADULT - PROBLEM SELECTOR PROBLEM 7
Mixed hyperlipidemia
Mixed hyperlipidemia
Alger chorea
Fever, unspecified fever cause
[place] chorea
Volume depletion
Constipation, unspecified constipation type
Volume depletion

## 2017-02-17 NOTE — PROGRESS NOTE ADULT - SUBJECTIVE AND OBJECTIVE BOX
Patient seen and examined bedside resting comfortably.  No complaints offered.   Voiding spontaenously without difficulty.  Denies hematuria and dysuria.  Denies nausea and vomiting. Tolerating diet.  Denies chest pain, dyspnea, cough.    T(F): 98.2, Max: 101 (02-16 @ 17:17)  HR: 88 (88 - 91)  BP: 126/82 (116/78 - 129/71)  RR: 15 (14 - 16)  SpO2: 97% (96% - 98%)  Wt(kg): --  CAPILLARY BLOOD GLUCOSE      PHYSICAL EXAM:  General: NAD, WDWN  Neuro:  Alert & oriented x 3  HEENT: NCAT, EOMI, conjunctiva clear  CV: +S1S2 regular rate and rhythm  Lung: respirations nonlabored, good inspiratory effort  Abdomen: soft, NTND. Normactive BS  Extremities: no pedal edema or calf tenderness noted   : Mcfarland Catheter draining clear urine    LABS:                        9.4    7.5   )-----------( 276      ( 17 Feb 2017 07:43 )             27.7     17 Feb 2017 07:43    145    |  110    |  10     ----------------------------<  94     3.7     |  28     |  0.66     Ca    9.0        17 Feb 2017 07:43  Phos  2.7       17 Feb 2017 07:43  Mg     2.1       17 Feb 2017 07:43    TPro  7.1    /  Alb  2.1    /  TBili  0.3    /  DBili  x      /  AST  45     /  ALT  46     /  AlkPhos  41     17 Feb 2017 07:43      I&O's Detail  I & Os for 24h ending 17 Feb 2017 07:00  =============================================  IN:    dextrose 5% + sodium chloride 0.45%.: 1100 ml    Oral Fluid: 775 ml    Solution: 200 ml    Solution: 200 ml    Total IN: 2275 ml  ---------------------------------------------  OUT:    Indwelling Catheter - Urethral: 2600 ml    Total OUT: 2600 ml  ---------------------------------------------  Total NET: -325 ml    I & Os for current day (as of 17 Feb 2017 15:43)  =============================================  IN:    Oral Fluid: 925 ml    Total IN: 925 ml  ---------------------------------------------  OUT:    Total OUT: 0 ml  ---------------------------------------------  Total NET: 925 ml

## 2017-02-17 NOTE — PROGRESS NOTE ADULT - PROBLEM SELECTOR PROBLEM 4
Failure to thrive in adult
Acute urinary retention
Failure to thrive in adult
Failure to thrive in adult
Acute urinary retention
Bacteriuria, asymptomatic
[place] chorea
[place] chorea
[place] chorea
[place] chorea
[place] chorea
Decubitus ulcer of sacral region, unstageable
Fever, unspecified fever cause
Leukocytosis, unspecified type
Mcfarland catheter in place
Mcfarland catheter in place
Recurrent fever
Urinary retention
Bacteriuria, asymptomatic
Failure to thrive in adult
Fever
Urinary retention
Fever

## 2017-02-17 NOTE — PROGRESS NOTE ADULT - PROBLEM SELECTOR PROBLEM 5
Mcfarland catheter in place
Mcfarland catheter in place
Urinary retention
Decubitus ulcer of sacral region, unstageable
[place] chorea
Failure to thrive in adult
Fever, unspecified fever cause
High cholesterol
Mcfarland catheter in place
Mcfarland catheter in place
Neurogenic bladder
Urinary retention
Mcfarland catheter in place
Mcfarland catheter in place
Recurrent fever
Urinary retention

## 2017-02-17 NOTE — PROGRESS NOTE ADULT - PROBLEM SELECTOR PROBLEM 1
Decubitus ulcer, stage 4 with infection
Syncope, unspecified syncope type
Syncope, unspecified syncope type
Decubitus ulcer, stage 4 with infection
Fever, unspecified fever cause
Syncope, unspecified syncope type
Decubitus ulcer, stage 4 with infection
Failure to thrive in adult
Failure to thrive in adult
Non-traumatic rhabdomyolysis
Non-traumatic rhabdomyolysis
Wound infection
Wound infection
Decubitus ulcer, stage 4 with infection
Fever, unspecified fever cause
Wound infection

## 2017-02-17 NOTE — PROGRESS NOTE ADULT - PROBLEM SELECTOR PROBLEM 3
[place] chorea
Acute hypernatremia
Recurrent fever
Acute hypernatremia
[place] chorea
Recurrent fever
Sepsis, due to unspecified organism
[place] chorea
Urinary retention
Acute hypernatremia
[place]ary chorea
Recurrent fever
[place] chorea
Urinary retention
Non-traumatic rhabdomyolysis
Recurrent fever
Urinary retention

## 2017-02-17 NOTE — PROGRESS NOTE ADULT - I WAS PHYSICALLY PRESENT FOR THE KEY PORTIONS OF THE EVALUATION AND MANAGEMENT (E/M) SERVICE PROVIDED.  I AGREE WITH THE ABOVE HISTORY, PHYSICAL, AND PLAN WHICH I HAVE REVIEWED AND EDITED WHERE APPROPRIATE
Statement Selected

## 2017-02-17 NOTE — PROGRESS NOTE ADULT - PROBLEM SELECTOR PROBLEM 2
Decubitus ulcer of sacral region, unstageable
[place] chorea
Decubitus ulcer of sacral region, stage 4
[place] chorea
Decubitus ulcer of sacral region, stage 4
Decubitus ulcer of sacral region, unstageable
[place] chorea
Decubitus ulcer of sacral region, unstageable
Leukocytosis, unspecified type
Recurrent fever
Volume depletion
Volume depletion
Decubitus ulcer of sacral region, unstageable
Decubitus ulcer of sacral region, unstageable
Leukocytosis, unspecified type

## 2017-02-17 NOTE — PROGRESS NOTE ADULT - SUBJECTIVE AND OBJECTIVE BOX
CC/F/U for: Lassen's chorea, sacral decub infection, fevers    INTERVAL HPI/OVERNIGHT EVENTS:  Pt seen and examined at bedside. No complaints.    Allergies/Intolerance: No Known Allergies      MEDICATIONS  (STANDING):  simvastatin 20milliGRAM(s) Oral at bedtime  dextrose 5% + sodium chloride 0.45%. 1000milliLiter(s) IV Continuous <Continuous>  enoxaparin Injectable 40milliGRAM(s) SubCutaneous daily  piperacillin/tazobactam IVPB. 3.375Gram(s) IV Intermittent every 8 hours  metroNIDAZOLE  IVPB  IV Intermittent   metroNIDAZOLE  IVPB 500milliGRAM(s) IV Intermittent every 8 hours  lactobacillus acidophilus 1Tablet(s) Oral daily    MEDICATIONS  (PRN):  acetaminophen   Tablet 650milliGRAM(s) Oral every 6 hours PRN For Temp greater than 38 C (100.4 F)  ibuprofen  Tablet 200milliGRAM(s) Oral every 8 hours PRN Temp >/=102  bisacodyl 10milliGRAM(s) Oral every 1 hour PRN Constipation        ROS: as above; all other systems reviewed and wnl      PHYSICAL EXAMINATION:  Vital Signs Last 24 Hrs  T(C): 36.8, Max: 38.3 (02-16 @ 17:17)  T(F): 98.2, Max: 101 (02-16 @ 17:17)  HR: 88 (88 - 91)  BP: 126/82 (116/78 - 129/71)  BP(mean): --  RR: 15 (14 - 16)  SpO2: 97% (96% - 98%)  CAPILLARY BLOOD GLUCOSE    GENERAL: frail, chronically-ill appearing, middle-aged female  HEAD:  atraumatic, normocephalic  EYES: sclera anicteric  ENMT: mucous membranes dry  NECK: supple, No JVD  CHEST/LUNG: respirations unlabored; BS decr bases, air entry symmetric  HEART: normal S1, S2  ABDOMEN: BS+, soft, ND, NT; +Mcfarland in place  EXTREMITIES:  pulses palpable; no clubbing, cyanosis, or edema b/l LEs  NEURO: awake, alert, interactive; verbal output effortful  SKIN: sacral decub      LABS:                        9.4    7.5   )-----------( 276      ( 17 Feb 2017 07:43 )             27.7     17 Feb 2017 07:43    145    |  110    |  10     ----------------------------<  94     3.7     |  28     |  0.66     Ca    9.0        17 Feb 2017 07:43  Phos  2.7       17 Feb 2017 07:43  Mg     2.1       17 Feb 2017 07:43    TPro  7.1    /  Alb  2.1    /  TBili  0.3    /  DBili  x      /  AST  45     /  ALT  46     /  AlkPhos  41     17 Feb 2017 07:43      ASSESSMENT AND PLAN:  61F, HTN, HL, 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  Wound vac placed; midline placed, and plan is for additional 2 weeks of IV antibiotics (at least until 3/2/17) cvrg w/Zosyn and Flagyl.  Recurrent low-grade fevers despite resolution of leukocytosis was thought secondary to Lassen's effects, and less likely 2/2 acute infection.  Pt also w/urinary retention of 1100mL (2/4/17) s/p Mcfarland.  Urology consulted, and impression was likely neurogenic bladder; recomms were for continued Mcfarland catheter.  Pt also w/chronic anemia, with stable hemoglobin. Pt also w/nontraumatic rhabdomyolysis w/o associated renal dysfunction; CK measures normalized w/IVFs.  Pt eval by PT, and recomms were for SHANIQUE.  Family in agreement and arrangements made for [facility]. For d/c today.

## 2017-02-17 NOTE — PROGRESS NOTE ADULT - PROVIDER SPECIALTY LIST ADULT
Hospitalist
Infectious Disease
Neurology
Neurosurgery
Neurosurgery
Surgery
Urology
Vascular Surgery
Vascular Surgery
Surgery

## 2017-02-17 NOTE — PROGRESS NOTE ADULT - PROBLEM SELECTOR PROBLEM 6
Arenac chorea
[place]icking chorea
Berrien chorea
Dillon chorea
[place] chorea
Mcfarland catheter in place
Mcfarland catheter in place
Non-traumatic rhabdomyolysis
Somerset chorea
Volume depletion
Somerset chorea
Volume depletion

## 2017-02-21 DIAGNOSIS — E43 UNSPECIFIED SEVERE PROTEIN-CALORIE MALNUTRITION: ICD-10-CM

## 2017-02-21 DIAGNOSIS — L89.154 PRESSURE ULCER OF SACRAL REGION, STAGE 4: ICD-10-CM

## 2017-02-21 DIAGNOSIS — G91.9 HYDROCEPHALUS, UNSPECIFIED: ICD-10-CM

## 2017-02-21 DIAGNOSIS — M79.81 NONTRAUMATIC HEMATOMA OF SOFT TISSUE: ICD-10-CM

## 2017-02-21 DIAGNOSIS — B96.20 UNSPECIFIED ESCHERICHIA COLI [E. COLI] AS THE CAUSE OF DISEASES CLASSIFIED ELSEWHERE: ICD-10-CM

## 2017-02-21 DIAGNOSIS — E78.2 MIXED HYPERLIPIDEMIA: ICD-10-CM

## 2017-02-21 DIAGNOSIS — R26.89 OTHER ABNORMALITIES OF GAIT AND MOBILITY: ICD-10-CM

## 2017-02-21 DIAGNOSIS — E87.0 HYPEROSMOLALITY AND HYPERNATREMIA: ICD-10-CM

## 2017-02-21 DIAGNOSIS — G10 HUNTINGTON'S DISEASE: ICD-10-CM

## 2017-02-21 DIAGNOSIS — Z74.01 BED CONFINEMENT STATUS: ICD-10-CM

## 2017-02-21 DIAGNOSIS — I10 ESSENTIAL (PRIMARY) HYPERTENSION: ICD-10-CM

## 2017-02-21 DIAGNOSIS — M62.82 RHABDOMYOLYSIS: ICD-10-CM

## 2017-02-21 DIAGNOSIS — E86.0 DEHYDRATION: ICD-10-CM

## 2017-02-21 DIAGNOSIS — R33.9 RETENTION OF URINE, UNSPECIFIED: ICD-10-CM

## 2017-02-21 DIAGNOSIS — D63.8 ANEMIA IN OTHER CHRONIC DISEASES CLASSIFIED ELSEWHERE: ICD-10-CM

## 2017-02-21 DIAGNOSIS — N31.9 NEUROMUSCULAR DYSFUNCTION OF BLADDER, UNSPECIFIED: ICD-10-CM

## 2017-02-21 DIAGNOSIS — F02.80 DEMENTIA IN OTHER DISEASES CLASSIFIED ELSEWHERE, UNSPECIFIED SEVERITY, WITHOUT BEHAVIORAL DISTURBANCE, PSYCHOTIC DISTURBANCE, MOOD DISTURBANCE, AND ANXIETY: ICD-10-CM

## 2017-02-21 DIAGNOSIS — R13.10 DYSPHAGIA, UNSPECIFIED: ICD-10-CM

## 2017-02-21 DIAGNOSIS — R53.1 WEAKNESS: ICD-10-CM

## 2017-02-21 DIAGNOSIS — L08.89 OTHER SPECIFIED LOCAL INFECTIONS OF THE SKIN AND SUBCUTANEOUS TISSUE: ICD-10-CM

## 2017-02-21 DIAGNOSIS — B95.1 STREPTOCOCCUS, GROUP B, AS THE CAUSE OF DISEASES CLASSIFIED ELSEWHERE: ICD-10-CM

## 2017-02-21 DIAGNOSIS — R62.7 ADULT FAILURE TO THRIVE: ICD-10-CM

## 2017-02-24 DIAGNOSIS — R69 ILLNESS, UNSPECIFIED: ICD-10-CM

## 2017-03-01 PROCEDURE — G9001: CPT

## 2017-06-04 NOTE — PHYSICAL THERAPY INITIAL EVALUATION ADULT - BALANCE DISTURBANCE, IDENTIFIED IMPAIRMENT CONTRIBUTE, REHAB EVAL
fall
impaired motor control/abnormal muscle tone/decreased strength/impaired postural control/decreased ROM/impaired coordination
decreased ROM/impaired motor control/impaired coordination/decreased strength/impaired postural control/abnormal muscle tone

## 2018-01-12 ENCOUNTER — INPATIENT (INPATIENT)
Facility: HOSPITAL | Age: 63
LOS: 32 days | Discharge: NURSING, REHAB OR LONG-TERM CARE | End: 2018-02-14
Attending: INTERNAL MEDICINE | Admitting: INTERNAL MEDICINE
Payer: MEDICARE

## 2018-01-12 VITALS
HEART RATE: 134 BPM | TEMPERATURE: 102 F | OXYGEN SATURATION: 97 % | HEIGHT: 65 IN | WEIGHT: 110.01 LBS | RESPIRATION RATE: 25 BRPM | SYSTOLIC BLOOD PRESSURE: 121 MMHG | DIASTOLIC BLOOD PRESSURE: 73 MMHG

## 2018-01-12 DIAGNOSIS — L89.90 PRESSURE ULCER OF UNSPECIFIED SITE, UNSPECIFIED STAGE: ICD-10-CM

## 2018-01-12 DIAGNOSIS — N31.9 NEUROMUSCULAR DYSFUNCTION OF BLADDER, UNSPECIFIED: ICD-10-CM

## 2018-01-12 DIAGNOSIS — G81.90 HEMIPLEGIA, UNSPECIFIED AFFECTING UNSPECIFIED SIDE: ICD-10-CM

## 2018-01-12 DIAGNOSIS — L89.159 PRESSURE ULCER OF SACRAL REGION, UNSPECIFIED STAGE: ICD-10-CM

## 2018-01-12 DIAGNOSIS — E86.0 DEHYDRATION: ICD-10-CM

## 2018-01-12 DIAGNOSIS — M62.82 RHABDOMYOLYSIS: ICD-10-CM

## 2018-01-12 DIAGNOSIS — R62.7 ADULT FAILURE TO THRIVE: ICD-10-CM

## 2018-01-12 DIAGNOSIS — N39.0 URINARY TRACT INFECTION, SITE NOT SPECIFIED: ICD-10-CM

## 2018-01-12 LAB
ALBUMIN SERPL ELPH-MCNC: 2.7 G/DL — LOW (ref 3.3–5)
ALP SERPL-CCNC: 73 U/L — SIGNIFICANT CHANGE UP (ref 40–120)
ALT FLD-CCNC: 29 U/L — SIGNIFICANT CHANGE UP (ref 12–78)
ANION GAP SERPL CALC-SCNC: 8 MMOL/L — SIGNIFICANT CHANGE UP (ref 5–17)
APPEARANCE UR: ABNORMAL
APTT BLD: 31.6 SEC — SIGNIFICANT CHANGE UP (ref 27.5–37.4)
AST SERPL-CCNC: 20 U/L — SIGNIFICANT CHANGE UP (ref 15–37)
BACTERIA # UR AUTO: ABNORMAL
BASOPHILS # BLD AUTO: 0 K/UL — SIGNIFICANT CHANGE UP (ref 0–0.2)
BASOPHILS # BLD AUTO: 0.1 K/UL — SIGNIFICANT CHANGE UP (ref 0–0.2)
BASOPHILS NFR BLD AUTO: 0.2 % — SIGNIFICANT CHANGE UP (ref 0–2)
BASOPHILS NFR BLD AUTO: 0.4 % — SIGNIFICANT CHANGE UP (ref 0–2)
BILIRUB SERPL-MCNC: 0.4 MG/DL — SIGNIFICANT CHANGE UP (ref 0.2–1.2)
BILIRUB UR-MCNC: NEGATIVE — SIGNIFICANT CHANGE UP
BUN SERPL-MCNC: 62 MG/DL — HIGH (ref 7–23)
CALCIUM SERPL-MCNC: 10.2 MG/DL — HIGH (ref 8.5–10.1)
CHLORIDE SERPL-SCNC: 126 MMOL/L — HIGH (ref 96–108)
CK MB BLD-MCNC: <1.1 % — SIGNIFICANT CHANGE UP (ref 0–3.5)
CK MB CFR SERPL CALC: <0.5 NG/ML — SIGNIFICANT CHANGE UP (ref 0.5–3.6)
CK SERPL-CCNC: 46 U/L — SIGNIFICANT CHANGE UP (ref 26–192)
CO2 SERPL-SCNC: 27 MMOL/L — SIGNIFICANT CHANGE UP (ref 22–31)
COLOR SPEC: YELLOW — SIGNIFICANT CHANGE UP
CREAT SERPL-MCNC: 1.6 MG/DL — HIGH (ref 0.5–1.3)
DIFF PNL FLD: ABNORMAL
E COLI DNA BLD POS QL NAA+NON-PROBE: SIGNIFICANT CHANGE UP
EOSINOPHIL # BLD AUTO: 0 K/UL — SIGNIFICANT CHANGE UP (ref 0–0.5)
EOSINOPHIL # BLD AUTO: 0 K/UL — SIGNIFICANT CHANGE UP (ref 0–0.5)
EOSINOPHIL NFR BLD AUTO: 0 % — SIGNIFICANT CHANGE UP (ref 0–6)
EOSINOPHIL NFR BLD AUTO: 0 % — SIGNIFICANT CHANGE UP (ref 0–6)
EPI CELLS # UR: ABNORMAL
FLUAV SPEC QL CULT: NEGATIVE — SIGNIFICANT CHANGE UP
FLUBV AG SPEC QL IA: NEGATIVE — SIGNIFICANT CHANGE UP
GLUCOSE SERPL-MCNC: 160 MG/DL — HIGH (ref 70–99)
GLUCOSE UR QL: NEGATIVE MG/DL — SIGNIFICANT CHANGE UP
GRAM STN FLD: SIGNIFICANT CHANGE UP
HCT VFR BLD CALC: 30.6 % — LOW (ref 34.5–45)
HCT VFR BLD CALC: 34.9 % — SIGNIFICANT CHANGE UP (ref 34.5–45)
HGB BLD-MCNC: 11.4 G/DL — LOW (ref 11.5–15.5)
HGB BLD-MCNC: 9.8 G/DL — LOW (ref 11.5–15.5)
INR BLD: 1.84 RATIO — HIGH (ref 0.88–1.16)
KETONES UR-MCNC: ABNORMAL
LACTATE SERPL-SCNC: 1.4 MMOL/L — SIGNIFICANT CHANGE UP (ref 0.7–2)
LEUKOCYTE ESTERASE UR-ACNC: ABNORMAL
LYMPHOCYTES # BLD AUTO: 1.2 K/UL — SIGNIFICANT CHANGE UP (ref 1–3.3)
LYMPHOCYTES # BLD AUTO: 1.6 K/UL — SIGNIFICANT CHANGE UP (ref 1–3.3)
LYMPHOCYTES # BLD AUTO: 10.5 % — LOW (ref 13–44)
LYMPHOCYTES # BLD AUTO: 8.9 % — LOW (ref 13–44)
MCHC RBC-ENTMCNC: 25.4 PG — LOW (ref 27–34)
MCHC RBC-ENTMCNC: 25.7 PG — LOW (ref 27–34)
MCHC RBC-ENTMCNC: 31.9 GM/DL — LOW (ref 32–36)
MCHC RBC-ENTMCNC: 32.5 GM/DL — SIGNIFICANT CHANGE UP (ref 32–36)
MCV RBC AUTO: 79.2 FL — LOW (ref 80–100)
MCV RBC AUTO: 79.8 FL — LOW (ref 80–100)
METHOD TYPE: SIGNIFICANT CHANGE UP
MONOCYTES # BLD AUTO: 0.8 K/UL — SIGNIFICANT CHANGE UP (ref 0–0.9)
MONOCYTES # BLD AUTO: 1 K/UL — HIGH (ref 0–0.9)
MONOCYTES NFR BLD AUTO: 5.8 % — SIGNIFICANT CHANGE UP (ref 2–14)
MONOCYTES NFR BLD AUTO: 6.4 % — SIGNIFICANT CHANGE UP (ref 2–14)
NEUTROPHILS # BLD AUTO: 11.2 K/UL — HIGH (ref 1.8–7.4)
NEUTROPHILS # BLD AUTO: 12.4 K/UL — HIGH (ref 1.8–7.4)
NEUTROPHILS NFR BLD AUTO: 82.6 % — HIGH (ref 43–77)
NEUTROPHILS NFR BLD AUTO: 85 % — HIGH (ref 43–77)
NITRITE UR-MCNC: NEGATIVE — SIGNIFICANT CHANGE UP
PH UR: 6 — SIGNIFICANT CHANGE UP (ref 5–8)
PLATELET # BLD AUTO: 182 K/UL — SIGNIFICANT CHANGE UP (ref 150–400)
PLATELET # BLD AUTO: 263 K/UL — SIGNIFICANT CHANGE UP (ref 150–400)
POTASSIUM SERPL-MCNC: 3.6 MMOL/L — SIGNIFICANT CHANGE UP (ref 3.5–5.3)
POTASSIUM SERPL-SCNC: 3.6 MMOL/L — SIGNIFICANT CHANGE UP (ref 3.5–5.3)
PROT SERPL-MCNC: 10.1 GM/DL — HIGH (ref 6–8.3)
PROT UR-MCNC: 500 MG/DL
PROTHROM AB SERPL-ACNC: 20.3 SEC — HIGH (ref 9.8–12.7)
RBC # BLD: 3.84 M/UL — SIGNIFICANT CHANGE UP (ref 3.8–5.2)
RBC # BLD: 4.41 M/UL — SIGNIFICANT CHANGE UP (ref 3.8–5.2)
RBC # FLD: 16.9 % — HIGH (ref 11–15)
RBC # FLD: 17 % — HIGH (ref 11–15)
RBC CASTS # UR COMP ASSIST: ABNORMAL /HPF (ref 0–4)
SODIUM SERPL-SCNC: 161 MMOL/L — CRITICAL HIGH (ref 135–145)
SP GR SPEC: 1.01 — SIGNIFICANT CHANGE UP (ref 1.01–1.02)
SPECIMEN SOURCE: SIGNIFICANT CHANGE UP
TROPONIN I SERPL-MCNC: <.015 NG/ML — SIGNIFICANT CHANGE UP (ref 0.01–0.04)
UROBILINOGEN FLD QL: NEGATIVE MG/DL — SIGNIFICANT CHANGE UP
WBC # BLD: 13.2 K/UL — HIGH (ref 3.8–10.5)
WBC # BLD: 15 K/UL — HIGH (ref 3.8–10.5)
WBC # FLD AUTO: 13.2 K/UL — HIGH (ref 3.8–10.5)
WBC # FLD AUTO: 15 K/UL — HIGH (ref 3.8–10.5)
WBC UR QL: ABNORMAL

## 2018-01-12 PROCEDURE — 93010 ELECTROCARDIOGRAM REPORT: CPT

## 2018-01-12 PROCEDURE — 99222 1ST HOSP IP/OBS MODERATE 55: CPT

## 2018-01-12 PROCEDURE — 71045 X-RAY EXAM CHEST 1 VIEW: CPT | Mod: 26

## 2018-01-12 PROCEDURE — 99285 EMERGENCY DEPT VISIT HI MDM: CPT

## 2018-01-12 RX ORDER — SODIUM CHLORIDE 9 MG/ML
3 INJECTION INTRAMUSCULAR; INTRAVENOUS; SUBCUTANEOUS ONCE
Qty: 0 | Refills: 0 | Status: COMPLETED | OUTPATIENT
Start: 2018-01-12 | End: 2018-01-12

## 2018-01-12 RX ORDER — SODIUM CHLORIDE 9 MG/ML
1000 INJECTION INTRAMUSCULAR; INTRAVENOUS; SUBCUTANEOUS ONCE
Qty: 0 | Refills: 0 | Status: COMPLETED | OUTPATIENT
Start: 2018-01-12 | End: 2018-01-12

## 2018-01-12 RX ORDER — ASCORBIC ACID 60 MG
500 TABLET,CHEWABLE ORAL
Qty: 0 | Refills: 0 | Status: DISCONTINUED | OUTPATIENT
Start: 2018-01-12 | End: 2018-01-19

## 2018-01-12 RX ORDER — PIPERACILLIN AND TAZOBACTAM 4; .5 G/20ML; G/20ML
3.38 INJECTION, POWDER, LYOPHILIZED, FOR SOLUTION INTRAVENOUS EVERY 8 HOURS
Qty: 0 | Refills: 0 | Status: DISCONTINUED | OUTPATIENT
Start: 2018-01-12 | End: 2018-01-16

## 2018-01-12 RX ORDER — ASCORBIC ACID 60 MG
1 TABLET,CHEWABLE ORAL
Qty: 0 | Refills: 0 | COMMUNITY

## 2018-01-12 RX ORDER — ACETAMINOPHEN 500 MG
650 TABLET ORAL EVERY 6 HOURS
Qty: 0 | Refills: 0 | Status: DISCONTINUED | OUTPATIENT
Start: 2018-01-12 | End: 2018-01-12

## 2018-01-12 RX ORDER — MIRTAZAPINE 45 MG/1
15 TABLET, ORALLY DISINTEGRATING ORAL AT BEDTIME
Qty: 0 | Refills: 0 | Status: DISCONTINUED | OUTPATIENT
Start: 2018-01-12 | End: 2018-01-19

## 2018-01-12 RX ORDER — SIMVASTATIN 20 MG/1
20 TABLET, FILM COATED ORAL AT BEDTIME
Qty: 0 | Refills: 0 | Status: DISCONTINUED | OUTPATIENT
Start: 2018-01-12 | End: 2018-01-19

## 2018-01-12 RX ORDER — FERROUS SULFATE 325(65) MG
325 TABLET ORAL DAILY
Qty: 0 | Refills: 0 | Status: DISCONTINUED | OUTPATIENT
Start: 2018-01-12 | End: 2018-01-19

## 2018-01-12 RX ORDER — SENNA PLUS 8.6 MG/1
2 TABLET ORAL
Qty: 0 | Refills: 0 | COMMUNITY

## 2018-01-12 RX ORDER — FERROUS SULFATE 325(65) MG
2 TABLET ORAL
Qty: 0 | Refills: 0 | COMMUNITY

## 2018-01-12 RX ORDER — ACETAMINOPHEN 500 MG
650 TABLET ORAL ONCE
Qty: 0 | Refills: 0 | Status: COMPLETED | OUTPATIENT
Start: 2018-01-12 | End: 2018-01-12

## 2018-01-12 RX ORDER — SODIUM CHLORIDE 9 MG/ML
2000 INJECTION INTRAMUSCULAR; INTRAVENOUS; SUBCUTANEOUS ONCE
Qty: 0 | Refills: 0 | Status: COMPLETED | OUTPATIENT
Start: 2018-01-12 | End: 2018-01-12

## 2018-01-12 RX ORDER — MIRTAZAPINE 45 MG/1
1 TABLET, ORALLY DISINTEGRATING ORAL
Qty: 0 | Refills: 0 | COMMUNITY

## 2018-01-12 RX ORDER — FERROUS SULFATE 325(65) MG
160 TABLET ORAL
Qty: 0 | Refills: 0 | Status: DISCONTINUED | OUTPATIENT
Start: 2018-01-12 | End: 2018-01-12

## 2018-01-12 RX ORDER — MAGNESIUM HYDROXIDE 400 MG/1
30 TABLET, CHEWABLE ORAL
Qty: 0 | Refills: 0 | COMMUNITY

## 2018-01-12 RX ORDER — SODIUM CHLORIDE 9 MG/ML
1000 INJECTION, SOLUTION INTRAVENOUS
Qty: 0 | Refills: 0 | Status: DISCONTINUED | OUTPATIENT
Start: 2018-01-12 | End: 2018-01-13

## 2018-01-12 RX ORDER — MAGNESIUM HYDROXIDE 400 MG/1
30 TABLET, CHEWABLE ORAL DAILY
Qty: 0 | Refills: 0 | Status: DISCONTINUED | OUTPATIENT
Start: 2018-01-12 | End: 2018-01-19

## 2018-01-12 RX ORDER — ACETAMINOPHEN 500 MG
2 TABLET ORAL
Qty: 0 | Refills: 0 | COMMUNITY

## 2018-01-12 RX ORDER — MOXIFLOXACIN HYDROCHLORIDE TABLETS, 400 MG 400 MG/1
1 TABLET, FILM COATED ORAL
Qty: 0 | Refills: 0 | COMMUNITY

## 2018-01-12 RX ORDER — PIPERACILLIN AND TAZOBACTAM 4; .5 G/20ML; G/20ML
3.38 INJECTION, POWDER, LYOPHILIZED, FOR SOLUTION INTRAVENOUS ONCE
Qty: 0 | Refills: 0 | Status: COMPLETED | OUTPATIENT
Start: 2018-01-12 | End: 2018-01-12

## 2018-01-12 RX ORDER — MULTIVIT-MIN/FERROUS GLUCONATE 9 MG/15 ML
1 LIQUID (ML) ORAL DAILY
Qty: 0 | Refills: 0 | Status: DISCONTINUED | OUTPATIENT
Start: 2018-01-12 | End: 2018-01-19

## 2018-01-12 RX ORDER — SENNA PLUS 8.6 MG/1
2 TABLET ORAL AT BEDTIME
Qty: 0 | Refills: 0 | Status: DISCONTINUED | OUTPATIENT
Start: 2018-01-12 | End: 2018-01-19

## 2018-01-12 RX ORDER — LACTULOSE 10 G/15ML
10 SOLUTION ORAL
Qty: 0 | Refills: 0 | Status: DISCONTINUED | OUTPATIENT
Start: 2018-01-12 | End: 2018-01-19

## 2018-01-12 RX ORDER — LACTULOSE 10 G/15ML
15 SOLUTION ORAL
Qty: 0 | Refills: 0 | COMMUNITY

## 2018-01-12 RX ORDER — ENOXAPARIN SODIUM 100 MG/ML
30 INJECTION SUBCUTANEOUS DAILY
Qty: 0 | Refills: 0 | Status: DISCONTINUED | OUTPATIENT
Start: 2018-01-12 | End: 2018-01-17

## 2018-01-12 RX ORDER — ACETAMINOPHEN 500 MG
650 TABLET ORAL EVERY 6 HOURS
Qty: 0 | Refills: 0 | Status: DISCONTINUED | OUTPATIENT
Start: 2018-01-12 | End: 2018-01-19

## 2018-01-12 RX ORDER — KETOROLAC TROMETHAMINE 30 MG/ML
30 SYRINGE (ML) INJECTION ONCE
Qty: 0 | Refills: 0 | Status: DISCONTINUED | OUTPATIENT
Start: 2018-01-12 | End: 2018-01-12

## 2018-01-12 RX ORDER — MULTIVIT-MIN/FERROUS GLUCONATE 9 MG/15 ML
1 LIQUID (ML) ORAL
Qty: 0 | Refills: 0 | COMMUNITY

## 2018-01-12 RX ORDER — SODIUM CHLORIDE 9 MG/ML
500 INJECTION, SOLUTION INTRAVENOUS
Qty: 0 | Refills: 0 | Status: DISCONTINUED | OUTPATIENT
Start: 2018-01-12 | End: 2018-01-12

## 2018-01-12 RX ADMIN — SODIUM CHLORIDE 1000 MILLILITER(S): 9 INJECTION INTRAMUSCULAR; INTRAVENOUS; SUBCUTANEOUS at 08:30

## 2018-01-12 RX ADMIN — SODIUM CHLORIDE 1000 MILLILITER(S): 9 INJECTION INTRAMUSCULAR; INTRAVENOUS; SUBCUTANEOUS at 20:30

## 2018-01-12 RX ADMIN — SODIUM CHLORIDE 3 MILLILITER(S): 9 INJECTION INTRAMUSCULAR; INTRAVENOUS; SUBCUTANEOUS at 09:33

## 2018-01-12 RX ADMIN — SODIUM CHLORIDE 100 MILLILITER(S): 9 INJECTION, SOLUTION INTRAVENOUS at 11:37

## 2018-01-12 RX ADMIN — PIPERACILLIN AND TAZOBACTAM 12.5 GRAM(S): 4; .5 INJECTION, POWDER, LYOPHILIZED, FOR SOLUTION INTRAVENOUS at 14:06

## 2018-01-12 RX ADMIN — Medication 650 MILLIGRAM(S): at 08:12

## 2018-01-12 RX ADMIN — SODIUM CHLORIDE 2000 MILLILITER(S): 9 INJECTION INTRAMUSCULAR; INTRAVENOUS; SUBCUTANEOUS at 07:10

## 2018-01-12 RX ADMIN — PIPERACILLIN AND TAZOBACTAM 12.5 GRAM(S): 4; .5 INJECTION, POWDER, LYOPHILIZED, FOR SOLUTION INTRAVENOUS at 20:59

## 2018-01-12 RX ADMIN — ENOXAPARIN SODIUM 30 MILLIGRAM(S): 100 INJECTION SUBCUTANEOUS at 11:45

## 2018-01-12 RX ADMIN — SODIUM CHLORIDE 2000 MILLILITER(S): 9 INJECTION INTRAMUSCULAR; INTRAVENOUS; SUBCUTANEOUS at 23:00

## 2018-01-12 RX ADMIN — SODIUM CHLORIDE 100 MILLILITER(S): 9 INJECTION, SOLUTION INTRAVENOUS at 10:32

## 2018-01-12 RX ADMIN — LACTULOSE 10 GRAM(S): 10 SOLUTION ORAL at 17:15

## 2018-01-12 RX ADMIN — Medication 650 MILLIGRAM(S): at 14:30

## 2018-01-12 RX ADMIN — Medication 500 MILLIGRAM(S): at 17:15

## 2018-01-12 RX ADMIN — PIPERACILLIN AND TAZOBACTAM 100 GRAM(S): 4; .5 INJECTION, POWDER, LYOPHILIZED, FOR SOLUTION INTRAVENOUS at 08:13

## 2018-01-12 RX ADMIN — Medication 30 MILLIGRAM(S): at 23:00

## 2018-01-12 RX ADMIN — Medication 650 MILLIGRAM(S): at 20:58

## 2018-01-12 NOTE — ED PROVIDER NOTE - PHYSICAL EXAMINATION
Gen: Alert,  NAD    Head: NC, AT, PERRL, EOMI, normal lids/conjunctiva   ENT: Bilateral TM WNL, normal hearing, patent oropharynx without erythema/exudate, uvula midline  Neck: supple, no tenderness/meningismus/JVD   Pulm: Bilateral clear BS, normal resp effort, no wheeze/stridor/retractions  CV: RRR, no M/R/G, +dist pulses   Abd: soft, NT/ND, +BS, no guarding/rebound tenderness  Mskel: no edema/erythema/cyanosis   Skin: no rash   Neuro: Awake, no sensory/motor deficits, CN 2-12 intact

## 2018-01-12 NOTE — H&P ADULT - NSHPPHYSICALEXAM_GEN_ALL_CORE
thin black female who is lethargic  101.3 128 34 120/76  98 % ra sat  Heent ncat, + temperal wasting, no jvd, no thryomegally, eomi, decreased reactivity of pupils  LUng cta  cv rrr s1 s2  abd soft, nt/nd bs+ no hsm, sacral du  gu + obrien  ext tr edema  neuro - not talking coherently, right hemiparisis, + right babinski

## 2018-01-12 NOTE — CONSULT NOTE ADULT - ASSESSMENT
gram NEGATIVE bacteremia   source ?   antibiotics   will follow with you thanks gram NEGATIVE bacteremia   source ? urinary likey  antibiotics   will follow with you thanks

## 2018-01-12 NOTE — H&P ADULT - PMH
Anemia    Decubital ulcer  of sacrum - s/p debreedment  Dehydration    DM (diabetes mellitus)    DVT (deep venous thrombosis)  right upper extreamity  Failure to thrive in adult    Hemiparesis  right side  High cholesterol    HTN (hypertension)    Blair chorea    Neurogenic bladder disorder    Pneumonia    Pressure ulcer of sacrum    Rhabdomyolysis  h/o  UTI (urinary tract infection)    UTI (urinary tract infection)

## 2018-01-12 NOTE — CONSULT NOTE ADULT - SUBJECTIVE AND OBJECTIVE BOX
HPI:  63 yo black female with Ouray's chorea - with fevers for the past 3 days, last night went up to 103+.  Pt has been more lethargic.  no n/v (2018 14:07)  here gram negative rods in blood     PAST MEDICAL & SURGICAL HISTORY:  Decubital ulcer: of sacrum - s/p debreedment  Hemiparesis: right side  Failure to thrive in adult  UTI (urinary tract infection)  Dehydration  Rhabdomyolysis: h/o  HTN (hypertension)  Neurogenic bladder disorder  Pressure ulcer of sacrum  UTI (urinary tract infection)  DVT (deep venous thrombosis): right upper extreamity  Anemia  Pneumonia  DM (diabetes mellitus)  High cholesterol  Ashli chorea  No significant past surgical history      SOCHX:   tobacco,  -  alcohol    FMHX: FA/MO  - contributory       Recent Travel:    Immunizations:    Allergies    No Known Allergies    Intolerances        MEDICATIONS  (STANDING):  ascorbic acid 500 milliGRAM(s) Oral two times a day  dextrose 5% + sodium chloride 0.45%. 1000 milliLiter(s) (100 mL/Hr) IV Continuous <Continuous>  enoxaparin Injectable 30 milliGRAM(s) SubCutaneous daily  ferrous    sulfate 325 milliGRAM(s) Oral daily  ketorolac   Injectable 30 milliGRAM(s) IV Push once  lactulose Syrup 10 Gram(s) Oral two times a day  mirtazapine 15 milliGRAM(s) Oral at bedtime  multivitamin/minerals 1 Tablet(s) Oral daily  piperacillin/tazobactam IVPB. 3.375 Gram(s) IV Intermittent every 8 hours  senna 2 Tablet(s) Oral at bedtime  simvastatin 20 milliGRAM(s) Oral at bedtime  sodium chloride 0.9% Bolus 1000 milliLiter(s) IV Bolus once  sodium chloride 0.9% Bolus 1000 milliLiter(s) IV Bolus once    MEDICATIONS  (PRN):  acetaminophen  Suppository 650 milliGRAM(s) Rectal every 6 hours PRN For Temp greater than 38 C (100.4 F)  magnesium hydroxide Suspension 30 milliLiter(s) Oral daily PRN Constipation      REVIEW OF SYSTEMS:  CONSTITUTIONAL: No fever, weight loss, or fatigue  EYES: No eye pain, visual disturbances, or discharge  ENMT:  No difficulty hearing, tinnitus, vertigo; No sinus or throat pain  NECK: No pain or stiffness  BREASTS: No pain, masses, or nipple discharge  RESPIRATORY: No cough, wheezing, chills or hemoptysis; No shortness of breath  CARDIOVASCULAR: No chest pain, palpitations, dizziness, or leg swelling  GASTROINTESTINAL: No abdominal or epigastric pain. No nausea, vomiting, or hematemesis; No diarrhea or constipation. No melena or hematochezia.  GENITOURINARY: No dysuria, frequency, hematuria, or incontinence  NEUROLOGICAL: No headaches, memory loss, loss of strength, numbness, or tremors  SKIN: No itching, burning, rashes, or lesions   LYMPH NODES: No enlarged glands  ENDOCRINE: No heat or cold intolerance; No hair loss  MUSCULOSKELETAL: No joint pain or swelling; No muscle, back, or extremity pain  PSYCHIATRIC: No depression, anxiety, mood swings, or difficulty sleeping  HEME/LYMPH: No easy bruising, or bleeding gums  ALLERGY AND IMMUNOLOGIC: No hives or eczema    VITAL SIGNS:    T(C): 39.3 (18 @ 22:58), Max: 39.9 (18 @ 20:53)  T(F): 102.8 (18 @ 22:58), Max: 103.8 (18 @ 20:53)  HR: 117 (18 @ 22:58) (117 - 134)  BP: 140/86 (18 @ 20:53) (114/82 - 140/86)  RR: 24 (18 @ 22:58) (22 - 34)  SpO2: 95% (18 @ 22:58) (95% - 98%)    PHYSICAL EXAM:    GENERAL: NAD, well-groomed, well-developed  HEAD:  Atraumatic, Normocephalic  EYES: EOMI, PERRLA, conjunctiva and sclera clear  ENMT: No tonsillar erythema, exudates, or enlargement; Moist mucous membranes, Good dentition, No lesions  NECK: Supple, No JVD, Normal thyroid  NERVOUS SYSTEM:  Alert & Oriented X3, Good concentration; Motor Strength 5/5 B/L upper and lower extremities; DTRs 2+ intact and symmetric  CHEST/LUNG: Clear to auscultation bilaterally; No rales, rhonchi, wheezing bilaterally  HEART: Regular rate and rhythm; No murmurs, rubs, or gallops  ABDOMEN: Soft, Nontender, Nondistended; Bowel sounds present  EXTREMITIES:  2+ Peripheral Pulses, No clubbing, cyanosis, or edema  LYMPH: No lymphadenopathy noted  SKIN: No rashes or lesions  BACK: no pressor sore     LABS:                         9.8    13.2  )-----------( 182      ( 2018 14:18 )             30.6     -12    161<HH>  |  126<H>  |  62<H>  ----------------------------<  160<H>  3.6   |  27  |  1.60<H>    Ca    10.2<H>      2018 07:37    TPro  10.1<H>  /  Alb  2.7<L>  /  TBili  0.4  /  DBili  x   /  AST  20  /  ALT  29  /  AlkPhos  73  12    LIVER FUNCTIONS - ( 2018 07:37 )  Alb: 2.7 g/dL / Pro: 10.1 gm/dL / ALK PHOS: 73 U/L / ALT: 29 U/L / AST: 20 U/L / GGT: x           PT/INR - ( 2018 07:37 )   PT: 20.3 sec;   INR: 1.84 ratio         PTT - ( 2018 07:37 )  PTT:31.6 sec  Urinalysis Basic - ( 2018 08:01 )    Color: Yellow / Appearance: very cloudy / S.010 / pH: x  Gluc: x / Ketone: Trace  / Bili: Negative / Urobili: Negative mg/dL   Blood: x / Protein: 500 mg/dL / Nitrite: Negative   Leuk Esterase: Moderate / RBC: 6-10 /HPF / WBC 6-10   Sq Epi: x / Non Sq Epi: Moderate / Bacteria: Moderate        CARDIAC MARKERS ( 2018 07:37 )  <.015 ng/mL / x     / 46 U/L / x     / <0.5 ng/mL                    Culture Results:   Growth in aerobic bottle: Gram Negative Rods  ***Blood Panel PCR results on this specimen are available  approximately 3 hours after the Gram stain result.***  Gram stain, PCR, and/or culture results may not always  correspond due to difference in methodologies.  ************************************************************  This PCR assay was performed using [employer].  The following targets are tested for: Enterococcus,  vancomycin resistant enterococci, Listeria monocytogenes,  coagulase negative staphylococci, S. aureus,  methicillin resistant S. aureus, Streptococcus agalactiae  (Group B), S. pneumoniae, S. pyogenes (Group A),  Acinetobacter baumannii, Enterobacter cloacae, E. coli,  Klebsiella oxytoca, K. pneumoniae, Proteus sp.,  Serratia marcescens, Haemophilus influenzae,  Neisseria meningitidis, Pseudomonas aeruginosa, Candida  albicans, C. glabrata, C krusei, C parapsilosis,  C. tropicalis and the KPC resistance gene. ( @ 10:07)                Radiology:      < from: Xray Chest 1 View AP/PA. (18 @ 08:40) >  IMPRESSION:  Streaky opacities in the left mid and lower lung, may be due to   atelectasis or infection.                JOHANNE LAMBERT MD., ATTENDING RADIOLOGIST  This document has been electronically signed. 2018  8:57AM                < end of copied text > HPI:  61 yo black female with McCracken's chorea - with fevers for the past 3 days, last night went up to 103+.  Pt has been more lethargic.  no n/v (2018 14:07)  here gram negative rods in blood     PAST MEDICAL & SURGICAL HISTORY:  Decubital ulcer: of sacrum - s/p debreedment  Hemiparesis: right side  Failure to thrive in adult  UTI (urinary tract infection)  Dehydration  Rhabdomyolysis: h/o  HTN (hypertension)  Neurogenic bladder disorder  Pressure ulcer of sacrum  UTI (urinary tract infection)  DVT (deep venous thrombosis): right upper extreamity  Anemia  Pneumonia  DM (diabetes mellitus)  High cholesterol  Ashli chorea  No significant past surgical history      SOCHX: unable to obtain    tobacco,  -/  alcohol    FMHX: FA/MO  - contributory       Recent Travel:    Immunizations:    Allergies    No Known Allergies    Intolerances        MEDICATIONS  (STANDING):  ascorbic acid 500 milliGRAM(s) Oral two times a day  dextrose 5% + sodium chloride 0.45%. 1000 milliLiter(s) (100 mL/Hr) IV Continuous <Continuous>  enoxaparin Injectable 30 milliGRAM(s) SubCutaneous daily  ferrous    sulfate 325 milliGRAM(s) Oral daily  ketorolac   Injectable 30 milliGRAM(s) IV Push once  lactulose Syrup 10 Gram(s) Oral two times a day  mirtazapine 15 milliGRAM(s) Oral at bedtime  multivitamin/minerals 1 Tablet(s) Oral daily  piperacillin/tazobactam IVPB. 3.375 Gram(s) IV Intermittent every 8 hours  senna 2 Tablet(s) Oral at bedtime  simvastatin 20 milliGRAM(s) Oral at bedtime  sodium chloride 0.9% Bolus 1000 milliLiter(s) IV Bolus once  sodium chloride 0.9% Bolus 1000 milliLiter(s) IV Bolus once    MEDICATIONS  (PRN):  acetaminophen  Suppository 650 milliGRAM(s) Rectal every 6 hours PRN For Temp greater than 38 C (100.4 F)  magnesium hydroxide Suspension 30 milliLiter(s) Oral daily PRN Constipation      REVIEW OF SYSTEMS:  unable to obtain       T(C): 39.3 (18 @ 22:58), Max: 39.9 (18 @ 20:53)  T(F): 102.8 (18 @ 22:58), Max: 103.8 (18 @ 20:53)  HR: 117 (18 @ 22:58) (117 - 134)  BP: 140/86 (18 @ 20:53) (114/82 - 140/86)  RR: 24 (18 @ 22:58) (22 - 34)  SpO2: 95% (18 @ 22:58) (95% - 98%)    PHYSICAL EXAM:    GENERAL: NAD, chronic vegetative state ; contracted opens eyes stiff   HEAD:  Atraumatic, Normocephalic  EYES: EOMI, PERRLA, conjunctiva and sclera clear  ENMT: mouth open and dry   NECK: Supple, No JVD, Normal thyroid  NERVOUS SYSTEM: yes open ; chronic vegetative state ; contracted  CHEST/LUNG: Clear to auscultation bilaterally; No rales, rhonchi, wheezing bilaterally  HEART: Regular rate and rhythm; No murmurs, rubs, or gallops  ABDOMEN: Soft, Nontender, Nondistended; Bowel sounds present  obrien plus   EXTREMITIES:  2+ Peripheral Pulses, No clubbing, cyanosis, or edema  LYMPH: No lymphadenopathy noted  SKIN: sacral ulcer   decubiti noted      LABS:                         9.8    13.2  )-----------( 182      ( 2018 14:18 )             30.6     -12    161<HH>  |  126<H>  |  62<H>  ----------------------------<  160<H>  3.6   |  27  |  1.60<H>    Ca    10.2<H>      2018 07:37    TPro  10.1<H>  /  Alb  2.7<L>  /  TBili  0.4  /  DBili  x   /  AST  20  /  ALT  29  /  AlkPhos  73  -12    LIVER FUNCTIONS - ( 2018 07:37 )  Alb: 2.7 g/dL / Pro: 10.1 gm/dL / ALK PHOS: 73 U/L / ALT: 29 U/L / AST: 20 U/L / GGT: x           PT/INR - ( 2018 07:37 )   PT: 20.3 sec;   INR: 1.84 ratio         PTT - ( 2018 07:37 )  PTT:31.6 sec  Urinalysis Basic - ( 2018 08:01 )    Color: Yellow / Appearance: very cloudy / S.010 / pH: x  Gluc: x / Ketone: Trace  / Bili: Negative / Urobili: Negative mg/dL   Blood: x / Protein: 500 mg/dL / Nitrite: Negative   Leuk Esterase: Moderate / RBC: 6-10 /HPF / WBC 6-10   Sq Epi: x / Non Sq Epi: Moderate / Bacteria: Moderate        CARDIAC MARKERS ( 2018 07:37 )  <.015 ng/mL / x     / 46 U/L / x     / <0.5 ng/mL                    Culture Results:   Growth in aerobic bottle: Gram Negative Rods  ***Blood Panel PCR results on this specimen are available  approximately 3 hours after the Gram stain result.***  Gram stain, PCR, and/or culture results may not always  correspond due to difference in methodologies.  ************************************************************  This PCR assay was performed using [employer].  The following targets are tested for: Enterococcus,  vancomycin resistant enterococci, Listeria monocytogenes,  coagulase negative staphylococci, S. aureus,  methicillin resistant S. aureus, Streptococcus agalactiae  (Group B), S. pneumoniae, S. pyogenes (Group A),  Acinetobacter baumannii, Enterobacter cloacae, E. coli,  Klebsiella oxytoca, K. pneumoniae, Proteus sp.,  Serratia marcescens, Haemophilus influenzae,  Neisseria meningitidis, Pseudomonas aeruginosa, Candida  albicans, C. glabrata, C krusei, C parapsilosis,  C. tropicalis and the KPC resistance gene. ( @ 10:07)                Radiology:      < from: Xray Chest 1 View AP/PA. (18 @ 08:40) >  IMPRESSION:  Streaky opacities in the left mid and lower lung, may be due to   atelectasis or infection.                JOHANNE LAMBERT MD., ATTENDING RADIOLOGIST  This document has been electronically signed. 2018  8:57AM                < end of copied text >

## 2018-01-12 NOTE — H&P ADULT - NSHPLABSRESULTS_GEN_ALL_CORE
ecg as per ef md - t inversion 2,3,f, tachy 130's,   cxr left sided infiltrate vs atelectisi ecg as per ef md - t inversion 2,3,f, tachy 130's,   cxr left sided infiltrate vs atelectisi  h/h 34.9/11.4 wbc 15 plt 263  pr/inr 20.3 1.84  ptt 31.6  xk 46 mv lwaa rhN 0.5 XK %  less than 1.1 trop less than 0.015  u/a yellow very loudy 6-10 wbc 6-10 rbc large bloood mod leuk est pro 55 ph 6  influenzia a,b negiative   na 161 k 3.6 cl 126 co2 17 bun/cr 160 ca 10.2 tp 10 alb 2.7 sgot 20 sgpt 29  alk phosphate 73

## 2018-01-12 NOTE — ED PROVIDER NOTE - CARE PLAN
Principal Discharge DX:	Sepsis Principal Discharge DX:	Sepsis  Secondary Diagnosis:	UTI (urinary tract infection) Principal Discharge DX:	Sepsis  Secondary Diagnosis:	UTI (urinary tract infection)  Secondary Diagnosis:	Pneumonia

## 2018-01-12 NOTE — H&P ADULT - ASSESSMENT
sepis - broad spectrum abx, id consult  dehydration - ivf  curt - ivf  anemia - anemia w/u  abnormal ecg - r/o mi, cardiology consult    tried to call family - left message    Virgilio Broderick MD

## 2018-01-12 NOTE — ED ADULT NURSE NOTE - PMH
Anemia    DM (diabetes mellitus)    DVT (deep venous thrombosis)  right upper extreamity  High cholesterol    Sutter chorea    Pneumonia    Pressure ulcer of sacrum    UTI (urinary tract infection)

## 2018-01-12 NOTE — CONSULT NOTE ADULT - ASSESSMENT
Subjective Complaints:      Consult requested by ER doctor:                  Attending:     History of Present Illness:  Chief Complaint/Reason for Admission:  History of Present Illness:  HPI:  61 yo black female with Mc[place]sburg's chorea - with fevers for the past 3 days, last night went up to 103+.  Pt has been more lethargic.  no n/v (2018 14:07)        PAST MEDICAL & SURGICAL HISTORY:  Decubital ulcer: of sacrum - s/p debreedment  Hemiparesis: right side  Failure to thrive in adult  UTI (urinary tract infection)  Dehydration  Rhabdomyolysis: h/o  HTN (hypertension)  Neurogenic bladder disorder  Pressure ulcer of sacrum  UTI (urinary tract infection)  DVT (deep venous thrombosis): right upper extreamity  Anemia  Pneumonia  DM (diabetes mellitus)  High cholesterol  [place]sburg chorea  No significant past surgical history  62yFemale    MEDICATIONS  (STANDING):  ascorbic acid 500 milliGRAM(s) Oral two times a day  dextrose 5% + sodium chloride 0.45%. 1000 milliLiter(s) (100 mL/Hr) IV Continuous <Continuous>  enoxaparin Injectable 30 milliGRAM(s) SubCutaneous daily  ferrous    sulfate 325 milliGRAM(s) Oral daily  lactulose Syrup 10 Gram(s) Oral two times a day  mirtazapine 15 milliGRAM(s) Oral at bedtime  multivitamin/minerals 1 Tablet(s) Oral daily  piperacillin/tazobactam IVPB. 3.375 Gram(s) IV Intermittent every 8 hours  senna 2 Tablet(s) Oral at bedtime  simvastatin 20 milliGRAM(s) Oral at bedtime    MEDICATIONS  (PRN):  acetaminophen  Suppository 650 milliGRAM(s) Rectal every 6 hours PRN For Temp greater than 38 C (100.4 F)  magnesium hydroxide Suspension 30 milliLiter(s) Oral daily PRN Constipation      Allergies    No Known Allergies    Intolerances      FAMILY HISTORY:  No pertinent family history in first degree relatives      REVIEW OF SYSTEMS:  General:  No wt loss, fevers, chills, night sweats  Eyes:  Good vision, no reported pain  ENT:  No sore throat, pain, runny nose, dysphagia  CV:  No pain, palpitatioins, hypo/hypertension  Resp:  No dyspnea, cough, tachypnea, wheezing  GI:  No pain, nausea, vomiting, diarrhea, constipatiion  :  No pain, bleeding, incontinence, nocturia  Muscle:  No pain, weakness  Breast:  No pain, abscess, mass, discharge  Neuro:  No weakness, tingling, memory problems  Psych:  No fatigue, insomnia, mood problems, depression  Endocrine:  No polyuria, polydypsia, cold/heat intolerance  Heme:  No petechiae, ecchymosis, easy bruisability  Skin:  No rash, tattoos, scars, edema      Vital Signs Last 24 Hrs  T(C): 38.3 (2018 12:59), Max: 38.9 (2018 07:07)  T(F): 101 (2018 12:59), Max: 102 (2018 07:07)  HR: 118 (2018 12:59) (118 - 134)  BP: 120/81 (2018 12:59) (120/76 - 121/73)  BP(mean): --  RR: 22 (2018 12:59) (22 - 34)  SpO2: 96% (2018 12:59) (96% - 98%)    GENERAL PHYSICAL EXAM:  General:  Appears stated age, well-groomed, well-nourished, no distress  HEENT:  NC/AT, patent nares w/ pink mucosa, OP clear w/o lesions, PERRL, EOMI, conjunctivae clear, no thyromegaly, nodules, adenopathy, no JVD  Chest:  Full & symmetric excursion, no increased effort, breath sounds clear  Cardiovascular:  Regular rhythm, S1, S2, no murmur/rub/S3/S4, no carotid/femoral/abdominal bruit, radial/pedal pulses 2+, no edema  Abdomen:  Soft, non-tender, non-distended, normoactive bowel sounds, no HSM  Extremities:  Gait & station:   Digits:   Nails:   Joints, Bones, Muscles:   ROM:   Stability:  Skin:  No rash/erythema/ecchymoses/petechiae/wounds/abscess/warm/dry  Musculoskeletal:  Full ROM in all joints w/o swelling/tenderness/effusion    NEUROLOGICAL EXAM:  HENT:  Normocephalic head; atraumatic head.  Neck supple.  ENT: normal looking.  Mental State:  lethargic arousable does not follws commnads neck supple  fever                       9.8    13.2  )-----------( 182      ( 2018 14:18 )             30.6     01-12    161<HH>  |  126<H>  |  62<H>  ----------------------------<  160<H>  3.6   |  27  |  1.60<H>    Ca    10.2<H>      2018 07:37    TPro  10.1<H>  /  Alb  2.7<L>  /  TBili  0.4  /  DBili  x   /  AST  20  /  ALT  29  /  AlkPhos  73      PT/INR - ( 2018 07:37 )   PT: 20.3 sec;   INR: 1.84 ratio         PTT - ( 2018 07:37 )  PTT:31.6 sec    Urinalysis Basic - ( 2018 08:01 )    Color: Yellow / Appearance: very cloudy / S.010 / pH: x  Gluc: x / Ketone: Trace  / Bili: Negative / Urobili: Negative mg/dL   Blood: x / Protein: 500 mg/dL / Nitrite: Negative   Leuk Esterase: Moderate / RBC: 6-10 /HPF / WBC 6-10   Sq Epi: x / Non Sq Epi: Moderate / Bacteria: Moderate        RADIOLOGY & ADDITIONAL STUDIES:      Assessment & Opinion: events noted lethargic arousable does not follws  commnads  quadroparesis spastic hx of huntingtons chorea  dehydration  metabolic encepahalaoopthy     Recommendations:   ct head  tsh b12 blood and urine culture  will follow if no source fever then  spinal tap

## 2018-01-12 NOTE — H&P ADULT - ATTENDING COMMENTS
has sepsis , pyelonephritis, left distal ureteral stones, scheduled for insertion of left ureteral stent and possible removal of stones. Has non obstructing stones in right kidney as well. Procedure, Alternatives, Benefits and Risks discussed, all questions answered. Patient's daughter understands and requests to proceed with the plan and procedure.

## 2018-01-12 NOTE — CONSULT NOTE ADULT - SUBJECTIVE AND OBJECTIVE BOX
CHIEF COMPLAINT:  Patient is a 62y old  Female who presents with a chief complaint of 63 yo black female with fever up to 103+ (2018 14:07)      HPI:  63 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  Pt has been more lethargic.  no n/v (2018 14:07)    ALLERGIES:  No Known Allergies    Home Medications:  ferrous sulfate 160 mg (50 mg elemental iron) oral tablet, extended release: 2 tab(s) orally once a day wtih meals (2018 14:17)  lactulose 10 g/15 mL oral syrup: 15 milliliter(s) orally 2 times a day (2018 14:17)  Milk of Magnesia 8% oral suspension: 30 square centimeter orally once a day, As Needed constipation (2018 14:17)  mirtazapine 15 mg oral tablet: 1 tab(s) orally once a day (at bedtime) (2018 14:17)  Senna 8.6 mg oral tablet: 2 tab(s) orally once a day (at bedtime) (2018 14:17)  simvastatin 20 mg oral tablet: 1 tab(s) orally once a day (at bedtime) (2018 14:17)  Theragener-M oral tablet: 1 tab(s) orally once a day (2018 14:17)  Tylenol 325 mg oral tablet: 2 tab(s) orally every 6 hours, As Needed (2018 14:17)  Vitamin C 500 mg oral tablet: 1 tab(s) orally 2 times a day (2018 14:17)      PAST MEDICAL & SURGICAL HISTORY:  Decubital ulcer: of sacrum - s/p debreedment  Hemiparesis: right side  Failure to thrive in adult  UTI (urinary tract infection)  Dehydration  Rhabdomyolysis: h/o  HTN (hypertension)  Neurogenic bladder disorder  Pressure ulcer of sacrum  UTI (urinary tract infection)  DVT (deep venous thrombosis): right upper extreamity  Anemia  Pneumonia  DM (diabetes mellitus)  High cholesterol  [place] chorea  No significant past surgical history      FAMILY HISTORY:  No pertinent family history in first degree relatives      SOCIAL HISTORY:  nursing home resident  nonsmoker    REVIEW OF SYSTEMS:  General:  No wt loss, fevers, chills, night sweats  Eyes:  Good vision, no reported pain  ENT:  No sore throat, pain, runny nose, dysphagia  CV:  No pain, palpitations, hypo/hypertension  Resp:  No dyspnea, cough, tachypnea, wheezing  GI:  No pain, nausea, vomiting, diarrhea, constipation  :  No pain, bleeding, incontinence, nocturia  Muscle:  No pain, weakness  Breast:  No pain, abscess, mass, discharge  Neuro:  No weakness, tingling, memory problems  Psych:  No fatigue, insomnia, mood problems, depression  Endocrine:  No polyuria, polydipsia, cold/heat intolerance  Heme:  No petechiae, ecchymosis, easy bruisability  Skin:  No rash, edema    PHYSICAL EXAM:  Vital Signs:  Vital Signs Last 24 Hrs  T(C): 38.3 (2018 12:59), Max: 38.9 (2018 07:07)  T(F): 101 (2018 12:59), Max: 102 (2018 07:07)  HR: 118 (2018 12:59) (118 - 134)  BP: 120/81 (2018 12:59) (120/76 - 121/73)  RR: 22 (2018 12:59) (22 - 34)  SpO2: 96% (2018 12:59) (96% - 98%)    Tele:   General:  Appears stated age, well-groomed, well-nourished, no distress  HEENT:  NC/AT, patent nares w/ pink mucosa, OP clear w/o lesions, conjunctivae clear, no thyromegaly, nodules, adenopathy, no JVD  Chest:  Full & symmetric excursion, no increased effort, breath sounds clear  Cardiovascular:  Regular rhythm, S1, S2, no murmur/rub/S3/S4, no carotid/femoral/abdominal bruit, radial/pedal pulses 2+  Abdomen:  Soft, non-tender, non-distended, normoactive bowel sounds  Extremities: no edema  Skin:  No rash/erythema. Skin is warm/dry  Musculoskeletal:  N/A  Neuro/Psych:  N/A       LABORATORY:                          9.8    13.2  )-----------( 182      ( 2018 14:18 )             30.6     01-12    161<HH>  |  126<H>  |  62<H>  ----------------------------<  160<H>  3.6   |  27  |  1.60<H>    Ca    10.2<H>      2018 07:37    TPro  10.1<H>  /  Alb  2.7<L>  /  TBili  0.4  /  DBili  x   /  AST  20  /  ALT  29  /  AlkPhos  73  01-12      CARDIAC MARKERS ( 2018 07:37 )  <.015 ng/mL / x     / 46 U/L / x     / <0.5 ng/mL        LIVER FUNCTIONS - ( 2018 07:37 )  Alb: 2.7 g/dL / Pro: 10.1 gm/dL / ALK PHOS: 73 U/L / ALT: 29 U/L / AST: 20 U/L / GGT: x           PT/INR - ( 2018 07:37 )   PT: 20.3 sec;   INR: 1.84 ratio         PTT - ( 2018 07:37 )  PTT:31.6 sec  Urinalysis Basic - ( 2018 08:01 )    Color: Yellow / Appearance: very cloudy / S.010 / pH: x  Gluc: x / Ketone: Trace  / Bili: Negative / Urobili: Negative mg/dL   Blood: x / Protein: 500 mg/dL / Nitrite: Negative   Leuk Esterase: Moderate / RBC: 6-10 /HPF / WBC 6-10   Sq Epi: x / Non Sq Epi: Moderate / Bacteria: Moderate    IMAGING:  ecg:    < from: Xray Chest 1 View AP/PA. (18 @ 08:40) >  Streaky opacities in the left mid and lower lung, may be due to   atelectasis or infection.    < end of copied text >      ASSESSMENT:  63 yo black female with [place]'s chorea - with fevers for the past 3 days, last night went up to 103+.  Pt has been more lethargic.  no n/v     PLAN:     Tele.    Con't on:  MEDICATIONS  (STANDING):  ascorbic acid 500 milliGRAM(s) Oral two times a day  dextrose 5% + sodium chloride 0.45%. 1000 milliLiter(s) (100 mL/Hr) IV Continuous <Continuous>  enoxaparin Injectable 30 milliGRAM(s) SubCutaneous daily  ferrous    sulfate 325 milliGRAM(s) Oral daily  lactulose Syrup 10 Gram(s) Oral two times a day  mirtazapine 15 milliGRAM(s) Oral at bedtime  multivitamin/minerals 1 Tablet(s) Oral daily  piperacillin/tazobactam IVPB. 3.375 Gram(s) IV Intermittent every 8 hours  senna 2 Tablet(s) Oral at bedtime  simvastatin 20 milliGRAM(s) Oral at bedtime    Check echo.    Fabrizio Beckett MD, FACC, FASE, FASNC, FACP  Director, Heart Failure Services  [facility]  , Department of Cardiology  [facility] CHIEF COMPLAINT:  Patient is a 62y old  Female who presents with a chief complaint of 61 yo black female with fever up to 103+ (2018 14:07)      HPI:  61 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  Pt has been more lethargic.  no n/v (2018 14:07)    ALLERGIES:  No Known Allergies    Home Medications:  ferrous sulfate 160 mg (50 mg elemental iron) oral tablet, extended release: 2 tab(s) orally once a day wtih meals (2018 14:17)  lactulose 10 g/15 mL oral syrup: 15 milliliter(s) orally 2 times a day (2018 14:17)  Milk of Magnesia 8% oral suspension: 30 square centimeter orally once a day, As Needed constipation (2018 14:17)  mirtazapine 15 mg oral tablet: 1 tab(s) orally once a day (at bedtime) (2018 14:17)  Senna 8.6 mg oral tablet: 2 tab(s) orally once a day (at bedtime) (2018 14:17)  simvastatin 20 mg oral tablet: 1 tab(s) orally once a day (at bedtime) (2018 14:17)  Theragener-M oral tablet: 1 tab(s) orally once a day (2018 14:17)  Tylenol 325 mg oral tablet: 2 tab(s) orally every 6 hours, As Needed (2018 14:17)  Vitamin C 500 mg oral tablet: 1 tab(s) orally 2 times a day (2018 14:17)      PAST MEDICAL & SURGICAL HISTORY:  Decubital ulcer: of sacrum - s/p debreedment  Hemiparesis: right side  Failure to thrive in adult  UTI (urinary tract infection)  Dehydration  Rhabdomyolysis: h/o  HTN (hypertension)  Neurogenic bladder disorder  Pressure ulcer of sacrum  UTI (urinary tract infection)  DVT (deep venous thrombosis): right upper extreamity  Anemia  Pneumonia  DM (diabetes mellitus)  High cholesterol  [place] chorea  No significant past surgical history      FAMILY HISTORY:  No pertinent family history in first degree relatives      SOCIAL HISTORY:  nursing home resident  nonsmoker    REVIEW OF SYSTEMS:  Unable      PHYSICAL EXAM:  Vital Signs:  Vital Signs Last 24 Hrs  T(C): 38.3 (2018 12:59), Max: 38.9 (2018 07:07)  T(F): 101 (2018 12:59), Max: 102 (2018 07:07)  HR: 118 (2018 12:59) (118 - 134)  BP: 120/81 (2018 12:59) (120/76 - 121/73)  RR: 22 (2018 12:59) (22 - 34)  SpO2: 96% (2018 12:59) (96% - 98%)    Tele: ST  General:  Appears stated age, well-groomed, well-nourished, no distress  HEENT:  NC/AT, patent nares w/ pink mucosa, OP clear w/o lesions, conjunctivae clear, no thyromegaly, nodules, adenopathy, no JVD  Chest:  Full & symmetric excursion, no increased effort, breath sounds clear  Cardiovascular:  Regular rhythm, S1, S2, no murmur/rub/S3/S4,   Abdomen:  Soft, non-tender, non-distended, normoactive bowel sounds  Extremities: no edema  Skin:  No rash/erythema. Skin is warm/dry  Musculoskeletal:  N/A  Neuro/Psych:  N/A       LABORATORY:                          9.8    13.2  )-----------( 182      ( 2018 14:18 )             30.6     01-12    161<HH>  |  126<H>  |  62<H>  ----------------------------<  160<H>  3.6   |  27  |  1.60<H>    Ca    10.2<H>      2018 07:37    TPro  10.1<H>  /  Alb  2.7<L>  /  TBili  0.4  /  DBili  x   /  AST  20  /  ALT  29  /  AlkPhos  73  01-12      CARDIAC MARKERS ( 2018 07:37 )  <.015 ng/mL / x     / 46 U/L / x     / <0.5 ng/mL        LIVER FUNCTIONS - ( 2018 07:37 )  Alb: 2.7 g/dL / Pro: 10.1 gm/dL / ALK PHOS: 73 U/L / ALT: 29 U/L / AST: 20 U/L / GGT: x           PT/INR - ( 2018 07:37 )   PT: 20.3 sec;   INR: 1.84 ratio         PTT - ( 2018 07:37 )  PTT:31.6 sec  Urinalysis Basic - ( 2018 08:01 )    Color: Yellow / Appearance: very cloudy / S.010 / pH: x  Gluc: x / Ketone: Trace  / Bili: Negative / Urobili: Negative mg/dL   Blood: x / Protein: 500 mg/dL / Nitrite: Negative   Leuk Esterase: Moderate / RBC: 6-10 /HPF / WBC 6-10   Sq Epi: x / Non Sq Epi: Moderate / Bacteria: Moderate    IMAGING:  ecg: ST, inferolateral T wave abnl. Poor baseline.    < from: Xray Chest 1 View AP/PA. (18 @ 08:40) >  Streaky opacities in the left mid and lower lung, may be due to   atelectasis or infection.    < end of copied text >      ASSESSMENT:  61 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  Pt has been more lethargic.  no n/v     PLAN:     Tele.    Con't on:  MEDICATIONS  (STANDING):  ascorbic acid 500 milliGRAM(s) Oral two times a day  dextrose 5% + sodium chloride 0.45%. 1000 milliLiter(s) (100 mL/Hr) IV Continuous <Continuous>  enoxaparin Injectable 30 milliGRAM(s) SubCutaneous daily  ferrous    sulfate 325 milliGRAM(s) Oral daily  lactulose Syrup 10 Gram(s) Oral two times a day  mirtazapine 15 milliGRAM(s) Oral at bedtime  multivitamin/minerals 1 Tablet(s) Oral daily  piperacillin/tazobactam IVPB. 3.375 Gram(s) IV Intermittent every 8 hours  senna 2 Tablet(s) Oral at bedtime  simvastatin 20 milliGRAM(s) Oral at bedtime    Check echo.  supportive care management.    Fabrizio Beckett MD, FACC, FASE, FASNC, FACP  Director, Heart Failure Services  [facility]  , Department of Cardiology  [facility] CHIEF COMPLAINT:  Patient is a 62y old  Female who presents with a chief complaint of 61 yo black female with fever up to 103+ (2018 14:07)      HPI:  61 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  Pt has been more lethargic.  no n/v (2018 14:07)    ALLERGIES:  No Known Allergies    Home Medications:  ferrous sulfate 160 mg (50 mg elemental iron) oral tablet, extended release: 2 tab(s) orally once a day wtih meals (2018 14:17)  lactulose 10 g/15 mL oral syrup: 15 milliliter(s) orally 2 times a day (2018 14:17)  Milk of Magnesia 8% oral suspension: 30 square centimeter orally once a day, As Needed constipation (2018 14:17)  mirtazapine 15 mg oral tablet: 1 tab(s) orally once a day (at bedtime) (2018 14:17)  Senna 8.6 mg oral tablet: 2 tab(s) orally once a day (at bedtime) (2018 14:17)  simvastatin 20 mg oral tablet: 1 tab(s) orally once a day (at bedtime) (2018 14:17)  Theragener-M oral tablet: 1 tab(s) orally once a day (2018 14:17)  Tylenol 325 mg oral tablet: 2 tab(s) orally every 6 hours, As Needed (2018 14:17)  Vitamin C 500 mg oral tablet: 1 tab(s) orally 2 times a day (2018 14:17)      PAST MEDICAL & SURGICAL HISTORY:  Decubital ulcer: of sacrum - s/p debreedment  Hemiparesis: right side  Failure to thrive in adult  UTI (urinary tract infection)  Dehydration  Rhabdomyolysis: h/o  HTN (hypertension)  Neurogenic bladder disorder  Pressure ulcer of sacrum  UTI (urinary tract infection)  DVT (deep venous thrombosis): right upper extreamity  Anemia  Pneumonia  DM (diabetes mellitus)  High cholesterol  [place] chorea  No significant past surgical history      FAMILY HISTORY:  No pertinent family history in first degree relatives      SOCIAL HISTORY:  nursing home resident  nonsmoker    REVIEW OF SYSTEMS:  Unable      PHYSICAL EXAM:  Vital Signs:  Vital Signs Last 24 Hrs  T(C): 38.3 (2018 12:59), Max: 38.9 (2018 07:07)  T(F): 101 (2018 12:59), Max: 102 (2018 07:07)  HR: 118 (2018 12:59) (118 - 134)  BP: 120/81 (2018 12:59) (120/76 - 121/73)  RR: 22 (2018 12:59) (22 - 34)  SpO2: 96% (2018 12:59) (96% - 98%)    Tele: ST  General:  Appears stated age, well-groomed, well-nourished, no distress  HEENT:  NC/AT, patent nares w/ pink mucosa, OP clear w/o lesions, conjunctivae clear, no thyromegaly, nodules, adenopathy, no JVD  Chest:  Full & symmetric excursion, no increased effort, breath sounds clear  Cardiovascular:  Regular rhythm, S1, S2, no murmur/rub/S3/S4,   Abdomen:  Soft, non-tender, non-distended, normoactive bowel sounds  Extremities: no edema  Skin:  No rash/erythema. Skin is warm/dry  Musculoskeletal:  N/A  Neuro/Psych:  N/A       LABORATORY:                          9.8    13.2  )-----------( 182      ( 2018 14:18 )             30.6     01-12    161<HH>  |  126<H>  |  62<H>  ----------------------------<  160<H>  3.6   |  27  |  1.60<H>    Ca    10.2<H>      2018 07:37    TPro  10.1<H>  /  Alb  2.7<L>  /  TBili  0.4  /  DBili  x   /  AST  20  /  ALT  29  /  AlkPhos  73  01-12      CARDIAC MARKERS ( 2018 07:37 )  <.015 ng/mL / x     / 46 U/L / x     / <0.5 ng/mL        LIVER FUNCTIONS - ( 2018 07:37 )  Alb: 2.7 g/dL / Pro: 10.1 gm/dL / ALK PHOS: 73 U/L / ALT: 29 U/L / AST: 20 U/L / GGT: x           PT/INR - ( 2018 07:37 )   PT: 20.3 sec;   INR: 1.84 ratio         PTT - ( 2018 07:37 )  PTT:31.6 sec  Urinalysis Basic - ( 2018 08:01 )    Color: Yellow / Appearance: very cloudy / S.010 / pH: x  Gluc: x / Ketone: Trace  / Bili: Negative / Urobili: Negative mg/dL   Blood: x / Protein: 500 mg/dL / Nitrite: Negative   Leuk Esterase: Moderate / RBC: 6-10 /HPF / WBC 6-10   Sq Epi: x / Non Sq Epi: Moderate / Bacteria: Moderate    IMAGING:  ecg: ST, inferolateral T wave abnl. Poor baseline.    < from: Xray Chest 1 View AP/PA. (18 @ 08:40) >  Streaky opacities in the left mid and lower lung, may be due to   atelectasis or infection.    < end of copied text >    < from: TTE Echo Doppler w/o Cont (17 @ 11:28) >   1. Left ventricular ejection fraction, by visual estimation, is 60 to   65%.   2. Technically fair study.   3. Normal global left ventricular systolic function.   4. Normal left ventricular internal cavity size.   5. Spectral Doppler shows impaired relaxation pattern of left   ventricular myocardial filling (Grade I diastolic dysfunction).   6. Normal right ventricular size and function.   7. There is no evidence of pericardial effusion.   8. Mild mitral annular calcification.   9. Thickening of the anterior mitral valve leaflet.  10. Trace mitral valve regurgitation.  11. Sclerotic aortic valve with normal opening.  12. There is mild aortic root calcification.    < end of copied text >    ASSESSMENT:  61 yo black female with [place]'s chorea - with fevers for the past 3 days, last night went up to 103+.  Pt has been more lethargic.  no n/v   Preserved LVEF.  No clear ACS now.    PLAN:     Tele.    Con't on:  MEDICATIONS  (STANDING):  ascorbic acid 500 milliGRAM(s) Oral two times a day  dextrose 5% + sodium chloride 0.45%. 1000 milliLiter(s) (100 mL/Hr) IV Continuous <Continuous>  enoxaparin Injectable 30 milliGRAM(s) SubCutaneous daily  ferrous    sulfate 325 milliGRAM(s) Oral daily  lactulose Syrup 10 Gram(s) Oral two times a day  mirtazapine 15 milliGRAM(s) Oral at bedtime  multivitamin/minerals 1 Tablet(s) Oral daily  piperacillin/tazobactam IVPB. 3.375 Gram(s) IV Intermittent every 8 hours  senna 2 Tablet(s) Oral at bedtime  simvastatin 20 milliGRAM(s) Oral at bedtime    supportive care management.    Fabrizio Beckett MD, FACC, FASE, FASNC, FACP  Director, Heart Failure Services  [facility]  , Department of Cardiology  [facility]

## 2018-01-12 NOTE — ED PROVIDER NOTE - OBJECTIVE STATEMENT
63yo female from [facility], with pmh D, anemia, DVT, h/o neuromuscular bladder dysfxn, obrien, HL, [facility] presents with fever x 3 days. PT nonverbal at baseline.     pmd: morgan ausebel

## 2018-01-12 NOTE — H&P ADULT - HISTORY OF PRESENT ILLNESS
61 yo black female with [place]'s chorea - with fevers for the past 3 days, last night went up to 103+.  Pt has been more lethargic.  no n/v

## 2018-01-13 LAB
ALBUMIN SERPL ELPH-MCNC: 2.1 G/DL — LOW (ref 3.3–5)
ALBUMIN SERPL ELPH-MCNC: 2.1 G/DL — LOW (ref 3.3–5)
ALP SERPL-CCNC: 58 U/L — SIGNIFICANT CHANGE UP (ref 40–120)
ALP SERPL-CCNC: 60 U/L — SIGNIFICANT CHANGE UP (ref 40–120)
ALT FLD-CCNC: 23 U/L — SIGNIFICANT CHANGE UP (ref 12–78)
ALT FLD-CCNC: 25 U/L — SIGNIFICANT CHANGE UP (ref 12–78)
ANION GAP SERPL CALC-SCNC: 7 MMOL/L — SIGNIFICANT CHANGE UP (ref 5–17)
ANION GAP SERPL CALC-SCNC: 7 MMOL/L — SIGNIFICANT CHANGE UP (ref 5–17)
AST SERPL-CCNC: 17 U/L — SIGNIFICANT CHANGE UP (ref 15–37)
AST SERPL-CCNC: 24 U/L — SIGNIFICANT CHANGE UP (ref 15–37)
BASOPHILS # BLD AUTO: 0 K/UL — SIGNIFICANT CHANGE UP (ref 0–0.2)
BASOPHILS NFR BLD AUTO: 0.4 % — SIGNIFICANT CHANGE UP (ref 0–2)
BILIRUB SERPL-MCNC: 0.3 MG/DL — SIGNIFICANT CHANGE UP (ref 0.2–1.2)
BILIRUB SERPL-MCNC: 0.4 MG/DL — SIGNIFICANT CHANGE UP (ref 0.2–1.2)
BUN SERPL-MCNC: 37 MG/DL — HIGH (ref 7–23)
BUN SERPL-MCNC: 38 MG/DL — HIGH (ref 7–23)
CALCIUM SERPL-MCNC: 8.1 MG/DL — LOW (ref 8.5–10.1)
CALCIUM SERPL-MCNC: 8.8 MG/DL — SIGNIFICANT CHANGE UP (ref 8.5–10.1)
CHLORIDE SERPL-SCNC: 135 MMOL/L — HIGH (ref 96–108)
CHLORIDE SERPL-SCNC: 136 MMOL/L — HIGH (ref 96–108)
CK MB BLD-MCNC: <0.2 % — SIGNIFICANT CHANGE UP (ref 0–3.5)
CK MB BLD-MCNC: <1.4 % — SIGNIFICANT CHANGE UP (ref 0–3.5)
CK MB CFR SERPL CALC: <0.5 NG/ML — SIGNIFICANT CHANGE UP (ref 0.5–3.6)
CK MB CFR SERPL CALC: <0.5 NG/ML — SIGNIFICANT CHANGE UP (ref 0.5–3.6)
CK SERPL-CCNC: 234 U/L — HIGH (ref 26–192)
CK SERPL-CCNC: 35 U/L — SIGNIFICANT CHANGE UP (ref 26–192)
CO2 SERPL-SCNC: 23 MMOL/L — SIGNIFICANT CHANGE UP (ref 22–31)
CO2 SERPL-SCNC: 23 MMOL/L — SIGNIFICANT CHANGE UP (ref 22–31)
CREAT SERPL-MCNC: 1.05 MG/DL — SIGNIFICANT CHANGE UP (ref 0.5–1.3)
CREAT SERPL-MCNC: 1.11 MG/DL — SIGNIFICANT CHANGE UP (ref 0.5–1.3)
CULTURE RESULTS: SIGNIFICANT CHANGE UP
EOSINOPHIL # BLD AUTO: 0 K/UL — SIGNIFICANT CHANGE UP (ref 0–0.5)
EOSINOPHIL NFR BLD AUTO: 0.1 % — SIGNIFICANT CHANGE UP (ref 0–6)
FERRITIN SERPL-MCNC: 962 NG/ML — HIGH (ref 15–150)
FOLATE SERPL-MCNC: >20 NG/ML — SIGNIFICANT CHANGE UP (ref 4.8–24.2)
GLUCOSE SERPL-MCNC: 146 MG/DL — HIGH (ref 70–99)
GLUCOSE SERPL-MCNC: 168 MG/DL — HIGH (ref 70–99)
GRAM STN FLD: SIGNIFICANT CHANGE UP
HCT VFR BLD CALC: 28.1 % — LOW (ref 34.5–45)
HCT VFR BLD CALC: 29.1 % — LOW (ref 34.5–45)
HCV AB S/CO SERPL IA: 0.1 S/CO — SIGNIFICANT CHANGE UP
HCV AB SERPL-IMP: SIGNIFICANT CHANGE UP
HGB BLD-MCNC: 8.7 G/DL — LOW (ref 11.5–15.5)
HGB BLD-MCNC: 9.5 G/DL — LOW (ref 11.5–15.5)
IRON SATN MFR SERPL: 15 UG/DL — LOW (ref 30–160)
IRON SATN MFR SERPL: 9 % — LOW (ref 14–50)
LACTATE SERPL-SCNC: 0.7 MMOL/L — SIGNIFICANT CHANGE UP (ref 0.7–2)
LYMPHOCYTES # BLD AUTO: 1.5 K/UL — SIGNIFICANT CHANGE UP (ref 1–3.3)
LYMPHOCYTES # BLD AUTO: 13.2 % — SIGNIFICANT CHANGE UP (ref 13–44)
MCHC RBC-ENTMCNC: 24.9 PG — LOW (ref 27–34)
MCHC RBC-ENTMCNC: 25.7 PG — LOW (ref 27–34)
MCHC RBC-ENTMCNC: 31.2 GM/DL — LOW (ref 32–36)
MCHC RBC-ENTMCNC: 32.4 GM/DL — SIGNIFICANT CHANGE UP (ref 32–36)
MCV RBC AUTO: 79.2 FL — LOW (ref 80–100)
MCV RBC AUTO: 79.8 FL — LOW (ref 80–100)
MONOCYTES # BLD AUTO: 0.9 K/UL — SIGNIFICANT CHANGE UP (ref 0–0.9)
MONOCYTES NFR BLD AUTO: 8.3 % — SIGNIFICANT CHANGE UP (ref 2–14)
NEUTROPHILS # BLD AUTO: 8.7 K/UL — HIGH (ref 1.8–7.4)
NEUTROPHILS NFR BLD AUTO: 78 % — HIGH (ref 43–77)
PLATELET # BLD AUTO: 165 K/UL — SIGNIFICANT CHANGE UP (ref 150–400)
PLATELET # BLD AUTO: 175 K/UL — SIGNIFICANT CHANGE UP (ref 150–400)
POTASSIUM SERPL-MCNC: 3.2 MMOL/L — LOW (ref 3.5–5.3)
POTASSIUM SERPL-MCNC: 3.3 MMOL/L — LOW (ref 3.5–5.3)
POTASSIUM SERPL-SCNC: 3.2 MMOL/L — LOW (ref 3.5–5.3)
POTASSIUM SERPL-SCNC: 3.3 MMOL/L — LOW (ref 3.5–5.3)
PROT SERPL-MCNC: 7.8 GM/DL — SIGNIFICANT CHANGE UP (ref 6–8.3)
PROT SERPL-MCNC: 7.8 GM/DL — SIGNIFICANT CHANGE UP (ref 6–8.3)
RBC # BLD: 3.52 M/UL — LOW (ref 3.8–5.2)
RBC # BLD: 3.68 M/UL — LOW (ref 3.8–5.2)
RBC # FLD: 17.1 % — HIGH (ref 11–15)
RBC # FLD: 17.5 % — HIGH (ref 11–15)
SODIUM SERPL-SCNC: 165 MMOL/L — CRITICAL HIGH (ref 135–145)
SODIUM SERPL-SCNC: 166 MMOL/L — CRITICAL HIGH (ref 135–145)
SPECIMEN SOURCE: SIGNIFICANT CHANGE UP
SPECIMEN SOURCE: SIGNIFICANT CHANGE UP
TIBC SERPL-MCNC: 166 UG/DL — LOW (ref 220–430)
TROPONIN I SERPL-MCNC: 0.03 NG/ML — SIGNIFICANT CHANGE UP (ref 0.01–0.04)
TROPONIN I SERPL-MCNC: 0.04 NG/ML — SIGNIFICANT CHANGE UP (ref 0.01–0.04)
TSH SERPL-MCNC: 0.04 UIU/ML — LOW (ref 0.36–3.74)
UIBC SERPL-MCNC: 151 UG/DL — SIGNIFICANT CHANGE UP (ref 110–370)
VIT B12 SERPL-MCNC: 1645 PG/ML — HIGH (ref 232–1245)
WBC # BLD: 11.2 K/UL — HIGH (ref 3.8–10.5)
WBC # BLD: 12.5 K/UL — HIGH (ref 3.8–10.5)
WBC # FLD AUTO: 11.2 K/UL — HIGH (ref 3.8–10.5)
WBC # FLD AUTO: 12.5 K/UL — HIGH (ref 3.8–10.5)

## 2018-01-13 PROCEDURE — 70450 CT HEAD/BRAIN W/O DYE: CPT | Mod: 26

## 2018-01-13 PROCEDURE — 74018 RADEX ABDOMEN 1 VIEW: CPT | Mod: 26

## 2018-01-13 PROCEDURE — 93010 ELECTROCARDIOGRAM REPORT: CPT

## 2018-01-13 RX ORDER — ACETAMINOPHEN 500 MG
650 TABLET ORAL ONCE
Qty: 0 | Refills: 0 | Status: COMPLETED | OUTPATIENT
Start: 2018-01-13 | End: 2018-01-13

## 2018-01-13 RX ORDER — DEXTROSE MONOHYDRATE, SODIUM CHLORIDE, AND POTASSIUM CHLORIDE 50; .745; 4.5 G/1000ML; G/1000ML; G/1000ML
1000 INJECTION, SOLUTION INTRAVENOUS
Qty: 0 | Refills: 0 | Status: DISCONTINUED | OUTPATIENT
Start: 2018-01-13 | End: 2018-01-14

## 2018-01-13 RX ADMIN — Medication 650 MILLIGRAM(S): at 03:08

## 2018-01-13 RX ADMIN — SENNA PLUS 2 TABLET(S): 8.6 TABLET ORAL at 22:19

## 2018-01-13 RX ADMIN — MIRTAZAPINE 15 MILLIGRAM(S): 45 TABLET, ORALLY DISINTEGRATING ORAL at 22:19

## 2018-01-13 RX ADMIN — Medication 1 TABLET(S): at 12:16

## 2018-01-13 RX ADMIN — Medication 650 MILLIGRAM(S): at 22:19

## 2018-01-13 RX ADMIN — Medication 500 MILLIGRAM(S): at 17:07

## 2018-01-13 RX ADMIN — Medication 325 MILLIGRAM(S): at 12:16

## 2018-01-13 RX ADMIN — DEXTROSE MONOHYDRATE, SODIUM CHLORIDE, AND POTASSIUM CHLORIDE 125 MILLILITER(S): 50; .745; 4.5 INJECTION, SOLUTION INTRAVENOUS at 05:50

## 2018-01-13 RX ADMIN — SODIUM CHLORIDE 100 MILLILITER(S): 9 INJECTION, SOLUTION INTRAVENOUS at 01:43

## 2018-01-13 RX ADMIN — SIMVASTATIN 20 MILLIGRAM(S): 20 TABLET, FILM COATED ORAL at 22:19

## 2018-01-13 RX ADMIN — PIPERACILLIN AND TAZOBACTAM 12.5 GRAM(S): 4; .5 INJECTION, POWDER, LYOPHILIZED, FOR SOLUTION INTRAVENOUS at 13:21

## 2018-01-13 RX ADMIN — DEXTROSE MONOHYDRATE, SODIUM CHLORIDE, AND POTASSIUM CHLORIDE 125 MILLILITER(S): 50; .745; 4.5 INJECTION, SOLUTION INTRAVENOUS at 22:19

## 2018-01-13 RX ADMIN — PIPERACILLIN AND TAZOBACTAM 12.5 GRAM(S): 4; .5 INJECTION, POWDER, LYOPHILIZED, FOR SOLUTION INTRAVENOUS at 22:19

## 2018-01-13 RX ADMIN — LACTULOSE 10 GRAM(S): 10 SOLUTION ORAL at 17:07

## 2018-01-13 RX ADMIN — ENOXAPARIN SODIUM 30 MILLIGRAM(S): 100 INJECTION SUBCUTANEOUS at 11:36

## 2018-01-13 RX ADMIN — Medication 30 MILLIGRAM(S): at 01:01

## 2018-01-13 RX ADMIN — Medication 650 MILLIGRAM(S): at 00:10

## 2018-01-13 RX ADMIN — Medication 500 MILLIGRAM(S): at 05:50

## 2018-01-13 RX ADMIN — LACTULOSE 10 GRAM(S): 10 SOLUTION ORAL at 05:50

## 2018-01-13 RX ADMIN — PIPERACILLIN AND TAZOBACTAM 12.5 GRAM(S): 4; .5 INJECTION, POWDER, LYOPHILIZED, FOR SOLUTION INTRAVENOUS at 05:50

## 2018-01-13 NOTE — PROGRESS NOTE ADULT - ASSESSMENT
Subjective Complaints:  Historian:         REVIEW OF SYSTEMS:  Eyes:  Good vision, no reported pain  ENT:  No sore throat, pain, runny nose, dysphagia  CV:  No pain, palpitatioins, hypo/hypertension  Resp:  No dyspnea, cough, tachypnea, wheezing  GI:  No pain, nausea, vomiting, diarrhea, constipatiion  Muscle:  No pain, weakness  Neuro:  No weakness, tingling, memory problems  Psych:  No fatigue, insomnia, mood problems, depression  Endocrine:  No polyuria, polydypsia, cold/heat intolerance    Vital Signs Last 24 Hrs  T(C): 37.7 (2018 17:12), Max: 39.9 (2018 20:53)  T(F): 99.9 (2018 17:12), Max: 103.8 (2018 20:53)  HR: 106 (2018 17:12) (96 - 125)  BP: 140/88 (2018 17:12) (132/80 - 149/85)  BP(mean): --  RR: 16 (2018 17:12) (16 - 33)  SpO2: 99% (2018 17:12) (95% - 99%)    GENERAL PHYSICAL EXAM:  General:  Appears stated age, well-groomed, well-nourished, no distress  HEENT:  NC/AT, patent nares w/ pink mucosa, OP clear w/o lesions, PERRL, EOMI, conjunctivae clear, no thyromegaly, nodules, adenopathy, no JVD  Chest:  Full & symmetric excursion, no increased effort, breath sounds clear  Cardiovascular:  Regular rhythm, S1, S2, no murmur/rub/S3/S4, no carotid/femoral/abdominal bruit, radial/pedal pulses 2+, no edema  Abdomen:  Soft, non-tender, non-distended, normoactive bowel sounds, no HSM  Extremities:  Gait & station:   Digits:   Nails:   Joints, Bones, Muscles:   ROM:   Stability:  Skin:  No rash/erythema/ecchymoses/petechiae/wounds/abscess/warm/dry  Musculoskeletal:  Full ROM in all joints w/o swelling/tenderness/effusion    NEUROLOGICAL EXAM:  HENT:  Normocephalic head; atraumatic head.  Neck supple.  ENT: normal looking.  Mental State:    lethargic arousable open eyes  does not follws commnads            8.7    12.5  )-----------( 175      ( 2018 05:39 )             28.1         165<HH>  |  135<H>  |  37<H>  ----------------------------<  168<H>  3.3<L>   |  23  |  1.05    Ca    8.8      2018 05:39    TPro  7.8  /  Alb  2.1<L>  /  TBili  0.3  /  DBili  x   /  AST  24  /  ALT  23  /  AlkPhos  58      PT/INR - ( 2018 07:37 )   PT: 20.3 sec;   INR: 1.84 ratio         PTT - ( 2018 07:37 )  PTT:31.6 sec  Urinalysis Basic - ( 2018 08:01 )    Color: Yellow / Appearance: very cloudy / S.010 / pH: x  Gluc: x / Ketone: Trace  / Bili: Negative / Urobili: Negative mg/dL   Blood: x / Protein: 500 mg/dL / Nitrite: Negative   Leuk Esterase: Moderate / RBC: 6-10 /HPF / WBC 6-10   Sq Epi: x / Non Sq Epi: Moderate / Bacteria: Moderate   ass-=    lethargic arousable open eyes does not follows   commands quadroparesis spastic  metabolic encepahalaothy hx od depression  ct ehad report pending  on iv antibioctics       RADIOLOGY & ADDITIONAL STUDIES:        Recommendations: ct head tsh b12  will follow

## 2018-01-13 NOTE — PROGRESS NOTE ADULT - SUBJECTIVE AND OBJECTIVE BOX
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  Pt has been more lethargic.  no n/v (2018 14:07)      Allergies    No Known Allergies    Intolerances        MEDICATIONS  (STANDING):  ascorbic acid 500 milliGRAM(s) Oral two times a day  dextrose 5% + sodium chloride 0.45% with potassium chloride 20 mEq/L 1000 milliLiter(s) (125 mL/Hr) IV Continuous <Continuous>  enoxaparin Injectable 30 milliGRAM(s) SubCutaneous daily  ferrous    sulfate 325 milliGRAM(s) Oral daily  lactulose Syrup 10 Gram(s) Oral two times a day  mirtazapine 15 milliGRAM(s) Oral at bedtime  multivitamin/minerals 1 Tablet(s) Oral daily  piperacillin/tazobactam IVPB. 3.375 Gram(s) IV Intermittent every 8 hours  senna 2 Tablet(s) Oral at bedtime  simvastatin 20 milliGRAM(s) Oral at bedtime    MEDICATIONS  (PRN):  acetaminophen  Suppository 650 milliGRAM(s) Rectal every 6 hours PRN For Temp greater than 38 C (100.4 F)  magnesium hydroxide Suspension 30 milliLiter(s) Oral daily PRN Constipation      REVIEW OF SYSTEMS:    CONSTITUTIONAL: No fever, chills, weight loss, or fatigue  HEENT: No sore throat, runny nose, ear ache  RESPIRATORY: No cough, wheezing, No shortness of breath  CARDIOVASCULAR: No chest pain, palpitations, dizziness  GASTROINTESTINAL: No abdominal pain. No nausea, vomiting, diarrhea  GENITOURINARY: No dysuria, increase frequency, hematuria, or incontinence  NEUROLOGICAL: No headaches, memory loss, loss of strength, numbness, or tremors, no weakness  EXTREMITY: No pedal edema BLE  SKIN: No itching, burning, rashes, or lesions     VITAL SIGNS:  T(C): 37.7 (18 @ 17:12), Max: 39.9 (18 @ 20:53)  T(F): 99.9 (18 @ 17:12), Max: 103.8 (18 @ 20:53)  HR: 106 (18 @ 17:12) (96 - 125)  BP: 140/88 (18 @ 17:12) (132/80 - 149/85)  RR: 16 (18 @ 17:12) (16 - 33)  SpO2: 99% (18 @ 17:12) (95% - 99%)  Wt(kg): --    PHYSICAL EXAM:    GENERAL: not in any distress  HEENT: Neck is supple, normocephalic, atraumatic   CHEST/LUNG: Clear to auscultation bilaterally; No rales, rhonchi, wheezing  HEART: Regular rate and rhythm; No murmurs, rubs, or gallops  ABDOMEN: Soft, Nontender, Nondistended; Bowel sounds present, no rebound   EXTREMITIES:  2+ Peripheral Pulses, No clubbing, cyanosis, or edema  GENITOURINARY:   SKIN: No rashes or lesions  BACK: no pressor sore   NERVOUS SYSTEM:  Alert & Oriented X3, Good concentration  PSYCH: normal affect     LABS:                         8.7    12.5  )-----------( 175      ( 2018 05:39 )             28.1         165<HH>  |  135<H>  |  37<H>  ----------------------------<  168<H>  3.3<L>   |  23  |  1.05    Ca    8.8      2018 05:39    TPro  7.8  /  Alb  2.1<L>  /  TBili  0.3  /  DBili  x   /  AST  24  /  ALT  23  /  AlkPhos  58      LIVER FUNCTIONS - ( 2018 05:39 )  Alb: 2.1 g/dL / Pro: 7.8 gm/dL / ALK PHOS: 58 U/L / ALT: 23 U/L / AST: 24 U/L / GGT: x           PT/INR - ( 2018 07:37 )   PT: 20.3 sec;   INR: 1.84 ratio         PTT - ( 2018 07:37 )  PTT:31.6 sec  Urinalysis Basic - ( 2018 08:01 )    Color: Yellow / Appearance: very cloudy / S.010 / pH: x  Gluc: x / Ketone: Trace  / Bili: Negative / Urobili: Negative mg/dL   Blood: x / Protein: 500 mg/dL / Nitrite: Negative   Leuk Esterase: Moderate / RBC: 6-10 /HPF / WBC 6-10   Sq Epi: x / Non Sq Epi: Moderate / Bacteria: Moderate        CARDIAC MARKERS ( 2018 10:13 )  .033 ng/mL / x     / 234 U/L / x     / <0.5 ng/mL  CARDIAC MARKERS ( 2018 01:37 )  .037 ng/mL / x     / 35 U/L / x     / <0.5 ng/mL  CARDIAC MARKERS ( 2018 07:37 )  <.015 ng/mL / x     / 46 U/L / x     / <0.5 ng/mL      Thyroid Stimulating Hormone, Serum: 0.040 uIU/mL ( @ 05:39)                Culture Results:   Culture grew 3 or more types of organisms which indicate  collection contamination; consider recollection only if clinically  indicated. ( @ 11:47)  Culture Results:   Growth in aerobic bottle: Gram Negative Rods  ***Blood Panel PCR results on this specimen are available  approximately 3 hours after the Gram stain result.***  Gram stain, PCR, and/or culture results may not always  correspond due to difference in methodologies.  ************************************************************  This PCR assay was performed using [employer].  The following targets are tested for: Enterococcus,  vancomycin resistant enterococci, Listeria monocytogenes,  coagulase negative staphylococci, S. aureus,  methicillin resistant S. aureus, Streptococcus agalactiae  (Group B), S. pneumoniae, S. pyogenes (Group A),  Acinetobacter baumannii, Enterobacter cloacae, E. coli,  Klebsiella oxytoca, K. pneumoniae, Proteus sp.,  Serratia marcescens, Haemophilus influenzae,  Neisseria meningitidis, Pseudomonas aeruginosa, Candida  albicans, C. glabrata, C krusei, C parapsilosis,  C. tropicalis and the KPC resistance gene.  Growth in anaerobic bottle: Gram Negative Rods ( @ 10:07)  Culture Results:   Growth in aerobic bottle: Gram Negative Rods  Growth in anaerobic bottle: Gram Negative Rods ( @ 10:06)                Radiology: HPI:  63 yo black female with [place]osa's chorea - with fevers for the past 3 days, last night went up to 103+.  Pt has been more lethargic.  no n/v (2018 14:07)  no change in status   still in er      Allergies    No Known Allergies    Intolerances        MEDICATIONS  (STANDING):  ascorbic acid 500 milliGRAM(s) Oral two times a day  dextrose 5% + sodium chloride 0.45% with potassium chloride 20 mEq/L 1000 milliLiter(s) (125 mL/Hr) IV Continuous <Continuous>  enoxaparin Injectable 30 milliGRAM(s) SubCutaneous daily  ferrous    sulfate 325 milliGRAM(s) Oral daily  lactulose Syrup 10 Gram(s) Oral two times a day  mirtazapine 15 milliGRAM(s) Oral at bedtime  multivitamin/minerals 1 Tablet(s) Oral daily  piperacillin/tazobactam IVPB. 3.375 Gram(s) IV Intermittent every 8 hours  senna 2 Tablet(s) Oral at bedtime  simvastatin 20 milliGRAM(s) Oral at bedtime    MEDICATIONS  (PRN):  acetaminophen  Suppository 650 milliGRAM(s) Rectal every 6 hours PRN For Temp greater than 38 C (100.4 F)  magnesium hydroxide Suspension 30 milliLiter(s) Oral daily PRN Constipation      REVIEW OF SYSTEMS:    unable to assess     VITAL SIGNS:  T(C): 37.7 (18 @ 17:12), Max: 39.9 (18 @ 20:53)  T(F): 99.9 (18 @ 17:12), Max: 103.8 (18 @ 20:53)  HR: 106 (18 @ 17:12) (96 - 125)  BP: 140/88 (18 @ 17:12) (132/80 - 149/85)  RR: 16 (18 @ 17:12) (16 - 33)  SpO2: 99% (18 @ 17:12) (95% - 99%)  Wt(kg): --    PHYSICAL EXAM:    GENERAL: arousable lethargic in nad  HEENT: Neck is supple, normocephalic, atraumatic   CHEST/LUNG: decreased  to auscultation bilaterally; occ  rhonchi,  HEART: Regular rate and rhythm; No murmurs, rubs, or gallops  ABDOMEN: Soft, Nontender, Nondistended; Bowel sounds present, no rebound   EXTREMITIES:  2+ Peripheral Pulses, No clubbing, cyanosis, or edema  GENITOURINARY: NEGATIVE   SKIN: no change in status   BACK: no pressor sore   NERVOUS SYSTEM:  non verbal contracted    LABS:                         8.7    12.5  )-----------( 175      ( 2018 05:39 )             28.1         165<HH>  |  135<H>  |  37<H>  ----------------------------<  168<H>  3.3<L>   |  23  |  1.05    Ca    8.8      2018 05:39    TPro  7.8  /  Alb  2.1<L>  /  TBili  0.3  /  DBili  x   /  AST  24  /  ALT  23  /  AlkPhos  58  13    LIVER FUNCTIONS - ( 2018 05:39 )  Alb: 2.1 g/dL / Pro: 7.8 gm/dL / ALK PHOS: 58 U/L / ALT: 23 U/L / AST: 24 U/L / GGT: x           PT/INR - ( 2018 07:37 )   PT: 20.3 sec;   INR: 1.84 ratio         PTT - ( 2018 07:37 )  PTT:31.6 sec  Urinalysis Basic - ( 2018 08:01 )    Color: Yellow / Appearance: very cloudy / S.010 / pH: x  Gluc: x / Ketone: Trace  / Bili: Negative / Urobili: Negative mg/dL   Blood: x / Protein: 500 mg/dL / Nitrite: Negative   Leuk Esterase: Moderate / RBC: 6-10 /HPF / WBC 6-10   Sq Epi: x / Non Sq Epi: Moderate / Bacteria: Moderate        CARDIAC MARKERS ( 2018 10:13 )  .033 ng/mL / x     / 234 U/L / x     / <0.5 ng/mL  CARDIAC MARKERS ( 2018 01:37 )  .037 ng/mL / x     / 35 U/L / x     / <0.5 ng/mL  CARDIAC MARKERS ( 2018 07:37 )  <.015 ng/mL / x     / 46 U/L / x     / <0.5 ng/mL      Thyroid Stimulating Hormone, Serum: 0.040 uIU/mL ( @ 05:39)                Culture Results:   Culture grew 3 or more types of organisms which indicate  collection contamination; consider recollection only if clinically  indicated. ( @ 11:47)  Culture Results:   Growth in aerobic bottle: Gram Negative Rods  ***Blood Panel PCR results on this specimen are available  approximately 3 hours after the Gram stain result.***  Gram stain, PCR, and/or culture results may not always  correspond due to difference in methodologies.  ************************************************************  This PCR assay was performed using [employer].  The following targets are tested for: Enterococcus,  vancomycin resistant enterococci, Listeria monocytogenes,  coagulase negative staphylococci, S. aureus,  methicillin resistant S. aureus, Streptococcus agalactiae  (Group B), S. pneumoniae, S. pyogenes (Group A),  Acinetobacter baumannii, Enterobacter cloacae, E. coli,  Klebsiella oxytoca, K. pneumoniae, Proteus sp.,  Serratia marcescens, Haemophilus influenzae,  Neisseria meningitidis, Pseudomonas aeruginosa, Candida  albicans, C. glabrata, C krusei, C parapsilosis,  C. tropicalis and the KPC resistance gene.  Growth in anaerobic bottle: Gram Negative Rods ( @ 10:07)  Culture Results:   Growth in aerobic bottle: Gram Negative Rods  Growth in anaerobic bottle: Gram Negative Rods ( @ 10:06)                Radiology:    < from: Xray Chest 1 View AP/PA. (18 @ 08:40) >  The cardiac silhouette is within normal limits in size.    IMPRESSION:  Streaky opacities in the left mid and lower lung, may be due to   atelectasis or infection.                JOHANNE LAMBERT MD., ATTENDING RADIOLOGIST  This document has been electronically signed. 2018  8:57AM          < end of copied text >

## 2018-01-13 NOTE — PROGRESS NOTE ADULT - ASSESSMENT
sepsis   gram negative rods in  blood   on broad spectrum antibiotics empirically   status quo today   continue antibiotics zosyn   still febrile ? has rhino virus as well   will follow

## 2018-01-13 NOTE — PROGRESS NOTE ADULT - SUBJECTIVE AND OBJECTIVE BOX
INTERVAL HPI/OVERNIGHT EVENTS:  pt talking incoherently  MEDICATIONS  (STANDING):  ascorbic acid 500 milliGRAM(s) Oral two times a day  dextrose 5% + sodium chloride 0.45% with potassium chloride 20 mEq/L 1000 milliLiter(s) (125 mL/Hr) IV Continuous <Continuous>  enoxaparin Injectable 30 milliGRAM(s) SubCutaneous daily  ferrous    sulfate 325 milliGRAM(s) Oral daily  lactulose Syrup 10 Gram(s) Oral two times a day  mirtazapine 15 milliGRAM(s) Oral at bedtime  multivitamin/minerals 1 Tablet(s) Oral daily  piperacillin/tazobactam IVPB. 3.375 Gram(s) IV Intermittent every 8 hours  senna 2 Tablet(s) Oral at bedtime  simvastatin 20 milliGRAM(s) Oral at bedtime    MEDICATIONS  (PRN):  acetaminophen  Suppository 650 milliGRAM(s) Rectal every 6 hours PRN For Temp greater than 38 C (100.4 F)  magnesium hydroxide Suspension 30 milliLiter(s) Oral daily PRN Constipation      Allergies    No Known Allergies    Intolerances          telelmtry sinus tachy  Vital Signs Last 24 Hrs  T(C): 38.8 (2018 04:28), Max: 39.9 (2018 20:53)  T(F): 101.9 (2018 04:28), Max: 103.8 (2018 20:53)  HR: 109 (2018 04:28) (109 - 134)  BP: 132/80 (2018 04:28) (114/82 - 144/80)  BP(mean): --  RR: 26 (2018 04:28) (22 - 34)  SpO2: 97% (2018 04:28) (95% - 98%)    PHYSICAL EXAM:  GENERAL: thin black female  HEENT ncat, no jvd, no thryomegally, + temporal wasting, + ng  NERVOUS SYSTEM:  responds to voice, lethargic, talks incoherently  CHEST/LUNG:cta anterioly  cv rrr s1 s2  ABDOMEN: Soft, Nontender, Nondistended; Bowel sounds present  EXTREMITIES:  trace ble edema    LABS:  CAPILLARY BLOOD GLUCOSE                              9.5    11.2  )-----------( 165      ( 2018 01:37 )             29.1     01-13    166<HH>  |  136<H>  |  38<H>  ----------------------------<  146<H>  3.2<L>   |  23  |  1.11    Ca    8.1<L>      2018 01:37    TPro  7.8  /  Alb  2.1<L>  /  TBili  0.4  /  DBili  x   /  AST  17  /  ALT  25  /  AlkPhos  60  -13    PT/INR - ( 2018 07:37 )   PT: 20.3 sec;   INR: 1.84 ratio         PTT - ( 2018 07:37 )  PTT:31.6 sec  Urinalysis Basic - ( 2018 08:01 )    Color: Yellow / Appearance: very cloudy / S.010 / pH: x  Gluc: x / Ketone: Trace  / Bili: Negative / Urobili: Negative mg/dL   Blood: x / Protein: 500 mg/dL / Nitrite: Negative   Leuk Esterase: Moderate / RBC: 6-10 /HPF / WBC 6-10   Sq Epi: x / Non Sq Epi: Moderate / Bacteria: Moderate      CARDIAC MARKERS ( 2018 01:37 )  .037 ng/mL / x     / 35 U/L / x     / <0.5 ng/mL  CARDIAC MARKERS ( 2018 07:37 )  <.015 ng/mL / x     / 46 U/L / x     / <0.5 ng/mL      Cultures:    Culture Results:   Growth in aerobic bottle: Gram Negative Rods  ***Blood Panel PCR results on this specimen are available  approximately 3 hours after the Gram stain result.***  Gram stain, PCR, and/or culture results may not always  correspond due to difference in methodologies.  ************************************************************  This PCR assay was performed using [employer].  The following targets are tested for: Enterococcus,  vancomycin resistant enterococci, Listeria monocytogenes,  coagulase negative staphylococci, S. aureus,  methicillin resistant S. aureus, Streptococcus agalactiae  (Group B), S. pneumoniae, S. pyogenes (Group A),  Acinetobacter baumannii, Enterobacter cloacae, E. coli,  Klebsiella oxytoca, K. pneumoniae, Proteus sp.,  Serratia marcescens, Haemophilus influenzae,  Neisseria meningitidis, Pseudomonas aeruginosa, Candida  albicans, C. glabrata, C krusei, C parapsilosis,  C. tropicalis and the KPC resistance gene. ( @ 10:07)  Culture Results:   Growth in aerobic bottle: Gram Negative Rods  Growth in anaerobic bottle: Gram Negative Rods ( @ 10:06)        RADIOLOGY & ADDITIONAL TESTS:  A/P  sepsis - e coli growing in blood - on zosyn - apprecite id help - id feels source might be urinary - awaiting urine cx  rsv - supportive care  tachypnea - o2 sat ok, will ask pulmonary to see  cv - cards sees no acute cv process going on, troponin negative x 2  neuro - a neurologist at [facility] questioned dx of [place] chorea - family does not know where she had testing to show aicha's chorea - neurology saw pt  here - neuro recomends ct head, tsh, b12  dehydration - will increase rate of ivf - f/u bmp  anemia - anemia w/u in place  dvt risk - lovenox  dysphagia - give meds thru ng tube - if pt does not start eating, consider feeds thru ng  left message for daughter    Virgilio Broderick MD

## 2018-01-14 LAB
-  AMIKACIN: SIGNIFICANT CHANGE UP
-  AMPICILLIN/SULBACTAM: SIGNIFICANT CHANGE UP
-  AMPICILLIN: SIGNIFICANT CHANGE UP
-  AZTREONAM: SIGNIFICANT CHANGE UP
-  CEFAZOLIN: SIGNIFICANT CHANGE UP
-  CEFEPIME: SIGNIFICANT CHANGE UP
-  CEFOXITIN: SIGNIFICANT CHANGE UP
-  CEFTAZIDIME: SIGNIFICANT CHANGE UP
-  CEFTRIAXONE: SIGNIFICANT CHANGE UP
-  CIPROFLOXACIN: SIGNIFICANT CHANGE UP
-  ERTAPENEM: SIGNIFICANT CHANGE UP
-  GENTAMICIN: SIGNIFICANT CHANGE UP
-  IMIPENEM: SIGNIFICANT CHANGE UP
-  LEVOFLOXACIN: SIGNIFICANT CHANGE UP
-  MEROPENEM: SIGNIFICANT CHANGE UP
-  PIPERACILLIN/TAZOBACTAM: SIGNIFICANT CHANGE UP
-  TOBRAMYCIN: SIGNIFICANT CHANGE UP
-  TRIMETHOPRIM/SULFAMETHOXAZOLE: SIGNIFICANT CHANGE UP
ANION GAP SERPL CALC-SCNC: 6 MMOL/L — SIGNIFICANT CHANGE UP (ref 5–17)
BUN SERPL-MCNC: 23 MG/DL — SIGNIFICANT CHANGE UP (ref 7–23)
CALCIUM SERPL-MCNC: 9.2 MG/DL — SIGNIFICANT CHANGE UP (ref 8.5–10.1)
CHLORIDE SERPL-SCNC: 130 MMOL/L — HIGH (ref 96–108)
CO2 SERPL-SCNC: 23 MMOL/L — SIGNIFICANT CHANGE UP (ref 22–31)
CREAT SERPL-MCNC: 0.87 MG/DL — SIGNIFICANT CHANGE UP (ref 0.5–1.3)
CULTURE RESULTS: SIGNIFICANT CHANGE UP
CULTURE RESULTS: SIGNIFICANT CHANGE UP
GLUCOSE SERPL-MCNC: 141 MG/DL — HIGH (ref 70–99)
GRAM STN FLD: SIGNIFICANT CHANGE UP
GRAM STN FLD: SIGNIFICANT CHANGE UP
HCT VFR BLD CALC: 26.8 % — LOW (ref 34.5–45)
HGB BLD-MCNC: 8.8 G/DL — LOW (ref 11.5–15.5)
MCHC RBC-ENTMCNC: 26.2 PG — LOW (ref 27–34)
MCHC RBC-ENTMCNC: 32.9 GM/DL — SIGNIFICANT CHANGE UP (ref 32–36)
MCV RBC AUTO: 79.8 FL — LOW (ref 80–100)
METHOD TYPE: SIGNIFICANT CHANGE UP
ORGANISM # SPEC MICROSCOPIC CNT: SIGNIFICANT CHANGE UP
PLATELET # BLD AUTO: 171 K/UL — SIGNIFICANT CHANGE UP (ref 150–400)
POTASSIUM SERPL-MCNC: 3 MMOL/L — LOW (ref 3.5–5.3)
POTASSIUM SERPL-SCNC: 3 MMOL/L — LOW (ref 3.5–5.3)
RBC # BLD: 3.35 M/UL — LOW (ref 3.8–5.2)
RBC # FLD: 17 % — HIGH (ref 11–15)
SODIUM SERPL-SCNC: 159 MMOL/L — HIGH (ref 135–145)
SPECIMEN SOURCE: SIGNIFICANT CHANGE UP
SPECIMEN SOURCE: SIGNIFICANT CHANGE UP
WBC # BLD: 13.4 K/UL — HIGH (ref 3.8–10.5)
WBC # FLD AUTO: 13.4 K/UL — HIGH (ref 3.8–10.5)

## 2018-01-14 RX ORDER — SODIUM CHLORIDE 9 MG/ML
1000 INJECTION, SOLUTION INTRAVENOUS
Qty: 0 | Refills: 0 | Status: DISCONTINUED | OUTPATIENT
Start: 2018-01-14 | End: 2018-01-19

## 2018-01-14 RX ORDER — POTASSIUM CHLORIDE 20 MEQ
20 PACKET (EA) ORAL DAILY
Qty: 0 | Refills: 0 | Status: DISCONTINUED | OUTPATIENT
Start: 2018-01-14 | End: 2018-01-19

## 2018-01-14 RX ORDER — POTASSIUM CHLORIDE 20 MEQ
20 PACKET (EA) ORAL
Qty: 0 | Refills: 0 | Status: COMPLETED | OUTPATIENT
Start: 2018-01-14 | End: 2018-01-14

## 2018-01-14 RX ADMIN — MIRTAZAPINE 15 MILLIGRAM(S): 45 TABLET, ORALLY DISINTEGRATING ORAL at 21:37

## 2018-01-14 RX ADMIN — Medication 20 MILLIEQUIVALENT(S): at 07:51

## 2018-01-14 RX ADMIN — PIPERACILLIN AND TAZOBACTAM 12.5 GRAM(S): 4; .5 INJECTION, POWDER, LYOPHILIZED, FOR SOLUTION INTRAVENOUS at 05:14

## 2018-01-14 RX ADMIN — LACTULOSE 10 GRAM(S): 10 SOLUTION ORAL at 05:14

## 2018-01-14 RX ADMIN — ENOXAPARIN SODIUM 30 MILLIGRAM(S): 100 INJECTION SUBCUTANEOUS at 13:12

## 2018-01-14 RX ADMIN — Medication 1 TABLET(S): at 13:27

## 2018-01-14 RX ADMIN — PIPERACILLIN AND TAZOBACTAM 12.5 GRAM(S): 4; .5 INJECTION, POWDER, LYOPHILIZED, FOR SOLUTION INTRAVENOUS at 14:27

## 2018-01-14 RX ADMIN — Medication 650 MILLIGRAM(S): at 04:47

## 2018-01-14 RX ADMIN — PIPERACILLIN AND TAZOBACTAM 12.5 GRAM(S): 4; .5 INJECTION, POWDER, LYOPHILIZED, FOR SOLUTION INTRAVENOUS at 21:37

## 2018-01-14 RX ADMIN — Medication 20 MILLIEQUIVALENT(S): at 13:13

## 2018-01-14 RX ADMIN — Medication 500 MILLIGRAM(S): at 05:14

## 2018-01-14 RX ADMIN — SENNA PLUS 2 TABLET(S): 8.6 TABLET ORAL at 21:37

## 2018-01-14 RX ADMIN — Medication 650 MILLIGRAM(S): at 12:31

## 2018-01-14 RX ADMIN — Medication 325 MILLIGRAM(S): at 13:39

## 2018-01-14 RX ADMIN — SODIUM CHLORIDE 125 MILLILITER(S): 9 INJECTION, SOLUTION INTRAVENOUS at 06:38

## 2018-01-14 RX ADMIN — Medication 20 MILLIEQUIVALENT(S): at 10:12

## 2018-01-14 RX ADMIN — SIMVASTATIN 20 MILLIGRAM(S): 20 TABLET, FILM COATED ORAL at 21:37

## 2018-01-14 RX ADMIN — Medication 500 MILLIGRAM(S): at 21:37

## 2018-01-14 RX ADMIN — LACTULOSE 10 GRAM(S): 10 SOLUTION ORAL at 21:38

## 2018-01-14 NOTE — PROGRESS NOTE ADULT - ASSESSMENT
Subjective Complaints:  Historian:         REVIEW OF SYSTEMS:  Eyes:  Good vision, no reported pain  ENT:  No sore throat, pain, runny nose, dysphagia  CV:  No pain, palpitatioins, hypo/hypertension  Resp:  No dyspnea, cough, tachypnea, wheezing  GI:  No pain, nausea, vomiting, diarrhea, constipatiion  Muscle:  No pain, weakness  Neuro:  No weakness, tingling, memory problems  Psych:  No fatigue, insomnia, mood problems, depression  Endocrine:  No polyuria, polydypsia, cold/heat intolerance    Vital Signs Last 24 Hrs  T(C): 37.2 (14 Jan 2018 16:45), Max: 38.7 (14 Jan 2018 04:11)  T(F): 99 (14 Jan 2018 16:45), Max: 101.6 (14 Jan 2018 04:11)  HR: 98 (14 Jan 2018 16:45) (98 - 103)  BP: 154/89 (14 Jan 2018 16:45) (138/76 - 154/89)  BP(mean): --  RR: 18 (14 Jan 2018 16:45) (17 - 25)  SpO2: 96% (14 Jan 2018 16:45) (95% - 99%)    GENERAL PHYSICAL EXAM:  General:  Appears stated age, well-groomed, well-nourished, no distress  HEENT:  NC/AT, patent nares w/ pink mucosa, OP clear w/o lesions, PERRL, EOMI, conjunctivae clear, no thyromegaly, nodules, adenopathy, no JVD  Chest:  Full & symmetric excursion, no increased effort, breath sounds clear  Cardiovascular:  Regular rhythm, S1, S2, no murmur/rub/S3/S4, no carotid/femoral/abdominal bruit, radial/pedal pulses 2+, no edema  Abdomen:  Soft, non-tender, non-distended, normoactive bowel sounds, no HSM  Extremities:  Gait & station:   Digits:   Nails:   Joints, Bones, Muscles:   ROM:   Stability:  Skin:  No rash/erythema/ecchymoses/petechiae/wounds/abscess/warm/dry  Musculoskeletal:  Full ROM in all joints w/o swelling/tenderness/effusion    NEUROLOGICAL EXAM:  HENT:  Normocephalic head; atraumatic head.  Neck supple.  ENT: normal looking.  Mental State:      ;ethargic arousabl;e open eyes does not follws commnads  quadroparesis spastic LABS:                        8.8    13.4  )-----------( 171      ( 14 Jan 2018 04:21 )             26.8     01-14    159<H>  |  130<H>  |  23  ----------------------------<  141<H>  3.0<L>   |  23  |  0.87    Ca    9.2      14 Jan 2018 04:21    TPro  7.8  /  Alb  2.1<L>  /  TBili  0.3  /  DBili  x   /  AST  24  /  ALT  23  /  AlkPhos  58  01-13   ass=   ;ethargic arousable open eyes  does not follws commnads spastic quadropresis  no seziure reported ct head no acute path  metabolic encepahaloasthy id  follow up on iv antibiotics           RADIOLOGY & ADDITIONAL STUDIES:        Recommendations:   for sub acute rehab will follow

## 2018-01-14 NOTE — PATIENT PROFILE ADULT. - PMH
Anemia    Decubital ulcer  of sacrum - s/p debreedment  Dehydration    DM (diabetes mellitus)    DVT (deep venous thrombosis)  right upper extreamity  Failure to thrive in adult    Hemiparesis  right side  High cholesterol    HTN (hypertension)    [place] chorea    Neurogenic bladder disorder    Pneumonia    Pressure ulcer of sacrum    Rhabdomyolysis  h/o  UTI (urinary tract infection)    UTI (urinary tract infection)

## 2018-01-14 NOTE — ED ADULT NURSE REASSESSMENT NOTE - NS ED NURSE REASSESS COMMENT FT1
Pt alert able to answer basic questions appropriately. Pt awaiting bed assignment ,on CM. Isolation maintained. Pt's temp continue to be monitored.
Pt's temperature improving. Pt continue to be monitored awaiting transfer to bed assigned
Patient asleep at this time, will continue to monitor

## 2018-01-14 NOTE — PROGRESS NOTE ADULT - SUBJECTIVE AND OBJECTIVE BOX
INTERVAL HPI/OVERNIGHT EVENTS:  pt taking po's and eating  MEDICATIONS  (STANDING):  ascorbic acid 500 milliGRAM(s) Oral two times a day  dextrose 5% + sodium chloride 0.45% with potassium chloride 20 mEq/L 1000 milliLiter(s) (125 mL/Hr) IV Continuous <Continuous>  enoxaparin Injectable 30 milliGRAM(s) SubCutaneous daily  ferrous    sulfate 325 milliGRAM(s) Oral daily  lactulose Syrup 10 Gram(s) Oral two times a day  mirtazapine 15 milliGRAM(s) Oral at bedtime  multivitamin/minerals 1 Tablet(s) Oral daily  piperacillin/tazobactam IVPB. 3.375 Gram(s) IV Intermittent every 8 hours  senna 2 Tablet(s) Oral at bedtime  simvastatin 20 milliGRAM(s) Oral at bedtime    MEDICATIONS  (PRN):  acetaminophen  Suppository 650 milliGRAM(s) Rectal every 6 hours PRN For Temp greater than 38 C (100.4 F)  magnesium hydroxide Suspension 30 milliLiter(s) Oral daily PRN Constipation      Allergies    No Known Allergies    Intolerances        telemetry st  Vital Signs Last 24 Hrs  T(C): 38.7 (2018 04:11), Max: 38.7 (2018 04:11)  T(F): 101.6 (2018 04:11), Max: 101.6 (2018 04:11)  HR: 100 (2018 05:27) (96 - 106)  BP: 138/76 (2018 05:27) (138/76 - 149/92)  BP(mean): --  RR: 20 (2018 05:27) (16 - 20)  SpO2: 98% (2018 05:27) (98% - 99%)    PHYSICAL EXAM:  GENERAL: thin black female, lethargic  HEENT ncat no jvd, no thryomegally  NERVOUS SYSTEM:  lethargic, hemiparisis  CHEST/LUNG: cta  HEART: tachy s1 s2  ABDOMEN: Soft, Nontender, Nondistended; Bowel sounds present  EXTREMITIES:  trace ble edeam    LABS:  CAPILLARY BLOOD GLUCOSE                              8.8    13.4  )-----------( 171      ( 2018 04:21 )             26.8     01-14    159<H>  |  130<H>  |  23  ----------------------------<  141<H>  3.0<L>   |  23  |  0.87    Ca    9.2      2018 04:21    TPro  7.8  /  Alb  2.1<L>  /  TBili  0.3  /  DBili  x   /  AST  24  /  ALT  23  /  AlkPhos  58  01-13    PT/INR - ( 2018 07:37 )   PT: 20.3 sec;   INR: 1.84 ratio         PTT - ( 2018 07:37 )  PTT:31.6 sec  Urinalysis Basic - ( 2018 08:01 )    Color: Yellow / Appearance: very cloudy / S.010 / pH: x  Gluc: x / Ketone: Trace  / Bili: Negative / Urobili: Negative mg/dL   Blood: x / Protein: 500 mg/dL / Nitrite: Negative   Leuk Esterase: Moderate / RBC: 6-10 /HPF / WBC 6-10   Sq Epi: x / Non Sq Epi: Moderate / Bacteria: Moderate      CARDIAC MARKERS ( 2018 10:13 )  .033 ng/mL / x     / 234 U/L / x     / <0.5 ng/mL  CARDIAC MARKERS ( 2018 01:37 )  .037 ng/mL / x     / 35 U/L / x     / <0.5 ng/mL  CARDIAC MARKERS ( 2018 07:37 )  <.015 ng/mL / x     / 46 U/L / x     / <0.5 ng/mL      Cultures:    Culture Results:   Culture grew 3 or more types of organisms which indicate  collection contamination; consider recollection only if clinically  indicated. ( @ 11:47)  Culture Results:   Growth in aerobic and anaerobic bottles: Escherichia coli  ***Blood Panel PCR results on this specimen are available  approximately 3 hours after the Gram stain result.***  Gram stain, PCR, and/or culture results may not always  correspond due to difference in methodologies.  ************************************************************  This PCR assay was performed using [employer]  The following targets are tested for: Enterococcus,  vancomycin resistant enterococci, Listeria monocytogenes,  coagulase negative staphylococci, S. aureus,  methicillin resistant S. aureus, Streptococcus agalactiae  (Group B), S. pneumoniae, S. pyogenes (Group A),  Acinetobacter baumannii, Enterobacter cloacae, E. coli,  Klebsiella oxytoca, K. pneumoniae, Proteus sp.,  Serratia marcescens, Haemophilus influenzae,  Neisseria meningitidis, Pseudomonas aeruginosa, Candida  albicans, C. glabrata, C krusei, C parapsilosis,  C. tropicalis and the KPC resistance gene. ( @ 10:07)  Culture Results:   Growth in aerobic and anaerobic bottles: Escherichia coli  See previous culture [id number] ( @ 10:06)        RADIOLOGY & ADDITIONAL TESTS:    A/P  fever - working on getting cooling blanked  id - on zosyn - apprecite id help - + rsv  dehydration - change to d5w fluid  decreaed tsh ? sick euthyroid - check tft's  resp - awaiting pulmonary consult  dvt risk - lovenox  d/w pt and daughter.    Virgilio Broderick MD

## 2018-01-15 LAB
-  AMIKACIN: SIGNIFICANT CHANGE UP
-  AMPICILLIN/SULBACTAM: SIGNIFICANT CHANGE UP
-  AMPICILLIN: SIGNIFICANT CHANGE UP
-  AZTREONAM: SIGNIFICANT CHANGE UP
-  CEFAZOLIN: SIGNIFICANT CHANGE UP
-  CEFEPIME: SIGNIFICANT CHANGE UP
-  CEFOXITIN: SIGNIFICANT CHANGE UP
-  CEFTAZIDIME: SIGNIFICANT CHANGE UP
-  CEFTRIAXONE: SIGNIFICANT CHANGE UP
-  CIPROFLOXACIN: SIGNIFICANT CHANGE UP
-  ERTAPENEM: SIGNIFICANT CHANGE UP
-  GENTAMICIN: SIGNIFICANT CHANGE UP
-  IMIPENEM: SIGNIFICANT CHANGE UP
-  LEVOFLOXACIN: SIGNIFICANT CHANGE UP
-  MEROPENEM: SIGNIFICANT CHANGE UP
-  NITROFURANTOIN: SIGNIFICANT CHANGE UP
-  PIPERACILLIN/TAZOBACTAM: SIGNIFICANT CHANGE UP
-  TOBRAMYCIN: SIGNIFICANT CHANGE UP
-  TRIMETHOPRIM/SULFAMETHOXAZOLE: SIGNIFICANT CHANGE UP
ANION GAP SERPL CALC-SCNC: 8 MMOL/L — SIGNIFICANT CHANGE UP (ref 5–17)
BUN SERPL-MCNC: 11 MG/DL — SIGNIFICANT CHANGE UP (ref 7–23)
CALCIUM SERPL-MCNC: 8.9 MG/DL — SIGNIFICANT CHANGE UP (ref 8.5–10.1)
CHLORIDE SERPL-SCNC: 111 MMOL/L — HIGH (ref 96–108)
CO2 SERPL-SCNC: 25 MMOL/L — SIGNIFICANT CHANGE UP (ref 22–31)
CREAT SERPL-MCNC: 0.69 MG/DL — SIGNIFICANT CHANGE UP (ref 0.5–1.3)
CULTURE RESULTS: SIGNIFICANT CHANGE UP
GLUCOSE SERPL-MCNC: 124 MG/DL — HIGH (ref 70–99)
HCT VFR BLD CALC: 30.7 % — LOW (ref 34.5–45)
HGB BLD-MCNC: 9.8 G/DL — LOW (ref 11.5–15.5)
MCHC RBC-ENTMCNC: 25.6 PG — LOW (ref 27–34)
MCHC RBC-ENTMCNC: 32 GM/DL — SIGNIFICANT CHANGE UP (ref 32–36)
MCV RBC AUTO: 79.9 FL — LOW (ref 80–100)
METHOD TYPE: SIGNIFICANT CHANGE UP
ORGANISM # SPEC MICROSCOPIC CNT: SIGNIFICANT CHANGE UP
ORGANISM # SPEC MICROSCOPIC CNT: SIGNIFICANT CHANGE UP
PLATELET # BLD AUTO: 168 K/UL — SIGNIFICANT CHANGE UP (ref 150–400)
POTASSIUM SERPL-MCNC: 3.4 MMOL/L — LOW (ref 3.5–5.3)
POTASSIUM SERPL-SCNC: 3.4 MMOL/L — LOW (ref 3.5–5.3)
RBC # BLD: 3.84 M/UL — SIGNIFICANT CHANGE UP (ref 3.8–5.2)
RBC # FLD: 17.2 % — HIGH (ref 11–15)
SODIUM SERPL-SCNC: 144 MMOL/L — SIGNIFICANT CHANGE UP (ref 135–145)
SPECIMEN SOURCE: SIGNIFICANT CHANGE UP
T3 SERPL-MCNC: 78 NG/DL — LOW (ref 80–200)
T4 AB SER-ACNC: 5.1 UG/DL — SIGNIFICANT CHANGE UP (ref 4.6–12)
WBC # BLD: 14.5 K/UL — HIGH (ref 3.8–10.5)
WBC # FLD AUTO: 14.5 K/UL — HIGH (ref 3.8–10.5)

## 2018-01-15 RX ADMIN — SODIUM CHLORIDE 125 MILLILITER(S): 9 INJECTION, SOLUTION INTRAVENOUS at 01:55

## 2018-01-15 RX ADMIN — Medication 650 MILLIGRAM(S): at 00:51

## 2018-01-15 RX ADMIN — Medication 20 MILLIEQUIVALENT(S): at 11:34

## 2018-01-15 RX ADMIN — Medication 500 MILLIGRAM(S): at 17:00

## 2018-01-15 RX ADMIN — PIPERACILLIN AND TAZOBACTAM 12.5 GRAM(S): 4; .5 INJECTION, POWDER, LYOPHILIZED, FOR SOLUTION INTRAVENOUS at 13:17

## 2018-01-15 RX ADMIN — Medication 500 MILLIGRAM(S): at 05:34

## 2018-01-15 RX ADMIN — PIPERACILLIN AND TAZOBACTAM 12.5 GRAM(S): 4; .5 INJECTION, POWDER, LYOPHILIZED, FOR SOLUTION INTRAVENOUS at 23:08

## 2018-01-15 RX ADMIN — Medication 325 MILLIGRAM(S): at 11:34

## 2018-01-15 RX ADMIN — SODIUM CHLORIDE 125 MILLILITER(S): 9 INJECTION, SOLUTION INTRAVENOUS at 23:29

## 2018-01-15 RX ADMIN — LACTULOSE 10 GRAM(S): 10 SOLUTION ORAL at 17:00

## 2018-01-15 RX ADMIN — Medication 1 TABLET(S): at 11:34

## 2018-01-15 RX ADMIN — LACTULOSE 10 GRAM(S): 10 SOLUTION ORAL at 05:34

## 2018-01-15 RX ADMIN — SIMVASTATIN 20 MILLIGRAM(S): 20 TABLET, FILM COATED ORAL at 22:54

## 2018-01-15 RX ADMIN — SODIUM CHLORIDE 125 MILLILITER(S): 9 INJECTION, SOLUTION INTRAVENOUS at 05:40

## 2018-01-15 RX ADMIN — PIPERACILLIN AND TAZOBACTAM 12.5 GRAM(S): 4; .5 INJECTION, POWDER, LYOPHILIZED, FOR SOLUTION INTRAVENOUS at 05:34

## 2018-01-15 RX ADMIN — MIRTAZAPINE 15 MILLIGRAM(S): 45 TABLET, ORALLY DISINTEGRATING ORAL at 22:54

## 2018-01-15 RX ADMIN — ENOXAPARIN SODIUM 30 MILLIGRAM(S): 100 INJECTION SUBCUTANEOUS at 14:38

## 2018-01-15 RX ADMIN — SENNA PLUS 2 TABLET(S): 8.6 TABLET ORAL at 22:53

## 2018-01-15 RX ADMIN — Medication 650 MILLIGRAM(S): at 17:00

## 2018-01-15 NOTE — PROGRESS NOTE ADULT - SUBJECTIVE AND OBJECTIVE BOX
INTERVAL HPI/OVERNIGHT EVENTS:  pt reports feeling ill    MEDICATIONS  (STANDING):  ascorbic acid 500 milliGRAM(s) Oral two times a day  dextrose 5%. 1000 milliLiter(s) (125 mL/Hr) IV Continuous <Continuous>  enoxaparin Injectable 30 milliGRAM(s) SubCutaneous daily  ferrous    sulfate 325 milliGRAM(s) Oral daily  lactulose Syrup 10 Gram(s) Oral two times a day  mirtazapine 15 milliGRAM(s) Oral at bedtime  multivitamin/minerals 1 Tablet(s) Oral daily  piperacillin/tazobactam IVPB. 3.375 Gram(s) IV Intermittent every 8 hours  potassium chloride    Tablet ER 20 milliEquivalent(s) Oral daily  senna 2 Tablet(s) Oral at bedtime  simvastatin 20 milliGRAM(s) Oral at bedtime    MEDICATIONS  (PRN):  acetaminophen  Suppository 650 milliGRAM(s) Rectal every 6 hours PRN For Temp greater than 38 C (100.4 F)  magnesium hydroxide Suspension 30 milliLiter(s) Oral daily PRN Constipation      Allergies    No Known Allergies    Intolerances            Vital Signs Last 24 Hrs  T(C): 39.5 (15 Kelvin 2018 00:43), Max: 39.5 (15 Kelvin 2018 00:43)  T(F): 103.1 (15 Kelvin 2018 00:43), Max: 103.1 (15 Kelvin 2018 00:43)  HR: 108 (15 Kelvin 2018 00:43) (98 - 108)  BP: 156/86 (15 Kelvin 2018 00:43) (139/84 - 156/86)  BP(mean): --  RR: 18 (15 Kelvin 2018 00:43) (17 - 25)  SpO2: 98% (15 Kelvin 2018 00:43) (95% - 99%)    PHYSICAL EXAM:  GENERAL:thin black female in nad  HEENT ncat no jvd, no thryomeggaly  NERVOUS SYSTEM : hemiparisis, alert  CHEST/LUNG: cta  HEART: rrr s1 s2  ABDOMEN: Soft, Nontender, Nondistended; Bowel sounds present  EXTREMITIES:  2+ Peripheral Pulses, No clubbing, cyanosis, or edema      LABS:  CAPILLARY BLOOD GLUCOSE                              8.8    13.4  )-----------( 171      ( 14 Jan 2018 04:21 )             26.8     01-14    159<H>  |  130<H>  |  23  ----------------------------<  141<H>  3.0<L>   |  23  |  0.87    Ca    9.2      14 Jan 2018 04:21          CARDIAC MARKERS ( 13 Jan 2018 10:13 )  .033 ng/mL / x     / 234 U/L / x     / <0.5 ng/mL      Cultures:    Culture Results:   10,000 - 49,000 CFU/mL Escherichia coli (01-13 @ 21:10)  Culture Results:   Culture grew 3 or more types of organisms which indicate  collection contamination; consider recollection only if clinically  indicated. (01-12 @ 11:47)  Culture Results:   Growth in aerobic and anaerobic bottles: Escherichia coli  ***Blood Panel PCR results on this specimen are available  approximately 3 hours after the Gram stain result.***  Gram stain, PCR, and/or culture results may not always  correspond due to difference in methodologies.  ************************************************************  This PCR assay was performed using [employer].  The following targets are tested for: Enterococcus,  vancomycin resistant enterococci, Listeria monocytogenes,  coagulase negative staphylococci, S. aureus,  methicillin resistant S. aureus, Streptococcus agalactiae  (Group B), S. pneumoniae, S. pyogenes (Group A),  Acinetobacter baumannii, Enterobacter cloacae, E. coli,  Klebsiella oxytoca, K. pneumoniae, Proteus sp.,  Serratia marcescens, Haemophilus influenzae,  Neisseria meningitidis, Pseudomonas aeruginosa, Candida  albicans, C. glabrata, C krusei, C parapsilosis,  C. tropicalis and the KPC resistance gene. 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

## 2018-01-15 NOTE — PROGRESS NOTE ADULT - ASSESSMENT
Subjective Complaints:  Historian:         REVIEW OF SYSTEMS:  Eyes:  Good vision, no reported pain  ENT:  No sore throat, pain, runny nose, dysphagia  CV:  No pain, palpitatioins, hypo/hypertension  Resp:  No dyspnea, cough, tachypnea, wheezing  GI:  No pain, nausea, vomiting, diarrhea, constipatiion  Muscle:  No pain, weakness  Neuro:  No weakness, tingling, memory problems  Psych:  No fatigue, insomnia, mood problems, depression  Endocrine:  No polyuria, polydypsia, cold/heat intolerance    Vital Signs Last 24 Hrs  T(C): 38.3 (15 Kelvin 2018 19:26), Max: 39.5 (15 Kelvin 2018 00:43)  T(F): 101 (15 Kelvin 2018 19:26), Max: 103.1 (15 Kelvin 2018 00:43)  HR: 110 (15 Kelvin 2018 16:26) (93 - 110)  BP: 137/80 (15 Kelvin 2018 16:26) (131/73 - 156/86)  BP(mean): --  RR: 18 (15 Kelvin 2018 16:26) (16 - 18)  SpO2: 99% (15 Kelvin 2018 16:26) (98% - 100%)    GENERAL PHYSICAL EXAM:  General:  Appears stated age, well-groomed, well-nourished, no distress  HEENT:  NC/AT, patent nares w/ pink mucosa, OP clear w/o lesions, PERRL, EOMI, conjunctivae clear, no thyromegaly, nodules, adenopathy, no JVD  Chest:  Full & symmetric excursion, no increased effort, breath sounds clear  Cardiovascular:  Regular rhythm, S1, S2, no murmur/rub/S3/S4, no carotid/femoral/abdominal bruit, radial/pedal pulses 2+, no edema  Abdomen:  Soft, non-tender, non-distended, normoactive bowel sounds, no HSM  Extremities:  Gait & station:   Digits:   Nails:   Joints, Bones, Muscles:   ROM:   Stability:  Skin:  No rash/erythema/ecchymoses/petechiae/wounds/abscess/warm/dry  Musculoskeletal:  Full ROM in all joints w/o swelling/tenderness/effusion    NEUROLOGICAL EXAM:  HENT:  Normocephalic head; atraumatic head.  Neck supple.  ENT: normal looking.  Mental State:     lethargic arousable open eyes does not    follws commnads LABS:                        9.8    14.5  )-----------( 168      ( 15 Kelvin 2018 06:15 )             30.7     01-15    144  |  111<H>  |  11  ----------------------------<  124<H>  3.4<L>   |  25  |  0.69    Ca    8.9      15 Kelvin 2018 06:15     ass= lethargic arousable open eyes does not norm gibbs arm leg spastic no seziure n/g tube feeding encepahalaothy hx of depression no chorea noticed         RADIOLOGY & ADDITIONAL STUDIES:        Recommendations:  ct iv antibiotics tsh b12  psychiatry follow up.

## 2018-01-15 NOTE — PROGRESS NOTE ADULT - SUBJECTIVE AND OBJECTIVE BOX
TELEMETRY: NORMAL SINUS RHYTHM  NO CHANGES CLINICALLY  STABLE CARDIOVASCULAR STATUS; CONTINUING WITH PRESENT MANAGEMENT.

## 2018-01-15 NOTE — PROGRESS NOTE ADULT - SUBJECTIVE AND OBJECTIVE BOX
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  Pt has been more lethargic.  no n/v (12 Jan 2018 14:07)  no change in status  Allergies    No Known Allergies    Intolerances        MEDICATIONS  (STANDING):  ascorbic acid 500 milliGRAM(s) Oral two times a day  dextrose 5%. 1000 milliLiter(s) (125 mL/Hr) IV Continuous <Continuous>  enoxaparin Injectable 30 milliGRAM(s) SubCutaneous daily  ferrous    sulfate 325 milliGRAM(s) Oral daily  lactulose Syrup 10 Gram(s) Oral two times a day  mirtazapine 15 milliGRAM(s) Oral at bedtime  multivitamin/minerals 1 Tablet(s) Oral daily  piperacillin/tazobactam IVPB. 3.375 Gram(s) IV Intermittent every 8 hours  potassium chloride    Tablet ER 20 milliEquivalent(s) Oral daily  senna 2 Tablet(s) Oral at bedtime  simvastatin 20 milliGRAM(s) Oral at bedtime    MEDICATIONS  (PRN):  acetaminophen  Suppository 650 milliGRAM(s) Rectal every 6 hours PRN For Temp greater than 38 C (100.4 F)  magnesium hydroxide Suspension 30 milliLiter(s) Oral daily PRN Constipation      REVIEW OF SYSTEMS:    unable to obtain     VITAL SIGNS:  T(C): 38.9 (01-15-18 @ 16:26), Max: 39.5 (01-15-18 @ 00:43)  T(F): 102 (01-15-18 @ 16:26), Max: 103.1 (01-15-18 @ 00:43)  HR: 110 (01-15-18 @ 16:26) (93 - 110)  BP: 137/80 (01-15-18 @ 16:26) (131/73 - 156/86)  RR: 18 (01-15-18 @ 16:26) (16 - 18)  SpO2: 99% (01-15-18 @ 16:26) (98% - 100%)  Wt(kg): --    PHYSICAL EXAM:  remains febrile   GENERAL: not in any distress  HEENT: Neck is supple, normocephalic, atraumatic   CHEST/LUNG: Clear to auscultation bilaterally; No rales, rhonchi, wheezing  HEART: Regular rate and rhythm; No murmurs, rubs, or gallops  ABDOMEN: Soft, Nontender, Nondistended; Bowel sounds present, no rebound   EXTREMITIES:  2+ Peripheral Pulses, No clubbing, cyanosis, or edema  contracted   GENITOURINARY: obrien   SKIN: decubiti stable no cellulitis   BACK: no pressor sore   NERVOUS SYSTEM:  arousable trying to talk   LABS:                         9.8    14.5  )-----------( 168      ( 15 Kelvin 2018 06:15 )             30.7     01-15    144  |  111<H>  |  11  ----------------------------<  124<H>  3.4<L>   |  25  |  0.69    Ca    8.9      15 Kelvin 2018 06:15                  T4, Serum: 5.1 ug/dL (01-15 @ 08:37)                Culture Results:   10,000 - 49,000 CFU/mL Escherichia coli (01-13 @ 21:10)  Culture Results:   Culture grew 3 or more types of organisms which indicate  collection contamination; consider recollection only if clinically  indicated. (01-12 @ 11:47)  Culture Results:   Growth in aerobic and anaerobic bottles: Escherichia coli  ***Blood Panel PCR results on this specimen are available  approximately 3 hours after the Gram stain result.***  Gram stain, PCR, and/or culture results may not always  correspond due to difference in methodologies.  ************************************************************  This PCR assay was performed using [employer].  The following targets are tested for: Enterococcus,  vancomycin resistant enterococci, Listeria monocytogenes,  coagulase negative staphylococci, S. aureus,  methicillin resistant S. aureus, Streptococcus agalactiae  (Group B), S. pneumoniae, S. pyogenes (Group A),  Acinetobacter baumannii, Enterobacter cloacae, E. coli,  Klebsiella oxytoca, K. pneumoniae, Proteus sp.,  Serratia marcescens, Haemophilus influenzae,  Neisseria meningitidis, Pseudomonas aeruginosa, Candida  albicans, C. glabrata, C krusei, C parapsilosis,  C. tropicalis and the KPC resistance gene. (01-12 @ 10:07)  Culture Results:   Growth in aerobic and anaerobic bottles: Escherichia coli  See previous culture [id number] (01-12 @ 10:06)                Radiology:

## 2018-01-15 NOTE — PROGRESS NOTE ADULT - ASSESSMENT
sepsis   gram negative rods in  blood ; fairly sensitive e coli ;; more than adequately covered  on broad spectrum antibiotics empirically   status quo today   continue antibiotics   repeat chest xray    has rhino virus as well ; but too many days of fever   will get ct

## 2018-01-16 DIAGNOSIS — G10 HUNTINGTON'S DISEASE: ICD-10-CM

## 2018-01-16 DIAGNOSIS — N20.0 CALCULUS OF KIDNEY: ICD-10-CM

## 2018-01-16 DIAGNOSIS — I10 ESSENTIAL (PRIMARY) HYPERTENSION: ICD-10-CM

## 2018-01-16 DIAGNOSIS — N39.0 URINARY TRACT INFECTION, SITE NOT SPECIFIED: ICD-10-CM

## 2018-01-16 DIAGNOSIS — D64.9 ANEMIA, UNSPECIFIED: ICD-10-CM

## 2018-01-16 LAB
ANION GAP SERPL CALC-SCNC: 9 MMOL/L — SIGNIFICANT CHANGE UP (ref 5–17)
BUN SERPL-MCNC: 11 MG/DL — SIGNIFICANT CHANGE UP (ref 7–23)
CALCIUM SERPL-MCNC: 8.6 MG/DL — SIGNIFICANT CHANGE UP (ref 8.5–10.1)
CHLORIDE SERPL-SCNC: 107 MMOL/L — SIGNIFICANT CHANGE UP (ref 96–108)
CK MB BLD-MCNC: 0 % — SIGNIFICANT CHANGE UP (ref 0–0)
CK MB BLD-MCNC: 0 % — SIGNIFICANT CHANGE UP (ref 0–0)
CK MB BLD-MCNC: 0 % — SIGNIFICANT CHANGE UP (ref 0–3)
CK MB BLD-MCNC: 0 % — SIGNIFICANT CHANGE UP (ref 0–3)
CK MM CFR SERPL: 100 % — SIGNIFICANT CHANGE UP (ref 97–100)
CK MM CFR SERPL: 100 % — SIGNIFICANT CHANGE UP (ref 97–100)
CO2 SERPL-SCNC: 26 MMOL/L — SIGNIFICANT CHANGE UP (ref 22–31)
CPK MACRO TYPE 1: 0 % — SIGNIFICANT CHANGE UP
CPK MACRO TYPE 1: 0 % — SIGNIFICANT CHANGE UP
CPK MACRO TYPE 2: 0 % — SIGNIFICANT CHANGE UP
CPK MACRO TYPE 2: 0 % — SIGNIFICANT CHANGE UP
CREAT SERPL-MCNC: 0.77 MG/DL — SIGNIFICANT CHANGE UP (ref 0.5–1.3)
CREATINE KINASE,TOTAL,SERUM: 116 U/L — SIGNIFICANT CHANGE UP (ref 24–173)
CREATINE KINASE,TOTAL,SERUM: 44 U/L — SIGNIFICANT CHANGE UP (ref 24–173)
GLUCOSE SERPL-MCNC: 152 MG/DL — HIGH (ref 70–99)
HCT VFR BLD CALC: 26.5 % — LOW (ref 34.5–45)
HGB BLD-MCNC: 8.8 G/DL — LOW (ref 11.5–15.5)
MAGNESIUM SERPL-MCNC: 1.7 MG/DL — SIGNIFICANT CHANGE UP (ref 1.6–2.6)
MCHC RBC-ENTMCNC: 26 PG — LOW (ref 27–34)
MCHC RBC-ENTMCNC: 33.1 GM/DL — SIGNIFICANT CHANGE UP (ref 32–36)
MCV RBC AUTO: 78.6 FL — LOW (ref 80–100)
PLATELET # BLD AUTO: 193 K/UL — SIGNIFICANT CHANGE UP (ref 150–400)
POTASSIUM SERPL-MCNC: 3.1 MMOL/L — LOW (ref 3.5–5.3)
POTASSIUM SERPL-SCNC: 3.1 MMOL/L — LOW (ref 3.5–5.3)
RBC # BLD: 3.38 M/UL — LOW (ref 3.8–5.2)
RBC # FLD: 16 % — HIGH (ref 11–15)
SODIUM SERPL-SCNC: 142 MMOL/L — SIGNIFICANT CHANGE UP (ref 135–145)
WBC # BLD: 15.4 K/UL — HIGH (ref 3.8–10.5)
WBC # FLD AUTO: 15.4 K/UL — HIGH (ref 3.8–10.5)

## 2018-01-16 PROCEDURE — 71045 X-RAY EXAM CHEST 1 VIEW: CPT | Mod: 26

## 2018-01-16 PROCEDURE — 74177 CT ABD & PELVIS W/CONTRAST: CPT | Mod: 26

## 2018-01-16 PROCEDURE — 71250 CT THORAX DX C-: CPT | Mod: 26

## 2018-01-16 RX ORDER — IOHEXOL 300 MG/ML
50 INJECTION, SOLUTION INTRAVENOUS ONCE
Qty: 0 | Refills: 0 | Status: DISCONTINUED | OUTPATIENT
Start: 2018-01-16 | End: 2018-01-16

## 2018-01-16 RX ORDER — POTASSIUM CHLORIDE 20 MEQ
40 PACKET (EA) ORAL ONCE
Qty: 0 | Refills: 0 | Status: COMPLETED | OUTPATIENT
Start: 2018-01-16 | End: 2018-01-16

## 2018-01-16 RX ORDER — MEROPENEM 1 G/30ML
INJECTION INTRAVENOUS
Qty: 0 | Refills: 0 | Status: DISCONTINUED | OUTPATIENT
Start: 2018-01-17 | End: 2018-01-19

## 2018-01-16 RX ORDER — IOHEXOL 300 MG/ML
30 INJECTION, SOLUTION INTRAVENOUS ONCE
Qty: 0 | Refills: 0 | Status: COMPLETED | OUTPATIENT
Start: 2018-01-16 | End: 2018-01-16

## 2018-01-16 RX ORDER — MEROPENEM 1 G/30ML
500 INJECTION INTRAVENOUS ONCE
Qty: 0 | Refills: 0 | Status: COMPLETED | OUTPATIENT
Start: 2018-01-16 | End: 2018-01-17

## 2018-01-16 RX ORDER — MEROPENEM 1 G/30ML
500 INJECTION INTRAVENOUS EVERY 8 HOURS
Qty: 0 | Refills: 0 | Status: DISCONTINUED | OUTPATIENT
Start: 2018-01-17 | End: 2018-01-19

## 2018-01-16 RX ADMIN — PIPERACILLIN AND TAZOBACTAM 12.5 GRAM(S): 4; .5 INJECTION, POWDER, LYOPHILIZED, FOR SOLUTION INTRAVENOUS at 21:22

## 2018-01-16 RX ADMIN — PIPERACILLIN AND TAZOBACTAM 12.5 GRAM(S): 4; .5 INJECTION, POWDER, LYOPHILIZED, FOR SOLUTION INTRAVENOUS at 06:29

## 2018-01-16 RX ADMIN — Medication 1 TABLET(S): at 13:41

## 2018-01-16 RX ADMIN — IOHEXOL 30 MILLILITER(S): 300 INJECTION, SOLUTION INTRAVENOUS at 13:43

## 2018-01-16 RX ADMIN — Medication 650 MILLIGRAM(S): at 21:22

## 2018-01-16 RX ADMIN — LACTULOSE 10 GRAM(S): 10 SOLUTION ORAL at 17:54

## 2018-01-16 RX ADMIN — LACTULOSE 10 GRAM(S): 10 SOLUTION ORAL at 06:28

## 2018-01-16 RX ADMIN — Medication 500 MILLIGRAM(S): at 17:55

## 2018-01-16 RX ADMIN — SIMVASTATIN 20 MILLIGRAM(S): 20 TABLET, FILM COATED ORAL at 21:24

## 2018-01-16 RX ADMIN — SODIUM CHLORIDE 125 MILLILITER(S): 9 INJECTION, SOLUTION INTRAVENOUS at 13:44

## 2018-01-16 RX ADMIN — Medication 325 MILLIGRAM(S): at 13:42

## 2018-01-16 RX ADMIN — Medication 650 MILLIGRAM(S): at 00:17

## 2018-01-16 RX ADMIN — Medication 20 MILLIEQUIVALENT(S): at 13:43

## 2018-01-16 RX ADMIN — SENNA PLUS 2 TABLET(S): 8.6 TABLET ORAL at 21:24

## 2018-01-16 RX ADMIN — Medication 500 MILLIGRAM(S): at 06:28

## 2018-01-16 RX ADMIN — Medication 40 MILLIEQUIVALENT(S): at 17:55

## 2018-01-16 RX ADMIN — MIRTAZAPINE 15 MILLIGRAM(S): 45 TABLET, ORALLY DISINTEGRATING ORAL at 21:24

## 2018-01-16 RX ADMIN — ENOXAPARIN SODIUM 30 MILLIGRAM(S): 100 INJECTION SUBCUTANEOUS at 13:41

## 2018-01-16 RX ADMIN — PIPERACILLIN AND TAZOBACTAM 12.5 GRAM(S): 4; .5 INJECTION, POWDER, LYOPHILIZED, FOR SOLUTION INTRAVENOUS at 17:54

## 2018-01-16 NOTE — PROGRESS NOTE ADULT - ASSESSMENT
Subjective Complaints:  Historian:         REVIEW OF SYSTEMS:  Eyes:  Good vision, no reported pain  ENT:  No sore throat, pain, runny nose, dysphagia  CV:  No pain, palpitatioins, hypo/hypertension  Resp:  No dyspnea, cough, tachypnea, wheezing  GI:  No pain, nausea, vomiting, diarrhea, constipatiion  Muscle:  No pain, weakness  Neuro:  No weakness, tingling, memory problems  Psych:  No fatigue, insomnia, mood problems, depression  Endocrine:  No polyuria, polydypsia, cold/heat intolerance    Vital Signs Last 24 Hrs  T(C): 38.5 (16 Jan 2018 16:45), Max: 39.1 (15 Kelvin 2018 23:52)  T(F): 101.3 (16 Jan 2018 16:45), Max: 102.4 (15 Kelvin 2018 23:52)  HR: 96 (16 Jan 2018 16:45) (95 - 114)  BP: 120/70 (16 Jan 2018 16:45) (92/53 - 150/82)  BP(mean): --  RR: 18 (16 Jan 2018 16:45) (17 - 18)  SpO2: 99% (16 Jan 2018 16:45) (98% - 100%)    GENERAL PHYSICAL EXAM:  General:  Appears stated age, well-groomed, well-nourished, no distress  HEENT:  NC/AT, patent nares w/ pink mucosa, OP clear w/o lesions, PERRL, EOMI, conjunctivae clear, no thyromegaly, nodules, adenopathy, no JVD  Chest:  Full & symmetric excursion, no increased effort, breath sounds clear  Cardiovascular:  Regular rhythm, S1, S2, no murmur/rub/S3/S4, no carotid/femoral/abdominal bruit, radial/pedal pulses 2+, no edema  Abdomen:  Soft, non-tender, non-distended, normoactive bowel sounds, no HSM  Extremities:  Gait & station:   Digits:   Nails:   Joints, Bones, Muscles:   ROM:   Stability:  Skin:  No rash/erythema/ecchymoses/petechiae/wounds/abscess/warm/dry  Musculoskeletal:  Full ROM in all joints w/o swelling/tenderness/effusion    NEUROLOGICAL EXAM:  HENT:  Normocephalic head; atraumatic head.  Neck supple.  ENT: normal looking.  Mental State:     awake open eyes does not follws  commands  arm leg spastic contracted                         8.8    15.4  )-----------( 193      ( 16 Jan 2018 08:53 )             26.5     01-16    142  |  107  |  11  ----------------------------<  152<H>  3.1<L>   |  26  |  0.77    Ca    8.6      16 Jan 2018 08:53  Mg     1.7     01-16     ass= awake open eyes  arm leg spastic contracted hx of depresssion  metabolic encepahalaothy  no seziure         RADIOLOGY & ADDITIONAL STUDIES:        Recommendations:  on iv antibiotics  sepsis  for sub acute rehab for swallowing  eval  will follow

## 2018-01-16 NOTE — PROGRESS NOTE ADULT - SUBJECTIVE AND OBJECTIVE BOX
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  Pt has been more lethargic.  no n/v (12 Jan 2018 14:07)      INTERVAL HPI/OVERNIGHT EVENTS:  no new c/o. Fever > 103    MEDICATIONS  (STANDING):  ascorbic acid 500 milliGRAM(s) Oral two times a day  dextrose 5%. 1000 milliLiter(s) (125 mL/Hr) IV Continuous <Continuous>  enoxaparin Injectable 30 milliGRAM(s) SubCutaneous daily  ferrous    sulfate 325 milliGRAM(s) Oral daily  lactulose Syrup 10 Gram(s) Oral two times a day  mirtazapine 15 milliGRAM(s) Oral at bedtime  multivitamin/minerals 1 Tablet(s) Oral daily  piperacillin/tazobactam IVPB. 3.375 Gram(s) IV Intermittent every 8 hours  potassium chloride    Tablet ER 20 milliEquivalent(s) Oral daily  senna 2 Tablet(s) Oral at bedtime  simvastatin 20 milliGRAM(s) Oral at bedtime    MEDICATIONS  (PRN):  acetaminophen  Suppository 650 milliGRAM(s) Rectal every 6 hours PRN For Temp greater than 38 C (100.4 F)  magnesium hydroxide Suspension 30 milliLiter(s) Oral daily PRN Constipation      FAMILY HISTORY:  No pertinent family history in first degree relatives      Allergies    No Known Allergies    Intolerances        PMH/PSH:  Decubital ulcer  Hemiparesis  Failure to thrive in adult  UTI (urinary tract infection)  Dehydration  Rhabdomyolysis  HTN (hypertension)  Neurogenic bladder disorder  Pressure ulcer of sacrum  UTI (urinary tract infection)  Embolism  DVT (deep venous thrombosis)  Anemia  Pneumonia  DM (diabetes mellitus)  High cholesterol  [place] chorea  No pertinent past medical history  No significant past surgical history        REVIEW OF SYSTEMS:  CONSTITUTIONAL: No fever, weight loss, or fatigue  EYES: No eye pain, visual disturbances, or discharge  ENMT:  No difficulty hearing, tinnitus, vertigo; No sinus or throat pain  NECK: No pain or stiffness  BREASTS: No pain, masses, or nipple discharge  RESPIRATORY: No cough, wheezing, chills or hemoptysis; No shortness of breath  CARDIOVASCULAR: No chest pain, palpitations, dizziness, or leg swelling  GASTROINTESTINAL: No abdominal or epigastric pain. No nausea, vomiting, or hematemesis; No diarrhea or constipation. No melena or hematochezia.  GENITOURINARY: No dysuria, frequency, hematuria, or incontinence  NEUROLOGICAL: No headaches, memory loss, loss of strength, numbness, or tremors  SKIN: No itching, burning, rashes, or lesions   LYMPH NODES: No enlarged glands  ENDOCRINE: No heat or cold intolerance; No hair loss  MUSCULOSKELETAL: No joint pain or swelling; No muscle, back, or extremity pain  PSYCHIATRIC: No depression, anxiety, mood swings, or difficulty sleeping  HEME/LYMPH: No easy bruising, or bleeding gums  ALLERGY AND IMMUNOLOGIC: No hives or eczema    Vital Signs Last 24 Hrs  T(C): 38.5 (16 Jan 2018 16:45), Max: 39.1 (15 Kelvin 2018 23:52)  T(F): 101.3 (16 Jan 2018 16:45), Max: 102.4 (15 Kelvin 2018 23:52)  HR: 96 (16 Jan 2018 16:45) (95 - 114)  BP: 120/70 (16 Jan 2018 16:45) (92/53 - 150/82)  BP(mean): --  RR: 18 (16 Jan 2018 16:45) (17 - 18)  SpO2: 99% (16 Jan 2018 16:45) (98% - 100%)    PHYSICAL EXAM:  GENERAL: NAD, well-groomed, well-developed  HEAD:  Atraumatic, Normocephalic  EYES: EOMI, PERRLA, conjunctiva and sclera clear  NECK: Supple, No JVD, Normal thyroid  NERVOUS SYSTEM:  Alert & Oriented  CHEST/LUNG: Clear to percussion bilaterally; No rales, rhonchi, wheezing, or rubs  HEART: Regular rate and rhythm; No murmurs, rubs, or gallops  ABDOMEN: Soft, Nontender, Nondistended; Bowel sounds present  EXTREMITIES:  2+ Peripheral Pulses, No clubbing, cyanosis, or edema  LYMPH: No lymphadenopathy noted  SKIN: No rashes or lesions    LAB                          8.8    15.4  )-----------( 193      ( 16 Jan 2018 08:53 )             26.5       CBC:  01-16 @ 08:53  WBC 15.4   Hgb 8.8   Hct 26.5   Plts 193  MCV 78.6  01-15 @ 06:15  WBC 14.5   Hgb 9.8   Hct 30.7   Plts 168  MCV 79.9  01-14 @ 04:21  WBC 13.4   Hgb 8.8   Hct 26.8   Plts 171  MCV 79.8  01-13 @ 05:39  WBC 12.5   Hgb 8.7   Hct 28.1   Plts 175  MCV 79.8  01-13 @ 01:37  WBC 11.2   Hgb 9.5   Hct 29.1   Plts 165  MCV 79.2  01-12 @ 14:18  WBC 13.2   Hgb 9.8   Hct 30.6   Plts 182  MCV 79.8  01-12 @ 07:37  WBC 15.0   Hgb 11.4   Hct 34.9   Plts 263  MCV 79.2      Chemistry:  01-16 @ 08:53  Na+ 142  K+ 3.1  Cl- 107  CO2 26  BUN 11  Cr 0.77     01-15 @ 06:15  Na+ 144  K+ 3.4  Cl- 111  CO2 25  BUN 11  Cr 0.69     01-14 @ 04:21  Na+ 159  K+ 3.0  Cl- 130  CO2 23  BUN 23  Cr 0.87     01-13 @ 05:39  Na+ 165  K+ 3.3  Cl- 135  CO2 23  BUN 37  Cr 1.05     01-13 @ 01:37  Na+ 166  K+ 3.2  Cl- 136  CO2 23  BUN 38  Cr 1.11     01-12 @ 07:37  Na+ 161  K+ 3.6  Cl- 126  CO2 27  BUN 62  Cr 1.60         Glucose, Serum: 152 mg/dL (01-16 @ 08:53)  Glucose, Serum: 124 mg/dL (01-15 @ 06:15)  Glucose, Serum: 141 mg/dL (01-14 @ 04:21)  Glucose, Serum: 168 mg/dL (01-13 @ 05:39)  Glucose, Serum: 146 mg/dL (01-13 @ 01:37)  Glucose, Serum: 160 mg/dL (01-12 @ 07:37)      16 Jan 2018 08:53    142    |  107    |  11     ----------------------------<  152    3.1     |  26     |  0.77   15 Kelvin 2018 06:15    144    |  111    |  11     ----------------------------<  124    3.4     |  25     |  0.69   14 Jan 2018 04:21    159    |  130    |  23     ----------------------------<  141    3.0     |  23     |  0.87   13 Jan 2018 05:39    165    |  135    |  37     ----------------------------<  168    3.3     |  23     |  1.05   13 Jan 2018 01:37    166    |  136    |  38     ----------------------------<  146    3.2     |  23     |  1.11   12 Jan 2018 07:37    161    |  126    |  62     ----------------------------<  160    3.6     |  27     |  1.60     Ca    8.6        16 Jan 2018 08:53  Ca    8.9        15 Kelvin 2018 06:15  Ca    9.2        14 Jan 2018 04:21  Ca    8.8        13 Jan 2018 05:39  Ca    8.1        13 Jan 2018 01:37  Ca    10.2       12 Jan 2018 07:37  Mg     1.7       16 Jan 2018 08:53    TPro  7.8    /  Alb  2.1    /  TBili  0.3    /  DBili  x      /  AST  24     /  ALT  23     /  AlkPhos  58     13 Jan 2018 05:39  TPro  7.8    /  Alb  2.1    /  TBili  0.4    /  DBili  x      /  AST  17     /  ALT  25     /  AlkPhos  60     13 Jan 2018 01:37  TPro  10.1   /  Alb  2.7    /  TBili  0.4    /  DBili  x      /  AST  20     /  ALT  29     /  AlkPhos  73     12 Jan 2018 07:37              CAPILLARY BLOOD GLUCOSE              RADIOLOGY & ADDITIONAL TESTS:      < from: CT Chest No Cont (01.16.18 @ 16:08) >    EXAM:  CT CHEST                          EXAM:  CT ABDOMEN AND PELVIS OC                             PROCEDURE DATE:  01/16/2018          INTERPRETATION:  Clinical information: Fever, urosepsis, chest pain    Comparison: 2/5/2017    PROCEDURE:   CT of the Chest, abdomen and pelvis was performed with intravenous   contrast.  90ml of Omnipaque 350 was injected intravenously. 10cc was discarded.    FINDINGS:    CHEST:    CHEST WALL AND LOWER NECK: Heterogeneously nodular, nonemergent sonogram   may be considered.  MEDIASTINUM AND KOKO: Within normal limits  HEART: Within normal limits  VESSELS: Within normal limits  LUNG AND LARGE AIRWAYS: Within normal limits    ABDOMEN:  LIVER: Within normal limits  BILE DUCTS: Normal caliber  GALLBLADDER:No calcified gallstones. Normal caliber wall  PANCREAS: within normal limits  SPLEEN: within normal limits  ADRENALS: within normal limits  KIDNEYS: Bilateral intrarenal nonobstructing calculi up to 1.5 cm on the   right. Mild left-sided hydroureteronephrosis, suspect 2 stones in the   distal left ureter 0.9 and 0.6 cm each, new from 2/17. Left urothelial   thickening, left renal enlargement and mildly heterogeneous nephrograms.    PELVIS:  REPRODUCTIVE ORGANS: no pelvic masses  BLADDER: Thickened, contracted around Mcfarland catheter.    BOWEL: Large stool in the colon and rectum. Thickened rectum, correlate   for proctitis.  PERITONEUM: No ascites or free air, no fluid collection    VESSELS: Within normal limits.  RETROPERITONEUM: No enlarged retroperitoneal or pelvic nodes  ABDOMINAL WALL: Large sacral decubitus ulcer.  BONES: Extensive bilateral hip arthropathy and inflammatory synovitis.    IMPRESSION:     Mild left-sided hydroureteronephrosis, suspect 2 stones in the distal   left ureter 0.9 and 0.6 cm each, new from 2/17. Left-sided pyelitis and   pyelonephritis.    Other incidental findings as above.                        TEOFILO RUELAS M.D., ATTENDING RADIOLOGIST  This document has been electronically signed. Jan 16 2018  4:32PM                < end of copied text >  < from: CT Abdomen and Pelvis w/ Oral Cont and w/ IV Cont (01.16.18 @ 16:08) >    EXAM:  CT CHEST                          EXAM:  CT ABDOMEN AND PELVIS OC IC                            PROCEDURE DATE:  01/16/2018          INTERPRETATION:  Clinical information: Fever, urosepsis, chest pain    Comparison: 2/5/2017    PROCEDURE:   CT of the Chest, abdomen and pelvis was performed with intravenous   contrast.  90ml of Omnipaque 350 was injected intravenously. 10cc was discarded.    FINDINGS:    CHEST:    CHEST WALL AND LOWER NECK: Heterogeneously nodular, nonemergent sonogram   may be considered.  MEDIASTINUM AND KOKO: Within normal limits  HEART: Within normal limits  VESSELS: Within normal limits  LUNG AND LARGE AIRWAYS: Within normal limits    ABDOMEN:  LIVER: Within normal limits  BILE DUCTS: Normal caliber  GALLBLADDER:No calcified gallstones. Normal caliber wall  PANCREAS: within normal limits  SPLEEN: within normal limits  ADRENALS: within normal limits  KIDNEYS: Bilateral intrarenal nonobstructing calculi up to 1.5 cm on the   right. Mild left-sided hydroureteronephrosis, suspect 2 stones in the   distal left ureter 0.9 and 0.6 cm each, new from 2/17. Left urothelial   thickening, left renal enlargement and mildly heterogeneous nephrograms.    PELVIS:  REPRODUCTIVE ORGANS: no pelvic masses  BLADDER: Thickened, contracted around Mcfarland catheter.    BOWEL: Large stool in the colon and rectum. Thickened rectum, correlate   for proctitis.  PERITONEUM: No ascites or free air, no fluid collection    VESSELS: Within normal limits.  RETROPERITONEUM: No enlarged retroperitoneal or pelvic nodes  ABDOMINAL WALL: Large sacral decubitus ulcer.  BONES: Extensive bilateral hip arthropathy and inflammatory synovitis.    IMPRESSION:     Mild left-sided hydroureteronephrosis, suspect 2 stones in the distal   left ureter 0.9 and 0.6 cm each, new from 2/17. Left-sided pyelitis and   pyelonephritis.    Other incidental findings as above.                        TEOFILO RUELAS M.D., ATTENDING RADIOLOGIST  This document has been electronically signed. Jan 16 2018  4:32PM                < end of copied text >      Imaging Personally Reviewed:  [ ] YES  [ ] NO    Consultant(s) Notes Reviewed:  [ ] YES  [ ] NO    Care Discussed with Consultants/Other Providers [ ] YES  [ ] NO

## 2018-01-16 NOTE — PROGRESS NOTE ADULT - ASSESSMENT
sepsis   gram negative rods in  blood ; fairly sensitive e coli ;; more than adequately covered  on broad spectrum antibiotics empirically   status quo today   continue antibiotics   repeat chest xray    has rhino virus as well ; but too many days of fever   gu eval needed asap sepsis   gram negative rods in  blood ; fairly sensitive e coli ;; more than adequately covered  on broad spectrum antibiotics empirically   status quo today   continue antibiotics   repeat chest xray    has rhino virus as well ; but too many days of fever   gu eval needed asap   to be discussed with primary care physician  switch to [place] for now

## 2018-01-16 NOTE — PROGRESS NOTE ADULT - ASSESSMENT
RADIOLOGY & ADDITIONAL TESTS:  A/P  fever - cooling blanket  sepsis - + blood cx to e coli - suseptable to zosyn  rsv + supportive measures  10-49 k ecoli in urine - but this likley taken after pt on abx - continue zosyn - apprecitae id help  respiratory status - improved  dehydration - iv fluid d5 w, recheck bmp  renal insufficiency - improved on ivf  cv - felt stable by cardiology - echocardiogram pending  neuro - reported h/o of Ashli's chorea - neurology does not recommed retesting - felt stable by neurology - appreciate neurolgy help  prognsis - guarded  d/w with pt and daughter     Virgilio Broderick MD

## 2018-01-16 NOTE — PROGRESS NOTE ADULT - SUBJECTIVE AND OBJECTIVE BOX
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  Pt has been more lethargic.  no n/v (12 Jan 2018 14:07)  no change in status        MEDICATIONS  (STANDING):  ascorbic acid 500 milliGRAM(s) Oral two times a day  dextrose 5%. 1000 milliLiter(s) (125 mL/Hr) IV Continuous <Continuous>  enoxaparin Injectable 30 milliGRAM(s) SubCutaneous daily  ferrous    sulfate 325 milliGRAM(s) Oral daily  lactulose Syrup 10 Gram(s) Oral two times a day  mirtazapine 15 milliGRAM(s) Oral at bedtime  multivitamin/minerals 1 Tablet(s) Oral daily  piperacillin/tazobactam IVPB. 3.375 Gram(s) IV Intermittent every 8 hours  potassium chloride    Tablet ER 20 milliEquivalent(s) Oral daily  senna 2 Tablet(s) Oral at bedtime  simvastatin 20 milliGRAM(s) Oral at bedtime    MEDICATIONS  (PRN):  acetaminophen  Suppository 650 milliGRAM(s) Rectal every 6 hours PRN For Temp greater than 38 C (100.4 F)  magnesium hydroxide Suspension 30 milliLiter(s) Oral daily PRN Constipation      REVIEW OF SYSTEMS:    unable to obtain     VITAL SIGNS:  T(C): 38.5 (01-16-18 @ 16:45), Max: 39.1 (01-15-18 @ 23:52)  T(F): 101.3 (01-16-18 @ 16:45), Max: 102.4 (01-15-18 @ 23:52)  HR: 96 (01-16-18 @ 16:45) (95 - 114)  BP: 120/70 (01-16-18 @ 16:45) (92/53 - 150/82)  RR: 18 (01-16-18 @ 16:45) (17 - 18)  SpO2: 99% (01-16-18 @ 16:45) (98% - 100%)  Wt(kg): --    PHYSICAL EXAM:    GENERAL: not in any distress  HEENT: Neck is supple, normocephalic, atraumatic   CHEST/LUNG: Clear to auscultation bilaterally; No rales, rhonchi, wheezing  HEART: Regular rate and rhythm; No murmurs, rubs, or gallops  ABDOMEN: Soft, Nontender, Nondistended; Bowel sounds present, no rebound   EXTREMITIES:  2+ Peripheral Pulses, No clubbing, cyanosis, or edema  contracted   GENITOURINARY: NEGATIVE   SKIN: multiple decubiti  BACK: no pressor sore   NERVOUS SYSTEM: arousable in nad    LABS:                         8.8    15.4  )-----------( 193      ( 16 Jan 2018 08:53 )             26.5     01-16    142  |  107  |  11  ----------------------------<  152<H>  3.1<L>   |  26  |  0.77    Ca    8.6      16 Jan 2018 08:53  Mg     1.7     01-16                  T4, Serum: 5.1 ug/dL (01-15 @ 08:37)                Culture Results:   10,000 - 49,000 CFU/mL Escherichia coli (01-13 @ 21:10)  Culture Results:   Culture grew 3 or more types of organisms which indicate  collection contamination; consider recollection only if clinically  indicated. (01-12 @ 11:47)  Culture Results:   Growth in aerobic and anaerobic bottles: Escherichia coli  ***Blood Panel PCR results on this specimen are available  approximately 3 hours after the Gram stain result.***  Gram stain, PCR, and/or culture results may not always  correspond due to difference in methodologies.  ************************************************************  This PCR assay was performed using [employer].  The following targets are tested for: Enterococcus,  vancomycin resistant enterococci, Listeria monocytogenes,  coagulase negative staphylococci, S. aureus,  methicillin resistant S. aureus, Streptococcus agalactiae  (Group B), S. pneumoniae, S. pyogenes (Group A),  Acinetobacter baumannii, Enterobacter cloacae, E. coli,  Klebsiella oxytoca, K. pneumoniae, Proteus sp.,  Serratia marcescens, Haemophilus influenzae,  Neisseria meningitidis, Pseudomonas aeruginosa, Candida  albicans, C. glabrata, C krusei, C parapsilosis,  C. tropicalis and the KPC resistance gene. (01-12 @ 10:07)  Culture Results:   Growth in aerobic and anaerobic bottles: Escherichia coli  See previous culture [id number] (01-12 @ 10:06)                Radiology:      < from: CT Chest No Cont (01.16.18 @ 16:08) >  MPRESSION:     Mild left-sided hydroureteronephrosis, suspect 2 stones in the distal   left ureter 0.9 and 0.6 cm each, new from 2/17. Left-sided pyelitis and   pyelonephritis.    Other incidental findings as above.                        TEOFILO RUELAS M.D., ATTENDING RADIOLOGIST  This document has been electronically signed. 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  Pt has been more lethargic.  no n/v (12 Jan 2018 14:07)  no change in status        MEDICATIONS  (STANDING):  ascorbic acid 500 milliGRAM(s) Oral two times a day  dextrose 5%. 1000 milliLiter(s) (125 mL/Hr) IV Continuous <Continuous>  enoxaparin Injectable 30 milliGRAM(s) SubCutaneous daily  ferrous    sulfate 325 milliGRAM(s) Oral daily  lactulose Syrup 10 Gram(s) Oral two times a day  mirtazapine 15 milliGRAM(s) Oral at bedtime  multivitamin/minerals 1 Tablet(s) Oral daily  piperacillin/tazobactam IVPB. 3.375 Gram(s) IV Intermittent every 8 hours  potassium chloride    Tablet ER 20 milliEquivalent(s) Oral daily  senna 2 Tablet(s) Oral at bedtime  simvastatin 20 milliGRAM(s) Oral at bedtime    MEDICATIONS  (PRN):  acetaminophen  Suppository 650 milliGRAM(s) Rectal every 6 hours PRN For Temp greater than 38 C (100.4 F)  magnesium hydroxide Suspension 30 milliLiter(s) Oral daily PRN Constipation      REVIEW OF SYSTEMS:    unable to obtain   daily fevers now 3 days 103 earlier 101.4 now   VITAL SIGNS:  T(C): 38.5 (01-16-18 @ 16:45), Max: 39.1 (01-15-18 @ 23:52)  T(F): 101.3 (01-16-18 @ 16:45), Max: 102.4 (01-15-18 @ 23:52)  HR: 96 (01-16-18 @ 16:45) (95 - 114)  BP: 120/70 (01-16-18 @ 16:45) (92/53 - 150/82)  RR: 18 (01-16-18 @ 16:45) (17 - 18)  SpO2: 99% (01-16-18 @ 16:45) (98% - 100%)  Wt(kg): --    PHYSICAL EXAM:    GENERAL: not in any distress  HEENT: Neck is supple, normocephalic, atraumatic   CHEST/LUNG: Clear to auscultation bilaterally; No rales, rhonchi, wheezing  HEART: Regular rate and rhythm; No murmurs, rubs, or gallops  ABDOMEN: Soft, Nontender, Nondistended; Bowel sounds present, no rebound   EXTREMITIES:  2+ Peripheral Pulses, No clubbing, cyanosis, or edema  contracted   GENITOURINARY: NEGATIVE   SKIN: multiple decubiti  BACK: no pressor sore   NERVOUS SYSTEM: arousable in nad    LABS:                         8.8    15.4  )-----------( 193      ( 16 Jan 2018 08:53 )             26.5     01-16    142  |  107  |  11  ----------------------------<  152<H>  3.1<L>   |  26  |  0.77    Ca    8.6      16 Jan 2018 08:53  Mg     1.7     01-16                  T4, Serum: 5.1 ug/dL (01-15 @ 08:37)                Culture Results:   10,000 - 49,000 CFU/mL Escherichia coli (01-13 @ 21:10)  Culture Results:   Culture grew 3 or more types of organisms which indicate  collection contamination; consider recollection only if clinically  indicated. (01-12 @ 11:47)  Culture Results:   Growth in aerobic and anaerobic bottles: Escherichia coli  ***Blood Panel PCR results on this specimen are available  approximately 3 hours after the Gram stain result.***  Gram stain, PCR, and/or culture results may not always  correspond due to difference in methodologies.  ************************************************************  This PCR assay was performed using [employer].  The following targets are tested for: Enterococcus,  vancomycin resistant enterococci, Listeria monocytogenes,  coagulase negative staphylococci, S. aureus,  methicillin resistant S. aureus, Streptococcus agalactiae  (Group B), S. pneumoniae, S. pyogenes (Group A),  Acinetobacter baumannii, Enterobacter cloacae, E. coli,  Klebsiella oxytoca, K. pneumoniae, Proteus sp.,  Serratia marcescens, Haemophilus influenzae,  Neisseria meningitidis, Pseudomonas aeruginosa, Candida  albicans, C. glabrata, C krusei, C parapsilosis,  C. tropicalis and the KPC resistance gene. (01-12 @ 10:07)  Culture Results:   Growth in aerobic and anaerobic bottles: Escherichia coli  See previous culture [id number] (01-12 @ 10:06)                Radiology:      < from: CT Chest No Cont (01.16.18 @ 16:08) >  MPRESSION:     Mild left-sided hydroureteronephrosis, suspect 2 stones in the distal   left ureter 0.9 and 0.6 cm each, new from 2/17. Left-sided pyelitis and   pyelonephritis.    Other incidental findings as above.                        TEOFILO RUELAS M.D., ATTENDING RADIOLOGIST  This document has been electronically signed. Jan 16 2018  4:32PM                < end of copied text >

## 2018-01-17 DIAGNOSIS — N10 ACUTE PYELONEPHRITIS: ICD-10-CM

## 2018-01-17 DIAGNOSIS — E87.6 HYPOKALEMIA: ICD-10-CM

## 2018-01-17 DIAGNOSIS — N31.9 NEUROMUSCULAR DYSFUNCTION OF BLADDER, UNSPECIFIED: ICD-10-CM

## 2018-01-17 DIAGNOSIS — J18.9 PNEUMONIA, UNSPECIFIED ORGANISM: ICD-10-CM

## 2018-01-17 DIAGNOSIS — A41.9 SEPSIS, UNSPECIFIED ORGANISM: ICD-10-CM

## 2018-01-17 DIAGNOSIS — N20.0 CALCULUS OF KIDNEY: ICD-10-CM

## 2018-01-17 DIAGNOSIS — N13.2 HYDRONEPHROSIS WITH RENAL AND URETERAL CALCULOUS OBSTRUCTION: ICD-10-CM

## 2018-01-17 LAB
ANION GAP SERPL CALC-SCNC: 9 MMOL/L — SIGNIFICANT CHANGE UP (ref 5–17)
BUN SERPL-MCNC: 10 MG/DL — SIGNIFICANT CHANGE UP (ref 7–23)
CALCIUM SERPL-MCNC: 9 MG/DL — SIGNIFICANT CHANGE UP (ref 8.5–10.1)
CHLORIDE SERPL-SCNC: 107 MMOL/L — SIGNIFICANT CHANGE UP (ref 96–108)
CO2 SERPL-SCNC: 24 MMOL/L — SIGNIFICANT CHANGE UP (ref 22–31)
CREAT SERPL-MCNC: 0.56 MG/DL — SIGNIFICANT CHANGE UP (ref 0.5–1.3)
GLUCOSE SERPL-MCNC: 183 MG/DL — HIGH (ref 70–99)
HCT VFR BLD CALC: 26.7 % — LOW (ref 34.5–45)
HGB BLD-MCNC: 8.7 G/DL — LOW (ref 11.5–15.5)
MCHC RBC-ENTMCNC: 25.4 PG — LOW (ref 27–34)
MCHC RBC-ENTMCNC: 32.7 GM/DL — SIGNIFICANT CHANGE UP (ref 32–36)
MCV RBC AUTO: 77.7 FL — LOW (ref 80–100)
PLATELET # BLD AUTO: 231 K/UL — SIGNIFICANT CHANGE UP (ref 150–400)
POTASSIUM SERPL-MCNC: 3 MMOL/L — LOW (ref 3.5–5.3)
POTASSIUM SERPL-SCNC: 3 MMOL/L — LOW (ref 3.5–5.3)
RBC # BLD: 3.44 M/UL — LOW (ref 3.8–5.2)
RBC # FLD: 16 % — HIGH (ref 11–15)
SODIUM SERPL-SCNC: 140 MMOL/L — SIGNIFICANT CHANGE UP (ref 135–145)
WBC # BLD: 13.2 K/UL — HIGH (ref 3.8–10.5)
WBC # FLD AUTO: 13.2 K/UL — HIGH (ref 3.8–10.5)

## 2018-01-17 PROCEDURE — 93306 TTE W/DOPPLER COMPLETE: CPT | Mod: 26

## 2018-01-17 RX ORDER — HYDROCORTISONE 1 %
1 OINTMENT (GRAM) TOPICAL
Qty: 0 | Refills: 0 | Status: DISCONTINUED | OUTPATIENT
Start: 2018-01-17 | End: 2018-01-19

## 2018-01-17 RX ORDER — POTASSIUM CHLORIDE 20 MEQ
10 PACKET (EA) ORAL
Qty: 0 | Refills: 0 | Status: COMPLETED | OUTPATIENT
Start: 2018-01-17 | End: 2018-01-17

## 2018-01-17 RX ORDER — POTASSIUM CHLORIDE 20 MEQ
40 PACKET (EA) ORAL ONCE
Qty: 0 | Refills: 0 | Status: COMPLETED | OUTPATIENT
Start: 2018-01-17 | End: 2018-01-17

## 2018-01-17 RX ADMIN — MEROPENEM 100 MILLIGRAM(S): 1 INJECTION INTRAVENOUS at 06:06

## 2018-01-17 RX ADMIN — Medication 20 MILLIEQUIVALENT(S): at 17:11

## 2018-01-17 RX ADMIN — Medication 40 MILLIEQUIVALENT(S): at 21:52

## 2018-01-17 RX ADMIN — LACTULOSE 10 GRAM(S): 10 SOLUTION ORAL at 06:06

## 2018-01-17 RX ADMIN — MEROPENEM 100 MILLIGRAM(S): 1 INJECTION INTRAVENOUS at 21:53

## 2018-01-17 RX ADMIN — SIMVASTATIN 20 MILLIGRAM(S): 20 TABLET, FILM COATED ORAL at 21:54

## 2018-01-17 RX ADMIN — Medication 100 MILLIEQUIVALENT(S): at 17:10

## 2018-01-17 RX ADMIN — Medication 500 MILLIGRAM(S): at 06:06

## 2018-01-17 RX ADMIN — Medication 500 MILLIGRAM(S): at 17:13

## 2018-01-17 RX ADMIN — Medication 650 MILLIGRAM(S): at 19:11

## 2018-01-17 RX ADMIN — Medication 1 TABLET(S): at 17:09

## 2018-01-17 RX ADMIN — SENNA PLUS 2 TABLET(S): 8.6 TABLET ORAL at 21:52

## 2018-01-17 RX ADMIN — Medication 325 MILLIGRAM(S): at 17:10

## 2018-01-17 RX ADMIN — Medication 100 MILLIEQUIVALENT(S): at 13:21

## 2018-01-17 RX ADMIN — MIRTAZAPINE 15 MILLIGRAM(S): 45 TABLET, ORALLY DISINTEGRATING ORAL at 21:52

## 2018-01-17 RX ADMIN — Medication 1 APPLICATION(S): at 22:13

## 2018-01-17 RX ADMIN — MEROPENEM 100 MILLIGRAM(S): 1 INJECTION INTRAVENOUS at 17:13

## 2018-01-17 RX ADMIN — Medication 100 MILLIEQUIVALENT(S): at 21:53

## 2018-01-17 RX ADMIN — MEROPENEM 100 MILLIGRAM(S): 1 INJECTION INTRAVENOUS at 00:37

## 2018-01-17 RX ADMIN — Medication 650 MILLIGRAM(S): at 06:05

## 2018-01-17 RX ADMIN — LACTULOSE 10 GRAM(S): 10 SOLUTION ORAL at 17:12

## 2018-01-17 RX ADMIN — ENOXAPARIN SODIUM 30 MILLIGRAM(S): 100 INJECTION SUBCUTANEOUS at 17:12

## 2018-01-17 NOTE — PHYSICAL THERAPY INITIAL EVALUATION ADULT - PLANNED THERAPY INTERVENTIONS, PT EVAL
ROM/balance training/bed mobility training/gait training/motor coordination training/postural re-education/strengthening/transfer training

## 2018-01-17 NOTE — DIETITIAN INITIAL EVALUATION ADULT. - PERTINENT LABORATORY DATA
01-17 Na140 mmol/L Glu 183 mg/dL<H> K+ 3.0 mmol/L<L> Cr  0.56 mg/dL BUN 10 mg/dL Phos n/a   Alb n/a   PAB n/a

## 2018-01-17 NOTE — DIETITIAN INITIAL EVALUATION ADULT. - PHYSICAL APPEARANCE
contracted/debilitated/underweight/BMI = 22.5; no edema noted; Nutrition focused physical exam conducted ; found signs of malnutrition [ ]absent [ ]present.  Subcutaneous fat loss: [moderate ] Orbital fat pads region, [moderate ]Buccal fat region, [severe ]Triceps region,  [severe ]Ribs region.  Muscle wasting: [ severe]Temples region, [severe ]Clavicle region, [severe ]Shoulder region, [unable ]Scapula region, [severe ]Interosseous region,  [unable ]thigh region, [unable ]Calf region

## 2018-01-17 NOTE — PROGRESS NOTE ADULT - SUBJECTIVE AND OBJECTIVE BOX
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  Pt has been more lethargic.  no n/v (12 Jan 2018 14:07)      INTERVAL HPI/OVERNIGHT EVENTS:    MEDICATIONS  (STANDING):  ascorbic acid 500 milliGRAM(s) Oral two times a day  dextrose 5%. 1000 milliLiter(s) (125 mL/Hr) IV Continuous <Continuous>  enoxaparin Injectable 30 milliGRAM(s) SubCutaneous daily  ferrous    sulfate 325 milliGRAM(s) Oral daily  lactulose Syrup 10 Gram(s) Oral two times a day  meropenem IVPB      meropenem IVPB 500 milliGRAM(s) IV Intermittent every 8 hours  mirtazapine 15 milliGRAM(s) Oral at bedtime  multivitamin/minerals 1 Tablet(s) Oral daily  potassium chloride    Tablet ER 20 milliEquivalent(s) Oral daily  senna 2 Tablet(s) Oral at bedtime  simvastatin 20 milliGRAM(s) Oral at bedtime    MEDICATIONS  (PRN):  acetaminophen  Suppository 650 milliGRAM(s) Rectal every 6 hours PRN For Temp greater than 38 C (100.4 F)  magnesium hydroxide Suspension 30 milliLiter(s) Oral daily PRN Constipation      FAMILY HISTORY:  No pertinent family history in first degree relatives      Allergies    No Known Allergies    Intolerances        PMH/PSH:  Decubital ulcer  Hemiparesis  Failure to thrive in adult  UTI (urinary tract infection)  Dehydration  Rhabdomyolysis  HTN (hypertension)  Neurogenic bladder disorder  Pressure ulcer of sacrum  UTI (urinary tract infection)  Embolism  DVT (deep venous thrombosis)  Anemia  Pneumonia  DM (diabetes mellitus)  High cholesterol  Ashli chorea  No pertinent past medical history  No significant past surgical history        REVIEW OF SYSTEMS:  CONSTITUTIONAL: No fever, weight loss, or fatigue  EYES: No eye pain, visual disturbances, or discharge  ENMT:  No difficulty hearing, tinnitus, vertigo; No sinus or throat pain  NECK: No pain or stiffness  BREASTS: No pain, masses, or nipple discharge  RESPIRATORY: No cough, wheezing, chills or hemoptysis; No shortness of breath  CARDIOVASCULAR: No chest pain, palpitations, dizziness, or leg swelling  GASTROINTESTINAL: No abdominal or epigastric pain. No nausea, vomiting, or hematemesis; No diarrhea or constipation. No melena or hematochezia.  GENITOURINARY: No dysuria, frequency, hematuria, or incontinence  NEUROLOGICAL: No headaches, memory loss, loss of strength, numbness, or tremors  SKIN: No itching, burning, rashes, or lesions   LYMPH NODES: No enlarged glands  ENDOCRINE: No heat or cold intolerance; No hair loss  MUSCULOSKELETAL: No joint pain or swelling; No muscle, back, or extremity pain  PSYCHIATRIC: No depression, anxiety, mood swings, or difficulty sleeping  HEME/LYMPH: No easy bruising, or bleeding gums  ALLERGY AND IMMUNOLOGIC: No hives or eczema    Vital Signs Last 24 Hrs  T(C): 38.5 (17 Jan 2018 05:50), Max: 38.5 (16 Jan 2018 16:45)  T(F): 101.3 (17 Jan 2018 05:50), Max: 101.3 (16 Jan 2018 16:45)  HR: 125 (17 Jan 2018 05:50) (96 - 125)  BP: 125/75 (17 Jan 2018 05:50) (92/53 - 126/78)  BP(mean): --  RR: 16 (17 Jan 2018 05:50) (16 - 18)  SpO2: 98% (17 Jan 2018 05:50) (98% - 99%)    PHYSICAL EXAM:  GENERAL: NAD, well-groomed, well-developed  HEAD:  Atraumatic, Normocephalic  EYES: EOMI, PERRLA, conjunctiva and sclera clear  ENMT: No tonsillar erythema, exudates, or enlargement; Moist mucous membranes, Good dentition, No lesions  NECK: Supple, No JVD, Normal thyroid  NERVOUS SYSTEM:  Alert & Oriented X3, Good concentration; Motor Strength 5/5 B/L upper and lower extremities; DTRs 2+ intact and symmetric  CHEST/LUNG: Clear to percussion bilaterally; No rales, rhonchi, wheezing, or rubs  HEART: Regular rate and rhythm; No murmurs, rubs, or gallops  ABDOMEN: Soft, Nontender, Nondistended; Bowel sounds present  EXTREMITIES:  2+ Peripheral Pulses, No clubbing, cyanosis, or edema  LYMPH: No lymphadenopathy noted  SKIN: No rashes or lesions    LAB                          8.7    13.2  )-----------( 231      ( 17 Jan 2018 07:35 )             26.7       CBC:  01-17 @ 07:35  WBC 13.2   Hgb 8.7   Hct 26.7   Plts 231  MCV 77.7  01-16 @ 08:53  WBC 15.4   Hgb 8.8   Hct 26.5   Plts 193  MCV 78.6  01-15 @ 06:15  WBC 14.5   Hgb 9.8   Hct 30.7   Plts 168  MCV 79.9  01-14 @ 04:21  WBC 13.4   Hgb 8.8   Hct 26.8   Plts 171  MCV 79.8  01-13 @ 05:39  WBC 12.5   Hgb 8.7   Hct 28.1   Plts 175  MCV 79.8  01-13 @ 01:37  WBC 11.2   Hgb 9.5   Hct 29.1   Plts 165  MCV 79.2  01-12 @ 14:18  WBC 13.2   Hgb 9.8   Hct 30.6   Plts 182  MCV 79.8  01-12 @ 07:37  WBC 15.0   Hgb 11.4   Hct 34.9   Plts 263  MCV 79.2      Chemistry:  01-17 @ 07:35  Na+ 140  K+ 3.0  Cl- 107  CO2 24  BUN 10  Cr 0.56     01-16 @ 08:53  Na+ 142  K+ 3.1  Cl- 107  CO2 26  BUN 11  Cr 0.77     01-15 @ 06:15  Na+ 144  K+ 3.4  Cl- 111  CO2 25  BUN 11  Cr 0.69     01-14 @ 04:21  Na+ 159  K+ 3.0  Cl- 130  CO2 23  BUN 23  Cr 0.87     01-13 @ 05:39  Na+ 165  K+ 3.3  Cl- 135  CO2 23  BUN 37  Cr 1.05     01-13 @ 01:37  Na+ 166  K+ 3.2  Cl- 136  CO2 23  BUN 38  Cr 1.11     01-12 @ 07:37  Na+ 161  K+ 3.6  Cl- 126  CO2 27  BUN 62  Cr 1.60         Glucose, Serum: 183 mg/dL (01-17 @ 07:35)  Glucose, Serum: 152 mg/dL (01-16 @ 08:53)  Glucose, Serum: 124 mg/dL (01-15 @ 06:15)  Glucose, Serum: 141 mg/dL (01-14 @ 04:21)  Glucose, Serum: 168 mg/dL (01-13 @ 05:39)  Glucose, Serum: 146 mg/dL (01-13 @ 01:37)  Glucose, Serum: 160 mg/dL (01-12 @ 07:37)      17 Jan 2018 07:35    140    |  107    |  10     ----------------------------<  183    3.0     |  24     |  0.56   16 Jan 2018 08:53    142    |  107    |  11     ----------------------------<  152    3.1     |  26     |  0.77   15 Kelvin 2018 06:15    144    |  111    |  11     ----------------------------<  124    3.4     |  25     |  0.69   14 Jan 2018 04:21    159    |  130    |  23     ----------------------------<  141    3.0     |  23     |  0.87   13 Jan 2018 05:39    165    |  135    |  37     ----------------------------<  168    3.3     |  23     |  1.05   13 Jan 2018 01:37    166    |  136    |  38     ----------------------------<  146    3.2     |  23     |  1.11   12 Jan 2018 07:37    161    |  126    |  62     ----------------------------<  160    3.6     |  27     |  1.60     Ca    9.0        17 Jan 2018 07:35  Ca    8.6        16 Jan 2018 08:53  Ca    8.9        15 Kelvin 2018 06:15  Ca    9.2        14 Jan 2018 04:21  Ca    8.8        13 Jan 2018 05:39  Ca    8.1        13 Jan 2018 01:37  Ca    10.2       12 Jan 2018 07:37  Mg     1.7       16 Jan 2018 08:53    TPro  7.8    /  Alb  2.1    /  TBili  0.3    /  DBili  x      /  AST  24     /  ALT  23     /  AlkPhos  58     13 Jan 2018 05:39  TPro  7.8    /  Alb  2.1    /  TBili  0.4    /  DBili  x      /  AST  17     /  ALT  25     /  AlkPhos  60     13 Jan 2018 01:37  TPro  10.1   /  Alb  2.7    /  TBili  0.4    /  DBili  x      /  AST  20     /  ALT  29     /  AlkPhos  73     12 Jan 2018 07:37              CAPILLARY BLOOD GLUCOSE              RADIOLOGY & ADDITIONAL TESTS:    Imaging Personally Reviewed:  [ ] YES  [ ] NO    Consultant(s) Notes Reviewed:  [ ] YES  [ ] NO    Care Discussed with Consultants/Other Providers [ ] YES  [ ] NO Patient is a 62y old  Female who presents with a chief complaint of 61 yo black female with fever up to 103+ (12 Jan 2018 14:07)      HPI:  61 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  Pt has been more lethargic.  no n/v (12 Jan 2018 14:07)      INTERVAL HPI/OVERNIGHT EVENTS:  No new c/o Fever 101.3    MEDICATIONS  (STANDING):  ascorbic acid 500 milliGRAM(s) Oral two times a day  dextrose 5%. 1000 milliLiter(s) (125 mL/Hr) IV Continuous <Continuous>  enoxaparin Injectable 30 milliGRAM(s) SubCutaneous daily  ferrous    sulfate 325 milliGRAM(s) Oral daily  lactulose Syrup 10 Gram(s) Oral two times a day  meropenem IVPB      meropenem IVPB 500 milliGRAM(s) IV Intermittent every 8 hours  mirtazapine 15 milliGRAM(s) Oral at bedtime  multivitamin/minerals 1 Tablet(s) Oral daily  potassium chloride    Tablet ER 20 milliEquivalent(s) Oral daily  senna 2 Tablet(s) Oral at bedtime  simvastatin 20 milliGRAM(s) Oral at bedtime    MEDICATIONS  (PRN):  acetaminophen  Suppository 650 milliGRAM(s) Rectal every 6 hours PRN For Temp greater than 38 C (100.4 F)  magnesium hydroxide Suspension 30 milliLiter(s) Oral daily PRN Constipation      FAMILY HISTORY:  No pertinent family history in first degree relatives      Allergies    No Known Allergies    Intolerances        PMH/PSH:  Decubital ulcer  Hemiparesis  Failure to thrive in adult  UTI (urinary tract infection)  Dehydration  Rhabdomyolysis  HTN (hypertension)  Neurogenic bladder disorder  Pressure ulcer of sacrum  UTI (urinary tract infection)  Embolism  DVT (deep venous thrombosis)  Anemia  Pneumonia  DM (diabetes mellitus)  High cholesterol  Ashli chorea  No pertinent past medical history  No significant past surgical history        REVIEW OF SYSTEMS:  NO c/o   CONSTITUTIONAL: No weight loss,  EYES: No eye pain, visual disturbances, or discharge  ENMT:  No difficulty hearing, tinnitus, vertigo; No sinus or throat pain  NECK: No pain or stiffness  BREASTS: No pain, masses, or nipple discharge  RESPIRATORY: No cough, wheezing, chills or hemoptysis; No shortness of breath  CARDIOVASCULAR: No chest pain, palpitations, dizziness, or leg swelling  GASTROINTESTINAL: No abdominal or epigastric pain. No nausea, vomiting, or hematemesis; No diarrhea or constipation. No melena or hematochezia.  GENITOURINARY: No dysuria, frequency, hematuria, or incontinence  NEUROLOGICAL: No headaches, memory loss, loss of strength, numbness, or tremors  SKIN: No itching, burning, rashes, or lesions   LYMPH NODES: No enlarged glands  ENDOCRINE: No heat or cold intolerance; No hair loss  MUSCULOSKELETAL: No joint pain or swelling; No muscle, back, or extremity pain  PSYCHIATRIC: No depression, anxiety, mood swings, or difficulty sleeping  HEME/LYMPH: No easy bruising, or bleeding gums  ALLERGY AND IMMUNOLOGIC: No hives or eczema    Vital Signs Last 24 Hrs  T(C): 38.5 (17 Jan 2018 05:50), Max: 38.5 (16 Jan 2018 16:45)  T(F): 101.3 (17 Jan 2018 05:50), Max: 101.3 (16 Jan 2018 16:45)  HR: 125 (17 Jan 2018 05:50) (96 - 125)  BP: 125/75 (17 Jan 2018 05:50) (92/53 - 126/78)  BP(mean): --  RR: 16 (17 Jan 2018 05:50) (16 - 18)  SpO2: 98% (17 Jan 2018 05:50) (98% - 99%)    PHYSICAL EXAM:  GENERAL: NAD, well-groomed, well-developed  HEAD:  Atraumatic, Normocephalic  EYES: EOMI, PERRLA, conjunctiva and sclera clear  NECK: Supple, No JVD, Normal thyroid  NERVOUS SYSTEM:  Alert & Oriented X3, Good concentration;   CHEST/LUNG: Clear to percussion bilaterally; No rales, rhonchi, wheezing, or rubs  HEART: Regular rate and rhythm; No murmurs, rubs, or gallops  ABDOMEN: Soft, Nontender, Nondistended; Bowel sounds present  EXTREMITIES:  2+ Peripheral Pulses, No clubbing, cyanosis, or edema  LYMPH: No lymphadenopathy noted  SKIN: No rashes or lesions    LAB                          8.7    13.2  )-----------( 231      ( 17 Jan 2018 07:35 )             26.7       CBC:  01-17 @ 07:35  WBC 13.2   Hgb 8.7   Hct 26.7   Plts 231  MCV 77.7  01-16 @ 08:53  WBC 15.4   Hgb 8.8   Hct 26.5   Plts 193  MCV 78.6  01-15 @ 06:15  WBC 14.5   Hgb 9.8   Hct 30.7   Plts 168  MCV 79.9  01-14 @ 04:21  WBC 13.4   Hgb 8.8   Hct 26.8   Plts 171  MCV 79.8  01-13 @ 05:39  WBC 12.5   Hgb 8.7   Hct 28.1   Plts 175  MCV 79.8  01-13 @ 01:37  WBC 11.2   Hgb 9.5   Hct 29.1   Plts 165  MCV 79.2  01-12 @ 14:18  WBC 13.2   Hgb 9.8   Hct 30.6   Plts 182  MCV 79.8  01-12 @ 07:37  WBC 15.0   Hgb 11.4   Hct 34.9   Plts 263  MCV 79.2      Chemistry:  01-17 @ 07:35  Na+ 140  K+ 3.0  Cl- 107  CO2 24  BUN 10  Cr 0.56     01-16 @ 08:53  Na+ 142  K+ 3.1  Cl- 107  CO2 26  BUN 11  Cr 0.77     01-15 @ 06:15  Na+ 144  K+ 3.4  Cl- 111  CO2 25  BUN 11  Cr 0.69     01-14 @ 04:21  Na+ 159  K+ 3.0  Cl- 130  CO2 23  BUN 23  Cr 0.87     01-13 @ 05:39  Na+ 165  K+ 3.3  Cl- 135  CO2 23  BUN 37  Cr 1.05     01-13 @ 01:37  Na+ 166  K+ 3.2  Cl- 136  CO2 23  BUN 38  Cr 1.11     01-12 @ 07:37  Na+ 161  K+ 3.6  Cl- 126  CO2 27  BUN 62  Cr 1.60         Glucose, Serum: 183 mg/dL (01-17 @ 07:35)  Glucose, Serum: 152 mg/dL (01-16 @ 08:53)  Glucose, Serum: 124 mg/dL (01-15 @ 06:15)  Glucose, Serum: 141 mg/dL (01-14 @ 04:21)  Glucose, Serum: 168 mg/dL (01-13 @ 05:39)  Glucose, Serum: 146 mg/dL (01-13 @ 01:37)  Glucose, Serum: 160 mg/dL (01-12 @ 07:37)      17 Jan 2018 07:35    140    |  107    |  10     ----------------------------<  183    3.0     |  24     |  0.56   16 Jan 2018 08:53    142    |  107    |  11     ----------------------------<  152    3.1     |  26     |  0.77   15 Kelvin 2018 06:15    144    |  111    |  11     ----------------------------<  124    3.4     |  25     |  0.69   14 Jan 2018 04:21    159    |  130    |  23     ----------------------------<  141    3.0     |  23     |  0.87   13 Jan 2018 05:39    165    |  135    |  37     ----------------------------<  168    3.3     |  23     |  1.05   13 Jan 2018 01:37    166    |  136    |  38     ----------------------------<  146    3.2     |  23     |  1.11   12 Jan 2018 07:37    161    |  126    |  62     ----------------------------<  160    3.6     |  27     |  1.60     Ca    9.0        17 Jan 2018 07:35  Ca    8.6        16 Jan 2018 08:53  Ca    8.9        15 Kelvin 2018 06:15  Ca    9.2        14 Jan 2018 04:21  Ca    8.8        13 Jan 2018 05:39  Ca    8.1        13 Jan 2018 01:37  Ca    10.2       12 Jan 2018 07:37  Mg     1.7       16 Jan 2018 08:53    TPro  7.8    /  Alb  2.1    /  TBili  0.3    /  DBili  x      /  AST  24     /  ALT  23     /  AlkPhos  58     13 Jan 2018 05:39  TPro  7.8    /  Alb  2.1    /  TBili  0.4    /  DBili  x      /  AST  17     /  ALT  25     /  AlkPhos  60     13 Jan 2018 01:37  TPro  10.1   /  Alb  2.7    /  TBili  0.4    /  DBili  x      /  AST  20     /  ALT  29     /  AlkPhos  73     12 Jan 2018 07:37              CAPILLARY BLOOD GLUCOSE              RADIOLOGY & ADDITIONAL TESTS:    Imaging Personally Reviewed:  [ ] YES  [ ] NO    Consultant(s) Notes Reviewed:  [ ] YES  [ ] NO    Care Discussed with Consultants/Other Providers [ ] YES  [ ] NO

## 2018-01-17 NOTE — PROGRESS NOTE ADULT - PROBLEM SELECTOR PLAN 1
on Zosyn, fever 103, as per ID Zosyn changed to Merrem, fever 103, as per ID,  WBC 13.2 Zosyn changed to Merrem, fever 101.3, as per ID,  WBC 13.2

## 2018-01-17 NOTE — PROGRESS NOTE ADULT - SUBJECTIVE AND OBJECTIVE BOX
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  Pt has been more lethargic.  no n/v (12 Jan 2018 14:07)  no change in status  now on regular floor    Allergies    No Known Allergies    Intolerances        MEDICATIONS  (STANDING):  ascorbic acid 500 milliGRAM(s) Oral two times a day  dextrose 5%. 1000 milliLiter(s) (63 mL/Hr) IV Continuous <Continuous>  enoxaparin Injectable 30 milliGRAM(s) SubCutaneous daily  ferrous    sulfate 325 milliGRAM(s) Oral daily  lactulose Syrup 10 Gram(s) Oral two times a day  meropenem IVPB      meropenem IVPB 500 milliGRAM(s) IV Intermittent every 8 hours  mirtazapine 15 milliGRAM(s) Oral at bedtime  multivitamin/minerals 1 Tablet(s) Oral daily  potassium chloride    Tablet ER 20 milliEquivalent(s) Oral daily  potassium chloride    Tablet ER 40 milliEquivalent(s) Oral once  potassium chloride  10 mEq/100 mL IVPB 10 milliEquivalent(s) IV Intermittent every 1 hour  senna 2 Tablet(s) Oral at bedtime  simvastatin 20 milliGRAM(s) Oral at bedtime    MEDICATIONS  (PRN):  acetaminophen  Suppository 650 milliGRAM(s) Rectal every 6 hours PRN For Temp greater than 38 C (100.4 F)  magnesium hydroxide Suspension 30 milliLiter(s) Oral daily PRN Constipation      REVIEW OF SYSTEMS:    CONSTITUTIONAL: No fever, chills, weight loss, or fatigue  HEENT: No sore throat, runny nose, ear ache  RESPIRATORY: No cough, wheezing, No shortness of breath  CARDIOVASCULAR: No chest pain, palpitations, dizziness  GASTROINTESTINAL: No abdominal pain. No nausea, vomiting, diarrhea  GENITOURINARY: No dysuria, increase frequency, hematuria, or incontinence  NEUROLOGICAL: No headaches, memory loss, loss of strength, numbness, or tremors, no weakness  EXTREMITY: No pedal edema BLE  SKIN: No itching, burning, rashes, or lesions     VITAL SIGNS:  T(C): 38.4 (01-17-18 @ 19:07), Max: 38.5 (01-17-18 @ 05:50)  T(F): 101.1 (01-17-18 @ 19:07), Max: 101.3 (01-17-18 @ 05:50)  HR: 113 (01-17-18 @ 19:07) (110 - 125)  BP: 134/71 (01-17-18 @ 19:07) (110/61 - 142/81)  RR: 17 (01-17-18 @ 19:07) (16 - 18)  SpO2: 99% (01-17-18 @ 19:07) (96% - 99%)  Wt(kg): --    PHYSICAL EXAM:    GENERAL: not in any distress  HEENT: Neck is supple, normocephalic, atraumatic   CHEST/LUNG: Clear to auscultation bilaterally; No rales, rhonchi, wheezing  HEART: Regular rate and rhythm; No murmurs, rubs, or gallops  ABDOMEN: Soft, Nontender, Nondistended; Bowel sounds present, no rebound   EXTREMITIES:  2+ Peripheral Pulses, No clubbing, cyanosis, or edema  GENITOURINARY:   SKIN: No rashes or lesions  BACK: no pressor sore   NERVOUS SYSTEM:  Alert & Oriented X3, Good concentration  PSYCH: normal affect     LABS:                         8.7    13.2  )-----------( 231      ( 17 Jan 2018 07:35 )             26.7     01-17    140  |  107  |  10  ----------------------------<  183<H>  3.0<L>   |  24  |  0.56    Ca    9.0      17 Jan 2018 07:35  Mg     1.7     01-16                  T4, Serum: 5.1 ug/dL (01-15 @ 08:37)                Culture Results:   10,000 - 49,000 CFU/mL Escherichia coli (01-13 @ 21:10)  Culture Results:   Culture grew 3 or more types of organisms which indicate  collection contamination; consider recollection only if clinically  indicated. (01-12 @ 11:47)  Culture Results:   Growth in aerobic and anaerobic bottles: Escherichia coli  ***Blood Panel PCR results on this specimen are available  approximately 3 hours after the Gram stain result.***  Gram stain, PCR, and/or culture results may not always  correspond due to difference in methodologies.  ************************************************************  This PCR assay was performed using [employer].  The following targets are tested for: Enterococcus,  vancomycin resistant enterococci, Listeria monocytogenes,  coagulase negative staphylococci, S. aureus,  methicillin resistant S. aureus, Streptococcus agalactiae  (Group B), S. pneumoniae, S. pyogenes (Group A),  Acinetobacter baumannii, Enterobacter cloacae, E. coli,  Klebsiella oxytoca, K. pneumoniae, Proteus sp.,  Serratia marcescens, Haemophilus influenzae,  Neisseria meningitidis, Pseudomonas aeruginosa, Candida  albicans, C. glabrata, C krusei, C parapsilosis,  C. tropicalis and the KPC resistance gene. 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  Pt has been more lethargic.  no n/v (12 Jan 2018 14:07)  no change in status  now on regular floor    Allergies    No Known Allergies    Intolerances        MEDICATIONS  (STANDING):  ascorbic acid 500 milliGRAM(s) Oral two times a day  dextrose 5%. 1000 milliLiter(s) (63 mL/Hr) IV Continuous <Continuous>  enoxaparin Injectable 30 milliGRAM(s) SubCutaneous daily  ferrous    sulfate 325 milliGRAM(s) Oral daily  lactulose Syrup 10 Gram(s) Oral two times a day  meropenem IVPB      meropenem IVPB 500 milliGRAM(s) IV Intermittent every 8 hours  mirtazapine 15 milliGRAM(s) Oral at bedtime  multivitamin/minerals 1 Tablet(s) Oral daily  potassium chloride    Tablet ER 20 milliEquivalent(s) Oral daily  potassium chloride    Tablet ER 40 milliEquivalent(s) Oral once  potassium chloride  10 mEq/100 mL IVPB 10 milliEquivalent(s) IV Intermittent every 1 hour  senna 2 Tablet(s) Oral at bedtime  simvastatin 20 milliGRAM(s) Oral at bedtime    MEDICATIONS  (PRN):  acetaminophen  Suppository 650 milliGRAM(s) Rectal every 6 hours PRN For Temp greater than 38 C (100.4 F)  magnesium hydroxide Suspension 30 milliLiter(s) Oral daily PRN Constipation      REVIEW OF SYSTEMS:    unable to obtain   VITAL SIGNS:  T(C): 38.4 (01-17-18 @ 19:07), Max: 38.5 (01-17-18 @ 05:50)  T(F): 101.1 (01-17-18 @ 19:07), Max: 101.3 (01-17-18 @ 05:50)  HR: 113 (01-17-18 @ 19:07) (110 - 125)  BP: 134/71 (01-17-18 @ 19:07) (110/61 - 142/81)  RR: 17 (01-17-18 @ 19:07) (16 - 18)  SpO2: 99% (01-17-18 @ 19:07) (96% - 99%)  Wt(kg): --    PHYSICAL EXAM:    GENERAL: not in any distress  HEENT: Neck is supple, normocephalic, atraumatic   CHEST/LUNG: Clear to auscultation bilaterally; No rales, rhonchi, wheezing  HEART: Regular rate and rhythm; No murmurs, rubs, or gallops  ABDOMEN: Soft, Nontender, Nondistended; Bowel sounds present, no rebound   EXTREMITIES:  2+ Peripheral Pulses, No clubbing, cyanosis, or edema  contracted  GENITOURINARY: NEGATIVE   SKIN: stable  BACK: no pressor sore   NERVOUS SYSTEM:  Alert & non verbal   LABS:                         8.7    13.2  )-----------( 231      ( 17 Jan 2018 07:35 )             26.7     01-17    140  |  107  |  10  ----------------------------<  183<H>  3.0<L>   |  24  |  0.56    Ca    9.0      17 Jan 2018 07:35  Mg     1.7     01-16                  T4, Serum: 5.1 ug/dL (01-15 @ 08:37)                Culture Results:   10,000 - 49,000 CFU/mL Escherichia coli (01-13 @ 21:10)  Culture Results:   Culture grew 3 or more types of organisms which indicate  collection contamination; consider recollection only if clinically  indicated. (01-12 @ 11:47)  Culture Results:   Growth in aerobic and anaerobic bottles: Escherichia coli  ***Blood Panel PCR results on this specimen are available  approximately 3 hours after the Gram stain result.***  Gram stain, PCR, and/or culture results may not always  correspond due to difference in methodologies.  ************************************************************  This PCR assay was performed using [employer].  The following targets are tested for: Enterococcus,  vancomycin resistant enterococci, Listeria monocytogenes,  coagulase negative staphylococci, S. aureus,  methicillin resistant S. aureus, Streptococcus agalactiae  (Group B), S. pneumoniae, S. pyogenes (Group A),  Acinetobacter baumannii, Enterobacter cloacae, E. coli,  Klebsiella oxytoca, K. pneumoniae, Proteus sp.,  Serratia marcescens, Haemophilus influenzae,  Neisseria meningitidis, Pseudomonas aeruginosa, Candida  albicans, C. glabrata, C krusei, C parapsilosis,  C. tropicalis and the KPC resistance gene. (01-12 @ 10:07)  Culture Results:   Growth in aerobic and anaerobic bottles: Escherichia coli  See previous culture [id number] (01-12 @ 10:06)                Radiology:

## 2018-01-17 NOTE — CONSULT NOTE ADULT - SUBJECTIVE AND OBJECTIVE BOX
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  Pt has been more lethargic. Has positive urine and blood culture E.coli, on appropriate antibiotics. CT scan reveals: Right renal stone, Left hydronephrosis with 2 distal ureteral stones. urinary retention secondary to Hypotonic bladder.  no n/v (2018 14:07)      PAST MEDICAL & SURGICAL HISTORY:  Decubital ulcer: of sacrum - s/p debreedment  Hemiparesis: right side  Failure to thrive in adult  UTI (urinary tract infection)  Dehydration  Rhabdomyolysis: h/o  HTN (hypertension)  Neurogenic bladder disorder  Pressure ulcer of sacrum  UTI (urinary tract infection)  DVT (deep venous thrombosis): right upper extreamity  Anemia  Pneumonia  DM (diabetes mellitus)  High cholesterol  Ashli chorea  No significant past surgical history      Allergies    No Known Allergies      FAMILY HISTORY:  No pertinent family history in first degree relatives      Home Medications:  ferrous sulfate 160 mg (50 mg elemental iron) oral tablet, extended release: 2 tab(s) orally once a day wtih meals (2018 14:17)  lactulose 10 g/15 mL oral syrup: 15 milliliter(s) orally 2 times a day (2018 14:17)  Milk of Magnesia 8% oral suspension: 30 square centimeter orally once a day, As Needed constipation (2018 14:17)  mirtazapine 15 mg oral tablet: 1 tab(s) orally once a day (at bedtime) (2018 14:17)  Senna 8.6 mg oral tablet: 2 tab(s) orally once a day (at bedtime) (2018 14:17)  simvastatin 20 mg oral tablet: 1 tab(s) orally once a day (at bedtime) (2018 14:17)  Theragener-M oral tablet: 1 tab(s) orally once a day (2018 14:17)  Tylenol 325 mg oral tablet: 2 tab(s) orally every 6 hours, As Needed (2018 14:17)  Vitamin C 500 mg oral tablet: 1 tab(s) orally 2 times a day (2018 14:17)      MEDICATIONS  (STANDING):  ascorbic acid 500 milliGRAM(s) Oral two times a day  dextrose 5%. 1000 milliLiter(s) (63 mL/Hr) IV Continuous <Continuous>  ferrous    sulfate 325 milliGRAM(s) Oral daily  lactulose Syrup 10 Gram(s) Oral two times a day  meropenem IVPB      meropenem IVPB 500 milliGRAM(s) IV Intermittent every 8 hours  mirtazapine 15 milliGRAM(s) Oral at bedtime  multivitamin/minerals 1 Tablet(s) Oral daily  potassium chloride    Tablet ER 20 milliEquivalent(s) Oral daily  potassium chloride    Tablet ER 40 milliEquivalent(s) Oral once  potassium chloride  10 mEq/100 mL IVPB 10 milliEquivalent(s) IV Intermittent every 1 hour  senna 2 Tablet(s) Oral at bedtime  simvastatin 20 milliGRAM(s) Oral at bedtime    MEDICATIONS  (PRN):  acetaminophen  Suppository 650 milliGRAM(s) Rectal every 6 hours PRN For Temp greater than 38 C (100.4 F)  magnesium hydroxide Suspension 30 milliLiter(s) Oral daily PRN Constipation      ROS:    pt is non communicative. History obtained from the chart.      Physical Exam:    Vital Signs:  Vital Signs Last 24 Hrs  T(C): 38.4 (2018 19:07), Max: 38.5 (2018 05:50)  T(F): 101.1 (2018 19:07), Max: 101.3 (2018 05:50)  HR: 113 (2018 19:07) (110 - 125)  BP: 134/71 (2018 19:07) (110/61 - 142/81)  BP(mean): --  RR: 17 (2018 19:07) (16 - 18)  SpO2: 99% (2018 19:07) (96% - 99%)  Daily     Daily Weight in k.8 (2018 11:34)  I&O's Summary    2018 07:  -  2018 07:00  --------------------------------------------------------  IN: 300 mL / OUT: 1600 mL / NET: -1300 mL    2018 07:01  -  2018 20:20  --------------------------------------------------------  IN: 1100 mL / OUT: 800 mL / NET: 300 mL        General:  Appears stated age,  well-nourished, no distress  HEENT:  NC/AT, patent nares w/ pink mucosa, OP moist and pink,   conjunctivae clear  Chest:  Full & symmetric excursion, no increased effort.   Abdomen:  Soft, non-tender, non-distended, normoactive bowel sounds. Limited Exam;   Pelvic Exam: Deferred. Mcfarland catheter in place draining clear urine.  Rectal Examination: Deferred.  Extremities:  no edema, pedal pulsation are present, no calf tenderness, multiple contractures  Skin:  No rash/erythema  Neuro/Psych:  Alert and conscious. Grossly intact and symmetrical.      LABS:                        8.7    13.2  )-----------( 231      ( 2018 07:35 )             26.7     01-17    140  |  107  |  10  ----------------------------<  183<H>  3.0<L>   |  24  |  0.56    Ca    9.0      2018 07:35  Mg     1.7     01-16            Culture - Urine (collected 2018 21:10)  Source: .Urine Catheterized  Final Report (15 Kelvin 2018 16:42):    10,000 - 49,000 CFU/mL Escherichia coli  Organism: Escherichia coli (15 Kelvin 2018 16:42)  Organism: Escherichia coli (15 Kelvin 2018 16:42)    Sensitivities:      -  Amikacin: S <=8      -  Ampicillin: S <=2      -  Ampicillin/Sulbactam: S <=4/2      -  Aztreonam: S <=4      -  Cefazolin: S <=2      -  Cefepime: S <=2      -  Cefoxitin: S <=4      -  Ceftazidime: S <=1      -  Ceftriaxone: S <=1      -  Ciprofloxacin: R >2      -  Ertapenem: S <=0.5      -  Gentamicin: S <=1      -  Imipenem: S <=1      -  Levofloxacin: R >4      -  Meropenem: S <=1      -  Nitrofurantoin: S <=32      -  Piperacillin/Tazobactam: S <=8      -  Tobramycin: S <=2      -  Trimethoprim/Sulfamethoxazole: R >      Method Type: CHRISTINE        RADIOLOGY & ADDITIONAL STUDIES:    < from: CT Abdomen and Pelvis w/ Oral Cont and w/ IV Cont (18 @ 16:08) >  EXAM:  CT CHEST                          EXAM:  CT ABDOMEN AND PELVIS OC IC                            PROCEDURE DATE:  2018          INTERPRETATION:  Clinical information: Fever, urosepsis, chest pain    Comparison: 2017    PROCEDURE:   CT of the Chest, abdomen and pelvis was performed with intravenous   contrast.  90ml of Omnipaque 350 was injected intravenously. 10cc was discarded.    FINDINGS:    CHEST:    CHEST WALL AND LOWER NECK: Heterogeneously nodular, nonemergent sonogram   may be considered.  MEDIASTINUM AND KOKO: Within normal limits  HEART: Within normal limits  VESSELS: Within normal limits  LUNG AND LARGE AIRWAYS: Within normal limits    ABDOMEN:  LIVER: Within normal limits  BILE DUCTS: Normal caliber  GALLBLADDER:No calcified gallstones. Normal caliber wall  PANCREAS: within normal limits  SPLEEN: within normal limits  ADRENALS: within normal limits  KIDNEYS: Bilateral intrarenal nonobstructing calculi up to 1.5 cm on the   right. Mild left-sided hydroureteronephrosis, suspect 2 stones in the   distal left ureter 0.9 and 0.6 cm each, new from . Left urothelial   thickening, left renal enlargement and mildly heterogeneous nephrograms.    PELVIS:  REPRODUCTIVE ORGANS: no pelvic masses  BLADDER: Thickened, contracted around Mcfarland catheter.    BOWEL: Large stool in the colon and rectum. Thickened rectum, correlate   for proctitis.  PERITONEUM: No ascites or free air, no fluid collection    VESSELS: Within normal limits.  RETROPERITONEUM: No enlarged retroperitoneal or pelvic nodes  ABDOMINAL WALL: Large sacral decubitus ulcer.  BONES: Extensive bilateral hip arthropathy and inflammatory synovitis.    IMPRESSION:     Mild left-sided hydroureteronephrosis, suspect 2 stones in the distal   left ureter 0.9 and 0.6 cm each, new from . Left-sided pyelitis and   pyelonephritis.    Other incidental findings as above.                        TEOFILO RUELAS M.D., ATTENDING RADIOLOGIST  This document has been electronically signed. 2018  4:32PM

## 2018-01-17 NOTE — PROGRESS NOTE ADULT - SUBJECTIVE AND OBJECTIVE BOX
NO CHANGES CLINICALLY     CARDIOVASCULAR STATUS STABLE; WILL SIGN OFF; PLEASE RECONSULT AS  NEEDED.     CARRIE HOYOS MD, FACC

## 2018-01-17 NOTE — PHYSICAL THERAPY INITIAL EVALUATION ADULT - IMPAIRMENTS CONTRIBUTING IMPAIRED BED MOBILITY, REHAB EVAL
impaired motor control/impaired balance/impaired postural control/abnormal muscle tone/decreased strength

## 2018-01-17 NOTE — CHART NOTE - NSCHARTNOTEFT_GEN_A_CORE
Upon Nutritional Assessment by the Registered Dietitian your patient was determined to meet criteria / has evidence of the following diagnosis/diagnoses:          [ ]  Mild Protein Calorie Malnutrition        [ ]  Moderate Protein Calorie Malnutrition        [ X] Severe Protein Calorie Malnutrition        [ ] Unspecified Protein Calorie Malnutrition        [ ] Underweight / BMI <19        [ ] Morbid Obesity / BMI > 40      Findings as based on:  •  Comprehensive nutrition assessment and consultation  •  Calorie counts (nutrient intake analysis)  •  Food acceptance and intake status from observations by staff  •  Follow up  •  Patient education  •  Intervention secondary to interdisciplinary rounds  •   concerns      Treatment:    The following diet has been recommended:   Continue Dysphagia 1 - pureed c thin liquids; add Ensure Enlive x 3/day (provides 1050 kcal, 60 g protein) + Prosource x 1/day (15 g protein) + Talha x 1/day to promote wound healing      PROVIDER Section:     By signing this assessment you are acknowledging and agree with the diagnosis/diagnoses assigned by the Registered Dietitian    Comments:

## 2018-01-17 NOTE — DIETITIAN INITIAL EVALUATION ADULT. - ENERGY NEEDS
Height (cm): 165.1 (01-12)  Weight (kg): 61.2  (01-17)  BMI (kg/m2): 22.5 (01-17)  IBW:  56.8 +/- 10%   % IBW:  108%     UBW:  stable     %UBW: 100%

## 2018-01-17 NOTE — DIETITIAN INITIAL EVALUATION ADULT. - PERTINENT MEDS FT
Lovenox, Merrem, D5 @ 125 ml/hr, KCl, Vitamin C, Ferrous Sulfate, Lactulose, Maalox, Remeron, MVI, Zocor

## 2018-01-17 NOTE — PHYSICAL THERAPY INITIAL EVALUATION ADULT - MODIFIED CLINICAL TEST OF SENSORY INTEGRATION IN BALANCE TEST
Barthel Index: Feeding Score _0__, Bathing Score _0__, Grooming Score _0__, Dressing Score _0__, Bowels Score _0__, Bladder Score _0__, Toilet Score _0__, Transfers Score __0_, Mobility Score _0__, Stairs Score _0__,     Total Score _0__

## 2018-01-17 NOTE — CONSULT NOTE ADULT - ASSESSMENT
Continue antibiotics. Plan for b/l uteroscopy, possible removal of stones, insertion of stents. Scheduled for tomorrow. Will discuss with family.

## 2018-01-17 NOTE — PROGRESS NOTE ADULT - ASSESSMENT
sepsis   gram negative rods in  blood ; fairly sensitive e coli ;; more than adequately covered  on broad spectrum antibiotics empirically   status quo today   continue antibiotics   repeat chest xray    has rhino virus as well ; but too many days of fever   gu eval needed asap   to be discussed with primary care physician  switch to merrem for now sepsis   gram negative rods in  blood ; fairly sensitive e coli ;; more than adequately covered  on broad spectrum antibiotics empirically   status quo today   continue antibiotics   repeat chest xray    has rhino virus as well ; but too many days of fever   gu eval needed asap ; discussed with dr falcon today   on merrem   ?? releif of hydro ?? deferred to nanie

## 2018-01-17 NOTE — DIETITIAN INITIAL EVALUATION ADULT. - OTHER INFO
Pt seen for consult - pressure ulcers. Pt resides at SNF; diet there pureed c thin liquids + Prosource x 2/day + Ensure Plus x 3/day + Arginaid x 2/day + Ensure Pudding x 1/day.  Per CNA, pt drinks liquids; Ensure Enlive appropriate to add to diet rx. Pt [facility] indicates hx DM but no meds given & no CHO reduced diet rx.; advised RN re situation; will request A1c lab.; also request stop D5.  No reports of N/V; BM regular; last x 1 (1/17). Wt has been stable over last year based on previous nutrition assessment conducted on 2/8/17 ( 62.4 kg).

## 2018-01-18 LAB
ANION GAP SERPL CALC-SCNC: 8 MMOL/L — SIGNIFICANT CHANGE UP (ref 5–17)
APTT BLD: 29.3 SEC — SIGNIFICANT CHANGE UP (ref 27.5–37.4)
BLD GP AB SCN SERPL QL: SIGNIFICANT CHANGE UP
BUN SERPL-MCNC: 8 MG/DL — SIGNIFICANT CHANGE UP (ref 7–23)
CALCIUM SERPL-MCNC: 9.4 MG/DL — SIGNIFICANT CHANGE UP (ref 8.5–10.1)
CHLORIDE SERPL-SCNC: 107 MMOL/L — SIGNIFICANT CHANGE UP (ref 96–108)
CO2 SERPL-SCNC: 26 MMOL/L — SIGNIFICANT CHANGE UP (ref 22–31)
CREAT SERPL-MCNC: 0.54 MG/DL — SIGNIFICANT CHANGE UP (ref 0.5–1.3)
GLUCOSE SERPL-MCNC: 121 MG/DL — HIGH (ref 70–99)
HCT VFR BLD CALC: 25.7 % — LOW (ref 34.5–45)
HGB BLD-MCNC: 8.7 G/DL — LOW (ref 11.5–15.5)
INR BLD: 1.55 RATIO — HIGH (ref 0.88–1.16)
MAGNESIUM SERPL-MCNC: 2.1 MG/DL — SIGNIFICANT CHANGE UP (ref 1.6–2.6)
MCHC RBC-ENTMCNC: 26.4 PG — LOW (ref 27–34)
MCHC RBC-ENTMCNC: 33.7 GM/DL — SIGNIFICANT CHANGE UP (ref 32–36)
MCV RBC AUTO: 78.4 FL — LOW (ref 80–100)
PLATELET # BLD AUTO: 268 K/UL — SIGNIFICANT CHANGE UP (ref 150–400)
POTASSIUM SERPL-MCNC: 3.6 MMOL/L — SIGNIFICANT CHANGE UP (ref 3.5–5.3)
POTASSIUM SERPL-SCNC: 3.6 MMOL/L — SIGNIFICANT CHANGE UP (ref 3.5–5.3)
PROTHROM AB SERPL-ACNC: 17 SEC — HIGH (ref 9.8–12.7)
RBC # BLD: 3.28 M/UL — LOW (ref 3.8–5.2)
RBC # FLD: 15.7 % — HIGH (ref 11–15)
SODIUM SERPL-SCNC: 141 MMOL/L — SIGNIFICANT CHANGE UP (ref 135–145)
WBC # BLD: 11.6 K/UL — HIGH (ref 3.8–10.5)
WBC # FLD AUTO: 11.6 K/UL — HIGH (ref 3.8–10.5)

## 2018-01-18 PROCEDURE — 71045 X-RAY EXAM CHEST 1 VIEW: CPT | Mod: 26

## 2018-01-18 RX ADMIN — MEROPENEM 100 MILLIGRAM(S): 1 INJECTION INTRAVENOUS at 21:51

## 2018-01-18 RX ADMIN — SIMVASTATIN 20 MILLIGRAM(S): 20 TABLET, FILM COATED ORAL at 21:51

## 2018-01-18 RX ADMIN — MEROPENEM 100 MILLIGRAM(S): 1 INJECTION INTRAVENOUS at 06:32

## 2018-01-18 RX ADMIN — SENNA PLUS 2 TABLET(S): 8.6 TABLET ORAL at 21:51

## 2018-01-18 RX ADMIN — MIRTAZAPINE 15 MILLIGRAM(S): 45 TABLET, ORALLY DISINTEGRATING ORAL at 21:51

## 2018-01-18 RX ADMIN — Medication 325 MILLIGRAM(S): at 12:10

## 2018-01-18 RX ADMIN — MEROPENEM 100 MILLIGRAM(S): 1 INJECTION INTRAVENOUS at 13:32

## 2018-01-18 RX ADMIN — LACTULOSE 10 GRAM(S): 10 SOLUTION ORAL at 06:33

## 2018-01-18 RX ADMIN — Medication 1 APPLICATION(S): at 06:33

## 2018-01-18 RX ADMIN — LACTULOSE 10 GRAM(S): 10 SOLUTION ORAL at 18:40

## 2018-01-18 RX ADMIN — Medication 1 TABLET(S): at 13:24

## 2018-01-18 RX ADMIN — Medication 20 MILLIEQUIVALENT(S): at 12:10

## 2018-01-18 RX ADMIN — Medication 650 MILLIGRAM(S): at 23:29

## 2018-01-18 RX ADMIN — Medication 500 MILLIGRAM(S): at 18:41

## 2018-01-18 RX ADMIN — Medication 500 MILLIGRAM(S): at 06:33

## 2018-01-18 NOTE — PROGRESS NOTE ADULT - SUBJECTIVE AND OBJECTIVE BOX
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  Pt has been more lethargic.  no n/v (12 Jan 2018 14:07)      INTERVAL HPI/OVERNIGHT EVENTS:  No new c/o. Tmax 101.1, For cystoscopy with sone removal / possible stent today    MEDICATIONS  (STANDING):  ascorbic acid 500 milliGRAM(s) Oral two times a day  dextrose 5%. 1000 milliLiter(s) (63 mL/Hr) IV Continuous <Continuous>  ferrous    sulfate 325 milliGRAM(s) Oral daily  hydrocortisone 2.5% Cream 1 Application(s) Topical two times a day  lactulose Syrup 10 Gram(s) Oral two times a day  meropenem IVPB      meropenem IVPB 500 milliGRAM(s) IV Intermittent every 8 hours  mirtazapine 15 milliGRAM(s) Oral at bedtime  multivitamin/minerals 1 Tablet(s) Oral daily  potassium chloride    Tablet ER 20 milliEquivalent(s) Oral daily  senna 2 Tablet(s) Oral at bedtime  simvastatin 20 milliGRAM(s) Oral at bedtime    MEDICATIONS  (PRN):  acetaminophen  Suppository 650 milliGRAM(s) Rectal every 6 hours PRN For Temp greater than 38 C (100.4 F)  magnesium hydroxide Suspension 30 milliLiter(s) Oral daily PRN Constipation      FAMILY HISTORY:  No pertinent family history in first degree relatives      Allergies    No Known Allergies    Intolerances        PMH/PSH:  Decubital ulcer  Hemiparesis  Failure to thrive in adult  UTI (urinary tract infection)  Dehydration  Rhabdomyolysis  HTN (hypertension)  Neurogenic bladder disorder  Pressure ulcer of sacrum  UTI (urinary tract infection)  Embolism  DVT (deep venous thrombosis)  Anemia  Pneumonia  DM (diabetes mellitus)  High cholesterol  Tyrrell chorea  No pertinent past medical history  No significant past surgical history        REVIEW OF SYSTEMS:  CONSTITUTIONAL: No weight loss,   EYES: No eye pain, visual disturbances, or discharge  ENMT:  No difficulty hearing, tinnitus, vertigo; No sinus or throat pain  NECK: No pain or stiffness  BREASTS: No pain, masses, or nipple discharge  RESPIRATORY: No cough, wheezing, chills or hemoptysis; No shortness of breath  CARDIOVASCULAR: No chest pain, palpitations, dizziness, or leg swelling  GASTROINTESTINAL: No abdominal or epigastric pain. No nausea, vomiting, or hematemesis; No diarrhea or constipation. No melena or hematochezia.  GENITOURINARY: No dysuria, frequency, hematuria, or incontinence  NEUROLOGICAL: No headaches, numbness, or tremors  SKIN: No itching, burning, rashes, or lesions   LYMPH NODES: No enlarged glands  ENDOCRINE: No heat or cold intolerance; No hair loss  MUSCULOSKELETAL: No joint pain or swelling; No muscle, back, or extremity pain  PSYCHIATRIC: No depression, anxiety, mood swings, or difficulty sleeping  HEME/LYMPH: No easy bruising, or bleeding gums  ALLERGY AND IMMUNOLOGIC: No hives or eczema    Vital Signs Last 24 Hrs  T(C): 36.6 (18 Jan 2018 05:21), Max: 38.4 (17 Jan 2018 19:07)  T(F): 97.8 (18 Jan 2018 05:21), Max: 101.1 (17 Jan 2018 19:07)  HR: 116 (18 Jan 2018 05:21) (103 - 119)  BP: 146/80 (18 Jan 2018 05:21) (110/61 - 146/80)  BP(mean): --  RR: 16 (18 Jan 2018 05:21) (16 - 17)  SpO2: 96% (18 Jan 2018 05:21) (96% - 99%)    PHYSICAL EXAM:  GENERAL: NAD, well-groomed, well-developed  HEAD:  Atraumatic, Normocephalic  EYES: EOMI, PERRLA, conjunctiva and sclera clear  NECK: Supple, No JVD, Normal thyroid  NERVOUS SYSTEM:  Alert & Oriented   CHEST/LUNG: Clear to percussion bilaterally; No rales, rhonchi, wheezing, or rubs  HEART: Regular rate and rhythm; No murmurs, rubs, or gallops  ABDOMEN: Soft, Nontender, Nondistended; Bowel sounds present  EXTREMITIES:  2+ Peripheral Pulses, No clubbing, cyanosis, or edema  LYMPH: No lymphadenopathy noted  SKIN: No rashes or lesions    LAB                          8.7    11.6  )-----------( 268      ( 18 Jan 2018 08:01 )             25.7       CBC:  01-18 @ 08:01  WBC 11.6   Hgb 8.7   Hct 25.7   Plts 268  MCV 78.4  01-17 @ 07:35  WBC 13.2   Hgb 8.7   Hct 26.7   Plts 231  MCV 77.7  01-16 @ 08:53  WBC 15.4   Hgb 8.8   Hct 26.5   Plts 193  MCV 78.6  01-15 @ 06:15  WBC 14.5   Hgb 9.8   Hct 30.7   Plts 168  MCV 79.9  01-14 @ 04:21  WBC 13.4   Hgb 8.8   Hct 26.8   Plts 171  MCV 79.8  01-13 @ 05:39  WBC 12.5   Hgb 8.7   Hct 28.1   Plts 175  MCV 79.8  01-13 @ 01:37  WBC 11.2   Hgb 9.5   Hct 29.1   Plts 165  MCV 79.2  01-12 @ 14:18  WBC 13.2   Hgb 9.8   Hct 30.6   Plts 182  MCV 79.8  01-12 @ 07:37  WBC 15.0   Hgb 11.4   Hct 34.9   Plts 263  MCV 79.2      Chemistry:  01-18 @ 08:01  Na+ 141  K+ 3.6  Cl- 107  CO2 26  BUN 8  Cr 0.54     01-17 @ 07:35  Na+ 140  K+ 3.0  Cl- 107  CO2 24  BUN 10  Cr 0.56     01-16 @ 08:53  Na+ 142  K+ 3.1  Cl- 107  CO2 26  BUN 11  Cr 0.77     01-15 @ 06:15  Na+ 144  K+ 3.4  Cl- 111  CO2 25  BUN 11  Cr 0.69     01-14 @ 04:21  Na+ 159  K+ 3.0  Cl- 130  CO2 23  BUN 23  Cr 0.87     01-13 @ 05:39  Na+ 165  K+ 3.3  Cl- 135  CO2 23  BUN 37  Cr 1.05     01-13 @ 01:37  Na+ 166  K+ 3.2  Cl- 136  CO2 23  BUN 38  Cr 1.11     01-12 @ 07:37  Na+ 161  K+ 3.6  Cl- 126  CO2 27  BUN 62  Cr 1.60         Glucose, Serum: 121 mg/dL (01-18 @ 08:01)  Glucose, Serum: 183 mg/dL (01-17 @ 07:35)  Glucose, Serum: 152 mg/dL (01-16 @ 08:53)  Glucose, Serum: 124 mg/dL (01-15 @ 06:15)  Glucose, Serum: 141 mg/dL (01-14 @ 04:21)  Glucose, Serum: 168 mg/dL (01-13 @ 05:39)  Glucose, Serum: 146 mg/dL (01-13 @ 01:37)  Glucose, Serum: 160 mg/dL (01-12 @ 07:37)      18 Jan 2018 08:01    141    |  107    |  8      ----------------------------<  121    3.6     |  26     |  0.54   17 Jan 2018 07:35    140    |  107    |  10     ----------------------------<  183    3.0     |  24     |  0.56   16 Jan 2018 08:53    142    |  107    |  11     ----------------------------<  152    3.1     |  26     |  0.77   15 Kelvin 2018 06:15    144    |  111    |  11     ----------------------------<  124    3.4     |  25     |  0.69   14 Jan 2018 04:21    159    |  130    |  23     ----------------------------<  141    3.0     |  23     |  0.87   13 Jan 2018 05:39    165    |  135    |  37     ----------------------------<  168    3.3     |  23     |  1.05   13 Jan 2018 01:37    166    |  136    |  38     ----------------------------<  146    3.2     |  23     |  1.11     Ca    9.4        18 Jan 2018 08:01  Ca    9.0        17 Jan 2018 07:35  Ca    8.6        16 Jan 2018 08:53  Ca    8.9        15 Kelvin 2018 06:15  Ca    9.2        14 Jan 2018 04:21  Ca    8.8        13 Jan 2018 05:39  Ca    8.1        13 Jan 2018 01:37  Mg     2.1       18 Jan 2018 08:01  Mg     1.7       16 Jan 2018 08:53    TPro  7.8    /  Alb  2.1    /  TBili  0.3    /  DBili  x      /  AST  24     /  ALT  23     /  AlkPhos  58     13 Jan 2018 05:39  TPro  7.8    /  Alb  2.1    /  TBili  0.4    /  DBili  x      /  AST  17     /  ALT  25     /  AlkPhos  60     13 Jan 2018 01:37  TPro  10.1   /  Alb  2.7    /  TBili  0.4    /  DBili  x      /  AST  20     /  ALT  29     /  AlkPhos  73     12 Jan 2018 07:37      PT/INR - ( 18 Jan 2018 08:01 )   PT: 17.0 sec;   INR: 1.55 ratio         PTT - ( 18 Jan 2018 08:01 )  PTT:29.3 sec        CAPILLARY BLOOD GLUCOSE              RADIOLOGY & ADDITIONAL TESTS:    Imaging Personally Reviewed:  [ ] YES  [ ] NO    Consultant(s) Notes Reviewed:  [ ] YES  [ ] NO    Care Discussed with Consultants/Other Providers [ ] YES  [ ] NO

## 2018-01-18 NOTE — CONSULT NOTE ADULT - SUBJECTIVE AND OBJECTIVE BOX
HPI:  63 yo black female with Juniata's chorea, admitted with fevers to 103 and lethargy        PAST MEDICAL & SURGICAL HISTORY:  Decubital ulcer: of sacrum - s/p debreedment  Hemiparesis: right side  Failure to thrive in adult  UTI (urinary tract infection)  Dehydration  Rhabdomyolysis: h/o  HTN (hypertension)  Neurogenic bladder disorder  Pressure ulcer of sacrum  UTI (urinary tract infection)  DVT (deep venous thrombosis): right upper extreamity  Anemia  Pneumonia  DM (diabetes mellitus)  High cholesterol  Ashli chorea  No significant past surgical history      FAMILY HISTORY:  No pertinent family history in first degree relatives    SOCIAL HISTORY:     Allergies  No Known Allergies          MEDICATIONS  (STANDING):  ascorbic acid 500 milliGRAM(s) Oral two times a day  dextrose 5%. 1000 milliLiter(s) (63 mL/Hr) IV Continuous <Continuous>  ferrous    sulfate 325 milliGRAM(s) Oral daily  hydrocortisone 2.5% Cream 1 Application(s) Topical two times a day  lactulose Syrup 10 Gram(s) Oral two times a day  meropenem IVPB      meropenem IVPB 500 milliGRAM(s) IV Intermittent every 8 hours  mirtazapine 15 milliGRAM(s) Oral at bedtime  multivitamin/minerals 1 Tablet(s) Oral daily  potassium chloride    Tablet ER 20 milliEquivalent(s) Oral daily  senna 2 Tablet(s) Oral at bedtime  simvastatin 20 milliGRAM(s) Oral at bedtime    MEDICATIONS  (PRN):  acetaminophen  Suppository 650 milliGRAM(s) Rectal every 6 hours PRN For Temp greater than 38 C (100.4 F)  magnesium hydroxide Suspension 30 milliLiter(s) Oral daily PRN Constipation    REVIEW OF SYSTEMS:    Constitutional:            No fever, weight loss or fatigue  HEENT:                         No difficulty hearing, tinnitus, vertigo; No sinus or throat pain  Respiratory:                      no sob  Cardiovascular:           No chest pain, palpitations  Gastrointestinal:        No abdominal or epigastric pain. No N/V/diarrhea or hematemesis  Genitourinary:            No dysuria, frequency, hematuria or incontinence  SKIN:                             no rash  Musculoskeletal:        No joint pain or swelling  Extremities:                No swelling  Neurological:              No headaches  Psychiatric:                 No depression, anxiety    PQRS:  Vaccines - Influenza and Pneumovax:  BMI:  Tobacco:  Depression:   Colorectal Screening:  Breast Cancer Screening:  Blood Presssure Screening / Control of:  HbAIc:  Ischemic Vascular Disease:  Current Medications Reviewed:    Vital Signs Last 24 Hrs  T(C): 37.8 (18 Jan 2018 19:02), Max: 37.8 (18 Jan 2018 19:02)  T(F): 100 (18 Jan 2018 19:02), Max: 100 (18 Jan 2018 19:02)  HR: 113 (18 Jan 2018 19:02) (89 - 116)  BP: 122/75 (18 Jan 2018 19:02) (120/78 - 146/80)  BP(mean): --  RR: 16 (18 Jan 2018 19:02) (16 - 17)  SpO2: 95% (18 Jan 2018 19:02) (95% - 97%)    PHYSICAL EXAM:  GEN:         Awake, responsive and comfortable.  HEENT:    Normal.    RESP:       scattered rhonchi. no wheezing  CVS:             Regular rate and rhythm.   ABD:         Soft, non-tender, non-distended;   :             No costovertebral angle tenderness  SKIN:           Warm and dry.  EXTR:            No clubbing, cyanosis or edema  CNS:              Intact sensory and motor function.  PSYCH:        cooperative, no anxiety or depression            LABS:                        8.7    11.6  )-----------( 268      ( 18 Jan 2018 08:01 )             25.7     01-18    141  |  107  |  8   ----------------------------<  121<H>  3.6   |  26  |  0.54    Ca    9.4      18 Jan 2018 08:01  Mg     2.1     01-18      PT/INR - ( 18 Jan 2018 08:01 )   PT: 17.0 sec;   INR: 1.55 ratio         PTT - ( 18 Jan 2018 08:01 )  PTT:29.3 sec            EKG:     RADIOLOGY & ADDITIONAL STUDIES:  < from: Xray Chest 1 View AP/PA. (01.18.18 @ 14:02) >  EXAM:  XR CHEST AP OR PA 1V                            PROCEDURE DATE:  01/18/2018          INTERPRETATION:  cough    A frontal chest film  demonstrates grossly clear lungs.  No acute   infiltrate or consolidation is  noted. The costophrenic anglesare   grossly clear.    The heart size and vascular markings are within normal limits for   technique.      No mediastinal or hilar adenopathy is suggested. .     The osseous structures appear intact intact.     IMPRESSION:  Clear  lungs.  No acute airspace disease suggested.                ALEX DIAZ M.D., ATTENDING RADIOLOGIST  This document has been electronically signed. Jan 18 2018  3:06PM        < end of copied text >    ASSESSMENT: cxr dated 1/16 significant for bilat areas of linear stranding probably representing atelectasis from poor inspiratory effort. resolved in 1/18 study. could not open chest ct. report noted, lungs clear, nodular chest wall findings?  PLAN: iv abs in progress. will discuss chest ct with rads.

## 2018-01-18 NOTE — PROGRESS NOTE ADULT - ASSESSMENT
Subjective Complaints:  Historian:         REVIEW OF SYSTEMS:  Eyes:  Good vision, no reported pain  ENT:  No sore throat, pain, runny nose, dysphagia  CV:  No pain, palpitatioins, hypo/hypertension  Resp:  No dyspnea, cough, tachypnea, wheezing  GI:  No pain, nausea, vomiting, diarrhea, constipatiion  Muscle:  No pain, weakness  Neuro:  No weakness, tingling, memory problems  Psych:  No fatigue, insomnia, mood problems, depression  Endocrine:  No polyuria, polydypsia, cold/heat intolerance    Vital Signs Last 24 Hrs  T(C): 37.8 (18 Jan 2018 19:02), Max: 37.8 (18 Jan 2018 19:02)  T(F): 100 (18 Jan 2018 19:02), Max: 100 (18 Jan 2018 19:02)  HR: 113 (18 Jan 2018 19:02) (89 - 116)  BP: 122/75 (18 Jan 2018 19:02) (120/78 - 146/80)  BP(mean): --  RR: 16 (18 Jan 2018 19:02) (16 - 17)  SpO2: 95% (18 Jan 2018 19:02) (95% - 97%)    GENERAL PHYSICAL EXAM:  General:  Appears stated age, well-groomed, well-nourished, no distress  HEENT:  NC/AT, patent nares w/ pink mucosa, OP clear w/o lesions, PERRL, EOMI, conjunctivae clear, no thyromegaly, nodules, adenopathy, no JVD  Chest:  Full & symmetric excursion, no increased effort, breath sounds clear  Cardiovascular:  Regular rhythm, S1, S2, no murmur/rub/S3/S4, no carotid/femoral/abdominal bruit, radial/pedal pulses 2+, no edema  Abdomen:  Soft, non-tender, non-distended, normoactive bowel sounds, no HSM  Extremities:  Gait & station:   Digits:   Nails:   Joints, Bones, Muscles:   ROM:   Stability:  Skin:  No rash/erythema/ecchymoses/petechiae/wounds/abscess/warm/dry  Musculoskeletal:  Full ROM in all joints w/o swelling/tenderness/effusion    NEUROLOGICAL EXAM:  HENT:  Normocephalic head; atraumatic head.  Neck supple.  ENT: normal looking.  Mental State:    Alert.   awake open eyes arm leg contracted encepahalaothy uti  haematuria                       8.7    11.6  )-----------( 268      ( 18 Jan 2018 08:01 )             25.7     01-18    141  |  107  |  8   ----------------------------<  121<H>  3.6   |  26  |  0.54    Ca    9.4      18 Jan 2018 08:01  Mg     2.1     01-18      PT/INR - ( 18 Jan 2018 08:01 )   PT: 17.0 sec;   INR: 1.55 ratio         PTT - ( 18 Jan 2018 08:01 )  PTT:29.3 sec   ass= awake open eyes  arm leg contracted  uti haematuria  renal stone for cystoscopy  and ureterl stent in am  encephaloapthy     RADIOLOGY & ADDITIONAL STUDIES:        Recommendations:  for pt sub acute rehab

## 2018-01-18 NOTE — PROGRESS NOTE ADULT - SUBJECTIVE AND OBJECTIVE BOX
Patient seen and examined bedside resting comfortably.  [place]cf[place] draining well.      T(F): 100 (01-18-18 @ 19:02), Max: 100 (01-18-18 @ 19:02)  HR: 113 (01-18-18 @ 19:02) (89 - 116)  BP: 122/75 (01-18-18 @ 19:02) (120/78 - 146/80)  RR: 16 (01-18-18 @ 19:02) (16 - 17)  SpO2: 95% (01-18-18 @ 19:02) (95% - 97%)        PHYSICAL EXAM:  General: NAD, WDWN  Neuro:  Alert & conscious  HEENT: NCAT, EOMI, conjunctiva clear  Lung: respirations nonlabored, good inspiratory effort  Abdomen: soft, NTND.   Extremities: no pedal edema or calf tenderness noted   : uncircumcised phallus, adequate meatus.     LABS:                        8.7    11.6  )-----------( 268      ( 18 Jan 2018 08:01 )             25.7     01-18    141  |  107  |  8   ----------------------------<  121<H>  3.6   |  26  |  0.54    Ca    9.4      18 Jan 2018 08:01  Mg     2.1     01-18      PT/INR - ( 18 Jan 2018 08:01 )   PT: 17.0 sec;   INR: 1.55 ratio         PTT - ( 18 Jan 2018 08:01 )  PTT:29.3 sec  I&O's Detail    17 Jan 2018 07:01  -  18 Jan 2018 07:00  --------------------------------------------------------  IN:    dextrose 5%.: 1100 mL  Total IN: 1100 mL    OUT:    Indwelling Catheter - Urethral: 1300 mL  Total OUT: 1300 mL    Total NET: -200 mL      18 Jan 2018 07:01  -  18 Jan 2018 19:41  --------------------------------------------------------  IN:  Total IN: 0 mL    OUT:    Indwelling Catheter - Urethral: 600 mL  Total OUT: 600 mL    Total NET: -600 mL          wbc    01-18 @ 08:01    11.6    01-17 @ 07:35    13.2    01-16 @ 08:53    15.4    01-15 @ 06:15    14.5    01-14 @ 04:21    13.4    01-13 @ 05:39    12.5    01-13 @ 01:37    11.2    01-12 @ 14:18    13.2    01-12 @ 07:37    15.0        cr   01-18 @ 08:01   0.54    01-17 @ 07:35   0.56    01-16 @ 08:53   0.77    01-15 @ 06:15   0.69    01-14 @ 04:21   0.87    01-13 @ 05:39   1.05    01-13 @ 01:37   1.11    01-12 @ 07:37   1.60        < from: Xray Chest 1 View AP/PA. (01.18.18 @ 14:02) >  EXAM:  XR CHEST AP OR PA 1V                            PROCEDURE DATE:  01/18/2018          INTERPRETATION:  cough    A frontal chest film  demonstrates grossly clear lungs.  No acute   infiltrate or consolidation is  noted. The costophrenic anglesare   grossly clear.    The heart size and vascular markings are within normal limits for   technique.      No mediastinal or hilar adenopathy is suggested. .     The osseous structures appear intact intact.     IMPRESSION:  Clear  lungs.  No acute airspace disease suggested.                ALEX DIAZ M.D., ATTENDING RADIOLOGIST  This document has been electronically signed. Jan 18 2018  3:06PM

## 2018-01-18 NOTE — PROGRESS NOTE ADULT - ASSESSMENT
Afebrile , still leucocytosis, CXR: no acute infiltration.   will postpone insertion of stent till tomorrow.

## 2018-01-19 ENCOUNTER — RESULT REVIEW (OUTPATIENT)
Age: 63
End: 2018-01-19

## 2018-01-19 ENCOUNTER — TRANSCRIPTION ENCOUNTER (OUTPATIENT)
Age: 63
End: 2018-01-19

## 2018-01-19 LAB
ALBUMIN SERPL ELPH-MCNC: 2.1 G/DL — LOW (ref 3.3–5)
ALP SERPL-CCNC: 60 U/L — SIGNIFICANT CHANGE UP (ref 40–120)
ALT FLD-CCNC: 25 U/L — SIGNIFICANT CHANGE UP (ref 12–78)
ANION GAP SERPL CALC-SCNC: 8 MMOL/L — SIGNIFICANT CHANGE UP (ref 5–17)
AST SERPL-CCNC: 31 U/L — SIGNIFICANT CHANGE UP (ref 15–37)
BILIRUB SERPL-MCNC: 0.4 MG/DL — SIGNIFICANT CHANGE UP (ref 0.2–1.2)
BUN SERPL-MCNC: 9 MG/DL — SIGNIFICANT CHANGE UP (ref 7–23)
CALCIUM SERPL-MCNC: 9.2 MG/DL — SIGNIFICANT CHANGE UP (ref 8.5–10.1)
CHLORIDE SERPL-SCNC: 106 MMOL/L — SIGNIFICANT CHANGE UP (ref 96–108)
CO2 SERPL-SCNC: 26 MMOL/L — SIGNIFICANT CHANGE UP (ref 22–31)
CREAT SERPL-MCNC: 0.49 MG/DL — LOW (ref 0.5–1.3)
GLUCOSE BLDC GLUCOMTR-MCNC: 125 MG/DL — HIGH (ref 70–99)
GLUCOSE SERPL-MCNC: 115 MG/DL — HIGH (ref 70–99)
HCT VFR BLD CALC: 25.8 % — LOW (ref 34.5–45)
HGB BLD-MCNC: 8.6 G/DL — LOW (ref 11.5–15.5)
INR BLD: 1.56 RATIO — HIGH (ref 0.88–1.16)
MCHC RBC-ENTMCNC: 26 PG — LOW (ref 27–34)
MCHC RBC-ENTMCNC: 33.3 GM/DL — SIGNIFICANT CHANGE UP (ref 32–36)
MCV RBC AUTO: 78 FL — LOW (ref 80–100)
PLATELET # BLD AUTO: 331 K/UL — SIGNIFICANT CHANGE UP (ref 150–400)
POTASSIUM SERPL-MCNC: 3.9 MMOL/L — SIGNIFICANT CHANGE UP (ref 3.5–5.3)
POTASSIUM SERPL-SCNC: 3.9 MMOL/L — SIGNIFICANT CHANGE UP (ref 3.5–5.3)
PROT SERPL-MCNC: 8.7 GM/DL — HIGH (ref 6–8.3)
PROTHROM AB SERPL-ACNC: 17.2 SEC — HIGH (ref 9.8–12.7)
RBC # BLD: 3.31 M/UL — LOW (ref 3.8–5.2)
RBC # FLD: 15.6 % — HIGH (ref 11–15)
SODIUM SERPL-SCNC: 140 MMOL/L — SIGNIFICANT CHANGE UP (ref 135–145)
WBC # BLD: 11.1 K/UL — HIGH (ref 3.8–10.5)
WBC # FLD AUTO: 11.1 K/UL — HIGH (ref 3.8–10.5)

## 2018-01-19 PROCEDURE — 88300 SURGICAL PATH GROSS: CPT | Mod: 26,59

## 2018-01-19 RX ORDER — ONDANSETRON 8 MG/1
4 TABLET, FILM COATED ORAL ONCE
Qty: 0 | Refills: 0 | Status: DISCONTINUED | OUTPATIENT
Start: 2018-01-19 | End: 2018-01-19

## 2018-01-19 RX ORDER — POTASSIUM CHLORIDE 20 MEQ
20 PACKET (EA) ORAL DAILY
Qty: 0 | Refills: 0 | Status: DISCONTINUED | OUTPATIENT
Start: 2018-01-19 | End: 2018-01-20

## 2018-01-19 RX ORDER — MORPHINE SULFATE 50 MG/1
2 CAPSULE, EXTENDED RELEASE ORAL
Qty: 0 | Refills: 0 | Status: DISCONTINUED | OUTPATIENT
Start: 2018-01-19 | End: 2018-01-19

## 2018-01-19 RX ORDER — FERROUS SULFATE 325(65) MG
325 TABLET ORAL DAILY
Qty: 0 | Refills: 0 | Status: DISCONTINUED | OUTPATIENT
Start: 2018-01-19 | End: 2018-02-07

## 2018-01-19 RX ORDER — SODIUM CHLORIDE 9 MG/ML
1000 INJECTION, SOLUTION INTRAVENOUS
Qty: 0 | Refills: 0 | Status: DISCONTINUED | OUTPATIENT
Start: 2018-01-19 | End: 2018-01-19

## 2018-01-19 RX ORDER — HYDROCORTISONE 1 %
1 OINTMENT (GRAM) TOPICAL
Qty: 0 | Refills: 0 | Status: DISCONTINUED | OUTPATIENT
Start: 2018-01-19 | End: 2018-02-07

## 2018-01-19 RX ORDER — SODIUM CHLORIDE 9 MG/ML
1000 INJECTION, SOLUTION INTRAVENOUS
Qty: 0 | Refills: 0 | Status: DISCONTINUED | OUTPATIENT
Start: 2018-01-19 | End: 2018-02-07

## 2018-01-19 RX ORDER — ACETAMINOPHEN 500 MG
650 TABLET ORAL EVERY 6 HOURS
Qty: 0 | Refills: 0 | Status: DISCONTINUED | OUTPATIENT
Start: 2018-01-19 | End: 2018-02-04

## 2018-01-19 RX ORDER — SENNA PLUS 8.6 MG/1
2 TABLET ORAL AT BEDTIME
Qty: 0 | Refills: 0 | Status: DISCONTINUED | OUTPATIENT
Start: 2018-01-19 | End: 2018-02-07

## 2018-01-19 RX ORDER — ASCORBIC ACID 60 MG
500 TABLET,CHEWABLE ORAL
Qty: 0 | Refills: 0 | Status: DISCONTINUED | OUTPATIENT
Start: 2018-01-19 | End: 2018-02-07

## 2018-01-19 RX ORDER — LACTULOSE 10 G/15ML
10 SOLUTION ORAL
Qty: 0 | Refills: 0 | Status: DISCONTINUED | OUTPATIENT
Start: 2018-01-19 | End: 2018-02-07

## 2018-01-19 RX ORDER — MAGNESIUM HYDROXIDE 400 MG/1
30 TABLET, CHEWABLE ORAL DAILY
Qty: 0 | Refills: 0 | Status: DISCONTINUED | OUTPATIENT
Start: 2018-01-19 | End: 2018-02-07

## 2018-01-19 RX ORDER — MIRTAZAPINE 45 MG/1
15 TABLET, ORALLY DISINTEGRATING ORAL AT BEDTIME
Qty: 0 | Refills: 0 | Status: DISCONTINUED | OUTPATIENT
Start: 2018-01-19 | End: 2018-02-07

## 2018-01-19 RX ORDER — MULTIVIT-MIN/FERROUS GLUCONATE 9 MG/15 ML
1 LIQUID (ML) ORAL DAILY
Qty: 0 | Refills: 0 | Status: DISCONTINUED | OUTPATIENT
Start: 2018-01-19 | End: 2018-02-07

## 2018-01-19 RX ORDER — MEROPENEM 1 G/30ML
500 INJECTION INTRAVENOUS EVERY 8 HOURS
Qty: 0 | Refills: 0 | Status: DISCONTINUED | OUTPATIENT
Start: 2018-01-19 | End: 2018-01-28

## 2018-01-19 RX ADMIN — Medication 650 MILLIGRAM(S): at 08:05

## 2018-01-19 RX ADMIN — Medication 1 APPLICATION(S): at 18:48

## 2018-01-19 RX ADMIN — Medication 1 TABLET(S): at 12:47

## 2018-01-19 RX ADMIN — Medication 20 MILLIEQUIVALENT(S): at 12:47

## 2018-01-19 RX ADMIN — Medication 1 APPLICATION(S): at 05:15

## 2018-01-19 RX ADMIN — MEROPENEM 100 MILLIGRAM(S): 1 INJECTION INTRAVENOUS at 21:48

## 2018-01-19 RX ADMIN — LACTULOSE 10 GRAM(S): 10 SOLUTION ORAL at 18:45

## 2018-01-19 RX ADMIN — SODIUM CHLORIDE 63 MILLILITER(S): 9 INJECTION, SOLUTION INTRAVENOUS at 05:16

## 2018-01-19 RX ADMIN — Medication 325 MILLIGRAM(S): at 12:47

## 2018-01-19 RX ADMIN — SODIUM CHLORIDE 63 MILLILITER(S): 9 INJECTION, SOLUTION INTRAVENOUS at 14:47

## 2018-01-19 RX ADMIN — SODIUM CHLORIDE 50 MILLILITER(S): 9 INJECTION, SOLUTION INTRAVENOUS at 11:25

## 2018-01-19 RX ADMIN — Medication 500 MILLIGRAM(S): at 18:46

## 2018-01-19 RX ADMIN — MEROPENEM 100 MILLIGRAM(S): 1 INJECTION INTRAVENOUS at 14:39

## 2018-01-19 RX ADMIN — MEROPENEM 100 MILLIGRAM(S): 1 INJECTION INTRAVENOUS at 05:16

## 2018-01-19 RX ADMIN — Medication 650 MILLIGRAM(S): at 19:15

## 2018-01-19 NOTE — BRIEF OPERATIVE NOTE - PROCEDURE
<<-----Click on this checkbox to enter Procedure Ureteroscopy, with laser lithotripsy and stent insertion  01/19/2018  left  Active  [facility]

## 2018-01-19 NOTE — PROGRESS NOTE ADULT - PROBLEM SELECTOR PLAN 1
Zosyn changed to Merrem, fever 101, as per ID,  WBC 11.6, for cystoscopy today ( postponed from yesterday )

## 2018-01-19 NOTE — PROGRESS NOTE ADULT - SUBJECTIVE AND OBJECTIVE BOX
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  Pt has been more lethargic.  no n/v (12 Jan 2018 14:07)      INTERVAL HPI/OVERNIGHT EVENTS:  No new c/o.    MEDICATIONS  (STANDING):  ascorbic acid 500 milliGRAM(s) Oral two times a day  dextrose 5%. 1000 milliLiter(s) (63 mL/Hr) IV Continuous <Continuous>  ferrous    sulfate 325 milliGRAM(s) Oral daily  hydrocortisone 2.5% Cream 1 Application(s) Topical two times a day  lactulose Syrup 10 Gram(s) Oral two times a day  meropenem IVPB      meropenem IVPB 500 milliGRAM(s) IV Intermittent every 8 hours  mirtazapine 15 milliGRAM(s) Oral at bedtime  multivitamin/minerals 1 Tablet(s) Oral daily  potassium chloride    Tablet ER 20 milliEquivalent(s) Oral daily  senna 2 Tablet(s) Oral at bedtime  simvastatin 20 milliGRAM(s) Oral at bedtime    MEDICATIONS  (PRN):  acetaminophen  Suppository 650 milliGRAM(s) Rectal every 6 hours PRN For Temp greater than 38 C (100.4 F)  magnesium hydroxide Suspension 30 milliLiter(s) Oral daily PRN Constipation      FAMILY HISTORY:  No pertinent family history in first degree relatives      Allergies    No Known Allergies    Intolerances        PMH/PSH:  Decubital ulcer  Hemiparesis  Failure to thrive in adult  UTI (urinary tract infection)  Dehydration  Rhabdomyolysis  HTN (hypertension)  Neurogenic bladder disorder  Pressure ulcer of sacrum  UTI (urinary tract infection)  Embolism  DVT (deep venous thrombosis)  Anemia  Pneumonia  DM (diabetes mellitus)  High cholesterol  [place] chorea  No pertinent past medical history  No significant past surgical history        REVIEW OF SYSTEMS:  CONSTITUTIONAL: No fever, weight loss  EYES: No eye pain, visual disturbances, or discharge  ENMT:  No difficulty hearing, tinnitus, vertigo; No sinus or throat pain  NECK: No pain or stiffness  BREASTS: No pain, masses, or nipple discharge  RESPIRATORY: No cough, wheezing, chills or hemoptysis; No shortness of breath  CARDIOVASCULAR: No chest pain, palpitations, dizziness, or leg swelling  GASTROINTESTINAL: No abdominal or epigastric pain. No nausea, vomiting, or hematemesis; No diarrhea or constipation. No melena or hematochezia.  GENITOURINARY:  [place] (+)  NEUROLOGICAL: No headaches, memory loss, loss of strength, numbness, or tremors  SKIN: No itching, burning, rashes, or lesions   LYMPH NODES: No enlarged glands  ENDOCRINE: No heat or cold intolerance; No hair loss  MUSCULOSKELETAL: No joint pain or swelling; No muscle, back, or extremity pain  PSYCHIATRIC: No depression, anxiety, mood swings, or difficulty sleeping  HEME/LYMPH: No easy bruising, or bleeding gums  ALLERGY AND IMMUNOLOGIC: No hives or eczema    Vital Signs Last 24 Hrs  T(C): 38.1 (19 Jan 2018 04:44), Max: 38.3 (18 Jan 2018 23:18)  T(F): 100.5 (19 Jan 2018 04:44), Max: 101 (18 Jan 2018 23:18)  HR: 116 (19 Jan 2018 04:44) (89 - 116)  BP: 152/78 (19 Jan 2018 04:44) (120/78 - 156/84)  BP(mean): --  RR: 15 (19 Jan 2018 04:44) (15 - 17)  SpO2: 98% (19 Jan 2018 04:44) (95% - 98%)    PHYSICAL EXAM:  GENERAL: NAD, well-groomed, well-developed  HEAD:  Atraumatic, Normocephalic  EYES: EOMI, PERRLA, conjunctiva and sclera clear  NECK: Supple, No JVD, Normal thyroid  NERVOUS SYSTEM:  Alert , no changes  CHEST/LUNG: Clear to percussion bilaterally; No rales, rhonchi, wheezing, or rubs  HEART: Regular rate and rhythm; No murmurs, rubs, or gallops  ABDOMEN: Soft, Nontender, Nondistended; Bowel sounds present  EXTREMITIES:  2+ Peripheral Pulses, No clubbing, cyanosis, or edema  LYMPH: No lymphadenopathy noted  SKIN: No rashes or lesions    LAB                          8.7    11.6  )-----------( 268      ( 18 Jan 2018 08:01 )             25.7       CBC:  01-18 @ 08:01  WBC 11.6   Hgb 8.7   Hct 25.7   Plts 268  MCV 78.4  01-17 @ 07:35  WBC 13.2   Hgb 8.7   Hct 26.7   Plts 231  MCV 77.7  01-16 @ 08:53  WBC 15.4   Hgb 8.8   Hct 26.5   Plts 193  MCV 78.6  01-15 @ 06:15  WBC 14.5   Hgb 9.8   Hct 30.7   Plts 168  MCV 79.9  01-14 @ 04:21  WBC 13.4   Hgb 8.8   Hct 26.8   Plts 171  MCV 79.8  01-13 @ 05:39  WBC 12.5   Hgb 8.7   Hct 28.1   Plts 175  MCV 79.8  01-13 @ 01:37  WBC 11.2   Hgb 9.5   Hct 29.1   Plts 165  MCV 79.2  01-12 @ 14:18  WBC 13.2   Hgb 9.8   Hct 30.6   Plts 182  MCV 79.8      Chemistry:  01-18 @ 08:01  Na+ 141  K+ 3.6  Cl- 107  CO2 26  BUN 8  Cr 0.54     01-17 @ 07:35  Na+ 140  K+ 3.0  Cl- 107  CO2 24  BUN 10  Cr 0.56     01-16 @ 08:53  Na+ 142  K+ 3.1  Cl- 107  CO2 26  BUN 11  Cr 0.77     01-15 @ 06:15  Na+ 144  K+ 3.4  Cl- 111  CO2 25  BUN 11  Cr 0.69     01-14 @ 04:21  Na+ 159  K+ 3.0  Cl- 130  CO2 23  BUN 23  Cr 0.87     01-13 @ 05:39  Na+ 165  K+ 3.3  Cl- 135  CO2 23  BUN 37  Cr 1.05     01-13 @ 01:37  Na+ 166  K+ 3.2  Cl- 136  CO2 23  BUN 38  Cr 1.11         Glucose, Serum: 121 mg/dL (01-18 @ 08:01)  Glucose, Serum: 183 mg/dL (01-17 @ 07:35)  Glucose, Serum: 152 mg/dL (01-16 @ 08:53)  Glucose, Serum: 124 mg/dL (01-15 @ 06:15)  Glucose, Serum: 141 mg/dL (01-14 @ 04:21)  Glucose, Serum: 168 mg/dL (01-13 @ 05:39)  Glucose, Serum: 146 mg/dL (01-13 @ 01:37)      18 Jan 2018 08:01    141    |  107    |  8      ----------------------------<  121    3.6     |  26     |  0.54   17 Jan 2018 07:35    140    |  107    |  10     ----------------------------<  183    3.0     |  24     |  0.56   16 Jan 2018 08:53    142    |  107    |  11     ----------------------------<  152    3.1     |  26     |  0.77   15 Kelvin 2018 06:15    144    |  111    |  11     ----------------------------<  124    3.4     |  25     |  0.69   14 Jan 2018 04:21    159    |  130    |  23     ----------------------------<  141    3.0     |  23     |  0.87   13 Jan 2018 05:39    165    |  135    |  37     ----------------------------<  168    3.3     |  23     |  1.05   13 Jan 2018 01:37    166    |  136    |  38     ----------------------------<  146    3.2     |  23     |  1.11     Ca    9.4        18 Jan 2018 08:01  Ca    9.0        17 Jan 2018 07:35  Ca    8.6        16 Jan 2018 08:53  Ca    8.9        15 Kelvin 2018 06:15  Ca    9.2        14 Jan 2018 04:21  Ca    8.8        13 Jan 2018 05:39  Ca    8.1        13 Jan 2018 01:37  Mg     2.1       18 Jan 2018 08:01  Mg     1.7       16 Jan 2018 08:53    TPro  7.8    /  Alb  2.1    /  TBili  0.3    /  DBili  x      /  AST  24     /  ALT  23     /  AlkPhos  58     13 Jan 2018 05:39  TPro  7.8    /  Alb  2.1    /  TBili  0.4    /  DBili  x      /  AST  17     /  ALT  25     /  AlkPhos  60     13 Jan 2018 01:37      PT/INR - ( 18 Jan 2018 08:01 )   PT: 17.0 sec;   INR: 1.55 ratio         PTT - ( 18 Jan 2018 08:01 )  PTT:29.3 sec        CAPILLARY BLOOD GLUCOSE              RADIOLOGY & ADDITIONAL TESTS:    Imaging Personally Reviewed:  [ ] YES  [ ] NO    Consultant(s) Notes Reviewed:  [ ] YES  [ ] NO    Care Discussed with Consultants/Other Providers [ ] YES  [ ] NO

## 2018-01-19 NOTE — PROGRESS NOTE ADULT - SUBJECTIVE AND OBJECTIVE BOX
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  Pt has been more lethargic.  no n/v (12 Jan 2018 14:07)  no change in status  now on regular floor  sp stone remocval and stent today   or findings discussed with dr ezekiel Frederick        MEDICATIONS  (STANDING):  ascorbic acid 500 milliGRAM(s) Oral two times a day  dextrose 5%. 1000 milliLiter(s) (63 mL/Hr) IV Continuous <Continuous>  ferrous    sulfate 325 milliGRAM(s) Oral daily  hydrocortisone 2.5% Cream 1 Application(s) Topical two times a day  lactulose Syrup 10 Gram(s) Oral two times a day  meropenem IVPB 500 milliGRAM(s) IV Intermittent every 8 hours  mirtazapine 15 milliGRAM(s) Oral at bedtime  multivitamin/minerals 1 Tablet(s) Oral daily  potassium chloride    Tablet ER 20 milliEquivalent(s) Oral daily  senna 2 Tablet(s) Oral at bedtime    MEDICATIONS  (PRN):  magnesium hydroxide Suspension 30 milliLiter(s) Oral daily PRN Constipation      REVIEW OF SYSTEMS:    unable to obtain   VITAL SIGNS:  T(C): 37.1 (01-19-18 @ 14:32), Max: 38.3 (01-18-18 @ 23:18)  T(F): 98.7 (01-19-18 @ 14:32), Max: 101 (01-18-18 @ 23:18)  HR: 108 (01-19-18 @ 14:32) (100 - 118)  BP: 120/71 (01-19-18 @ 14:32) (109/67 - 156/84)  RR: 16 (01-19-18 @ 14:32) (15 - 20)  SpO2: 98% (01-19-18 @ 14:32) (95% - 98%)  Wt(kg): --    PHYSICAL EXAM:    GENERAL: not in any distress  HEENT: Neck is supple, normocephalic, atraumatic   CHEST/LUNG: Clear to auscultation bilaterally; No rales, rhonchi, wheezing  HEART: Regular rate and rhythm; No murmurs, rubs, or gallops  ABDOMEN: Soft, Nontender, Nondistended; Bowel sounds present, no rebound   EXTREMITIES:  2+ Peripheral Pulses, No clubbing, cyanosis, or edema  GENITOURINARY:   SKIN: decubiti clean  BACK: no pressor sore   NERVOUS SYSTEM:  Alert &not really verbal    LABS:                         8.6    11.1  )-----------( 331      ( 19 Jan 2018 08:29 )             25.8     01-19    140  |  106  |  9   ----------------------------<  115<H>  3.9   |  26  |  0.49<L>    Ca    9.2      19 Jan 2018 08:29  Mg     2.1     01-18    TPro  8.7<H>  /  Alb  2.1<L>  /  TBili  0.4  /  DBili  x   /  AST  31  /  ALT  25  /  AlkPhos  60  01-19    LIVER FUNCTIONS - ( 19 Jan 2018 08:29 )  Alb: 2.1 g/dL / Pro: 8.7 gm/dL / ALK PHOS: 60 U/L / ALT: 25 U/L / AST: 31 U/L / GGT: x           PT/INR - ( 19 Jan 2018 08:29 )   PT: 17.2 sec;   INR: 1.56 ratio         PTT - ( 18 Jan 2018 08:01 )  PTT:29.3 sec          T4, Serum: 5.1 ug/dL (01-15 @ 08:37)                Culture Results:   10,000 - 49,000 CFU/mL Escherichia coli (01-13 @ 21:10)                Radiology:

## 2018-01-19 NOTE — PROGRESS NOTE ADULT - ASSESSMENT
Subjective Complaints:  Historian:         REVIEW OF SYSTEMS:  Eyes:  Good vision, no reported pain  ENT:  No sore throat, pain, runny nose, dysphagia  CV:  No pain, palpitatioins, hypo/hypertension  Resp:  No dyspnea, cough, tachypnea, wheezing  GI:  No pain, nausea, vomiting, diarrhea, constipatiion  Muscle:  No pain, weakness  Neuro:  No weakness, tingling, memory problems  Psych:  No fatigue, insomnia, mood problems, depression  Endocrine:  No polyuria, polydypsia, cold/heat intolerance    Vital Signs Last 24 Hrs  T(C): 38 (19 Jan 2018 16:30), Max: 38.3 (18 Jan 2018 23:18)  T(F): 100.4 (19 Jan 2018 16:30), Max: 101 (18 Jan 2018 23:18)  HR: 124 (19 Jan 2018 16:30) (100 - 124)  BP: 122/74 (19 Jan 2018 16:30) (109/67 - 156/84)  BP(mean): --  RR: 16 (19 Jan 2018 16:30) (15 - 20)  SpO2: 100% (19 Jan 2018 16:30) (95% - 100%)    GENERAL PHYSICAL EXAM:  General:  Appears stated age, well-groomed, well-nourished, no distress  HEENT:  NC/AT, patent nares w/ pink mucosa, OP clear w/o lesions, PERRL, EOMI, conjunctivae clear, no thyromegaly, nodules, adenopathy, no JVD  Chest:  Full & symmetric excursion, no increased effort, breath sounds clear  Cardiovascular:  Regular rhythm, S1, S2, no murmur/rub/S3/S4, no carotid/femoral/abdominal bruit, radial/pedal pulses 2+, no edema  Abdomen:  Soft, non-tender, non-distended, normoactive bowel sounds, no HSM  Extremities:  Gait & station:   Digits:   Nails:   Joints, Bones, Muscles:   ROM:   Stability:  Skin:  No rash/erythema/ecchymoses/petechiae/wounds/abscess/warm/dry  Musculoskeletal:  Full ROM in all joints w/o swelling/tenderness/effusion    NEUROLOGICAL EXAM:  HENT:  Normocephalic head; atraumatic head.  Neck supple.  ENT: normal looking.  Mental State:    awake open eyes does nofollws commnads quadroparesis  spastic  encephalaothy            8.6    11.1  )-----------( 331      ( 19 Jan 2018 08:29 )             25.8     01-19    140  |  106  |  9   ----------------------------<  115<H>  3.9   |  26  |  0.49<L>    Ca    9.2      19 Jan 2018 08:29  Mg     2.1     01-18    TPro  8.7<H>  /  Alb  2.1<L>  /  TBili  0.4  /  DBili  x   /  AST  31  /  ALT  25  /  AlkPhos  60  01-19    PT/INR - ( 19 Jan 2018 08:29 )   PT: 17.2 sec;   INR: 1.56 ratio         PTT - ( 18 Jan 2018 08:01 )  PTT:29.3 sec   ass= awak eopen eyes  s/p ureteral  stent for stones uti sepsis  metabolic encepahloapthy  quadroparesis  spastic  no seziure     RADIOLOGY & ADDITIONAL STUDIES:        Recommendations:  for sub acute rehjab will folow

## 2018-01-19 NOTE — PROGRESS NOTE ADULT - ASSESSMENT
sepsis   gram negative rods in  blood ; fairly sensitive e coli ;; more than adequately covered  on broad spectrum antibiotics empirically   status quo today   continue antibiotics    has rhino virus as well ; but too many days of fever   expect fevers to resolve now

## 2018-01-19 NOTE — PROGRESS NOTE ADULT - SUBJECTIVE AND OBJECTIVE BOX
INTERVAL HPI/OVERNIGHT EVENTS:  no respiratory distress. tmax 101.6, rm air o2 sat 100%    Vital Signs Last 24 Hrs  T(C): 38.7 (19 Jan 2018 19:00), Max: 38.7 (19 Jan 2018 19:00)  T(F): 101.6 (19 Jan 2018 19:00), Max: 101.6 (19 Jan 2018 19:00)  HR: 124 (19 Jan 2018 16:30) (100 - 124)  BP: 122/74 (19 Jan 2018 16:30) (109/67 - 156/84)  BP(mean): --  RR: 16 (19 Jan 2018 16:30) (15 - 20)  SpO2: 100% (19 Jan 2018 16:30) (96% - 100%)        PHYSICAL EXAM:  GEN:         Awake, responsive and comfortable.  HEENT:    Normal.    RESP:    rhonchi  CVS:             Regular rate and rhythm.   ABD:         Soft, non-tender, non-distended;   :             No costovertebral angle tenderness  EXTR:            No clubbing, cyanosis or edema  CNS:              Intact sensory and motor function.        MEDICATIONS  (STANDING):  ascorbic acid 500 milliGRAM(s) Oral two times a day  dextrose 5%. 1000 milliLiter(s) (63 mL/Hr) IV Continuous <Continuous>  ferrous    sulfate 325 milliGRAM(s) Oral daily  hydrocortisone 2.5% Cream 1 Application(s) Topical two times a day  lactulose Syrup 10 Gram(s) Oral two times a day  meropenem IVPB 500 milliGRAM(s) IV Intermittent every 8 hours  mirtazapine 15 milliGRAM(s) Oral at bedtime  multivitamin/minerals 1 Tablet(s) Oral daily  potassium chloride    Tablet ER 20 milliEquivalent(s) Oral daily  senna 2 Tablet(s) Oral at bedtime    MEDICATIONS  (PRN):  acetaminophen  Suppository 650 milliGRAM(s) Rectal every 6 hours PRN For Temp greater than 38 C (100.4 F)  magnesium hydroxide Suspension 30 milliLiter(s) Oral daily PRN Constipation        LABS:                        8.6    11.1  )-----------( 331      ( 19 Jan 2018 08:29 )             25.8     01-19    140  |  106  |  9   ----------------------------<  115<H>  3.9   |  26  |  0.49<L>    Ca    9.2      19 Jan 2018 08:29  Mg     2.1     01-18    TPro  8.7<H>  /  Alb  2.1<L>  /  TBili  0.4  /  DBili  x   /  AST  31  /  ALT  25  /  AlkPhos  60  01-19    PT/INR - ( 19 Jan 2018 08:29 )   PT: 17.2 sec;   INR: 1.56 ratio         PTT - ( 18 Jan 2018 08:01 )  PTT:29.3 sec          RADIOLOGY & ADDITIONAL STUDIES:    ASSESSMENT AND PLAN: cxr 1/18/18 no acute pulmonary process.

## 2018-01-20 LAB
ANION GAP SERPL CALC-SCNC: 9 MMOL/L — SIGNIFICANT CHANGE UP (ref 5–17)
BUN SERPL-MCNC: 9 MG/DL — SIGNIFICANT CHANGE UP (ref 7–23)
CALCIUM SERPL-MCNC: 9 MG/DL — SIGNIFICANT CHANGE UP (ref 8.5–10.1)
CHLORIDE SERPL-SCNC: 105 MMOL/L — SIGNIFICANT CHANGE UP (ref 96–108)
CO2 SERPL-SCNC: 25 MMOL/L — SIGNIFICANT CHANGE UP (ref 22–31)
COPPER SERPL-MCNC: 181 UG/DL — HIGH (ref 72–166)
CREAT SERPL-MCNC: 0.47 MG/DL — LOW (ref 0.5–1.3)
GLUCOSE SERPL-MCNC: 123 MG/DL — HIGH (ref 70–99)
HCT VFR BLD CALC: 24.1 % — LOW (ref 34.5–45)
HGB BLD-MCNC: 8.2 G/DL — LOW (ref 11.5–15.5)
MCHC RBC-ENTMCNC: 26.6 PG — LOW (ref 27–34)
MCHC RBC-ENTMCNC: 34 GM/DL — SIGNIFICANT CHANGE UP (ref 32–36)
MCV RBC AUTO: 78.2 FL — LOW (ref 80–100)
PLATELET # BLD AUTO: 365 K/UL — SIGNIFICANT CHANGE UP (ref 150–400)
POTASSIUM SERPL-MCNC: 4 MMOL/L — SIGNIFICANT CHANGE UP (ref 3.5–5.3)
POTASSIUM SERPL-SCNC: 4 MMOL/L — SIGNIFICANT CHANGE UP (ref 3.5–5.3)
RBC # BLD: 3.08 M/UL — LOW (ref 3.8–5.2)
RBC # FLD: 15.8 % — HIGH (ref 11–15)
SODIUM SERPL-SCNC: 139 MMOL/L — SIGNIFICANT CHANGE UP (ref 135–145)
WBC # BLD: 11.2 K/UL — HIGH (ref 3.8–10.5)
WBC # FLD AUTO: 11.2 K/UL — HIGH (ref 3.8–10.5)
ZINC SERPL-MCNC: 71 UG/DL — SIGNIFICANT CHANGE UP (ref 56–134)

## 2018-01-20 RX ORDER — ENOXAPARIN SODIUM 100 MG/ML
40 INJECTION SUBCUTANEOUS DAILY
Qty: 0 | Refills: 0 | Status: DISCONTINUED | OUTPATIENT
Start: 2018-01-20 | End: 2018-02-07

## 2018-01-20 RX ORDER — POTASSIUM CHLORIDE 20 MEQ
20 PACKET (EA) ORAL DAILY
Qty: 0 | Refills: 0 | Status: DISCONTINUED | OUTPATIENT
Start: 2018-01-20 | End: 2018-02-07

## 2018-01-20 RX ADMIN — Medication 1 APPLICATION(S): at 17:14

## 2018-01-20 RX ADMIN — Medication 1 TABLET(S): at 12:51

## 2018-01-20 RX ADMIN — SENNA PLUS 2 TABLET(S): 8.6 TABLET ORAL at 22:13

## 2018-01-20 RX ADMIN — Medication 20 MILLIEQUIVALENT(S): at 12:51

## 2018-01-20 RX ADMIN — ENOXAPARIN SODIUM 40 MILLIGRAM(S): 100 INJECTION SUBCUTANEOUS at 22:13

## 2018-01-20 RX ADMIN — Medication 650 MILLIGRAM(S): at 11:08

## 2018-01-20 RX ADMIN — Medication 650 MILLIGRAM(S): at 17:15

## 2018-01-20 RX ADMIN — LACTULOSE 10 GRAM(S): 10 SOLUTION ORAL at 17:15

## 2018-01-20 RX ADMIN — Medication 325 MILLIGRAM(S): at 12:51

## 2018-01-20 RX ADMIN — Medication 1 APPLICATION(S): at 05:14

## 2018-01-20 RX ADMIN — MEROPENEM 100 MILLIGRAM(S): 1 INJECTION INTRAVENOUS at 14:14

## 2018-01-20 RX ADMIN — MEROPENEM 100 MILLIGRAM(S): 1 INJECTION INTRAVENOUS at 22:11

## 2018-01-20 RX ADMIN — Medication 500 MILLIGRAM(S): at 17:14

## 2018-01-20 RX ADMIN — MEROPENEM 100 MILLIGRAM(S): 1 INJECTION INTRAVENOUS at 05:14

## 2018-01-20 RX ADMIN — MIRTAZAPINE 15 MILLIGRAM(S): 45 TABLET, ORALLY DISINTEGRATING ORAL at 22:12

## 2018-01-20 RX ADMIN — SODIUM CHLORIDE 63 MILLILITER(S): 9 INJECTION, SOLUTION INTRAVENOUS at 10:09

## 2018-01-20 NOTE — PROGRESS NOTE ADULT - ASSESSMENT
Subjective Complaints:  Historian:         REVIEW OF SYSTEMS:  Eyes:  Good vision, no reported pain  ENT:  No sore throat, pain, runny nose, dysphagia  CV:  No pain, palpitatioins, hypo/hypertension  Resp:  No dyspnea, cough, tachypnea, wheezing  GI:  No pain, nausea, vomiting, diarrhea, constipatiion  Muscle:  No pain, weakness  Neuro:  No weakness, tingling, memory problems  Psych:  No fatigue, insomnia, mood problems, depression  Endocrine:  No polyuria, polydypsia, cold/heat intolerance    Vital Signs Last 24 Hrs  T(C): 38.4 (20 Jan 2018 17:35), Max: 39 (20 Jan 2018 14:50)  T(F): 101.1 (20 Jan 2018 17:35), Max: 102.2 (20 Jan 2018 14:50)  HR: 124 (20 Jan 2018 17:35) (118 - 127)  BP: 130/77 (20 Jan 2018 17:35) (109/61 - 130/77)  BP(mean): --  RR: 16 (20 Jan 2018 17:35) (16 - 18)  SpO2: 98% (20 Jan 2018 17:35) (96% - 98%)    GENERAL PHYSICAL EXAM:  General:  Appears stated age, well-groomed, well-nourished, no distress  HEENT:  NC/AT, patent nares w/ pink mucosa, OP clear w/o lesions, PERRL, EOMI, conjunctivae clear, no thyromegaly, nodules, adenopathy, no JVD  Chest:  Full & symmetric excursion, no increased effort, breath sounds clear  Cardiovascular:  Regular rhythm, S1, S2, no murmur/rub/S3/S4, no carotid/femoral/abdominal bruit, radial/pedal pulses 2+, no edema  Abdomen:  Soft, non-tender, non-distended, normoactive bowel sounds, no HSM  Extremities:  Gait & station:   Digits:   Nails:   Joints, Bones, Muscles:   ROM:   Stability:  Skin:  No rash/erythema/ecchymoses/petechiae/wounds/abscess/warm/dry  Musculoskeletal:  Full ROM in all joints w/o swelling/tenderness/effusion    NEUROLOGICAL EXAM:  HENT:  Normocephalic head; atraumatic head.  Neck supple.  ENT: normal looking.  Mental State:    Alert.   awake open eyes  does not follws commnads arm leg contracted  encephalaothy no seziure uti                       8.2    11.2  )-----------( 365      ( 20 Jan 2018 07:30 )             24.1     01-20    139  |  105  |  9   ----------------------------<  123<H>  4.0   |  25  |  0.47<L>    Ca    9.0      20 Jan 2018 07:30    TPro  8.7<H>  /  Alb  2.1<L>  /  TBili  0.4  /  DBili  x   /  AST  31  /  ALT  25  /  AlkPhos  60  01-19    PT/INR - ( 19 Jan 2018 08:29 )   PT: 17.2 sec;   INR: 1.56 ratio        ass= awake open eyes  s/p renal stone s/p stent ureteral  uti sepsis arm legs contractted  encephalaothy no seziure         RADIOLOGY & ADDITIONAL STUDIES:        Recommendations:   for pt sub acute rehab

## 2018-01-20 NOTE — PROGRESS NOTE ADULT - SUBJECTIVE AND OBJECTIVE BOX
INTERVAL HPI/OVERNIGHT EVENTS:  remains febrile, has nasal and oral secretions    Vital Signs Last 24 Hrs  T(C): 38.7 (20 Jan 2018 12:50), Max: 38.9 (20 Jan 2018 11:03)  T(F): 101.7 (20 Jan 2018 12:50), Max: 102.1 (20 Jan 2018 11:03)  HR: 123 (20 Jan 2018 11:20) (118 - 127)  BP: 123/73 (20 Jan 2018 11:20) (109/61 - 130/69)  BP(mean): --  RR: 16 (20 Jan 2018 11:20) (16 - 18)  SpO2: 97% (20 Jan 2018 11:20) (96% - 100%)        PHYSICAL EXAM:  GEN:         Awake, responsive and comfortable.  HEENT:    Normal.    RESP:       rhonchi  CVS:             Regular rate and rhythm.   ABD:         Soft, non-tender, non-distended;   :             No costovertebral angle tenderness  EXTR:            No clubbing, cyanosis or edema  CNS:              Intact sensory and motor function.        MEDICATIONS  (STANDING):  ascorbic acid 500 milliGRAM(s) Oral two times a day  dextrose 5%. 1000 milliLiter(s) (63 mL/Hr) IV Continuous <Continuous>  ferrous    sulfate 325 milliGRAM(s) Oral daily  hydrocortisone 2.5% Cream 1 Application(s) Topical two times a day  lactulose Syrup 10 Gram(s) Oral two times a day  meropenem IVPB 500 milliGRAM(s) IV Intermittent every 8 hours  mirtazapine 15 milliGRAM(s) Oral at bedtime  multivitamin/minerals 1 Tablet(s) Oral daily  potassium chloride   Powder 20 milliEquivalent(s) Oral daily  senna 2 Tablet(s) Oral at bedtime    MEDICATIONS  (PRN):  acetaminophen  Suppository 650 milliGRAM(s) Rectal every 6 hours PRN For Temp greater than 38 C (100.4 F)  magnesium hydroxide Suspension 30 milliLiter(s) Oral daily PRN Constipation        LABS:                        8.2    11.2  )-----------( 365      ( 20 Jan 2018 07:30 )             24.1     01-20    139  |  105  |  9   ----------------------------<  123<H>  4.0   |  25  |  0.47<L>    Ca    9.0      20 Jan 2018 07:30    TPro  8.7<H>  /  Alb  2.1<L>  /  TBili  0.4  /  DBili  x   /  AST  31  /  ALT  25  /  AlkPhos  60  01-19    PT/INR - ( 19 Jan 2018 08:29 )   PT: 17.2 sec;   INR: 1.56 ratio                   RADIOLOGY & ADDITIONAL STUDIES: 1/16/18 chest ct reviewed with radiologist. probable thyroid nodules, lungs clear    ASSESSMENT AND PLAN: no acute pulmonary process. rm air o2 sats stable.

## 2018-01-20 NOTE — PROGRESS NOTE ADULT - SUBJECTIVE AND OBJECTIVE BOX
Patient seen and examined bedside with Daughter present.  Pt febrile to 102 per RN, received tylenol suppository.  Pt tolerated small amount of lunch today.    T(F): 100.6 (01-20-18 @ 11:20), Max: 101.6 (01-19-18 @ 19:00)  HR: 123 (01-20-18 @ 11:20) (108 - 127)  BP: 123/73 (01-20-18 @ 11:20) (109/61 - 130/69)  RR: 16 (01-20-18 @ 11:20) (16 - 18)  SpO2: 97% (01-20-18 @ 11:20) (96% - 100%)    PHYSICAL EXAM:  General: alert and awake  HEENT: NCAT,   Chest: nonlabored respirations  Abdomen: soft, NTND.   Extremities: no pedal edema or calf tenderness noted. contracted  : obrien catheter indwelling draining clear yellow urine with dark red/brown sediment    LABS:                        8.2    11.2  )-----------( 365      ( 20 Jan 2018 07:30 )             24.1   01-20    139  |  105  |  9   ----------------------------<  123<H>  4.0   |  25  |  0.47<L>    Ca    9.0      20 Jan 2018 07:30    TPro  8.7<H>  /  Alb  2.1<L>  /  TBili  0.4  /  DBili  x   /  AST  31  /  ALT  25  /  AlkPhos  60  01-19  PT/INR - ( 19 Jan 2018 08:29 )   PT: 17.2 sec;   INR: 1.56 ratio           I&O's Detail    19 Jan 2018 07:01  -  20 Jan 2018 07:00  --------------------------------------------------------  IN:    dextrose 5%.: 756 mL    lactated ringers.: 50 mL    Oral Fluid: 100 mL    Solution: 100 mL  Total IN: 1006 mL    OUT:    Indwelling Catheter - Urethral: 1100 mL  Total OUT: 1100 mL    Total NET: -94 mL    Impression: 62F PMH [place]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 F/u am labs  -continue to monitor for fevers, tylenol prn. ID noted, cooling blanket, Continue IV abx merrem and supportive measures for +RSV  -continue medical management, IVF

## 2018-01-21 LAB
ANION GAP SERPL CALC-SCNC: 5 MMOL/L — SIGNIFICANT CHANGE UP (ref 5–17)
BUN SERPL-MCNC: 14 MG/DL — SIGNIFICANT CHANGE UP (ref 7–23)
CALCIUM SERPL-MCNC: 9.3 MG/DL — SIGNIFICANT CHANGE UP (ref 8.5–10.1)
CHLORIDE SERPL-SCNC: 103 MMOL/L — SIGNIFICANT CHANGE UP (ref 96–108)
CO2 SERPL-SCNC: 30 MMOL/L — SIGNIFICANT CHANGE UP (ref 22–31)
CREAT SERPL-MCNC: 0.34 MG/DL — LOW (ref 0.5–1.3)
GLUCOSE SERPL-MCNC: 108 MG/DL — HIGH (ref 70–99)
HCT VFR BLD CALC: 24.5 % — LOW (ref 34.5–45)
HGB BLD-MCNC: 8.2 G/DL — LOW (ref 11.5–15.5)
MAGNESIUM SERPL-MCNC: 2.1 MG/DL — SIGNIFICANT CHANGE UP (ref 1.6–2.6)
MCHC RBC-ENTMCNC: 26.4 PG — LOW (ref 27–34)
MCHC RBC-ENTMCNC: 33.5 GM/DL — SIGNIFICANT CHANGE UP (ref 32–36)
MCV RBC AUTO: 78.8 FL — LOW (ref 80–100)
PHOSPHATE SERPL-MCNC: 2.8 MG/DL — SIGNIFICANT CHANGE UP (ref 2.5–4.5)
PLATELET # BLD AUTO: 398 K/UL — SIGNIFICANT CHANGE UP (ref 150–400)
POTASSIUM SERPL-MCNC: 3.7 MMOL/L — SIGNIFICANT CHANGE UP (ref 3.5–5.3)
POTASSIUM SERPL-SCNC: 3.7 MMOL/L — SIGNIFICANT CHANGE UP (ref 3.5–5.3)
RBC # BLD: 3.11 M/UL — LOW (ref 3.8–5.2)
RBC # FLD: 16 % — HIGH (ref 11–15)
SODIUM SERPL-SCNC: 138 MMOL/L — SIGNIFICANT CHANGE UP (ref 135–145)
WBC # BLD: 11.9 K/UL — HIGH (ref 3.8–10.5)
WBC # FLD AUTO: 11.9 K/UL — HIGH (ref 3.8–10.5)

## 2018-01-21 RX ADMIN — Medication 1 TABLET(S): at 11:26

## 2018-01-21 RX ADMIN — MEROPENEM 100 MILLIGRAM(S): 1 INJECTION INTRAVENOUS at 22:34

## 2018-01-21 RX ADMIN — MEROPENEM 100 MILLIGRAM(S): 1 INJECTION INTRAVENOUS at 14:39

## 2018-01-21 RX ADMIN — Medication 650 MILLIGRAM(S): at 00:03

## 2018-01-21 RX ADMIN — Medication 500 MILLIGRAM(S): at 05:15

## 2018-01-21 RX ADMIN — Medication 1 APPLICATION(S): at 17:22

## 2018-01-21 RX ADMIN — SODIUM CHLORIDE 63 MILLILITER(S): 9 INJECTION, SOLUTION INTRAVENOUS at 20:34

## 2018-01-21 RX ADMIN — LACTULOSE 10 GRAM(S): 10 SOLUTION ORAL at 05:14

## 2018-01-21 RX ADMIN — SENNA PLUS 2 TABLET(S): 8.6 TABLET ORAL at 22:34

## 2018-01-21 RX ADMIN — SODIUM CHLORIDE 63 MILLILITER(S): 9 INJECTION, SOLUTION INTRAVENOUS at 03:15

## 2018-01-21 RX ADMIN — Medication 650 MILLIGRAM(S): at 23:49

## 2018-01-21 RX ADMIN — ENOXAPARIN SODIUM 40 MILLIGRAM(S): 100 INJECTION SUBCUTANEOUS at 11:27

## 2018-01-21 RX ADMIN — MIRTAZAPINE 15 MILLIGRAM(S): 45 TABLET, ORALLY DISINTEGRATING ORAL at 22:34

## 2018-01-21 RX ADMIN — Medication 500 MILLIGRAM(S): at 17:22

## 2018-01-21 RX ADMIN — LACTULOSE 10 GRAM(S): 10 SOLUTION ORAL at 17:22

## 2018-01-21 RX ADMIN — Medication 325 MILLIGRAM(S): at 11:26

## 2018-01-21 RX ADMIN — Medication 650 MILLIGRAM(S): at 05:14

## 2018-01-21 RX ADMIN — Medication 1 APPLICATION(S): at 05:14

## 2018-01-21 RX ADMIN — MEROPENEM 100 MILLIGRAM(S): 1 INJECTION INTRAVENOUS at 05:15

## 2018-01-21 RX ADMIN — Medication 650 MILLIGRAM(S): at 13:22

## 2018-01-21 RX ADMIN — Medication 20 MILLIEQUIVALENT(S): at 11:27

## 2018-01-21 NOTE — PROGRESS NOTE ADULT - ASSESSMENT
sepsis   gram negative rods in  blood ; fairly sensitive e coli ;; more than adequately covered  on broad spectrum antibiotics empirically   status quo today   continue antibiotics   resolving rhinovirus infection  expect fevers to resolve now sepsis   gram negative rods in  blood ; fairly sensitive e coli ;; more than adequately covered  on broad spectrum antibiotics empirically   status quo today   continue antibiotics   resolving rhinovirus infection  expect fevers to resolve now   no or culture found

## 2018-01-21 NOTE — PROGRESS NOTE ADULT - SUBJECTIVE AND OBJECTIVE BOX
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  Pt has been more lethargic.  no n/v (12 Jan 2018 14:07)  no change in status  now on regular floor  sp stone remocval and stent friday   or findings discussed with dr falcon on friday   temps have been lower ; max 100      No Known Allergies    Intolerances        MEDICATIONS  (STANDING):  ascorbic acid 500 milliGRAM(s) Oral two times a day  dextrose 5%. 1000 milliLiter(s) (63 mL/Hr) IV Continuous <Continuous>  enoxaparin Injectable 40 milliGRAM(s) SubCutaneous daily  ferrous    sulfate 325 milliGRAM(s) Oral daily  hydrocortisone 2.5% Cream 1 Application(s) Topical two times a day  lactulose Syrup 10 Gram(s) Oral two times a day  meropenem IVPB 500 milliGRAM(s) IV Intermittent every 8 hours  mirtazapine 15 milliGRAM(s) Oral at bedtime  multivitamin/minerals 1 Tablet(s) Oral daily  potassium chloride   Powder 20 milliEquivalent(s) Oral daily  senna 2 Tablet(s) Oral at bedtime    MEDICATIONS  (PRN):  acetaminophen  Suppository 650 milliGRAM(s) Rectal every 6 hours PRN For Temp greater than 38 C (100.4 F)  magnesium hydroxide Suspension 30 milliLiter(s) Oral daily PRN Constipation      REVIEW OF SYSTEMS:    unable to assess   VITAL SIGNS:  T(C): 37.1 (01-21-18 @ 11:02), Max: 39 (01-20-18 @ 14:50)  T(F): 98.8 (01-21-18 @ 11:02), Max: 102.2 (01-20-18 @ 14:50)  HR: 96 (01-21-18 @ 11:02) (96 - 124)  BP: 114/67 (01-21-18 @ 11:02) (114/67 - 138/78)  RR: 17 (01-21-18 @ 11:02) (16 - 19)  SpO2: 98% (01-21-18 @ 11:02) (97% - 98%)  Wt(kg): --    PHYSICAL EXAM:    GENERAL: not in any distress  HEENT: Neck is supple, normocephalic, atraumatic   CHEST/LUNG: Clear to auscultation bilaterally; No rales, rhonchi, wheezing  HEART: Regular rate and rhythm; No murmurs, rubs, or gallops  ABDOMEN: Soft, Nontender, Nondistended; Bowel sounds present, no rebound   EXTREMITIES:  2+ Peripheral Pulses, No clubbing, cyanosis, or edema  contracted   SKIN: decubiti stable no infection  BACK: no pressor sore   NERVOUS SYSTEM:  Alert & responsive     LABS:                         8.2    11.9  )-----------( 398      ( 21 Jan 2018 06:37 )             24.5     01-21    138  |  103  |  14  ----------------------------<  108<H>  3.7   |  30  |  0.34<L>    Ca    9.3      21 Jan 2018 06:37  Phos  2.8     01-21  Mg     2.1     01-21                                              Radiology:  < from: Xray Chest 1 View AP/PA. (01.18.18 @ 14:02) >  IMPRESSION:  Clear  lungs.  No acute airspace disease suggested.                ALEX DIAZ M.D., ATTENDING RADIOLOGIST  This document has been electronically signed. Jan 18 2018  3:06PM                < end of copied text >

## 2018-01-21 NOTE — PROGRESS NOTE ADULT - ASSESSMENT
Subjective Complaints:  Historian:         REVIEW OF SYSTEMS:  Eyes:  Good vision, no reported pain  ENT:  No sore throat, pain, runny nose, dysphagia  CV:  No pain, palpitatioins, hypo/hypertension  Resp:  No dyspnea, cough, tachypnea, wheezing  GI:  No pain, nausea, vomiting, diarrhea, constipatiion  Muscle:  No pain, weakness  Neuro:  No weakness, tingling, memory problems  Psych:  No fatigue, insomnia, mood problems, depression  Endocrine:  No polyuria, polydypsia, cold/heat intolerance    Vital Signs Last 24 Hrs  T(C): 37.7 (21 Jan 2018 17:58), Max: 38.2 (21 Jan 2018 00:18)  T(F): 99.8 (21 Jan 2018 17:58), Max: 100.8 (21 Jan 2018 00:18)  HR: 115 (21 Jan 2018 17:58) (96 - 117)  BP: 110/66 (21 Jan 2018 17:58) (110/66 - 138/78)  BP(mean): --  RR: 18 (21 Jan 2018 17:58) (16 - 19)  SpO2: 98% (21 Jan 2018 17:58) (97% - 98%)    GENERAL PHYSICAL EXAM:  General:  Appears stated age, well-groomed, well-nourished, no distress  HEENT:  NC/AT, patent nares w/ pink mucosa, OP clear w/o lesions, PERRL, EOMI, conjunctivae clear, no thyromegaly, nodules, adenopathy, no JVD  Chest:  Full & symmetric excursion, no increased effort, breath sounds clear  Cardiovascular:  Regular rhythm, S1, S2, no murmur/rub/S3/S4, no carotid/femoral/abdominal bruit, radial/pedal pulses 2+, no edema  Abdomen:  Soft, non-tender, non-distended, normoactive bowel sounds, no HSM  Extremities:  Gait & station:   Digits:   Nails:   Joints, Bones, Muscles:   ROM:   Stability:  Skin:  No rash/erythema/ecchymoses/petechiae/wounds/abscess/warm/dry  Musculoskeletal:  Full ROM in all joints w/o swelling/tenderness/effusion    NEUROLOGICAL EXAM:  HENT:  Normocephalic head; atraumatic head.  Neck supple.  ENT: normal looking.  Mental State:    Alert.  open eyes does not follws commnads  quadroparesis  spastic   LABS:                        8.2    11.9  )-----------( 398      ( 21 Jan 2018 06:37 )             24.5     01-21    138  |  103  |  14  ----------------------------<  108<H>  3.7   |  30  |  0.34<L>    Ca    9.3      21 Jan 2018 06:37  Phos  2.8     01-21  Mg     2.1     01-21     ass= awake open eyes does not  folwws commands  arm leg contracted spastic s/p ureteral stent uti metabolic encpehalaothy  ct head no acute path         RADIOLOGY & ADDITIONAL STUDIES:        Recommendations:   for sub acute rehab on iv antibiotics

## 2018-01-21 NOTE — PROGRESS NOTE ADULT - SUBJECTIVE AND OBJECTIVE BOX
Patient seen and examined bedside resting comfortably.  No complaints offered. Poor ability to communicate. Continues with fevers.    T(F): 100.5 (01-21-18 @ 05:10), Max: 102.2 (01-20-18 @ 14:50)  HR: 97 (01-21-18 @ 04:36) (97 - 124)  BP: 138/78 (01-21-18 @ 04:36) (119/71 - 138/78)  RR: 19 (01-21-18 @ 04:36) (16 - 19)  SpO2: 97% (01-21-18 @ 04:36) (97% - 98%)    PHYSICAL EXAM:    General: NAD, alert and awake  Chest: nonlabored respirations, CTA b/l.  Abdomen: soft, NT/ND.   Extremities: Contracted. no pedal edema or calf tenderness noted   : obrien catheter indwelling draining blood tinged urine with minimal dark red/brown sediment seen in tubing. (1000cc/24hr)    LABS:                        8.2    11.9  )-----------( 398      ( 21 Jan 2018 06:37 )             24.5   01-21    138  |  103  |  14  ----------------------------<  108<H>  3.7   |  30  |  0.34<L>    Ca    9.3      21 Jan 2018 06:37  Phos  2.8     01-21  Mg     2.1     01-21      I&O's Detail    20 Jan 2018 07:01  -  21 Jan 2018 07:00  --------------------------------------------------------  IN:    dextrose 5%.: 1386 mL    Solution: 150 mL  Total IN: 1536 mL    OUT:    Indwelling Catheter - Urethral: 1000 mL  Total OUT: 1000 mL    Total NET: 536 mL    Impression: 63yo F with PMH Ashli's chorea, urinary retention due to neurogenic bladder, hemiparesis, decubitus ulcers admitted with sepsis 2/2 UTI and left hydronephrosis POD#2 s/p Left Ureteroscopy, with laser lithotripsy and stent insertion, continues to have fever.    Plan:  -continue obrien catheter for retention--monitor urine output and quality  -continue to monitor for fevers, tylenol prn. ID noted, cooling blanket, Continue IV abx merrem and supportive measures for +RSV  -continue medical management and supportive care  -f/u labs, trend renal function and WBC  -Will d/w Dr. Cleaning Patient seen and examined bedside resting comfortably.  No complaints offered. Poor ability to communicate. Continues with fevers.    T(F): 100.5 (01-21-18 @ 05:10), Max: 102.2 (01-20-18 @ 14:50)  HR: 97 (01-21-18 @ 04:36) (97 - 124)  BP: 138/78 (01-21-18 @ 04:36) (119/71 - 138/78)  RR: 19 (01-21-18 @ 04:36) (16 - 19)  SpO2: 97% (01-21-18 @ 04:36) (97% - 98%)    PHYSICAL EXAM:    General: NAD, alert and awake  Chest: nonlabored respirations, CTA b/l.  Abdomen: soft, NT/ND.   Extremities: Contracted. no pedal edema or calf tenderness noted   : obrien catheter indwelling draining blood tinged urine with minimal dark red/brown sediment seen in tubing. (1000cc/24hr)    LABS:                        8.2    11.9  )-----------( 398      ( 21 Jan 2018 06:37 )             24.5   01-21    138  |  103  |  14  ----------------------------<  108<H>  3.7   |  30  |  0.34<L>    Ca    9.3      21 Jan 2018 06:37  Phos  2.8     01-21  Mg     2.1     01-21      I&O's Detail    20 Jan 2018 07:01  -  21 Jan 2018 07:00  --------------------------------------------------------  IN:    dextrose 5%.: 1386 mL    Solution: 150 mL  Total IN: 1536 mL    OUT:    Indwelling Catheter - Urethral: 1000 mL  Total OUT: 1000 mL    Total NET: 536 mL    Impression: 63yo F with PMH Ashli's chorea, urinary retention due to neurogenic bladder, hemiparesis, decubitus ulcers admitted with sepsis 2/2 UTI and left hydronephrosis POD#2 s/p Left Ureteroscopy, with laser lithotripsy and stent insertion, continues to have fever.    Plan:  -continue obrien catheter for retention--monitor urine output and quality  -continue to monitor for fevers, tylenol prn. ID noted, cooling blanket, Continue IV abx merrem and supportive measures for +RSV  -continue medical management and supportive care  -f/u labs, trend renal function and WBC  -Discussed with Dr. Cleaning

## 2018-01-21 NOTE — CHART NOTE - NSCHARTNOTEFT_GEN_A_CORE
Assessment: 62 y.o. F pt c Ashli's disease. poor ability to communicate. presents c severe malnutrition. POD #2 s/p L ureteroscopy c laser lithotripsy & stent insertion. pt on pureed diet c thin liquids; as per RN pt has difficult time swallowing liquids, gags at times. Swallow re-eval ordered 1/19.     Factors impacting intake: [ ] none [ ] nausea  [ ] vomiting [ ] diarrhea [ ] constipation  [x ]chewing problems [x ] swallowing issues  [x ] other: difficulty feeding self; lethargic    Diet Presciption: Diet, Dysphagia 1 Pureed-Thin Liquids:   Talha(7 Gm Arginine/7 Gm Glut/1.2 Gm HMB     Qty per Day:  1  No Carb Prosource (1pkg = 15gms Protein)     Qty per Day:  1  Supplement Feeding Modality:  Oral  Ensure Enlive Servings Per Day:  1       Frequency:  Three Times a day (01-19-18 @ 11:11)  Supplements providing 1050 kcals, 60g pro; + 14 g pro + 15g pro    Intake: 50%  +BMx2 1/17    Current Weight: Weight: 54 kg (01/18)  % Weight Change: lost 7 kg x 6 days (11.7%) accuracy?    Pertinent Medications: MEDICATIONS  (STANDING):  ascorbic acid 500 milliGRAM(s) Oral two times a day  dextrose 5%. 1000 milliLiter(s) (63 mL/Hr) IV Continuous <Continuous>  enoxaparin Injectable 40 milliGRAM(s) SubCutaneous daily  ferrous    sulfate 325 milliGRAM(s) Oral daily  hydrocortisone 2.5% Cream 1 Application(s) Topical two times a day  lactulose Syrup 10 Gram(s) Oral two times a day  meropenem IVPB 500 milliGRAM(s) IV Intermittent every 8 hours  mirtazapine 15 milliGRAM(s) Oral at bedtime  multivitamin/minerals 1 Tablet(s) Oral daily  potassium chloride   Powder 20 milliEquivalent(s) Oral daily  senna 2 Tablet(s) Oral at bedtime    MEDICATIONS  (PRN):  acetaminophen  Suppository 650 milliGRAM(s) Rectal every 6 hours PRN For Temp greater than 38 C (100.4 F)  magnesium hydroxide Suspension 30 milliLiter(s) Oral daily PRN Constipation    Pertinent Labs: 01-21 Na138 mmol/L Glu 108 mg/dL<H> K+ 3.7 mmol/L Cr  0.34 mg/dL<L> BUN 14 mg/dL Phos 2.8 mg/dL Alb n/a   PAB n/a        CAPILLARY BLOOD GLUCOSE        Skin: multiple stage IV PU ; no edema     Estimated Needs:   [x ] no change since previous assessment  [ ] recalculated:     Previous Nutrition Diagnosis:   [ ] Inadequate Energy Intake [ ]Inadequate Oral Intake [ ] Excessive Energy Intake   [ ] Underweight [ ] Increased Nutrient Needs [ ] Overweight/Obesity   [ ] Altered GI Function [ ] Unintended Weight Loss [ ] Food & Nutrition Related Knowledge Deficit [x ] Severe Malnutrition in context of chronic illness     Nutrition Diagnosis is [ x] ongoing  [ ] resolved [ ] not applicable     New Nutrition Diagnosis: [x ] not applicable       Interventions:   Recommend  [ ] Change Diet To:  [ ] Nutrition Supplement  [ ] Nutrition Support  [x ] Other: continue current nutrition plan of care; pureed c thin liquids; Talha x1/day; ProSourcex1/day      Monitoring and Evaluation:   [x ] PO intake [ x ] Tolerance to diet prescription [ x ] weights [ x ] labs[ x ] follow up per protocol  [ x] other: Follow up c second swallow eval

## 2018-01-21 NOTE — PROGRESS NOTE ADULT - SUBJECTIVE AND OBJECTIVE BOX
Pink liquid in obrien catheter.  Low grade fever  throat clear   neck supple, trachea midline  lungs clear to P &A  Heart: S1, S2  Abd: no masses palpable  Ext: No cord, tenderness  Poor ability to communicate.  Dr. Cleaning following as urologist.

## 2018-01-22 LAB
ANION GAP SERPL CALC-SCNC: 7 MMOL/L — SIGNIFICANT CHANGE UP (ref 5–17)
APPEARANCE UR: CLEAR — SIGNIFICANT CHANGE UP
BACTERIA # UR AUTO: ABNORMAL
BILIRUB UR-MCNC: NEGATIVE — SIGNIFICANT CHANGE UP
BUN SERPL-MCNC: 10 MG/DL — SIGNIFICANT CHANGE UP (ref 7–23)
CALCIUM SERPL-MCNC: 9.6 MG/DL — SIGNIFICANT CHANGE UP (ref 8.5–10.1)
CHLORIDE SERPL-SCNC: 102 MMOL/L — SIGNIFICANT CHANGE UP (ref 96–108)
CO2 SERPL-SCNC: 29 MMOL/L — SIGNIFICANT CHANGE UP (ref 22–31)
COLOR SPEC: YELLOW — SIGNIFICANT CHANGE UP
COMMENT - URINE: SIGNIFICANT CHANGE UP
CREAT SERPL-MCNC: 0.37 MG/DL — LOW (ref 0.5–1.3)
DIFF PNL FLD: ABNORMAL
GLUCOSE SERPL-MCNC: 107 MG/DL — HIGH (ref 70–99)
GLUCOSE UR QL: NEGATIVE MG/DL — SIGNIFICANT CHANGE UP
GRAN CASTS # UR COMP ASSIST: ABNORMAL /LPF
HCT VFR BLD CALC: 25.6 % — LOW (ref 34.5–45)
HGB BLD-MCNC: 8.5 G/DL — LOW (ref 11.5–15.5)
KETONES UR-MCNC: NEGATIVE — SIGNIFICANT CHANGE UP
LEUKOCYTE ESTERASE UR-ACNC: ABNORMAL
MCHC RBC-ENTMCNC: 26 PG — LOW (ref 27–34)
MCHC RBC-ENTMCNC: 33 GM/DL — SIGNIFICANT CHANGE UP (ref 32–36)
MCV RBC AUTO: 78.8 FL — LOW (ref 80–100)
NITRITE UR-MCNC: NEGATIVE — SIGNIFICANT CHANGE UP
PH UR: 7 — SIGNIFICANT CHANGE UP (ref 5–8)
PLATELET # BLD AUTO: 460 K/UL — HIGH (ref 150–400)
POTASSIUM SERPL-MCNC: 3.6 MMOL/L — SIGNIFICANT CHANGE UP (ref 3.5–5.3)
POTASSIUM SERPL-SCNC: 3.6 MMOL/L — SIGNIFICANT CHANGE UP (ref 3.5–5.3)
PROT UR-MCNC: 30 MG/DL
RBC # BLD: 3.25 M/UL — LOW (ref 3.8–5.2)
RBC # FLD: 15.9 % — HIGH (ref 11–15)
RBC CASTS # UR COMP ASSIST: ABNORMAL /HPF (ref 0–4)
SODIUM SERPL-SCNC: 138 MMOL/L — SIGNIFICANT CHANGE UP (ref 135–145)
SP GR SPEC: 1 — LOW (ref 1.01–1.02)
SURGICAL PATHOLOGY FINAL REPORT - CH: SIGNIFICANT CHANGE UP
UROBILINOGEN FLD QL: 4 MG/DL
WBC # BLD: 9.1 K/UL — SIGNIFICANT CHANGE UP (ref 3.8–10.5)
WBC # FLD AUTO: 9.1 K/UL — SIGNIFICANT CHANGE UP (ref 3.8–10.5)
WBC UR QL: ABNORMAL

## 2018-01-22 RX ADMIN — MEROPENEM 100 MILLIGRAM(S): 1 INJECTION INTRAVENOUS at 21:22

## 2018-01-22 RX ADMIN — SODIUM CHLORIDE 63 MILLILITER(S): 9 INJECTION, SOLUTION INTRAVENOUS at 21:22

## 2018-01-22 RX ADMIN — SODIUM CHLORIDE 63 MILLILITER(S): 9 INJECTION, SOLUTION INTRAVENOUS at 05:28

## 2018-01-22 RX ADMIN — ENOXAPARIN SODIUM 40 MILLIGRAM(S): 100 INJECTION SUBCUTANEOUS at 13:16

## 2018-01-22 RX ADMIN — MEROPENEM 100 MILLIGRAM(S): 1 INJECTION INTRAVENOUS at 14:13

## 2018-01-22 RX ADMIN — Medication 500 MILLIGRAM(S): at 18:05

## 2018-01-22 RX ADMIN — LACTULOSE 10 GRAM(S): 10 SOLUTION ORAL at 18:04

## 2018-01-22 RX ADMIN — MIRTAZAPINE 15 MILLIGRAM(S): 45 TABLET, ORALLY DISINTEGRATING ORAL at 21:22

## 2018-01-22 RX ADMIN — Medication 20 MILLIEQUIVALENT(S): at 13:16

## 2018-01-22 RX ADMIN — Medication 650 MILLIGRAM(S): at 19:21

## 2018-01-22 RX ADMIN — Medication 1 APPLICATION(S): at 05:29

## 2018-01-22 RX ADMIN — Medication 500 MILLIGRAM(S): at 05:29

## 2018-01-22 RX ADMIN — SENNA PLUS 2 TABLET(S): 8.6 TABLET ORAL at 21:22

## 2018-01-22 RX ADMIN — MEROPENEM 100 MILLIGRAM(S): 1 INJECTION INTRAVENOUS at 05:29

## 2018-01-22 RX ADMIN — Medication 1 TABLET(S): at 13:16

## 2018-01-22 RX ADMIN — Medication 1 APPLICATION(S): at 18:05

## 2018-01-22 RX ADMIN — LACTULOSE 10 GRAM(S): 10 SOLUTION ORAL at 05:29

## 2018-01-22 RX ADMIN — Medication 325 MILLIGRAM(S): at 13:16

## 2018-01-22 NOTE — SWALLOW BEDSIDE ASSESSMENT ADULT - ORAL PREPARATORY PHASE
Reduced oral grading/Anterior loss of bolus Refuses to accept bolus into oral cavity/Anterior loss of bolus Anterior loss of bolus

## 2018-01-22 NOTE — SWALLOW BEDSIDE ASSESSMENT ADULT - SWALLOW EVAL: DIAGNOSIS
pt presented with oropharyngeal dysphagia marked by reduced labial seal - causing anterior loss, slow oral transport posterior, suspect delay in swallow trigger with decreased laryngeal elevation. inconsistent cough with thin liquid

## 2018-01-22 NOTE — SWALLOW BEDSIDE ASSESSMENT ADULT - SLP PERTINENT HISTORY OF CURRENT PROBLEM
63 yo black female with [place]'s chorea - with fevers for the past 3 days, last night went up to 103+.  Pt has been more lethargic. pt admitted with sepsis dehydration curt anemia

## 2018-01-22 NOTE — PROGRESS NOTE ADULT - ASSESSMENT
sepsis   gram negative rods in  blood ; fairly sensitive e coli ;; more than adequately covered  on broad spectrum antibiotics empirically   status quo today   continue antibiotics   persitent fever   no or culture   indium ordered

## 2018-01-22 NOTE — SWALLOW BEDSIDE ASSESSMENT ADULT - PHARYNGEAL PHASE
Delayed pharyngeal swallow/Decreased laryngeal elevation Delayed pharyngeal swallow/Decreased laryngeal elevation/Cough post oral intake

## 2018-01-22 NOTE — PROGRESS NOTE ADULT - SUBJECTIVE AND OBJECTIVE BOX
Patient seen and examined bedside resting comfortably.   Awake and repsonsive without complaint.    T(F): 97.2 (01-22-18 @ 05:03), Max: 101.6 (01-22-18 @ 01:09)  HR: 93 (01-22-18 @ 05:03) (93 - 119)  BP: 122/60 (01-22-18 @ 05:03) (110/66 - 122/68)  RR: 18 (01-22-18 @ 05:03) (17 - 18)  SpO2: 99% (01-22-18 @ 05:03) (97% - 99%)    PHYSICAL EXAM:  General: NAD, alert and awake. Cooling blanket in place  HEENT: NCAT, EOMI, conjunctiva clear  Chest: nonlabored respirations, good inspiratory effort  Abdomen: soft, NTND.   Extremities: no pedal edema or calf tenderness noted. contracted  : obrien catheter indwelling draining clear yellow urine    LABS:                        8.5    9.1   )-----------( 460      ( 22 Jan 2018 07:21 )             25.6   01-22    138  |  102  |  10  ----------------------------<  107<H>  3.6   |  29  |  0.37<L>    Ca    9.6      22 Jan 2018 07:21  Phos  2.8     01-21  Mg     2.1     01-21      I&O's Detail    21 Jan 2018 07:01  -  22 Jan 2018 07:00  --------------------------------------------------------  IN:    dextrose 5%.: 630 mL    Solution: 100 mL  Total IN: 730 mL    OUT:    Voided: 750 mL  Total OUT: 750 mL    Total NET: -20 mL      Impression: 63yo F with PMH [place]'s chorea, urinary retention due to neurogenic bladder, hemiparesis, decubitus ulcers admitted with sepsis 2/2 UTI and left hydronephrosis POD#3 s/p Left Ureteroscopy, with laser lithotripsy and stent insertion, with continued fevers but resolved leukocytosis    Plan:  -continue obrien catheter for retention, monitor urine output and quality  -continue to monitor for fevers, tylenol prn. ID noted, cooling blanket, Continue IV abx merrem and supportive measures for +RSV  -continue medical management and supportive care  -continue lovenox for VTE ppx

## 2018-01-22 NOTE — SWALLOW BEDSIDE ASSESSMENT ADULT - COMMENTS
CT chest no contrast 1/16/2018CHEST:CHEST WALL AND LOWER NECK: Heterogeneously nodular, nonemergent sonogram   may be considered. MEDIASTINUM AND K[place]O: Within normal limits  HEART: Within normal limits  VESSELS: Within normal limits  LUNG AND LARGE AIRWAYS: Within normal limits

## 2018-01-22 NOTE — PROGRESS NOTE ADULT - SUBJECTIVE AND OBJECTIVE BOX
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  Pt has been more lethargic.  no n/v (12 Jan 2018 14:07)  no change in status  now on regular floor  sp stone removal and stent friday   or findings discussed with dr falcon on friday   temps have been lower ; max 100    Allergies    No Known Allergies    Intolerances        MEDICATIONS  (STANDING):  ascorbic acid 500 milliGRAM(s) Oral two times a day  dextrose 5%. 1000 milliLiter(s) (63 mL/Hr) IV Continuous <Continuous>  enoxaparin Injectable 40 milliGRAM(s) SubCutaneous daily  ferrous    sulfate 325 milliGRAM(s) Oral daily  hydrocortisone 2.5% Cream 1 Application(s) Topical two times a day  lactulose Syrup 10 Gram(s) Oral two times a day  meropenem IVPB 500 milliGRAM(s) IV Intermittent every 8 hours  mirtazapine 15 milliGRAM(s) Oral at bedtime  multivitamin/minerals 1 Tablet(s) Oral daily  potassium chloride   Powder 20 milliEquivalent(s) Oral daily  senna 2 Tablet(s) Oral at bedtime    MEDICATIONS  (PRN):  acetaminophen  Suppository 650 milliGRAM(s) Rectal every 6 hours PRN For Temp greater than 38 C (100.4 F)  magnesium hydroxide Suspension 30 milliLiter(s) Oral daily PRN Constipation      REVIEW OF SYSTEMS:    unable to obtain   VITAL SIGNS:  T(C): 38.3 (01-22-18 @ 17:50), Max: 38.7 (01-22-18 @ 01:09)  T(F): 101 (01-22-18 @ 17:50), Max: 101.6 (01-22-18 @ 01:09)  HR: 119 (01-22-18 @ 17:50) (93 - 119)  BP: 130/71 (01-22-18 @ 17:50) (118/68 - 130/71)  RR: 16 (01-22-18 @ 17:50) (16 - 18)  SpO2: 95% (01-22-18 @ 17:50) (95% - 99%)  Wt(kg): --    PHYSICAL EXAM:    GENERAL: not in any distress  HEENT: Neck is supple, normocephalic, atraumatic   CHEST/LUNG: Clear to auscultation bilaterally; No rales, rhonchi, wheezing  HEART: Regular rate and rhythm; No murmurs, rubs, or gallops  ABDOMEN: Soft, Nontender, Nondistended; Bowel sounds present, no rebound   EXTREMITIES:  2+ Peripheral Pulses, No clubbing, cyanosis, or edema  contracted   none of the decubiti are infected  GENITOURINARY: NEGATIVE   SKIN: No rashes or lesions  BACK: no pressor sore   NERVOUS SYSTEM:  Alert & responsive     LABS:                         8.5    9.1   )-----------( 460      ( 22 Jan 2018 07:21 )             25.6     01-22    138  |  102  |  10  ----------------------------<  107<H>  3.6   |  29  |  0.37<L>    Ca    9.6      22 Jan 2018 07:21  Phos  2.8     01-21  Mg     2.1     01-21                                              Radiology:

## 2018-01-22 NOTE — PROGRESS NOTE ADULT - SUBJECTIVE AND OBJECTIVE BOX
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  Pt has been more lethargic.  no n/v (12 Jan 2018 14:07)      INTERVAL HPI/OVERNIGHT EVENTS:  No new c/o. Still febrile. S/P stent    MEDICATIONS  (STANDING):  ascorbic acid 500 milliGRAM(s) Oral two times a day  dextrose 5%. 1000 milliLiter(s) (63 mL/Hr) IV Continuous <Continuous>  enoxaparin Injectable 40 milliGRAM(s) SubCutaneous daily  ferrous    sulfate 325 milliGRAM(s) Oral daily  hydrocortisone 2.5% Cream 1 Application(s) Topical two times a day  lactulose Syrup 10 Gram(s) Oral two times a day  meropenem IVPB 500 milliGRAM(s) IV Intermittent every 8 hours  mirtazapine 15 milliGRAM(s) Oral at bedtime  multivitamin/minerals 1 Tablet(s) Oral daily  potassium chloride   Powder 20 milliEquivalent(s) Oral daily  senna 2 Tablet(s) Oral at bedtime    MEDICATIONS  (PRN):  acetaminophen  Suppository 650 milliGRAM(s) Rectal every 6 hours PRN For Temp greater than 38 C (100.4 F)  magnesium hydroxide Suspension 30 milliLiter(s) Oral daily PRN Constipation      FAMILY HISTORY:  No pertinent family history in first degree relatives      Allergies    No Known Allergies    Intolerances        PMH/PSH:  Decubital ulcer  Hemiparesis  Failure to thrive in adult  UTI (urinary tract infection)  Dehydration  Rhabdomyolysis  HTN (hypertension)  Neurogenic bladder disorder  Pressure ulcer of sacrum  UTI (urinary tract infection)  Embolism  DVT (deep venous thrombosis)  Anemia  Pneumonia  DM (diabetes mellitus)  High cholesterol  Ashli chorea  No pertinent past medical history  No significant past surgical history        REVIEW OF SYSTEMS:  CONSTITUTIONAL: No weight loss,   EYES: No eye pain, visual disturbances, or discharge  NECK: No pain or stiffness  BREASTS: No pain, masses, or nipple discharge  RESPIRATORY: No cough, wheezing, chills or hemoptysis; No shortness of breath  CARDIOVASCULAR: No chest pain, palpitations, dizziness, or leg swelling  GASTROINTESTINAL: No abdominal or epigastric pain. No nausea, vomiting, or hematemesis; No diarrhea or constipation. No melena or hematochezia.  GENITOURINARY: No dysuria, frequency,  or incontinence  NEUROLOGICAL: No headaches, memory loss, loss of strength, numbness, or tremors  SKIN: No itching, burning, rashes, or lesions   LYMPH NODES: No enlarged glands  ENDOCRINE: No heat or cold intolerance; No hair loss  MUSCULOSKELETAL: No joint pain or swelling; No muscle, back, or extremity pain  PSYCHIATRIC: No depression, anxiety, mood swings, or difficulty sleeping  HEME/LYMPH: No easy bruising, or bleeding gums  ALLERGY AND IMMUNOLOGIC: No hives or eczema    Vital Signs Last 24 Hrs  T(C): 37.2 (22 Jan 2018 11:32), Max: 38.7 (22 Jan 2018 01:09)  T(F): 99 (22 Jan 2018 11:32), Max: 101.6 (22 Jan 2018 01:09)  HR: 114 (22 Jan 2018 11:32) (93 - 119)  BP: 118/68 (22 Jan 2018 11:32) (110/66 - 122/68)  BP(mean): --  RR: 18 (22 Jan 2018 11:32) (18 - 18)  SpO2: 99% (22 Jan 2018 11:32) (97% - 99%)    PHYSICAL EXAM:  GENERAL: NAD, well-groomed, well-developed  HEAD:  Atraumatic, Normocephalic  EYES: EOMI, PERRLA, conjunctiva and sclera clear  NECK: Supple, No JVD, Normal thyroid  NERVOUS SYSTEM:  Alert   CHEST/LUNG: Clear to percussion bilaterally; No rales, rhonchi, wheezing, or rubs  HEART: Regular rate and rhythm; No murmurs, rubs, or gallops  ABDOMEN: Soft, Nontender, Nondistended; Bowel sounds present  EXTREMITIES:  2+ Peripheral Pulses, No clubbing, cyanosis, or edema  LYMPH: No lymphadenopathy noted  SKIN: No rashes or lesions    LAB                          8.5    9.1   )-----------( 460      ( 22 Jan 2018 07:21 )             25.6       CBC:  01-22 @ 07:21  WBC 9.1   Hgb 8.5   Hct 25.6   Plts 460  MCV 78.8  01-21 @ 06:37  WBC 11.9   Hgb 8.2   Hct 24.5   Plts 398  MCV 78.8  01-20 @ 07:30  WBC 11.2   Hgb 8.2   Hct 24.1   Plts 365  MCV 78.2  01-19 @ 08:29  WBC 11.1   Hgb 8.6   Hct 25.8   Plts 331  MCV 78.0  01-18 @ 08:01  WBC 11.6   Hgb 8.7   Hct 25.7   Plts 268  MCV 78.4  01-17 @ 07:35  WBC 13.2   Hgb 8.7   Hct 26.7   Plts 231  MCV 77.7  01-16 @ 08:53  WBC 15.4   Hgb 8.8   Hct 26.5   Plts 193  MCV 78.6      Chemistry:  01-22 @ 07:21  Na+ 138  K+ 3.6  Cl- 102  CO2 29  BUN 10  Cr 0.37     01-21 @ 06:37  Na+ 138  K+ 3.7  Cl- 103  CO2 30  BUN 14  Cr 0.34     01-20 @ 07:30  Na+ 139  K+ 4.0  Cl- 105  CO2 25  BUN 9  Cr 0.47     01-19 @ 08:29  Na+ 140  K+ 3.9  Cl- 106  CO2 26  BUN 9  Cr 0.49     01-18 @ 08:01  Na+ 141  K+ 3.6  Cl- 107  CO2 26  BUN 8  Cr 0.54     01-17 @ 07:35  Na+ 140  K+ 3.0  Cl- 107  CO2 24  BUN 10  Cr 0.56     01-16 @ 08:53  Na+ 142  K+ 3.1  Cl- 107  CO2 26  BUN 11  Cr 0.77         Glucose, Serum: 107 mg/dL (01-22 @ 07:21)  Glucose, Serum: 108 mg/dL (01-21 @ 06:37)  Glucose, Serum: 123 mg/dL (01-20 @ 07:30)  Glucose, Serum: 115 mg/dL (01-19 @ 08:29)  Glucose, Serum: 121 mg/dL (01-18 @ 08:01)  Glucose, Serum: 183 mg/dL (01-17 @ 07:35)  Glucose, Serum: 152 mg/dL (01-16 @ 08:53)      22 Jan 2018 07:21    138    |  102    |  10     ----------------------------<  107    3.6     |  29     |  0.37   21 Jan 2018 06:37    138    |  103    |  14     ----------------------------<  108    3.7     |  30     |  0.34   20 Jan 2018 07:30    139    |  105    |  9      ----------------------------<  123    4.0     |  25     |  0.47   19 Jan 2018 08:29    140    |  106    |  9      ----------------------------<  115    3.9     |  26     |  0.49   18 Jan 2018 08:01    141    |  107    |  8      ----------------------------<  121    3.6     |  26     |  0.54   17 Jan 2018 07:35    140    |  107    |  10     ----------------------------<  183    3.0     |  24     |  0.56   16 Jan 2018 08:53    142    |  107    |  11     ----------------------------<  152    3.1     |  26     |  0.77     Ca    9.6        22 Jan 2018 07:21  Ca    9.3        21 Jan 2018 06:37  Ca    9.0        20 Jan 2018 07:30  Ca    9.2        19 Jan 2018 08:29  Ca    9.4        18 Jan 2018 08:01  Ca    9.0        17 Jan 2018 07:35  Ca    8.6        16 Jan 2018 08:53  Phos  2.8       21 Jan 2018 06:37  Mg     2.1       21 Jan 2018 06:37  Mg     2.1       18 Jan 2018 08:01  Mg     1.7       16 Jan 2018 08:53    TPro  8.7    /  Alb  2.1    /  TBili  0.4    /  DBili  x      /  AST  31     /  ALT  25     /  AlkPhos  60     19 Jan 2018 08:29              CAPILLARY BLOOD GLUCOSE              RADIOLOGY & ADDITIONAL TESTS:    Imaging Personally Reviewed:  [ ] YES  [ ] NO    Consultant(s) Notes Reviewed:  [ ] YES  [ ] NO    Care Discussed with Consultants/Other Providers [ ] YES  [ ] NO

## 2018-01-22 NOTE — SWALLOW BEDSIDE ASSESSMENT ADULT - SLP GENERAL OBSERVATIONS
pt seen bedside, alert and oriented to self. pt responded to simple want/autobiographical questions- with noted disorientation and reduced initiation. she inconsistently followed one step directions. pt contracted lower, and upper extremities with head in flexion to the right despite repositioning.

## 2018-01-22 NOTE — PROGRESS NOTE ADULT - ASSESSMENT
Subjective Complaints:  Historian:         REVIEW OF SYSTEMS:  Eyes:  Good vision, no reported pain  ENT:  No sore throat, pain, runny nose, dysphagia  CV:  No pain, palpitatioins, hypo/hypertension  Resp:  No dyspnea, cough, tachypnea, wheezing  GI:  No pain, nausea, vomiting, diarrhea, constipatiion  Muscle:  No pain, weakness  Neuro:  No weakness, tingling, memory problems  Psych:  No fatigue, insomnia, mood problems, depression  Endocrine:  No polyuria, polydypsia, cold/heat intolerance    Vital Signs Last 24 Hrs  T(C): 38.7 (22 Jan 2018 19:19), Max: 38.7 (22 Jan 2018 01:09)  T(F): 101.7 (22 Jan 2018 19:19), Max: 101.7 (22 Jan 2018 19:19)  HR: 119 (22 Jan 2018 17:50) (93 - 119)  BP: 130/71 (22 Jan 2018 17:50) (118/68 - 130/71)  BP(mean): --  RR: 16 (22 Jan 2018 17:50) (16 - 18)  SpO2: 95% (22 Jan 2018 17:50) (95% - 99%)    GENERAL PHYSICAL EXAM:  General:  Appears stated age, well-groomed, well-nourished, no distress  HEENT:  NC/AT, patent nares w/ pink mucosa, OP clear w/o lesions, PERRL, EOMI, conjunctivae clear, no thyromegaly, nodules, adenopathy, no JVD  Chest:  Full & symmetric excursion, no increased effort, breath sounds clear  Cardiovascular:  Regular rhythm, S1, S2, no murmur/rub/S3/S4, no carotid/femoral/abdominal bruit, radial/pedal pulses 2+, no edema  Abdomen:  Soft, non-tender, non-distended, normoactive bowel sounds, no HSM  Extremities:  Gait & station:   Digits:   Nails:   Joints, Bones, Muscles:   ROM:   Stability:  Skin:  No rash/erythema/ecchymoses/petechiae/wounds/abscess/warm/dry  Musculoskeletal:  Full ROM in all joints w/o swelling/tenderness/effusion    NEUROLOGICAL EXAM:  HENT:  Normocephalic head; atraumatic head.  Neck supple.  ENT: normal looking.  Mental State:    Alert.  open eyes  does not follws commnads arm ,leg contracted  encephalopaythyLABS:                        8.5    9.1   )-----------( 460      ( 22 Jan 2018 07:21 )             25.6     01-22    138  |  102  |  10  ----------------------------<  107<H>  3.6   |  29  |  0.37<L>    Ca    9.6      22 Jan 2018 07:21  Phos  2.8     01-21  Mg     2.1     01-21     ass= awake open eyes does not norm gibbs arm leg contracted  uti s/p ureteral stent  encephalaothy sepsis no  seziure         RADIOLOGY & ADDITIONAL STUDIES:        Recommendations:   uti on iv antibiotics for sub acute rehab

## 2018-01-23 LAB
ANION GAP SERPL CALC-SCNC: 8 MMOL/L — SIGNIFICANT CHANGE UP (ref 5–17)
BUN SERPL-MCNC: 11 MG/DL — SIGNIFICANT CHANGE UP (ref 7–23)
CALCIUM SERPL-MCNC: 9.9 MG/DL — SIGNIFICANT CHANGE UP (ref 8.5–10.1)
CHLORIDE SERPL-SCNC: 102 MMOL/L — SIGNIFICANT CHANGE UP (ref 96–108)
CO2 SERPL-SCNC: 28 MMOL/L — SIGNIFICANT CHANGE UP (ref 22–31)
CREAT SERPL-MCNC: 0.48 MG/DL — LOW (ref 0.5–1.3)
GLUCOSE SERPL-MCNC: 117 MG/DL — HIGH (ref 70–99)
HCT VFR BLD CALC: 25.2 % — LOW (ref 34.5–45)
HGB BLD-MCNC: 8.2 G/DL — LOW (ref 11.5–15.5)
MCHC RBC-ENTMCNC: 25.1 PG — LOW (ref 27–34)
MCHC RBC-ENTMCNC: 32.4 GM/DL — SIGNIFICANT CHANGE UP (ref 32–36)
MCV RBC AUTO: 77.4 FL — LOW (ref 80–100)
PLATELET # BLD AUTO: 587 K/UL — HIGH (ref 150–400)
POTASSIUM SERPL-MCNC: 3.9 MMOL/L — SIGNIFICANT CHANGE UP (ref 3.5–5.3)
POTASSIUM SERPL-SCNC: 3.9 MMOL/L — SIGNIFICANT CHANGE UP (ref 3.5–5.3)
RBC # BLD: 3.25 M/UL — LOW (ref 3.8–5.2)
RBC # FLD: 16.1 % — HIGH (ref 11–15)
SODIUM SERPL-SCNC: 138 MMOL/L — SIGNIFICANT CHANGE UP (ref 135–145)
WBC # BLD: 9.7 K/UL — SIGNIFICANT CHANGE UP (ref 3.8–10.5)
WBC # FLD AUTO: 9.7 K/UL — SIGNIFICANT CHANGE UP (ref 3.8–10.5)

## 2018-01-23 PROCEDURE — 71045 X-RAY EXAM CHEST 1 VIEW: CPT | Mod: 26

## 2018-01-23 RX ADMIN — Medication 1 APPLICATION(S): at 05:55

## 2018-01-23 RX ADMIN — Medication 500 MILLIGRAM(S): at 18:46

## 2018-01-23 RX ADMIN — Medication 650 MILLIGRAM(S): at 18:48

## 2018-01-23 RX ADMIN — Medication 650 MILLIGRAM(S): at 05:55

## 2018-01-23 RX ADMIN — SODIUM CHLORIDE 63 MILLILITER(S): 9 INJECTION, SOLUTION INTRAVENOUS at 18:46

## 2018-01-23 RX ADMIN — Medication 325 MILLIGRAM(S): at 14:14

## 2018-01-23 RX ADMIN — SENNA PLUS 2 TABLET(S): 8.6 TABLET ORAL at 22:00

## 2018-01-23 RX ADMIN — MEROPENEM 100 MILLIGRAM(S): 1 INJECTION INTRAVENOUS at 22:00

## 2018-01-23 RX ADMIN — MIRTAZAPINE 15 MILLIGRAM(S): 45 TABLET, ORALLY DISINTEGRATING ORAL at 22:00

## 2018-01-23 RX ADMIN — LACTULOSE 10 GRAM(S): 10 SOLUTION ORAL at 18:46

## 2018-01-23 RX ADMIN — Medication 1 TABLET(S): at 14:14

## 2018-01-23 RX ADMIN — Medication 1 APPLICATION(S): at 18:49

## 2018-01-23 RX ADMIN — MEROPENEM 100 MILLIGRAM(S): 1 INJECTION INTRAVENOUS at 05:55

## 2018-01-23 RX ADMIN — LACTULOSE 10 GRAM(S): 10 SOLUTION ORAL at 05:55

## 2018-01-23 RX ADMIN — Medication 500 MILLIGRAM(S): at 05:55

## 2018-01-23 RX ADMIN — ENOXAPARIN SODIUM 40 MILLIGRAM(S): 100 INJECTION SUBCUTANEOUS at 14:15

## 2018-01-23 RX ADMIN — Medication 20 MILLIEQUIVALENT(S): at 14:18

## 2018-01-23 RX ADMIN — MEROPENEM 100 MILLIGRAM(S): 1 INJECTION INTRAVENOUS at 14:14

## 2018-01-23 NOTE — PROGRESS NOTE ADULT - SUBJECTIVE AND OBJECTIVE BOX
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  Pt has been more lethargic.  no n/v (2018 14:07)      INTERVAL HPI/OVERNIGHT EVENTS:  No new complaints. Afebrile , T max 101.4    MEDICATIONS  (STANDING):  ascorbic acid 500 milliGRAM(s) Oral two times a day  dextrose 5%. 1000 milliLiter(s) (63 mL/Hr) IV Continuous <Continuous>  enoxaparin Injectable 40 milliGRAM(s) SubCutaneous daily  ferrous    sulfate 325 milliGRAM(s) Oral daily  hydrocortisone 2.5% Cream 1 Application(s) Topical two times a day  lactulose Syrup 10 Gram(s) Oral two times a day  meropenem IVPB 500 milliGRAM(s) IV Intermittent every 8 hours  mirtazapine 15 milliGRAM(s) Oral at bedtime  multivitamin/minerals 1 Tablet(s) Oral daily  potassium chloride   Powder 20 milliEquivalent(s) Oral daily  senna 2 Tablet(s) Oral at bedtime    MEDICATIONS  (PRN):  acetaminophen  Suppository 650 milliGRAM(s) Rectal every 6 hours PRN For Temp greater than 38 C (100.4 F)  magnesium hydroxide Suspension 30 milliLiter(s) Oral daily PRN Constipation      FAMILY HISTORY:  No pertinent family history in first degree relatives      Allergies    No Known Allergies    Intolerances        PMH/PSH:  Decubital ulcer  Hemiparesis  Failure to thrive in adult  UTI (urinary tract infection)  Dehydration  Rhabdomyolysis  HTN (hypertension)  Neurogenic bladder disorder  Pressure ulcer of sacrum  UTI (urinary tract infection)  Embolism  DVT (deep venous thrombosis)  Anemia  Pneumonia  DM (diabetes mellitus)  High cholesterol  Ashli chorea  No pertinent past medical history  No significant past surgical history        REVIEW OF SYSTEMS:  CONSTITUTIONAL: No fever, weight loss, or fatigue  EYES: No eye pain, visual disturbances, or discharge  NECK: No pain or stiffness  BREASTS: No pain, masses, or nipple discharge  RESPIRATORY: No cough, wheezing, chills or hemoptysis; No shortness of breath  CARDIOVASCULAR: No chest pain, palpitations, dizziness, or leg swelling  GASTROINTESTINAL: No abdominal or epigastric pain. No nausea, vomiting, or hematemesis; No diarrhea or constipation. No melena or hematochezia.  GENITOURINARY: No dysuria, frequency, hematuria, or incontinence  NEUROLOGICAL: No headaches, memory loss, loss of strength, numbness, or tremors  SKIN: No itching, burning, rashes, or lesions   LYMPH NODES: No enlarged glands  ENDOCRINE: No heat or cold intolerance; No hair loss  MUSCULOSKELETAL: No joint pain or swelling; No muscle, back, or extremity pain  PSYCHIATRIC: No depression, anxiety, mood swings, or difficulty sleeping  HEME/LYMPH: No easy bruising, or bleeding gums  ALLERGY AND IMMUNOLOGIC: No hives or eczema    Vital Signs Last 24 Hrs  T(C): 37.4 (2018 11:31), Max: 38.8 (2018 05:44)  T(F): 99.4 (2018 11:31), Max: 101.9 (2018 05:44)  HR: 115 (2018 11:31) (115 - 119)  BP: 114/75 (2018 11:31) (105/57 - 132/76)  BP(mean): --  RR: 18 (2018 11:31) (15 - 18)  SpO2: 98% (2018 11:31) (95% - 99%)    PHYSICAL EXAM:  GENERAL: NAD, well-groomed, well-developed  HEAD:  Atraumatic, Normocephalic  EYES: EOMI, PERRLA, conjunctiva and sclera clear  ENMT: No tonsillar erythema, exudates, or enlargement; Moist mucous membranes, Good dentition, No lesions  NECK: Supple, No JVD, Normal thyroid  NERVOUS SYSTEM:  Alert & Oriented X3, Good concentration; Motor Strength 5/5 B/L upper and lower extremities; DTRs 2+ intact and symmetric  CHEST/LUNG: Clear to percussion bilaterally; No rales, rhonchi, wheezing, or rubs  HEART: Regular rate and rhythm; No murmurs, rubs, or gallops  ABDOMEN: Soft, Nontender, Nondistended; Bowel sounds present  EXTREMITIES:  2+ Peripheral Pulses, No clubbing, cyanosis, or edema  LYMPH: No lymphadenopathy noted  SKIN: No rashes or lesions    LAB                          8.2    9.7   )-----------( 587      ( 2018 09:32 )             25.2       CBC:  01-23 @ 09:32  WBC 9.7   Hgb 8.2   Hct 25.2   Plts 587  MCV 77.4   07:21  WBC 9.1   Hgb 8.5   Hct 25.6   Plts 460  MCV 78.8   @ 06:37  WBC 11.9   Hgb 8.2   Hct 24.5   Plts 398  MCV 78.8   @ 07:30  WBC 11.2   Hgb 8.2   Hct 24.1   Plts 365  MCV 78.2   @ 08:29  WBC 11.1   Hgb 8.6   Hct 25.8   Plts 331  MCV 78.0   @ 08:01  WBC 11.6   Hgb 8.7   Hct 25.7   Plts 268  MCV 78.4   @ 07:35  WBC 13.2   Hgb 8.7   Hct 26.7   Plts 231  MCV 77.7      Chemistry:   @ 09:32  Na+ 138  K+ 3.9  Cl- 102  CO2 28  BUN 11  Cr 0.48      @ 07:21  Na+ 138  K+ 3.6  Cl- 102  CO2 29  BUN 10  Cr 0.37      @ 06:37  Na+ 138  K+ 3.7  Cl- 103  CO2 30  BUN 14  Cr 0.34      @ 07:30  Na+ 139  K+ 4.0  Cl- 105  CO2 25  BUN 9  Cr 0.47      @ 08:29  Na+ 140  K+ 3.9  Cl- 106  CO2 26  BUN 9  Cr 0.49      @ 08:01  Na+ 141  K+ 3.6  Cl- 107  CO2 26  BUN 8  Cr 0.54      @ 07:35  Na+ 140  K+ 3.0  Cl- 107  CO2 24  BUN 10  Cr 0.56         Glucose, Serum: 117 mg/dL ( @ 09:32)  Glucose, Serum: 107 mg/dL ( @ 07:21)  Glucose, Serum: 108 mg/dL ( @ 06:37)  Glucose, Serum: 123 mg/dL ( @ 07:30)  Glucose, Serum: 115 mg/dL ( @ 08:29)  Glucose, Serum: 121 mg/dL ( @ 08:01)  Glucose, Serum: 183 mg/dL ( @ 07:35)      2018 09:32    138    |  102    |  11     ----------------------------<  117    3.9     |  28     |  0.48   2018 07:21    138    |  102    |  10     ----------------------------<  107    3.6     |  29     |  0.37   2018 06:37    138    |  103    |  14     ----------------------------<  108    3.7     |  30     |  0.34   2018 07:30    139    |  105    |  9      ----------------------------<  123    4.0     |  25     |  0.47   2018 08:29    140    |  106    |  9      ----------------------------<  115    3.9     |  26     |  0.49   2018 08:01    141    |  107    |  8      ----------------------------<  121    3.6     |  26     |  0.54   2018 07:35    140    |  107    |  10     ----------------------------<  183    3.0     |  24     |  0.56     Ca    9.9        2018 09:32  Ca    9.6        2018 07:21  Ca    9.3        2018 06:37  Ca    9.0        2018 07:30  Ca    9.2        2018 08:29  Ca    9.4        2018 08:01  Ca    9.0        2018 07:35  Phos  2.8       2018 06:37  Mg     2.1       2018 06:37  Mg     2.1       2018 08:01    TPro  8.7    /  Alb  2.1    /  TBili  0.4    /  DBili  x      /  AST  31     /  ALT  25     /  AlkPhos  60     2018 08:29          Urinalysis Basic - ( 2018 21:50 )    Color: Yellow / Appearance: Clear / S.005 / pH: x  Gluc: x / Ketone: Negative  / Bili: Negative / Urobili: 4 mg/dL   Blood: x / Protein: 30 mg/dL / Nitrite: Negative   Leuk Esterase: Moderate / RBC: 25-50 /HPF / WBC 6-10   Sq Epi: x / Non Sq Epi: x / Bacteria: Few        CAPILLARY BLOOD GLUCOSE              RADIOLOGY & ADDITIONAL TESTS:    Imaging Personally Reviewed:  [ ] YES  [ ] NO    Consultant(s) Notes Reviewed:  [ ] YES  [ ] NO    Care Discussed with Consultants/Other Providers [ ] YES  [ ] NO Patient is a 62y old  Female who presents with a chief complaint of 61 yo black female with fever up to 103+ (2018 14:07)      HPI:  61 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  Pt has been more lethargic.  no n/v (2018 14:07)      INTERVAL HPI/OVERNIGHT EVENTS:  No new complaints. Afebrile , T max 101.4    MEDICATIONS  (STANDING):  ascorbic acid 500 milliGRAM(s) Oral two times a day  dextrose 5%. 1000 milliLiter(s) (63 mL/Hr) IV Continuous <Continuous>  enoxaparin Injectable 40 milliGRAM(s) SubCutaneous daily  ferrous    sulfate 325 milliGRAM(s) Oral daily  hydrocortisone 2.5% Cream 1 Application(s) Topical two times a day  lactulose Syrup 10 Gram(s) Oral two times a day  meropenem IVPB 500 milliGRAM(s) IV Intermittent every 8 hours  mirtazapine 15 milliGRAM(s) Oral at bedtime  multivitamin/minerals 1 Tablet(s) Oral daily  potassium chloride   Powder 20 milliEquivalent(s) Oral daily  senna 2 Tablet(s) Oral at bedtime    MEDICATIONS  (PRN):  acetaminophen  Suppository 650 milliGRAM(s) Rectal every 6 hours PRN For Temp greater than 38 C (100.4 F)  magnesium hydroxide Suspension 30 milliLiter(s) Oral daily PRN Constipation      FAMILY HISTORY:  No pertinent family history in first degree relatives      Allergies    No Known Allergies    Intolerances        PMH/PSH:  Decubital ulcer  Hemiparesis  Failure to thrive in adult  UTI (urinary tract infection)  Dehydration  Rhabdomyolysis  HTN (hypertension)  Neurogenic bladder disorder  Pressure ulcer of sacrum  UTI (urinary tract infection)  Embolism  DVT (deep venous thrombosis)  Anemia  Pneumonia  DM (diabetes mellitus)  High cholesterol  Ashli chorea  No pertinent past medical history  No significant past surgical history        REVIEW OF SYSTEMS:  CONSTITUTIONAL: No fever, weight loss, or fatigue  EYES: No eye pain, visual disturbances, or discharge  NECK: No pain or stiffness  BREASTS: No pain, masses, or nipple discharge  RESPIRATORY: No cough, wheezing, chills or hemoptysis; No shortness of breath  CARDIOVASCULAR: No chest pain, palpitations, dizziness, or leg swelling  GASTROINTESTINAL: No abdominal or epigastric pain. No nausea, vomiting, or hematemesis; No diarrhea or constipation. No melena or hematochezia.  GENITOURINARY: No dysuria, frequency, hematuria, or incontinence  NEUROLOGICAL: No headaches, memory loss, loss of strength, numbness, or tremors  SKIN: No itching, burning, rashes, or lesions   LYMPH NODES: No enlarged glands  ENDOCRINE: No heat or cold intolerance; No hair loss  MUSCULOSKELETAL: No joint pain or swelling; No muscle, back, or extremity pain  PSYCHIATRIC: No depression, anxiety, mood swings, or difficulty sleeping  HEME/LYMPH: No easy bruising, or bleeding gums  ALLERGY AND IMMUNOLOGIC: No hives or eczema    Vital Signs Last 24 Hrs  T(C): 37.4 (2018 11:31), Max: 38.8 (2018 05:44)  T(F): 99.4 (2018 11:31), Max: 101.9 (2018 05:44)  HR: 115 (2018 11:31) (115 - 119)  BP: 114/75 (2018 11:31) (105/57 - 132/76)  BP(mean): --  RR: 18 (2018 11:31) (15 - 18)  SpO2: 98% (2018 11:31) (95% - 99%)    PHYSICAL EXAM:  GENERAL: NAD, well-groomed, well-developed  HEAD:  Atraumatic, Normocephalic  EYES: EOMI, PERRLA, conjunctiva and sclera clear  ENMT: No tonsillar erythema, exudates, or enlargement; Moist mucous membranes, Good dentition, No lesions  NECK: Supple, No JVD, Normal thyroid  NERVOUS SYSTEM:  Alert & Oriented , Good concentration;  CHEST/LUNG: Clear to percussion bilaterally; No rales, rhonchi, wheezing, or rubs  HEART: Regular rate and rhythm; No murmurs, rubs, or gallops  ABDOMEN: Soft, Nontender, Nondistended; Bowel sounds present  EXTREMITIES:  2+ Peripheral Pulses, No clubbing, cyanosis, or edema  LYMPH: No lymphadenopathy noted  SKIN: No rashes or lesions    LAB                          8.2    9.7   )-----------( 587      ( 2018 09:32 )             25.2       CBC:   @ 09:32  WBC 9.7   Hgb 8.2   Hct 25.2   Plts 587  MCV 77.4  01-22 @ 07:21  WBC 9.1   Hgb 8.5   Hct 25.6   Plts 460  MCV 78.8   @ 06:37  WBC 11.9   Hgb 8.2   Hct 24.5   Plts 398  MCV 78.8   @ 07:30  WBC 11.2   Hgb 8.2   Hct 24.1   Plts 365  MCV 78.2   @ 08:29  WBC 11.1   Hgb 8.6   Hct 25.8   Plts 331  MCV 78.0   @ 08:01  WBC 11.6   Hgb 8.7   Hct 25.7   Plts 268  MCV 78.4   @ 07:35  WBC 13.2   Hgb 8.7   Hct 26.7   Plts 231  MCV 77.7      Chemistry:   @ 09:32  Na+ 138  K+ 3.9  Cl- 102  CO2 28  BUN 11  Cr 0.48      @ 07:21  Na+ 138  K+ 3.6  Cl- 102  CO2 29  BUN 10  Cr 0.37      @ 06:37  Na+ 138  K+ 3.7  Cl- 103  CO2 30  BUN 14  Cr 0.34      @ 07:30  Na+ 139  K+ 4.0  Cl- 105  CO2 25  BUN 9  Cr 0.47      @ 08:29  Na+ 140  K+ 3.9  Cl- 106  CO2 26  BUN 9  Cr 0.49      @ 08:01  Na+ 141  K+ 3.6  Cl- 107  CO2 26  BUN 8  Cr 0.54      @ 07:35  Na+ 140  K+ 3.0  Cl- 107  CO2 24  BUN 10  Cr 0.56         Glucose, Serum: 117 mg/dL ( @ 09:32)  Glucose, Serum: 107 mg/dL ( @ 07:21)  Glucose, Serum: 108 mg/dL ( @ 06:37)  Glucose, Serum: 123 mg/dL ( @ 07:30)  Glucose, Serum: 115 mg/dL ( @ 08:29)  Glucose, Serum: 121 mg/dL ( @ 08:01)  Glucose, Serum: 183 mg/dL ( @ 07:35)      2018 09:32    138    |  102    |  11     ----------------------------<  117    3.9     |  28     |  0.48   2018 07:21    138    |  102    |  10     ----------------------------<  107    3.6     |  29     |  0.37   2018 06:37    138    |  103    |  14     ----------------------------<  108    3.7     |  30     |  0.34   2018 07:30    139    |  105    |  9      ----------------------------<  123    4.0     |  25     |  0.47   2018 08:29    140    |  106    |  9      ----------------------------<  115    3.9     |  26     |  0.49   2018 08:01    141    |  107    |  8      ----------------------------<  121    3.6     |  26     |  0.54   2018 07:35    140    |  107    |  10     ----------------------------<  183    3.0     |  24     |  0.56     Ca    9.9        2018 09:32  Ca    9.6        2018 07:21  Ca    9.3        2018 06:37  Ca    9.0        2018 07:30  Ca    9.2        2018 08:29  Ca    9.4        2018 08:01  Ca    9.0        2018 07:35  Phos  2.8       2018 06:37  Mg     2.1       2018 06:37  Mg     2.1       2018 08:01    TPro  8.7    /  Alb  2.1    /  TBili  0.4    /  DBili  x      /  AST  31     /  ALT  25     /  AlkPhos  60     2018 08:29          Urinalysis Basic - ( 2018 21:50 )    Color: Yellow / Appearance: Clear / S.005 / pH: x  Gluc: x / Ketone: Negative  / Bili: Negative / Urobili: 4 mg/dL   Blood: x / Protein: 30 mg/dL / Nitrite: Negative   Leuk Esterase: Moderate / RBC: 25-50 /HPF / WBC 6-10   Sq Epi: x / Non Sq Epi: x / Bacteria: Few        CAPILLARY BLOOD GLUCOSE              RADIOLOGY & ADDITIONAL TESTS:    Imaging Personally Reviewed:  [ ] YES  [ ] NO    Consultant(s) Notes Reviewed:  [ ] YES  [ ] NO    Care Discussed with Consultants/Other Providers [ ] YES  [ ] NO Patient is a 62y old  Female who presents with a chief complaint of 63 yo black female with fever up to 103+ (2018 14:07)      HPI:  63 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  Pt has been more lethargic.  no n/v (2018 14:07)      INTERVAL HPI/OVERNIGHT EVENTS:  No new complaints. Afebrile , T max 101.4    MEDICATIONS  (STANDING):  ascorbic acid 500 milliGRAM(s) Oral two times a day  dextrose 5%. 1000 milliLiter(s) (63 mL/Hr) IV Continuous <Continuous>  enoxaparin Injectable 40 milliGRAM(s) SubCutaneous daily  ferrous    sulfate 325 milliGRAM(s) Oral daily  hydrocortisone 2.5% Cream 1 Application(s) Topical two times a day  lactulose Syrup 10 Gram(s) Oral two times a day  meropenem IVPB 500 milliGRAM(s) IV Intermittent every 8 hours  mirtazapine 15 milliGRAM(s) Oral at bedtime  multivitamin/minerals 1 Tablet(s) Oral daily  potassium chloride   Powder 20 milliEquivalent(s) Oral daily  senna 2 Tablet(s) Oral at bedtime    MEDICATIONS  (PRN):  acetaminophen  Suppository 650 milliGRAM(s) Rectal every 6 hours PRN For Temp greater than 38 C (100.4 F)  magnesium hydroxide Suspension 30 milliLiter(s) Oral daily PRN Constipation      FAMILY HISTORY:  No pertinent family history in first degree relatives      Allergies    No Known Allergies    Intolerances        PMH/PSH:  Decubital ulcer  Hemiparesis  Failure to thrive in adult  UTI (urinary tract infection)  Dehydration  Rhabdomyolysis  HTN (hypertension)  Neurogenic bladder disorder  Pressure ulcer of sacrum  UTI (urinary tract infection)  Embolism  DVT (deep venous thrombosis)  Anemia  Pneumonia  DM (diabetes mellitus)  High cholesterol  Ashli chorea  No pertinent past medical history  No significant past surgical history        REVIEW OF SYSTEMS:  CONSTITUTIONAL: No fever, weight loss, or fatigue  EYES: No eye pain, visual disturbances, or discharge  NECK: No pain or stiffness  BREASTS: No pain, masses, or nipple discharge  RESPIRATORY: No cough, wheezing, chills or hemoptysis; No shortness of breath  CARDIOVASCULAR: No chest pain, palpitations, dizziness, or leg swelling  GASTROINTESTINAL: No abdominal or epigastric pain. No nausea, vomiting, or hematemesis; No diarrhea or constipation. No melena or hematochezia.  GENITOURINARY: No dysuria, frequency, hematuria, or incontinence  NEUROLOGICAL: No headaches, memory loss, loss of strength, numbness, or tremors  SKIN: No itching, burning, rashes, or lesions   LYMPH NODES: No enlarged glands  ENDOCRINE: No heat or cold intolerance; No hair loss  MUSCULOSKELETAL: No joint pain or swelling; No muscle, back, or extremity pain  PSYCHIATRIC: No depression, anxiety, mood swings, or difficulty sleeping  HEME/LYMPH: No easy bruising, or bleeding gums  ALLERGY AND IMMUNOLOGIC: No hives or eczema    Vital Signs Last 24 Hrs  T(C): 37.4 (2018 11:31), Max: 38.8 (2018 05:44)  T(F): 99.4 (2018 11:31), Max: 101.9 (2018 05:44)  HR: 115 (2018 11:31) (115 - 119)  BP: 114/75 (2018 11:31) (105/57 - 132/76)  BP(mean): --  RR: 18 (2018 11:31) (15 - 18)  SpO2: 98% (2018 11:31) (95% - 99%)    PHYSICAL EXAM:  GENERAL: NAD, well-groomed, well-developed  HEAD:  Atraumatic, Normocephalic  EYES: EOMI, PERRLA, conjunctiva and sclera clear  NECK: Supple, No JVD, Normal thyroid  NERVOUS SYSTEM:  Alert & Oriented , Good concentration;  CHEST/LUNG: Clear to percussion bilaterally; No rales, rhonchi, wheezing, or rubs  HEART: Regular rate and rhythm; No murmurs, rubs, or gallops  ABDOMEN: Soft, Nontender, Nondistended; Bowel sounds present  EXTREMITIES:  2+ Peripheral Pulses, No clubbing, cyanosis, or edema  LYMPH: No lymphadenopathy noted  SKIN: No rashes or lesions    LAB                          8.2    9.7   )-----------( 587      ( 2018 09:32 )             25.2       CBC:   @ 09:32  WBC 9.7   Hgb 8.2   Hct 25.2   Plts 587  MCV 77.4   @ 07:21  WBC 9.1   Hgb 8.5   Hct 25.6   Plts 460  MCV 78.8   @ 06:37  WBC 11.9   Hgb 8.2   Hct 24.5   Plts 398  MCV 78.8   @ 07:30  WBC 11.2   Hgb 8.2   Hct 24.1   Plts 365  MCV 78.2   @ 08:29  WBC 11.1   Hgb 8.6   Hct 25.8   Plts 331  MCV 78.0   @ 08:01  WBC 11.6   Hgb 8.7   Hct 25.7   Plts 268  MCV 78.4   @ 07:35  WBC 13.2   Hgb 8.7   Hct 26.7   Plts 231  MCV 77.7      Chemistry:   @ 09:32  Na+ 138  K+ 3.9  Cl- 102  CO2 28  BUN 11  Cr 0.48      @ 07:21  Na+ 138  K+ 3.6  Cl- 102  CO2 29  BUN 10  Cr 0.37      @ 06:37  Na+ 138  K+ 3.7  Cl- 103  CO2 30  BUN 14  Cr 0.34      @ 07:30  Na+ 139  K+ 4.0  Cl- 105  CO2 25  BUN 9  Cr 0.47      @ 08:29  Na+ 140  K+ 3.9  Cl- 106  CO2 26  BUN 9  Cr 0.49      @ 08:01  Na+ 141  K+ 3.6  Cl- 107  CO2 26  BUN 8  Cr 0.54      @ 07:35  Na+ 140  K+ 3.0  Cl- 107  CO2 24  BUN 10  Cr 0.56         Glucose, Serum: 117 mg/dL ( @ 09:32)  Glucose, Serum: 107 mg/dL ( @ 07:21)  Glucose, Serum: 108 mg/dL ( @ 06:37)  Glucose, Serum: 123 mg/dL ( @ 07:30)  Glucose, Serum: 115 mg/dL ( @ 08:29)  Glucose, Serum: 121 mg/dL ( @ 08:01)  Glucose, Serum: 183 mg/dL ( @ 07:35)      2018 09:32    138    |  102    |  11     ----------------------------<  117    3.9     |  28     |  0.48   2018 07:21    138    |  102    |  10     ----------------------------<  107    3.6     |  29     |  0.37   2018 06:37    138    |  103    |  14     ----------------------------<  108    3.7     |  30     |  0.34   2018 07:30    139    |  105    |  9      ----------------------------<  123    4.0     |  25     |  0.47   2018 08:29    140    |  106    |  9      ----------------------------<  115    3.9     |  26     |  0.49   2018 08:01    141    |  107    |  8      ----------------------------<  121    3.6     |  26     |  0.54   2018 07:35    140    |  107    |  10     ----------------------------<  183    3.0     |  24     |  0.56     Ca    9.9        2018 09:32  Ca    9.6        2018 07:21  Ca    9.3        2018 06:37  Ca    9.0        2018 07:30  Ca    9.2        2018 08:29  Ca    9.4        2018 08:01  Ca    9.0        2018 07:35  Phos  2.8       2018 06:37  Mg     2.1       2018 06:37  Mg     2.1       2018 08:01    TPro  8.7    /  Alb  2.1    /  TBili  0.4    /  DBili  x      /  AST  31     /  ALT  25     /  AlkPhos  60     2018 08:29          Urinalysis Basic - ( 2018 21:50 )    Color: Yellow / Appearance: Clear / S.005 / pH: x  Gluc: x / Ketone: Negative  / Bili: Negative / Urobili: 4 mg/dL   Blood: x / Protein: 30 mg/dL / Nitrite: Negative   Leuk Esterase: Moderate / RBC: 25-50 /HPF / WBC 6-10   Sq Epi: x / Non Sq Epi: x / Bacteria: Few        CAPILLARY BLOOD GLUCOSE              RADIOLOGY & ADDITIONAL TESTS:    Imaging Personally Reviewed:  [ ] YES  [ ] NO    Consultant(s) Notes Reviewed:  [ ] YES  [ ] NO    Care Discussed with Consultants/Other Providers [ ] YES  [ ] NO

## 2018-01-23 NOTE — PROGRESS NOTE ADULT - SUBJECTIVE AND OBJECTIVE BOX
INTERVAL HPI/OVERNIGHT EVENTS:  more awake. Tmax 101.9    Vital Signs Last 24 Hrs  T(C): 37.4 (2018 11:31), Max: 38.8 (2018 05:44)  T(F): 99.4 (2018 11:31), Max: 101.9 (2018 05:44)  HR: 115 (2018 11:31) (115 - 116)  BP: 114/75 (2018 11:31) (105/57 - 132/76)  BP(mean): --  RR: 18 (2018 11:31) (15 - 18)  SpO2: 98% (2018 11:31) (98% - 99%)        PHYSICAL EXAM:  GEN:         Awake, responsive and comfortable.  HEENT:    Normal.    RESP:        scattered rhonchi  CVS:             Regular rate and rhythm.   ABD:         Soft, non-tender, non-distended;   :             No costovertebral angle tenderness  EXTR:            No clubbing, cyanosis or edema          MEDICATIONS  (STANDING):  ascorbic acid 500 milliGRAM(s) Oral two times a day  dextrose 5%. 1000 milliLiter(s) (63 mL/Hr) IV Continuous <Continuous>  enoxaparin Injectable 40 milliGRAM(s) SubCutaneous daily  ferrous    sulfate 325 milliGRAM(s) Oral daily  hydrocortisone 2.5% Cream 1 Application(s) Topical two times a day  lactulose Syrup 10 Gram(s) Oral two times a day  meropenem IVPB 500 milliGRAM(s) IV Intermittent every 8 hours  mirtazapine 15 milliGRAM(s) Oral at bedtime  multivitamin/minerals 1 Tablet(s) Oral daily  potassium chloride   Powder 20 milliEquivalent(s) Oral daily  senna 2 Tablet(s) Oral at bedtime    MEDICATIONS  (PRN):  acetaminophen  Suppository 650 milliGRAM(s) Rectal every 6 hours PRN For Temp greater than 38 C (100.4 F)  magnesium hydroxide Suspension 30 milliLiter(s) Oral daily PRN Constipation        LABS:                        8.2    9.7   )-----------( 587      ( 2018 09:32 )             25.2     01-23    138  |  102  |  11  ----------------------------<  117<H>  3.9   |  28  |  0.48<L>    Ca    9.9      2018 09:32          Urinalysis Basic - ( 2018 21:50 )    Color: Yellow / Appearance: Clear / S.005 / pH: x  Gluc: x / Ketone: Negative  / Bili: Negative / Urobili: 4 mg/dL   Blood: x / Protein: 30 mg/dL / Nitrite: Negative   Leuk Esterase: Moderate / RBC: 25-50 /HPF / WBC 6-10   Sq Epi: x / Non Sq Epi: x / Bacteria: Few          RADIOLOGY & ADDITIONAL STUDIES: bilat areas of discoid atelectasis. no definite acute pneumonic process    ASSESSMENT AND PLAN: sepsis, continue abs as per id.

## 2018-01-23 NOTE — PROGRESS NOTE ADULT - SUBJECTIVE AND OBJECTIVE BOX
Patient seen and examined bedside resting comfortably in no distress.  Fevers persist. No complaints.    T(F): 101.9 (01-23-18 @ 05:44), Max: 101.9 (01-23-18 @ 05:44)  HR: 116 (01-23-18 @ 05:44) (114 - 119)  BP: 105/57 (01-23-18 @ 05:44) (105/57 - 132/76)  RR: 15 (01-23-18 @ 05:44) (15 - 18)  SpO2: 99% (01-23-18 @ 05:44) (95% - 99%)  Wt(kg): --  CAPILLARY BLOOD GLUCOSE    PHYSICAL EXAM:  General: NAD, alert and awake. Cooling blanket in place  HEENT: NCAT  Chest: nonlabored respirations, good inspiratory effort  Abdomen: soft, NTND.   Extremities: no pedal edema or calf tenderness noted. contracted  : obrien catheter indwelling draining clear yellow urine      LABS:                        8.2    9.7   )-----------( 587      ( 23 Jan 2018 09:32 )             25.2   01-23    138  |  102  |  11  ----------------------------<  117<H>  3.9   |  28  |  0.48<L>    Ca    9.9      23 Jan 2018 09:32      I&O's Detail    22 Jan 2018 07:01  -  23 Jan 2018 07:00  --------------------------------------------------------  IN:    dextrose 5%.: 756 mL    Oral Fluid: 275 mL    Solution: 50 mL  Total IN: 1081 mL    OUT:  Total OUT: 0 mL    Total NET: 1081 mL      Impression: 61yo F with PMH [place]'s chorea, urinary retention due to neurogenic bladder, hemiparesis, decubitus ulcers admitted with sepsis 2/2 UTI and left hydronephrosis POD#4 s/p Left Ureteroscopy, with laser lithotripsy and stent insertion, with continued fevers, resolved leukocytosis    Plan:  -continue obrien catheter for retention, monitor urine output and quality  -continue to monitor for fevers, tylenol prn. ID f/u, cooling blanket, Continue IV abx merrem and supportive measures for +RSV  -continue medical management and supportive care  -continue lovenox for VTE ppx

## 2018-01-23 NOTE — PROGRESS NOTE ADULT - ASSESSMENT
Subjective Complaints:  Historian:         REVIEW OF SYSTEMS:  Eyes:  Good vision, no reported pain  ENT:  No sore throat, pain, runny nose, dysphagia  CV:  No pain, palpitatioins, hypo/hypertension  Resp:  No dyspnea, cough, tachypnea, wheezing  GI:  No pain, nausea, vomiting, diarrhea, constipatiion  Muscle:  No pain, weakness  Neuro:  No weakness, tingling, memory problems  Psych:  No fatigue, insomnia, mood problems, depression  Endocrine:  No polyuria, polydypsia, cold/heat intolerance    Vital Signs Last 24 Hrs  T(C): 37.4 (2018 11:31), Max: 38.8 (2018 05:44)  T(F): 99.4 (2018 11:31), Max: 101.9 (2018 05:44)  HR: 115 (2018 11:31) (115 - 116)  BP: 114/75 (2018 11:31) (105/57 - 132/76)  BP(mean): --  RR: 18 (2018 11:31) (15 - 18)  SpO2: 98% (2018 11:31) (98% - 99%)    GENERAL PHYSICAL EXAM:  General:  Appears stated age, well-groomed, well-nourished, no distress  HEENT:  NC/AT, patent nares w/ pink mucosa, OP clear w/o lesions, PERRL, EOMI, conjunctivae clear, no thyromegaly, nodules, adenopathy, no JVD  Chest:  Full & symmetric excursion, no increased effort, breath sounds clear  Cardiovascular:  Regular rhythm, S1, S2, no murmur/rub/S3/S4, no carotid/femoral/abdominal bruit, radial/pedal pulses 2+, no edema  Abdomen:  Soft, non-tender, non-distended, normoactive bowel sounds, no HSM  Extremities:  Gait & station:   Digits:   Nails:   Joints, Bones, Muscles:   ROM:   Stability:  Skin:  No rash/erythema/ecchymoses/petechiae/wounds/abscess/warm/dry  Musculoskeletal:  Full ROM in all joints w/o swelling/tenderness/effusion    NEUROLOGICAL EXAM:  HENT:  Normocephalic head; atraumatic head.  Neck supple.  ENT: normal looking.  Mental State:       letahrgic arousable open eyes        does not follows commnads      arm legs contrcated  encephalaothy uti LABS:                        8.2    9.7   )-----------( 587      ( 2018 09:32 )             25.2     01-    138  |  102  |  11  ----------------------------<  117<H>  3.9   |  28  |  0.48<L>    Ca    9.9      2018 09:32        Urinalysis Basic - ( 2018 21:50 )    Color: Yellow / Appearance: Clear / S.005 / pH: x  Gluc: x / Ketone: Negative  / Bili: Negative / Urobili: 4 mg/dL   Blood: x / Protein: 30 mg/dL / Nitrite: Negative   Leuk Esterase: Moderate / RBC: 25-50 /HPF / WBC 6-10   Sq Epi: x / Non Sq Epi: x / Bacteria: Few   ass= lethargic arousable   open eyes speech incoherant does  ot follws commnads  arm leg contracted  no seziure uti s/p   ureteral stent metabolic encpehalaothy       RADIOLOGY & ADDITIONAL STUDIES:        Recommendations:   for pt sub acute  rehab

## 2018-01-24 LAB
ANION GAP SERPL CALC-SCNC: 8 MMOL/L — SIGNIFICANT CHANGE UP (ref 5–17)
BUN SERPL-MCNC: 15 MG/DL — SIGNIFICANT CHANGE UP (ref 7–23)
CALCIUM SERPL-MCNC: 10 MG/DL — SIGNIFICANT CHANGE UP (ref 8.5–10.1)
CHLORIDE SERPL-SCNC: 103 MMOL/L — SIGNIFICANT CHANGE UP (ref 96–108)
CO2 SERPL-SCNC: 27 MMOL/L — SIGNIFICANT CHANGE UP (ref 22–31)
CREAT SERPL-MCNC: 0.41 MG/DL — LOW (ref 0.5–1.3)
CULTURE RESULTS: NO GROWTH — SIGNIFICANT CHANGE UP
GLUCOSE SERPL-MCNC: 107 MG/DL — HIGH (ref 70–99)
HCT VFR BLD CALC: 24.4 % — LOW (ref 34.5–45)
HGB BLD-MCNC: 8.1 G/DL — LOW (ref 11.5–15.5)
MCHC RBC-ENTMCNC: 24.8 PG — LOW (ref 27–34)
MCHC RBC-ENTMCNC: 33.2 GM/DL — SIGNIFICANT CHANGE UP (ref 32–36)
MCV RBC AUTO: 74.6 FL — LOW (ref 80–100)
NRBC # BLD: 0 /100 WBCS — SIGNIFICANT CHANGE UP (ref 0–0)
PLATELET # BLD AUTO: 605 K/UL — HIGH (ref 150–400)
POTASSIUM SERPL-MCNC: 4 MMOL/L — SIGNIFICANT CHANGE UP (ref 3.5–5.3)
POTASSIUM SERPL-SCNC: 4 MMOL/L — SIGNIFICANT CHANGE UP (ref 3.5–5.3)
RBC # BLD: 3.27 M/UL — LOW (ref 3.8–5.2)
RBC # FLD: 17.2 % — HIGH (ref 10.3–14.5)
SODIUM SERPL-SCNC: 138 MMOL/L — SIGNIFICANT CHANGE UP (ref 135–145)
SPECIMEN SOURCE: SIGNIFICANT CHANGE UP
WBC # BLD: 10.35 K/UL — SIGNIFICANT CHANGE UP (ref 3.8–10.5)
WBC # FLD AUTO: 10.35 K/UL — SIGNIFICANT CHANGE UP (ref 3.8–10.5)

## 2018-01-24 RX ADMIN — LACTULOSE 10 GRAM(S): 10 SOLUTION ORAL at 05:08

## 2018-01-24 RX ADMIN — SODIUM CHLORIDE 63 MILLILITER(S): 9 INJECTION, SOLUTION INTRAVENOUS at 23:01

## 2018-01-24 RX ADMIN — MEROPENEM 100 MILLIGRAM(S): 1 INJECTION INTRAVENOUS at 13:43

## 2018-01-24 RX ADMIN — LACTULOSE 10 GRAM(S): 10 SOLUTION ORAL at 17:13

## 2018-01-24 RX ADMIN — Medication 500 MILLIGRAM(S): at 05:08

## 2018-01-24 RX ADMIN — MEROPENEM 100 MILLIGRAM(S): 1 INJECTION INTRAVENOUS at 05:08

## 2018-01-24 RX ADMIN — Medication 1 APPLICATION(S): at 17:13

## 2018-01-24 RX ADMIN — ENOXAPARIN SODIUM 40 MILLIGRAM(S): 100 INJECTION SUBCUTANEOUS at 11:29

## 2018-01-24 RX ADMIN — SENNA PLUS 2 TABLET(S): 8.6 TABLET ORAL at 23:01

## 2018-01-24 RX ADMIN — Medication 650 MILLIGRAM(S): at 17:16

## 2018-01-24 RX ADMIN — Medication 500 MILLIGRAM(S): at 17:13

## 2018-01-24 RX ADMIN — Medication 20 MILLIEQUIVALENT(S): at 11:29

## 2018-01-24 RX ADMIN — Medication 1 TABLET(S): at 11:29

## 2018-01-24 RX ADMIN — MIRTAZAPINE 15 MILLIGRAM(S): 45 TABLET, ORALLY DISINTEGRATING ORAL at 23:01

## 2018-01-24 RX ADMIN — MEROPENEM 100 MILLIGRAM(S): 1 INJECTION INTRAVENOUS at 23:00

## 2018-01-24 RX ADMIN — Medication 325 MILLIGRAM(S): at 11:29

## 2018-01-24 NOTE — PROGRESS NOTE ADULT - SUBJECTIVE AND OBJECTIVE BOX
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  Pt has been more lethargic.  no n/v (2018 14:07)      INTERVAL HPI/OVERNIGHT EVENTS:  No new complaints. T max 101.     MEDICATIONS  (STANDING):  ascorbic acid 500 milliGRAM(s) Oral two times a day  dextrose 5%. 1000 milliLiter(s) (63 mL/Hr) IV Continuous <Continuous>  enoxaparin Injectable 40 milliGRAM(s) SubCutaneous daily  ferrous    sulfate 325 milliGRAM(s) Oral daily  hydrocortisone 2.5% Cream 1 Application(s) Topical two times a day  lactulose Syrup 10 Gram(s) Oral two times a day  meropenem IVPB 500 milliGRAM(s) IV Intermittent every 8 hours  mirtazapine 15 milliGRAM(s) Oral at bedtime  multivitamin/minerals 1 Tablet(s) Oral daily  potassium chloride   Powder 20 milliEquivalent(s) Oral daily  senna 2 Tablet(s) Oral at bedtime    MEDICATIONS  (PRN):  acetaminophen  Suppository 650 milliGRAM(s) Rectal every 6 hours PRN For Temp greater than 38 C (100.4 F)  magnesium hydroxide Suspension 30 milliLiter(s) Oral daily PRN Constipation      FAMILY HISTORY:  No pertinent family history in first degree relatives      Allergies    No Known Allergies    Intolerances        PMH/PSH:  Decubital ulcer  Hemiparesis  Failure to thrive in adult  UTI (urinary tract infection)  Dehydration  Rhabdomyolysis  HTN (hypertension)  Neurogenic bladder disorder  Pressure ulcer of sacrum  UTI (urinary tract infection)  Embolism  DVT (deep venous thrombosis)  Anemia  Pneumonia  DM (diabetes mellitus)  High cholesterol  [place] chorea  No pertinent past medical history  No significant past surgical history        REVIEW OF SYSTEMS:  more alert this am  CONSTITUTIONAL: Fever (+) , Fatigue (+)  EYES: No eye pain, visual disturbances, or discharge  ENMT:  No difficulty hearing, tinnitus, vertigo; No sinus or throat pain  NECK: No pain or stiffness  BREASTS: No pain, masses, or nipple discharge  RESPIRATORY: No cough, wheezing, chills or hemoptysis; No shortness of breath  CARDIOVASCULAR: No chest pain, palpitations, dizziness, or leg swelling  GASTROINTESTINAL: No abdominal or epigastric pain. No nausea, vomiting, or hematemesis; No diarrhea or constipation. No melena or hematochezia.  GENITOURINARY: obrien (+)  NEUROLOGICAL: No headaches, memory loss, loss of strength, numbness, or tremors  SKIN: No itching, burning, rashes, or lesions   LYMPH NODES: No enlarged glands  ENDOCRINE: No heat or cold intolerance; No hair loss  MUSCULOSKELETAL: No joint pain or swelling; No muscle, back, or extremity pain  PSYCHIATRIC: No depression, anxiety, mood swings, or difficulty sleeping  HEME/LYMPH: No easy bruising, or bleeding gums  ALLERGY AND IMMUNOLOGIC: No hives or eczema    Vital Signs Last 24 Hrs  T(C): 37.8 (2018 06:12), Max: 38.3 (2018 19:11)  T(F): 100 (2018 06:12), Max: 101 (2018 19:11)  HR: 100 (2018 23:14) (100 - 116)  BP: 124/64 (2018 06:12) (114/75 - 125/68)  BP(mean): --  RR: 15 (2018 06:12) (14 - 18)  SpO2: 98% (2018 06:12) (96% - 98%)    PHYSICAL EXAM:  GENERAL: NAD, well-groomed, well-developed  HEAD:  Atraumatic, Normocephalic  EYES: EOMI, PERRLA, conjunctiva and sclera clear  NECK: Supple, No JVD, Normal thyroid  NERVOUS SYSTEM:  Alert & Oriented   CHEST/LUNG: Clear to percussion bilaterally; No rales, rhonchi, wheezing, or rubs  HEART: Regular rate and rhythm; No murmurs, rubs, or gallops  ABDOMEN: Soft, Nontender, Nondistended; Bowel sounds present  EXTREMITIES:  2+ Peripheral Pulses, No clubbing, cyanosis, or edema  LYMPH: No lymphadenopathy noted  SKIN: No rashes or lesions    LAB                          8.2    9.7   )-----------( 587      ( 2018 09:32 )             25.2       CBC:   @ 09:32  WBC 9.7   Hgb 8.2   Hct 25.2   Plts 587  MCV 77.4   @ 07:21  WBC 9.1   Hgb 8.5   Hct 25.6   Plts 460  MCV 78.8   @ 06:37  WBC 11.9   Hgb 8.2   Hct 24.5   Plts 398  MCV 78.8   @ 07:30  WBC 11.2   Hgb 8.2   Hct 24.1   Plts 365  MCV 78.2   @ 08:29  WBC 11.1   Hgb 8.6   Hct 25.8   Plts 331  MCV 78.0   @ 08:01  WBC 11.6   Hgb 8.7   Hct 25.7   Plts 268  MCV 78.4      Chemistry:   @ 09:32  Na+ 138  K+ 3.9  Cl- 102  CO2 28  BUN 11  Cr 0.48      @ 07:21  Na+ 138  K+ 3.6  Cl- 102  CO2 29  BUN 10  Cr 0.37      @ 06:37  Na+ 138  K+ 3.7  Cl- 103  CO2 30  BUN 14  Cr 0.34      @ 07:30  Na+ 139  K+ 4.0  Cl- 105  CO2 25  BUN 9  Cr 0.47      @ 08:29  Na+ 140  K+ 3.9  Cl- 106  CO2 26  BUN 9  Cr 0.49      @ 08:01  Na+ 141  K+ 3.6  Cl- 107  CO2 26  BUN 8  Cr 0.54         Glucose, Serum: 117 mg/dL ( @ 09:32)  Glucose, Serum: 107 mg/dL ( @ 07:21)  Glucose, Serum: 108 mg/dL ( @ 06:37)  Glucose, Serum: 123 mg/dL ( @ 07:30)  Glucose, Serum: 115 mg/dL ( @ 08:29)  Glucose, Serum: 121 mg/dL ( @ 08:01)      2018 09:32    138    |  102    |  11     ----------------------------<  117    3.9     |  28     |  0.48   2018 07:21    138    |  102    |  10     ----------------------------<  107    3.6     |  29     |  0.37   2018 06:37    138    |  103    |  14     ----------------------------<  108    3.7     |  30     |  0.34   2018 07:30    139    |  105    |  9      ----------------------------<  123    4.0     |  25     |  0.47   2018 08:29    140    |  106    |  9      ----------------------------<  115    3.9     |  26     |  0.49   2018 08:01    141    |  107    |  8      ----------------------------<  121    3.6     |  26     |  0.54     Ca    9.9        2018 09:32  Ca    9.6        2018 07:21  Ca    9.3        2018 06:37  Ca    9.0        2018 07:30  Ca    9.2        2018 08:29  Ca    9.4        2018 08:01  Phos  2.8       2018 06:37  Mg     2.1       2018 06:37  Mg     2.1       2018 08:01    TPro  8.7    /  Alb  2.1    /  TBili  0.4    /  DBili  x      /  AST  31     /  ALT  25     /  AlkPhos  60     2018 08:29          Urinalysis Basic - ( 2018 21:50 )    Color: Yellow / Appearance: Clear / S.005 / pH: x  Gluc: x / Ketone: Negative  / Bili: Negative / Urobili: 4 mg/dL   Blood: x / Protein: 30 mg/dL / Nitrite: Negative   Leuk Esterase: Moderate / RBC: 25-50 /HPF / WBC 6-10   Sq Epi: x / Non Sq Epi: x / Bacteria: Few        CAPILLARY BLOOD GLUCOSE              RADIOLOGY & ADDITIONAL TESTS:    Imaging Personally Reviewed:  [ ] YES  [ ] NO    Consultant(s) Notes Reviewed:  [ ] YES  [ ] NO    Care Discussed with Consultants/Other Providers [ ] YES  [ ] NO

## 2018-01-24 NOTE — PROGRESS NOTE ADULT - ASSESSMENT
Subjective Complaints:  Historian:         REVIEW OF SYSTEMS:  Eyes:  Good vision, no reported pain  ENT:  No sore throat, pain, runny nose, dysphagia  CV:  No pain, palpitatioins, hypo/hypertension  Resp:  No dyspnea, cough, tachypnea, wheezing  GI:  No pain, nausea, vomiting, diarrhea, constipatiion  Muscle:  No pain, weakness  Neuro:  No weakness, tingling, memory problems  Psych:  No fatigue, insomnia, mood problems, depression  Endocrine:  No polyuria, polydypsia, cold/heat intolerance    Vital Signs Last 24 Hrs  T(C): 38.2 (2018 17:35), Max: 38.2 (2018 17:35)  T(F): 100.7 (2018 17:35), Max: 100.7 (2018 17:35)  HR: 118 (2018 17:35) (100 - 118)  BP: 115/84 (2018 17:35) (115/84 - 125/77)  BP(mean): --  RR: 16 (2018 17:35) (14 - 17)  SpO2: 96% (2018 17:35) (96% - 98%)    GENERAL PHYSICAL EXAM:  General:  Appears stated age, well-groomed, well-nourished, no distress  HEENT:  NC/AT, patent nares w/ pink mucosa, OP clear w/o lesions, PERRL, EOMI, conjunctivae clear, no thyromegaly, nodules, adenopathy, no JVD  Chest:  Full & symmetric excursion, no increased effort, breath sounds clear  Cardiovascular:  Regular rhythm, S1, S2, no murmur/rub/S3/S4, no carotid/femoral/abdominal bruit, radial/pedal pulses 2+, no edema  Abdomen:  Soft, non-tender, non-distended, normoactive bowel sounds, no HSM  Extremities:  Gait & station:   Digits:   Nails:   Joints, Bones, Muscles:   ROM:   Stability:  Skin:  No rash/erythema/ecchymoses/petechiae/wounds/abscess/warm/dry  Musculoskeletal:  Full ROM in all joints w/o swelling/tenderness/effusion    NEUROLOGICAL EXAM:  HENT:  Normocephalic head; atraumatic head.  Neck supple.  ENT: normal looking.  Mental State:    awake open eyes does not follwsc omnadss arm, leg cintracted  uti encepahalaothy                       8.1    10.35 )-----------( 605      ( 2018 08:14 )             24.4     01-24    138  |  103  |  15  ----------------------------<  107<H>  4.0   |  27  |  0.41<L>    Ca    10.0      2018 08:14        Urinalysis Basic - ( 2018 21:50 )    Color: Yellow / Appearance: Clear / S.005 / pH: x  Gluc: x / Ketone: Negative  / Bili: Negative / Urobili: 4 mg/dL   Blood: x / Protein: 30 mg/dL / Nitrite: Negative   Leuk Esterase: Moderate / RBC: 25-50 /HPF / WBC 6-10   Sq Epi: x / Non Sq Epi: x / Bacteria: Few   ass= awake open eyes eating as per nurse  arm leg contracted uti  metabolic encepahalothy  s/p ureteral stent  kidney stones  metabolic encephaalothy       RADIOLOGY & ADDITIONAL STUDIES:        Recommendations:  on iv antibiotics b for sub acute rehab

## 2018-01-24 NOTE — PROGRESS NOTE ADULT - ASSESSMENT
No fever today, no leukocytosis   Sepsis likely from bacteremia with simple Ecoli , fairly sensitive e coli   CT revealed left pyelonephritis , hydrourethroonephrosis   CXR reviewed   on broad spectrum antibiotics empirically   will repeat surveillance blood culture    indium scan ?   Monitor for fever low grade fever today, no leukocytosis   Sepsis likely from bacteremia with simple Ecoli , fairly sensitive e coli , resistent to cipro   pos rhino virus   spiking persistent fever likely from rhino virus   CT revealed left pyelonephritis , hydrourethroonephrosis   CXR reviewed   on broad spectrum antibiotics empirically with jenny day 8  obrien catheter for retention , urology on board   will repeat surveillance blood culture    indium scan ?   Monitor for fever   supportive management for viral syndrome   we will fu.

## 2018-01-24 NOTE — PROGRESS NOTE ADULT - PROBLEM SELECTOR PLAN 1
Zosyn changed to Merrem, fever 101.0, as per ID,  WBC 9.7 [id number], s/p cystoscopy. For indium scan today

## 2018-01-24 NOTE — PROGRESS NOTE ADULT - SUBJECTIVE AND OBJECTIVE BOX
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  Pt has been more lethargic.  no n/v (2018 14:07)    Upon evaluation today, pt is barely communicable, she is contracted and cannot answer questions. She is on obrien cath. No other complication seen.   She had fever spike yesterday, not today     Allergies    No Known Allergies    Intolerances        MEDICATIONS  (STANDING):  ascorbic acid 500 milliGRAM(s) Oral two times a day  dextrose 5%. 1000 milliLiter(s) (63 mL/Hr) IV Continuous <Continuous>  enoxaparin Injectable 40 milliGRAM(s) SubCutaneous daily  ferrous    sulfate 325 milliGRAM(s) Oral daily  hydrocortisone 2.5% Cream 1 Application(s) Topical two times a day  lactulose Syrup 10 Gram(s) Oral two times a day  meropenem IVPB 500 milliGRAM(s) IV Intermittent every 8 hours  mirtazapine 15 milliGRAM(s) Oral at bedtime  multivitamin/minerals 1 Tablet(s) Oral daily  potassium chloride   Powder 20 milliEquivalent(s) Oral daily  senna 2 Tablet(s) Oral at bedtime    MEDICATIONS  (PRN):  acetaminophen  Suppository 650 milliGRAM(s) Rectal every 6 hours PRN For Temp greater than 38 C (100.4 F)  magnesium hydroxide Suspension 30 milliLiter(s) Oral daily PRN Constipation      REVIEW OF SYSTEMS:  cannot obtain this time as per her mental status       VITAL SIGNS:  T(C): 37.2 (18 @ 11:26), Max: 38.3 (18 @ 19:11)  T(F): 99 (18 @ 11:26), Max: 101 (18 @ 19:11)  HR: 114 (18 @ 11:26) (100 - 116)  BP: 125/77 (18 @ 11:26) (114/75 - 125/77)  RR: 17 (18 @ 11:26) (14 - 18)  SpO2: 98% (18 @ 11:26) (96% - 98%)  Wt(kg): --    PHYSICAL EXAM:    GENERAL: not in any distress, thin and contracted   HEENT: Neck is supple, normocephalic, atraumatic   CHEST/LUNG: Clear to percussion bilaterally; No rales, rhonchi, wheezing  HEART: Regular rate and rhythm; No murmurs, rubs, or gallops  ABDOMEN: Soft, Nontender, Nondistended; Bowel sounds present, no rebound   EXTREMITIES:  2+ Peripheral Pulses, No clubbing, cyanosis, or edema  GENITOURINARY: on obrien, flat suprapubic   SKIN: No rashes or lesions  BACK: no pressor sore   NERVOUS SYSTEM:  cannot communicate and cannot follow comments       LABS:                         8.1    10.35 )-----------( 605      ( 2018 08:14 )             24.4     -    138  |  103  |  15  ----------------------------<  107<H>  4.0   |  27  |  0.41<L>    Ca    10.0      2018 08:14          Urinalysis Basic - ( 2018 21:50 )    Color: Yellow / Appearance: Clear / S.005 / pH: x  Gluc: x / Ketone: Negative  / Bili: Negative / Urobili: 4 mg/dL   Blood: x / Protein: 30 mg/dL / Nitrite: Negative   Leuk Esterase: Moderate / RBC: 25-50 /HPF / WBC 6-10   Sq Epi: x / Non Sq Epi: x / Bacteria: Few                          Culture Results:   No growth ( @ 09:22)  Culture Results:   No growth to date. ( @ 00:18)                Radiology:

## 2018-01-24 NOTE — PROGRESS NOTE ADULT - SUBJECTIVE AND OBJECTIVE BOX
Patient seen and examined bedside resting comfortably. No complaints offered. Persistent fevers and tachycardia.    T(F): 99 (01-24-18 @ 11:26), Max: 101 (01-23-18 @ 19:11)  HR: 114 (01-24-18 @ 11:26) (100 - 116)  BP: 125/77 (01-24-18 @ 11:26) (118/60 - 125/77)  RR: 17 (01-24-18 @ 11:26) (14 - 17)  SpO2: 98% (01-24-18 @ 11:26) (96% - 98%)    PHYSICAL EXAM:  General: NAD, WDWN  CV: +S1S2 regular rate and rhythm  Lung: clear to ausculation bilaterally, respirations nonlabored, good inspiratory effort  Abdomen: soft, NT/ND.   : Obrien in place draining 1200ml/24hours. Clear yellow urine.     LABS:                        8.1    10.35 )-----------( 605      ( 24 Jan 2018 08:14 )             24.4     01-24    138  |  103  |  15  ----------------------------<  107<H>  4.0   |  27  |  0.41<L>    Ca    10.0      24 Jan 2018 08:14    I&O's Detail    23 Jan 2018 07:01  -  24 Jan 2018 07:00  --------------------------------------------------------  IN:    dextrose 5%.: 756 mL    Oral Fluid: 100 mL    Solution: 100 mL  Total IN: 956 mL    OUT:    Indwelling Catheter - Urethral: 1200 mL  Total OUT: 1200 mL    Total NET: -244 mL    Impression: 61yo F with PMH Ashli's chorea, urinary retention due to neurogenic bladder, hemiparesis, decubitus ulcers admitted with sepsis 2/2 UTI and left hydronephrosis POD#5 s/p Left Ureteroscopy, with laser lithotripsy and stent insertion, with continued fevers, resolved leukocytosis    Plan:  -continue obrien catheter for retention, monitor urine output and quality  -continue to monitor for fevers, tylenol prn.   -continue IV abx merrem and supportive measures for +RSV per ID  -continue medical management and supportive care  -continue lovenox for VTE ppx  -will discuss with Dr. Cleaning

## 2018-01-25 LAB
ANION GAP SERPL CALC-SCNC: 8 MMOL/L — SIGNIFICANT CHANGE UP (ref 5–17)
BUN SERPL-MCNC: 12 MG/DL — SIGNIFICANT CHANGE UP (ref 7–23)
CALCIUM SERPL-MCNC: 10.1 MG/DL — SIGNIFICANT CHANGE UP (ref 8.5–10.1)
CHLORIDE SERPL-SCNC: 100 MMOL/L — SIGNIFICANT CHANGE UP (ref 96–108)
CO2 SERPL-SCNC: 28 MMOL/L — SIGNIFICANT CHANGE UP (ref 22–31)
COMPN STONE: SIGNIFICANT CHANGE UP
CREAT SERPL-MCNC: 0.5 MG/DL — SIGNIFICANT CHANGE UP (ref 0.5–1.3)
GLUCOSE SERPL-MCNC: 120 MG/DL — HIGH (ref 70–99)
HCT VFR BLD CALC: 25.9 % — LOW (ref 34.5–45)
HGB BLD-MCNC: 8.4 G/DL — LOW (ref 11.5–15.5)
MCHC RBC-ENTMCNC: 24.3 PG — LOW (ref 27–34)
MCHC RBC-ENTMCNC: 32.4 GM/DL — SIGNIFICANT CHANGE UP (ref 32–36)
MCV RBC AUTO: 75.1 FL — LOW (ref 80–100)
NRBC # BLD: 0 /100 WBCS — SIGNIFICANT CHANGE UP (ref 0–0)
PLATELET # BLD AUTO: 579 K/UL — HIGH (ref 150–400)
POTASSIUM SERPL-MCNC: 4.2 MMOL/L — SIGNIFICANT CHANGE UP (ref 3.5–5.3)
POTASSIUM SERPL-SCNC: 4.2 MMOL/L — SIGNIFICANT CHANGE UP (ref 3.5–5.3)
RBC # BLD: 3.45 M/UL — LOW (ref 3.8–5.2)
RBC # FLD: 17.3 % — HIGH (ref 10.3–14.5)
SODIUM SERPL-SCNC: 136 MMOL/L — SIGNIFICANT CHANGE UP (ref 135–145)
WBC # BLD: 10.62 K/UL — HIGH (ref 3.8–10.5)
WBC # FLD AUTO: 10.62 K/UL — HIGH (ref 3.8–10.5)

## 2018-01-25 PROCEDURE — 78103 BONE MARROW IMAGING MULT: CPT | Mod: 26

## 2018-01-25 PROCEDURE — 78806: CPT | Mod: 26

## 2018-01-25 RX ADMIN — Medication 325 MILLIGRAM(S): at 12:15

## 2018-01-25 RX ADMIN — Medication 500 MILLIGRAM(S): at 05:04

## 2018-01-25 RX ADMIN — MEROPENEM 100 MILLIGRAM(S): 1 INJECTION INTRAVENOUS at 14:06

## 2018-01-25 RX ADMIN — LACTULOSE 10 GRAM(S): 10 SOLUTION ORAL at 18:11

## 2018-01-25 RX ADMIN — Medication 650 MILLIGRAM(S): at 19:14

## 2018-01-25 RX ADMIN — Medication 1 APPLICATION(S): at 05:04

## 2018-01-25 RX ADMIN — MIRTAZAPINE 15 MILLIGRAM(S): 45 TABLET, ORALLY DISINTEGRATING ORAL at 22:18

## 2018-01-25 RX ADMIN — Medication 1 TABLET(S): at 12:16

## 2018-01-25 RX ADMIN — ENOXAPARIN SODIUM 40 MILLIGRAM(S): 100 INJECTION SUBCUTANEOUS at 12:15

## 2018-01-25 RX ADMIN — MEROPENEM 100 MILLIGRAM(S): 1 INJECTION INTRAVENOUS at 22:18

## 2018-01-25 RX ADMIN — Medication 20 MILLIEQUIVALENT(S): at 12:15

## 2018-01-25 RX ADMIN — MEROPENEM 100 MILLIGRAM(S): 1 INJECTION INTRAVENOUS at 05:04

## 2018-01-25 RX ADMIN — Medication 650 MILLIGRAM(S): at 07:38

## 2018-01-25 RX ADMIN — LACTULOSE 10 GRAM(S): 10 SOLUTION ORAL at 05:04

## 2018-01-25 RX ADMIN — Medication 1 APPLICATION(S): at 18:10

## 2018-01-25 RX ADMIN — SENNA PLUS 2 TABLET(S): 8.6 TABLET ORAL at 22:18

## 2018-01-25 RX ADMIN — Medication 500 MILLIGRAM(S): at 18:11

## 2018-01-25 NOTE — PROGRESS NOTE ADULT - ASSESSMENT
Subjective Complaints:  Historian:         REVIEW OF SYSTEMS:  Eyes:  Good vision, no reported pain  ENT:  No sore throat, pain, runny nose, dysphagia  CV:  No pain, palpitatioins, hypo/hypertension  Resp:  No dyspnea, cough, tachypnea, wheezing  GI:  No pain, nausea, vomiting, diarrhea, constipatiion  Muscle:  No pain, weakness  Neuro:  No weakness, tingling, memory problems  Psych:  No fatigue, insomnia, mood problems, depression  Endocrine:  No polyuria, polydypsia, cold/heat intolerance    Vital Signs Last 24 Hrs  T(C): 38.6 (25 Jan 2018 19:10), Max: 38.6 (25 Jan 2018 19:10)  T(F): 101.4 (25 Jan 2018 19:10), Max: 101.4 (25 Jan 2018 19:10)  HR: 116 (25 Jan 2018 19:10) (105 - 116)  BP: 132/80 (25 Jan 2018 19:10) (94/61 - 132/80)  BP(mean): --  RR: 16 (25 Jan 2018 19:10) (16 - 16)  SpO2: 95% (25 Jan 2018 19:10) (95% - 100%)    GENERAL PHYSICAL EXAM:  General:  Appears stated age, well-groomed, well-nourished, no distress  HEENT:  NC/AT, patent nares w/ pink mucosa, OP clear w/o lesions, PERRL, EOMI, conjunctivae clear, no thyromegaly, nodules, adenopathy, no JVD  Chest:  Full & symmetric excursion, no increased effort, breath sounds clear  Cardiovascular:  Regular rhythm, S1, S2, no murmur/rub/S3/S4, no carotid/femoral/abdominal bruit, radial/pedal pulses 2+, no edema  Abdomen:  Soft, non-tender, non-distended, normoactive bowel sounds, no HSM  Extremities:  Gait & station:   Digits:   Nails:   Joints, Bones, Muscles:   ROM:   Stability:  Skin:  No rash/erythema/ecchymoses/petechiae/wounds/abscess/warm/dry  Musculoskeletal:  Full ROM in all joints w/o swelling/tenderness/effusion    NEUROLOGICAL EXAM:  HENT:  Normocephalic head; atraumatic head.  Neck supple.  ENT: normal looking.  Mental State:      lethargic arousable   open eyes does  not  follws commnads quadroparesis  no seziure LABS:                        8.4    10.62 )-----------( 579      ( 25 Jan 2018 08:03 )             25.9     01-25    136  |  100  |  12  ----------------------------<  120<H>  4.2   |  28  |  0.50    Ca    10.1      25 Jan 2018 08:03   ass= lethargic arousable open eyes does   not follws commnads uti hx of depression arm legs contracted no chorea  metabolic enceophaalothy           RADIOLOGY & ADDITIONAL STUDIES:        Recommendations: for sub acute rehab will follow

## 2018-01-25 NOTE — PROGRESS NOTE ADULT - SUBJECTIVE AND OBJECTIVE BOX
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  Pt has been more lethargic and barely communicable. Low grade fever still progressing.  Will go to nuclear medicine today.     Allergies    No Known Allergies    Intolerances        MEDICATIONS  (STANDING):  ascorbic acid 500 milliGRAM(s) Oral two times a day  dextrose 5%. 1000 milliLiter(s) (63 mL/Hr) IV Continuous <Continuous>  enoxaparin Injectable 40 milliGRAM(s) SubCutaneous daily  ferrous    sulfate 325 milliGRAM(s) Oral daily  hydrocortisone 2.5% Cream 1 Application(s) Topical two times a day  lactulose Syrup 10 Gram(s) Oral two times a day  meropenem IVPB 500 milliGRAM(s) IV Intermittent every 8 hours  mirtazapine 15 milliGRAM(s) Oral at bedtime  multivitamin/minerals 1 Tablet(s) Oral daily  potassium chloride   Powder 20 milliEquivalent(s) Oral daily  senna 2 Tablet(s) Oral at bedtime        REVIEW OF SYSTEMS: unable to obtain     VITAL SIGNS:  T(C): 37.2 (01-25-18 @ 11:50), Max: 38.2 (01-24-18 @ 17:35)  T(F): 99 (01-25-18 @ 11:50), Max: 100.7 (01-24-18 @ 17:35)  HR: 110 (01-25-18 @ 11:50) (109 - 118)  BP: 94/61 (01-25-18 @ 11:50) (94/61 - 115/84)  RR: 16 (01-25-18 @ 11:50) (16 - 16)  SpO2: 100% (01-25-18 @ 11:50) (96% - 100%)  Wt(kg): --    PHYSICAL EXAM:    GENERAL: lethergic   HEENT: Neck is supple, normocephalic, atraumatic   CHEST/LUNG: Clear bilaterally; No rales, rhonchi, wheezing  HEART: Regular rate and rhythm; No murmurs, rubs, or gallops  ABDOMEN: Soft, Nontender, Nondistended; Bowel sounds present, no rebound   EXTREMITIES:  2+ Peripheral Pulses, No clubbing, cyanosis, or edema  GENITOURINARY:   SKIN: No rashes or lesions  BACK: no pressor sore   NERVOUS SYSTEM:  lethargic       LABS:                         8.4    10.62 )-----------( 579      ( 25 Jan 2018 08:03 )             25.9     01-25    136  |  100  |  12  ----------------------------<  120<H>  4.2   |  28  |  0.50    Ca    10.1      25 Jan 2018 08:03                Culture Results:   No growth (01-23 @ 09:22)  Culture Results:   No growth to date. (01-23 @ 00:18)                Radiology:

## 2018-01-25 NOTE — CHART NOTE - NSCHARTNOTEFT_GEN_A_CORE
Assessment: Pt with Brewster's , lethargic, barely communicates, low grade fever, pneumonia.    Factors impacting intake: [ ] none [ ] nausea  [ ] vomiting [ ] diarrhea [ ] constipation  [ ]chewing problems [x ] swallowing issues  [x ] other: - difficulty feeding self    1/23 - Diet Prescription: Diet, Dysphagia 1 Pureed-Honey Consistency Fluid (01-23-18 @ 14:04)  1/22 - s/p swallow eval c recommendations for pureed c honey thick liquids  Intake:  < 50%    Current Weight: 1/25 -117  (53.5 kg), 1/18 - 119 (54 kg)  % Weight Change - 1.6% x 7 days (no edema noted)    Pertinent Medications: MEDICATIONS  (STANDING):  ascorbic acid 500 milliGRAM(s) Oral two times a day  dextrose 5%. 1000 milliLiter(s) (63 mL/Hr) IV Continuous <Continuous>  enoxaparin Injectable 40 milliGRAM(s) SubCutaneous daily  ferrous    sulfate 325 milliGRAM(s) Oral daily  hydrocortisone 2.5% Cream 1 Application(s) Topical two times a day  lactulose Syrup 10 Gram(s) Oral two times a day  meropenem IVPB 500 milliGRAM(s) IV Intermittent every 8 hours  mirtazapine 15 milliGRAM(s) Oral at bedtime  multivitamin/minerals 1 Tablet(s) Oral daily  potassium chloride   Powder 20 milliEquivalent(s) Oral daily  senna 2 Tablet(s) Oral at bedtime    MEDICATIONS  (PRN):  acetaminophen  Suppository 650 milliGRAM(s) Rectal every 6 hours PRN For Temp greater than 38 C (100.4 F)  magnesium hydroxide Suspension 30 milliLiter(s) Oral daily PRN Constipation    Pertinent Labs: 01-25 Na136 mmol/L Glu 120 mg/dL<H> K+ 4.2 mmol/L Cr  0.50 mg/dL BUN 12 mg/dL Phos n/a   Alb n/a   PAB n/a        CAPILLARY BLOOD GLUCOSE    BM - 1/25 - fecal incontinence    Skin:  Multiple - sacrum stage IV, L Lateral heel stage IV, Left lateral Malleous & Left Foot Stage lll    Estimated Needs:   [x ] no change since previous assessment  [ ] recalculated:     Previous Nutrition Diagnosis:   [ ] Inadequate Energy Intake [ ]Inadequate Oral Intake [ ] Excessive Energy Intake   [ ] Underweight [ ] Increased Nutrient Needs [ ] Overweight/Obesity   [ ] Altered GI Function [ ] Unintended Weight Loss [ ] Food & Nutrition Related Knowledge Deficit [x ] Malnutrition - severe in the context of chronic illness    Nutrition Diagnosis is [x ] ongoing  [ ] resolved [ ] not applicable     New Nutrition Diagnosis: [x ] not applicable       Interventions:   Recommend  [ ] Change Diet To:  [x ] Nutrition Supplement -  1 pkg prosource daily = 15 g pro & 60 kcal  [ ] Nutrition Support  [ ] Other:     Monitoring and Evaluation:   [ ] PO intake [ x ] Tolerance to diet prescription [ x ] weights [ x ] labs[ x ] follow up per protocol  [ ] other:

## 2018-01-25 NOTE — PROGRESS NOTE ADULT - PROBLEM SELECTOR PLAN 1
Zosyn changed to Merrem, fever 101.4, as per ID,  WBC 10.62 [id number], s/p cystoscopy. Indium scan negative

## 2018-01-25 NOTE — PROGRESS NOTE ADULT - ASSESSMENT
persistent fever , no leukocytosis   Sepsis likely from bacteremia with simple Ecoli , fairly sensitive e coli , resistant to cipro   Pos rhino virus which can be the cause of spiking persistent fever  CT revealed left pyelonephritis , hydrourethroonephrosis   CXR reviewed   on broad spectrum antibiotics with jenny day 9 today   in obrien catheter for retention , urology on board   FU surveillance blood culture    indium scan ? pending   Monitor for fever   supportive management for viral syndrome   Thanks you.

## 2018-01-25 NOTE — PROGRESS NOTE ADULT - SUBJECTIVE AND OBJECTIVE BOX
Patient seen and examined bedside resting comfortably.  No complaints offered. No acute overnight event. Patient still with persistent fevers and tachycardia.   Patient just returned from indium scan.     T(F): 100.4 (01-25-18 @ 05:30), Max: 100.7 (01-24-18 @ 17:35)  HR: 112 (01-25-18 @ 05:30) (109 - 118)  BP: 112/72 (01-25-18 @ 05:30) (112/72 - 115/84)  RR: 16 (01-25-18 @ 05:30) (16 - 16)  SpO2: 100% (01-25-18 @ 05:30) (96% - 100%)    PHYSICAL EXAM:    General: NAD, alert and awake  HEENT: NCAT, EOMI, conjunctiva clear  Chest: nonlabored respirations, CTA b/l.  Abdomen: soft, NT/ND.   Extremities: no pedal edema or calf tenderness noted   : Obrien draining clear yellow urine    LABS:                        8.4    10.62 )-----------( 579      ( 25 Jan 2018 08:03 )             25.9   01-25    136  |  100  |  12  ----------------------------<  120<H>  4.2   |  28  |  0.50    Ca    10.1      25 Jan 2018 08:03      I&O's Detail    24 Jan 2018 07:01  -  25 Jan 2018 07:00  --------------------------------------------------------  IN:    dextrose 5%.: 756 mL    Oral Fluid: 120 mL    Solution: 50 mL  Total IN: 926 mL    OUT:    Indwelling Catheter - Urethral: 400 mL  Total OUT: 400 mL    Total NET: 526 mL    Impression: 61yo F with PMH [place]'s chorea, urinary retention due to neurogenic bladder, hemiparesis, decubitus ulcers admitted with sepsis 2/2 UTI and left hydronephrosis POD#6 s/p Left Ureteroscopy, with laser lithotripsy and stent insertion, with continued fevers, and leukocytosis this morning.     Plan:  -continue obrien catheter for retention, monitor urine output and quality  -continue to monitor for fevers, tylenol prn.   -ID follow up, continue IV abx merrem and supportive measures for +RSV  -F/u results of Indium scan  -continue medical management and supportive care  -continue lovenox for VTE ppx  -Discussed with Dr. Cleaning Patient seen and examined bedside resting comfortably.  No complaints offered. No acute overnight event. Patient still with persistent fevers and tachycardia.   Patient just returned from indium scan.     T(F): 100.4 (01-25-18 @ 05:30), Max: 100.7 (01-24-18 @ 17:35)  HR: 112 (01-25-18 @ 05:30) (109 - 118)  BP: 112/72 (01-25-18 @ 05:30) (112/72 - 115/84)  RR: 16 (01-25-18 @ 05:30) (16 - 16)  SpO2: 100% (01-25-18 @ 05:30) (96% - 100%)    PHYSICAL EXAM:    General: NAD, alert and awake  HEENT: NCAT, EOMI, conjunctiva clear  Chest: nonlabored respirations, CTA b/l.  Abdomen: soft, NT/ND.   Extremities: no pedal edema or calf tenderness noted   : Obrien draining clear dark yellow urine    LABS:                        8.4    10.62 )-----------( 579      ( 25 Jan 2018 08:03 )             25.9   01-25    136  |  100  |  12  ----------------------------<  120<H>  4.2   |  28  |  0.50    Ca    10.1      25 Jan 2018 08:03      I&O's Detail    24 Jan 2018 07:01  -  25 Jan 2018 07:00  --------------------------------------------------------  IN:    dextrose 5%.: 756 mL    Oral Fluid: 120 mL    Solution: 50 mL  Total IN: 926 mL    OUT:    Indwelling Catheter - Urethral: 400 mL  Total OUT: 400 mL    Total NET: 526 mL    Impression: 61yo F with PMH Otoe's chorea, urinary retention due to neurogenic bladder, hemiparesis, decubitus ulcers admitted with sepsis 2/2 UTI and left hydronephrosis POD#6 s/p Left Ureteroscopy, with laser lithotripsy and stent insertion, with continued fevers, and leukocytosis this morning.     Plan:  -continue obrien catheter for retention, monitor urine output and quality  -continue to monitor for fevers, tylenol prn.   -ID follow up, continue IV abx merrem and supportive measures for +RSV  -F/u results of Indium scan  -continue medical management and supportive care  -continue lovenox for VTE ppx  -Discussed with Dr. Cleaning

## 2018-01-25 NOTE — PROGRESS NOTE ADULT - SUBJECTIVE AND OBJECTIVE BOX
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  Pt has been more lethargic.  no n/v (12 Jan 2018 14:07)      INTERVAL HPI/OVERNIGHT EVENTS:  fever 101.4    MEDICATIONS  (STANDING):  ascorbic acid 500 milliGRAM(s) Oral two times a day  dextrose 5%. 1000 milliLiter(s) (63 mL/Hr) IV Continuous <Continuous>  enoxaparin Injectable 40 milliGRAM(s) SubCutaneous daily  ferrous    sulfate 325 milliGRAM(s) Oral daily  hydrocortisone 2.5% Cream 1 Application(s) Topical two times a day  lactulose Syrup 10 Gram(s) Oral two times a day  meropenem IVPB 500 milliGRAM(s) IV Intermittent every 8 hours  mirtazapine 15 milliGRAM(s) Oral at bedtime  multivitamin/minerals 1 Tablet(s) Oral daily  potassium chloride   Powder 20 milliEquivalent(s) Oral daily  senna 2 Tablet(s) Oral at bedtime    MEDICATIONS  (PRN):  acetaminophen  Suppository 650 milliGRAM(s) Rectal every 6 hours PRN For Temp greater than 38 C (100.4 F)  magnesium hydroxide Suspension 30 milliLiter(s) Oral daily PRN Constipation      FAMILY HISTORY:  No pertinent family history in first degree relatives      Allergies    No Known Allergies    Intolerances        PMH/PSH:  Decubital ulcer  Hemiparesis  Failure to thrive in adult  UTI (urinary tract infection)  Dehydration  Rhabdomyolysis  HTN (hypertension)  Neurogenic bladder disorder  Pressure ulcer of sacrum  UTI (urinary tract infection)  Embolism  DVT (deep venous thrombosis)  Anemia  Pneumonia  DM (diabetes mellitus)  High cholesterol  Ashli chorea  No pertinent past medical history  No significant past surgical history        REVIEW OF SYSTEMS:  CONSTITUTIONAL: No fever, weight loss, or fatigue  EYES: No eye pain, visual disturbances, or discharge  ENMT:  No difficulty hearing, tinnitus, vertigo; No sinus or throat pain  NECK: No pain or stiffness  BREASTS: No pain, masses, or nipple discharge  RESPIRATORY: No cough, wheezing, chills or hemoptysis; No shortness of breath  CARDIOVASCULAR: No chest pain, palpitations, dizziness, or leg swelling  GASTROINTESTINAL: No abdominal or epigastric pain. No nausea, vomiting, or hematemesis; No diarrhea or constipation. No melena or hematochezia.  GENITOURINARY: No dysuria, frequency, hematuria, or incontinence  NEUROLOGICAL: No headaches, memory loss, loss of strength, numbness, or tremors  SKIN: No itching, burning, rashes, or lesions   LYMPH NODES: No enlarged glands  ENDOCRINE: No heat or cold intolerance; No hair loss  MUSCULOSKELETAL: No joint pain or swelling; No muscle, back, or extremity pain  PSYCHIATRIC: No depression, anxiety, mood swings, or difficulty sleeping  HEME/LYMPH: No easy bruising, or bleeding gums  ALLERGY AND IMMUNOLOGIC: No hives or eczema    Vital Signs Last 24 Hrs  T(C): 38.6 (25 Jan 2018 19:10), Max: 38.6 (25 Jan 2018 19:10)  T(F): 101.4 (25 Jan 2018 19:10), Max: 101.4 (25 Jan 2018 19:10)  HR: 116 (25 Jan 2018 19:10) (105 - 116)  BP: 132/80 (25 Jan 2018 19:10) (94/61 - 132/80)  BP(mean): --  RR: 16 (25 Jan 2018 19:10) (16 - 16)  SpO2: 95% (25 Jan 2018 19:10) (95% - 100%)    PHYSICAL EXAM:  GENERAL: NAD, well-groomed, well-developed  HEAD:  Atraumatic, Normocephalic  EYES: EOMI, PERRLA, conjunctiva and sclera clear  ENMT: No tonsillar erythema, exudates, or enlargement; Moist mucous membranes, Good dentition, No lesions  NECK: Supple, No JVD, Normal thyroid  NERVOUS SYSTEM:  Alert & Oriented X3, Good concentration; Motor Strength 5/5 B/L upper and lower extremities; DTRs 2+ intact and symmetric  CHEST/LUNG: Clear to percussion bilaterally; No rales, rhonchi, wheezing, or rubs  HEART: Regular rate and rhythm; No murmurs, rubs, or gallops  ABDOMEN: Soft, Nontender, Nondistended; Bowel sounds present  EXTREMITIES:  2+ Peripheral Pulses, No clubbing, cyanosis, or edema  LYMPH: No lymphadenopathy noted  SKIN: No rashes or lesions    LAB                          8.4    10.62 )-----------( 579      ( 25 Jan 2018 08:03 )             25.9       CBC:  01-25 @ 08:03  WBC 10.62   Hgb 8.4   Hct 25.9   Plts 579  MCV 75.1  01-24 @ 08:14  WBC 10.35   Hgb 8.1   Hct 24.4   Plts 605  MCV 74.6  01-23 @ 09:32  WBC 9.7   Hgb 8.2   Hct 25.2   Plts 587  MCV 77.4  01-22 @ 07:21  WBC 9.1   Hgb 8.5   Hct 25.6   Plts 460  MCV 78.8  01-21 @ 06:37  WBC 11.9   Hgb 8.2   Hct 24.5   Plts 398  MCV 78.8  01-20 @ 07:30  WBC 11.2   Hgb 8.2   Hct 24.1   Plts 365  MCV 78.2  01-19 @ 08:29  WBC 11.1   Hgb 8.6   Hct 25.8   Plts 331  MCV 78.0      Chemistry:  01-25 @ 08:03  Na+ 136  K+ 4.2  Cl- 100  CO2 28  BUN 12  Cr 0.50     01-24 @ 08:14  Na+ 138  K+ 4.0  Cl- 103  CO2 27  BUN 15  Cr 0.41     01-23 @ 09:32  Na+ 138  K+ 3.9  Cl- 102  CO2 28  BUN 11  Cr 0.48     01-22 @ 07:21  Na+ 138  K+ 3.6  Cl- 102  CO2 29  BUN 10  Cr 0.37     01-21 @ 06:37  Na+ 138  K+ 3.7  Cl- 103  CO2 30  BUN 14  Cr 0.34     01-20 @ 07:30  Na+ 139  K+ 4.0  Cl- 105  CO2 25  BUN 9  Cr 0.47     01-19 @ 08:29  Na+ 140  K+ 3.9  Cl- 106  CO2 26  BUN 9  Cr 0.49         Glucose, Serum: 120 mg/dL (01-25 @ 08:03)  Glucose, Serum: 107 mg/dL (01-24 @ 08:14)  Glucose, Serum: 117 mg/dL (01-23 @ 09:32)  Glucose, Serum: 107 mg/dL (01-22 @ 07:21)  Glucose, Serum: 108 mg/dL (01-21 @ 06:37)  Glucose, Serum: 123 mg/dL (01-20 @ 07:30)  Glucose, Serum: 115 mg/dL (01-19 @ 08:29)      25 Jan 2018 08:03    136    |  100    |  12     ----------------------------<  120    4.2     |  28     |  0.50   24 Jan 2018 08:14    138    |  103    |  15     ----------------------------<  107    4.0     |  27     |  0.41   23 Jan 2018 09:32    138    |  102    |  11     ----------------------------<  117    3.9     |  28     |  0.48   22 Jan 2018 07:21    138    |  102    |  10     ----------------------------<  107    3.6     |  29     |  0.37   21 Jan 2018 06:37    138    |  103    |  14     ----------------------------<  108    3.7     |  30     |  0.34   20 Jan 2018 07:30    139    |  105    |  9      ----------------------------<  123    4.0     |  25     |  0.47   19 Jan 2018 08:29    140    |  106    |  9      ----------------------------<  115    3.9     |  26     |  0.49     Ca    10.1       25 Jan 2018 08:03  Ca    10.0       24 Jan 2018 08:14  Ca    9.9        23 Jan 2018 09:32  Ca    9.6        22 Jan 2018 07:21  Ca    9.3        21 Jan 2018 06:37  Ca    9.0        20 Jan 2018 07:30  Ca    9.2        19 Jan 2018 08:29  Phos  2.8       21 Jan 2018 06:37  Mg     2.1       21 Jan 2018 06:37    TPro  8.7    /  Alb  2.1    /  TBili  0.4    /  DBili  x      /  AST  31     /  ALT  25     /  AlkPhos  60     19 Jan 2018 08:29              CAPILLARY BLOOD GLUCOSE              RADIOLOGY & ADDITIONAL TESTS:    Imaging Personally Reviewed:  [ ] YES  [ ] NO    Consultant(s) Notes Reviewed:  [ ] YES  [ ] NO    Care Discussed with Consultants/Other Providers [ ] YES  [ ] NO Patient is a 62y old  Female who presents with a chief complaint of 63 yo black female with fever up to 103+ (12 Jan 2018 14:07)      HPI:  63 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  Pt has been more lethargic.  no n/v (12 Jan 2018 14:07)      INTERVAL HPI/OVERNIGHT EVENTS:  fever 101.4, MS unchanged. No new c/o. eats slowly but eats most of the meals.    MEDICATIONS  (STANDING):  ascorbic acid 500 milliGRAM(s) Oral two times a day  dextrose 5%. 1000 milliLiter(s) (63 mL/Hr) IV Continuous <Continuous>  enoxaparin Injectable 40 milliGRAM(s) SubCutaneous daily  ferrous    sulfate 325 milliGRAM(s) Oral daily  hydrocortisone 2.5% Cream 1 Application(s) Topical two times a day  lactulose Syrup 10 Gram(s) Oral two times a day  meropenem IVPB 500 milliGRAM(s) IV Intermittent every 8 hours  mirtazapine 15 milliGRAM(s) Oral at bedtime  multivitamin/minerals 1 Tablet(s) Oral daily  potassium chloride   Powder 20 milliEquivalent(s) Oral daily  senna 2 Tablet(s) Oral at bedtime    MEDICATIONS  (PRN):  acetaminophen  Suppository 650 milliGRAM(s) Rectal every 6 hours PRN For Temp greater than 38 C (100.4 F)  magnesium hydroxide Suspension 30 milliLiter(s) Oral daily PRN Constipation      FAMILY HISTORY:  No pertinent family history in first degree relatives      Allergies    No Known Allergies    Intolerances        PMH/PSH:  Decubital ulcer  Hemiparesis  Failure to thrive in adult  UTI (urinary tract infection)  Dehydration  Rhabdomyolysis  HTN (hypertension)  Neurogenic bladder disorder  Pressure ulcer of sacrum  UTI (urinary tract infection)  Embolism  DVT (deep venous thrombosis)  Anemia  Pneumonia  DM (diabetes mellitus)  High cholesterol  Ford chorea  No pertinent past medical history  No significant past surgical history        REVIEW OF SYSTEMS:  CONSTITUTIONAL: No  weight loss, or fatigue  EYES: No eye pain, visual disturbances, or discharge  ENMT:  No difficulty hearing, tinnitus, vertigo; No sinus or throat pain  NECK: No pain or stiffness  BREASTS: No pain, masses, or nipple discharge  RESPIRATORY: No cough, wheezing, chills or hemoptysis; No shortness of breath  CARDIOVASCULAR: No chest pain, palpitations, dizziness, or leg swelling  GASTROINTESTINAL: No abdominal or epigastric pain. No nausea, vomiting, or hematemesis; No diarrhea or constipation. No melena or hematochezia.  GENITOURINARY: No dysuria, frequency, hematuria, or incontinence  NEUROLOGICAL: No headaches,  numbness, or tremors  SKIN: No itching, burning, rashes, or lesions   LYMPH NODES: No enlarged glands  ENDOCRINE: No heat or cold intolerance; No hair loss  MUSCULOSKELETAL: No joint pain or swelling; No muscle, back, or extremity pain  PSYCHIATRIC: No depression, anxiety, mood swings, or difficulty sleeping  HEME/LYMPH: No easy bruising, or bleeding gums  ALLERGY AND IMMUNOLOGIC: No hives or eczema    Vital Signs Last 24 Hrs  T(C): 38.6 (25 Jan 2018 19:10), Max: 38.6 (25 Jan 2018 19:10)  T(F): 101.4 (25 Jan 2018 19:10), Max: 101.4 (25 Jan 2018 19:10)  HR: 116 (25 Jan 2018 19:10) (105 - 116)  BP: 132/80 (25 Jan 2018 19:10) (94/61 - 132/80)  BP(mean): --  RR: 16 (25 Jan 2018 19:10) (16 - 16)  SpO2: 95% (25 Jan 2018 19:10) (95% - 100%)    PHYSICAL EXAM:  GENERAL: NAD, well-groomed, well-developed  HEAD:  Atraumatic, Normocephalic  EYES: EOMI, PERRLA, conjunctiva and sclera clear  ENMT: No tonsillar erythema, exudates, or enlargement; Moist mucous membranes, Good dentition, No lesions  NECK: Supple, No JVD, Normal thyroid  NERVOUS SYSTEM:  Alert & Oriented   CHEST/LUNG: Clear to percussion bilaterally; No rales, rhonchi, wheezing, or rubs  HEART: Regular rate and rhythm; No murmurs, rubs, or gallops  ABDOMEN: Soft, Nontender, Nondistended; Bowel sounds present  EXTREMITIES:  2+ Peripheral Pulses, No clubbing, cyanosis, or edema  LYMPH: No lymphadenopathy noted  SKIN: No rashes or lesions    LAB                          8.4    10.62 )-----------( 579      ( 25 Jan 2018 08:03 )             25.9       CBC:  01-25 @ 08:03  WBC 10.62   Hgb 8.4   Hct 25.9   Plts 579  MCV 75.1  01-24 @ 08:14  WBC 10.35   Hgb 8.1   Hct 24.4   Plts 605  MCV 74.6  01-23 @ 09:32  WBC 9.7   Hgb 8.2   Hct 25.2   Plts 587  MCV 77.4  01-22 @ 07:21  WBC 9.1   Hgb 8.5   Hct 25.6   Plts 460  MCV 78.8  01-21 @ 06:37  WBC 11.9   Hgb 8.2   Hct 24.5   Plts 398  MCV 78.8  01-20 @ 07:30  WBC 11.2   Hgb 8.2   Hct 24.1   Plts 365  MCV 78.2  01-19 @ 08:29  WBC 11.1   Hgb 8.6   Hct 25.8   Plts 331  MCV 78.0      Chemistry:  01-25 @ 08:03  Na+ 136  K+ 4.2  Cl- 100  CO2 28  BUN 12  Cr 0.50     01-24 @ 08:14  Na+ 138  K+ 4.0  Cl- 103  CO2 27  BUN 15  Cr 0.41     01-23 @ 09:32  Na+ 138  K+ 3.9  Cl- 102  CO2 28  BUN 11  Cr 0.48     01-22 @ 07:21  Na+ 138  K+ 3.6  Cl- 102  CO2 29  BUN 10  Cr 0.37     01-21 @ 06:37  Na+ 138  K+ 3.7  Cl- 103  CO2 30  BUN 14  Cr 0.34     01-20 @ 07:30  Na+ 139  K+ 4.0  Cl- 105  CO2 25  BUN 9  Cr 0.47     01-19 @ 08:29  Na+ 140  K+ 3.9  Cl- 106  CO2 26  BUN 9  Cr 0.49         Glucose, Serum: 120 mg/dL (01-25 @ 08:03)  Glucose, Serum: 107 mg/dL (01-24 @ 08:14)  Glucose, Serum: 117 mg/dL (01-23 @ 09:32)  Glucose, Serum: 107 mg/dL (01-22 @ 07:21)  Glucose, Serum: 108 mg/dL (01-21 @ 06:37)  Glucose, Serum: 123 mg/dL (01-20 @ 07:30)  Glucose, Serum: 115 mg/dL (01-19 @ 08:29)      25 Jan 2018 08:03    136    |  100    |  12     ----------------------------<  120    4.2     |  28     |  0.50   24 Jan 2018 08:14    138    |  103    |  15     ----------------------------<  107    4.0     |  27     |  0.41   23 Jan 2018 09:32    138    |  102    |  11     ----------------------------<  117    3.9     |  28     |  0.48   22 Jan 2018 07:21    138    |  102    |  10     ----------------------------<  107    3.6     |  29     |  0.37   21 Jan 2018 06:37    138    |  103    |  14     ----------------------------<  108    3.7     |  30     |  0.34   20 Jan 2018 07:30    139    |  105    |  9      ----------------------------<  123    4.0     |  25     |  0.47   19 Jan 2018 08:29    140    |  106    |  9      ----------------------------<  115    3.9     |  26     |  0.49     Ca    10.1       25 Jan 2018 08:03  Ca    10.0       24 Jan 2018 08:14  Ca    9.9        23 Jan 2018 09:32  Ca    9.6        22 Jan 2018 07:21  Ca    9.3        21 Jan 2018 06:37  Ca    9.0        20 Jan 2018 07:30  Ca    9.2        19 Jan 2018 08:29  Phos  2.8       21 Jan 2018 06:37  Mg     2.1       21 Jan 2018 06:37    TPro  8.7    /  Alb  2.1    /  TBili  0.4    /  DBili  x      /  AST  31     /  ALT  25     /  AlkPhos  60     19 Jan 2018 08:29              CAPILLARY BLOOD GLUCOSE              RADIOLOGY & ADDITIONAL TESTS:    Imaging Personally Reviewed:  [ ] YES  [ ] NO    Consultant(s) Notes Reviewed:  [ ] YES  [ ] NO    Care Discussed with Consultants/Other Providers [ ] YES  [ ] NO

## 2018-01-26 LAB
ALBUMIN SERPL ELPH-MCNC: 2.1 G/DL — LOW (ref 3.3–5)
ALP SERPL-CCNC: 75 U/L — SIGNIFICANT CHANGE UP (ref 40–120)
ALT FLD-CCNC: 23 U/L — SIGNIFICANT CHANGE UP (ref 12–78)
ANION GAP SERPL CALC-SCNC: 5 MMOL/L — SIGNIFICANT CHANGE UP (ref 5–17)
AST SERPL-CCNC: 29 U/L — SIGNIFICANT CHANGE UP (ref 15–37)
BILIRUB SERPL-MCNC: 0.4 MG/DL — SIGNIFICANT CHANGE UP (ref 0.2–1.2)
BUN SERPL-MCNC: 9 MG/DL — SIGNIFICANT CHANGE UP (ref 7–23)
CALCIUM SERPL-MCNC: 9.6 MG/DL — SIGNIFICANT CHANGE UP (ref 8.5–10.1)
CHLORIDE SERPL-SCNC: 101 MMOL/L — SIGNIFICANT CHANGE UP (ref 96–108)
CO2 SERPL-SCNC: 29 MMOL/L — SIGNIFICANT CHANGE UP (ref 22–31)
CREAT SERPL-MCNC: 0.42 MG/DL — LOW (ref 0.5–1.3)
GLUCOSE SERPL-MCNC: 117 MG/DL — HIGH (ref 70–99)
HCT VFR BLD CALC: 24.2 % — LOW (ref 34.5–45)
HGB BLD-MCNC: 8.1 G/DL — LOW (ref 11.5–15.5)
MCHC RBC-ENTMCNC: 25.2 PG — LOW (ref 27–34)
MCHC RBC-ENTMCNC: 33.5 GM/DL — SIGNIFICANT CHANGE UP (ref 32–36)
MCV RBC AUTO: 75.2 FL — LOW (ref 80–100)
NRBC # BLD: 0 /100 WBCS — SIGNIFICANT CHANGE UP (ref 0–0)
PLATELET # BLD AUTO: 544 K/UL — HIGH (ref 150–400)
POTASSIUM SERPL-MCNC: 3.8 MMOL/L — SIGNIFICANT CHANGE UP (ref 3.5–5.3)
POTASSIUM SERPL-SCNC: 3.8 MMOL/L — SIGNIFICANT CHANGE UP (ref 3.5–5.3)
PROT SERPL-MCNC: 8.9 GM/DL — HIGH (ref 6–8.3)
RBC # BLD: 3.22 M/UL — LOW (ref 3.8–5.2)
RBC # FLD: 17.2 % — HIGH (ref 10.3–14.5)
SODIUM SERPL-SCNC: 135 MMOL/L — SIGNIFICANT CHANGE UP (ref 135–145)
WBC # BLD: 11.36 K/UL — HIGH (ref 3.8–10.5)
WBC # FLD AUTO: 11.36 K/UL — HIGH (ref 3.8–10.5)

## 2018-01-26 RX ORDER — SODIUM CHLORIDE 9 MG/ML
1000 INJECTION INTRAMUSCULAR; INTRAVENOUS; SUBCUTANEOUS ONCE
Qty: 0 | Refills: 0 | Status: COMPLETED | OUTPATIENT
Start: 2018-01-26 | End: 2018-01-26

## 2018-01-26 RX ADMIN — MEROPENEM 100 MILLIGRAM(S): 1 INJECTION INTRAVENOUS at 05:41

## 2018-01-26 RX ADMIN — Medication 1 APPLICATION(S): at 05:41

## 2018-01-26 RX ADMIN — Medication 325 MILLIGRAM(S): at 12:02

## 2018-01-26 RX ADMIN — MEROPENEM 100 MILLIGRAM(S): 1 INJECTION INTRAVENOUS at 23:56

## 2018-01-26 RX ADMIN — Medication 1 APPLICATION(S): at 18:16

## 2018-01-26 RX ADMIN — ENOXAPARIN SODIUM 40 MILLIGRAM(S): 100 INJECTION SUBCUTANEOUS at 12:02

## 2018-01-26 RX ADMIN — Medication 650 MILLIGRAM(S): at 04:32

## 2018-01-26 RX ADMIN — LACTULOSE 10 GRAM(S): 10 SOLUTION ORAL at 18:16

## 2018-01-26 RX ADMIN — MIRTAZAPINE 15 MILLIGRAM(S): 45 TABLET, ORALLY DISINTEGRATING ORAL at 22:55

## 2018-01-26 RX ADMIN — SENNA PLUS 2 TABLET(S): 8.6 TABLET ORAL at 22:55

## 2018-01-26 RX ADMIN — Medication 500 MILLIGRAM(S): at 18:16

## 2018-01-26 RX ADMIN — SODIUM CHLORIDE 1000 MILLILITER(S): 9 INJECTION INTRAMUSCULAR; INTRAVENOUS; SUBCUTANEOUS at 23:56

## 2018-01-26 RX ADMIN — Medication 20 MILLIEQUIVALENT(S): at 12:02

## 2018-01-26 RX ADMIN — Medication 500 MILLIGRAM(S): at 05:41

## 2018-01-26 RX ADMIN — LACTULOSE 10 GRAM(S): 10 SOLUTION ORAL at 05:42

## 2018-01-26 RX ADMIN — Medication 1 TABLET(S): at 12:02

## 2018-01-26 RX ADMIN — Medication 650 MILLIGRAM(S): at 22:55

## 2018-01-26 RX ADMIN — MEROPENEM 100 MILLIGRAM(S): 1 INJECTION INTRAVENOUS at 13:31

## 2018-01-26 NOTE — PROGRESS NOTE ADULT - SUBJECTIVE AND OBJECTIVE BOX
Patient seen and examined bedside resting comfortably.  No complaints offered. No new events. Still with persistent fevers and tachycardia.    T(F): 99 (01-26-18 @ 06:40), Max: 102 (01-26-18 @ 04:28)  HR: 111 (01-26-18 @ 06:40) (99 - 116)  BP: 118/67 (01-26-18 @ 06:40) (94/61 - 132/80)  RR: 16 (01-26-18 @ 06:40) (16 - 18)  SpO2: 97% (01-26-18 @ 06:40) (95% - 100%)    PHYSICAL EXAM:    General: NAD, alert and awake.  HEENT: NCAT, EOMI, conjunctiva clear  Chest: nonlabored respirations, CTA b/l.  Abdomen: soft, NT/ND.   Extremities: Contracted. no pedal edema or calf tenderness noted   : Obrien draining clear yellow urine    LABS:                        8.1    11.36 )-----------( 544      ( 26 Jan 2018 07:32 )             24.2   01-26    135  |  101  |  9   ----------------------------<  117<H>  3.8   |  29  |  0.42<L>    Ca    9.6      26 Jan 2018 07:32    TPro  8.9<H>  /  Alb  2.1<L>  /  TBili  0.4  /  DBili  x   /  AST  29  /  ALT  23  /  AlkPhos  75  01-26    I&O's Detail    25 Jan 2018 07:01  -  26 Jan 2018 07:00  --------------------------------------------------------  IN:    dextrose 5%.: 756 mL    Oral Fluid: 250 mL    Solution: 100 mL  Total IN: 1106 mL    OUT:  Total OUT: 0 mL    Total NET: 1106 mL    Impression: 61yo F with PMH Ashli's chorea, urinary retention due to neurogenic bladder, hemiparesis, decubitus ulcers admitted with sepsis 2/2 UTI and left hydronephrosis POD#6 s/p Left Ureteroscopy, with laser lithotripsy and stent insertion, with continued fevers, and worsening leukocytosis. Indium scan NML.    Plan:  -continue obrien catheter for retention, monitor urine output and quality  -continue to monitor for fevers, tylenol prn.   -ID follow up, continue IV abx merrem and supportive measures for +RSV  -continue medical management and supportive care  -continue lovenox for VTE ppx  -Will discuss with Dr. Cleaning Patient seen and examined bedside resting comfortably.  No complaints offered. No new events. Still with persistent fevers and tachycardia.    T(F): 99 (01-26-18 @ 06:40), Max: 102 (01-26-18 @ 04:28)  HR: 111 (01-26-18 @ 06:40) (99 - 116)  BP: 118/67 (01-26-18 @ 06:40) (94/61 - 132/80)  RR: 16 (01-26-18 @ 06:40) (16 - 18)  SpO2: 97% (01-26-18 @ 06:40) (95% - 100%)    PHYSICAL EXAM:    General: NAD, alert and awake.  HEENT: NCAT, EOMI, conjunctiva clear  Chest: nonlabored respirations, CTA b/l.  Abdomen: soft, NT/ND.   Extremities: Contracted. no pedal edema or calf tenderness noted   : Obrien draining clear yellow urine--only about 5cc seen in obrien bag (urine output not recorded over 24hr in chart)    LABS:                        8.1    11.36 )-----------( 544      ( 26 Jan 2018 07:32 )             24.2   01-26    135  |  101  |  9   ----------------------------<  117<H>  3.8   |  29  |  0.42<L>    Ca    9.6      26 Jan 2018 07:32    TPro  8.9<H>  /  Alb  2.1<L>  /  TBili  0.4  /  DBili  x   /  AST  29  /  ALT  23  /  AlkPhos  75  01-26    I&O's Detail    25 Jan 2018 07:01  -  26 Jan 2018 07:00  --------------------------------------------------------  IN:    dextrose 5%.: 756 mL    Oral Fluid: 250 mL    Solution: 100 mL  Total IN: 1106 mL    OUT:  Total OUT: 0 mL    Total NET: 1106 mL    Impression: 61yo F with PMH [place]'s chorea, urinary retention due to neurogenic bladder, hemiparesis, decubitus ulcers admitted with sepsis 2/2 UTI and left hydronephrosis POD#6 s/p Left Ureteroscopy, with laser lithotripsy and stent insertion, with continued fevers, and worsening leukocytosis. Indium scan NML.    Plan:  -continue obrien catheter for retention, monitor urine output and quality  -continue to monitor for fevers, tylenol prn.   -ID follow up, continue IV abx merrem and supportive measures for +RSV  -continue medical management and supportive care  -continue lovenox for VTE ppx  -Will discuss with Dr. Cleaning

## 2018-01-26 NOTE — PROGRESS NOTE ADULT - ATTENDING COMMENTS
source of fever ?. No hydronephrosis as pt has stent and Mcfarland catheter , urine culture-negative, blood culture negative.   Continue Mcfarland catheter for neurogenic bladder.

## 2018-01-26 NOTE — PROGRESS NOTE ADULT - SUBJECTIVE AND OBJECTIVE BOX
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  Pt has been more lethargic and barely communicable. Low grade fever still progressing.    Upon evaluation otday, pt is alert and awake, mental status the same as per her baseline, cannot communicate well. No need any oxygen supply, breathing good.         No Known Allergies    Intolerances        MEDICATIONS  (STANDING):  ascorbic acid 500 milliGRAM(s) Oral two times a day  dextrose 5%. 1000 milliLiter(s) (63 mL/Hr) IV Continuous <Continuous>  enoxaparin Injectable 40 milliGRAM(s) SubCutaneous daily  ferrous    sulfate 325 milliGRAM(s) Oral daily  hydrocortisone 2.5% Cream 1 Application(s) Topical two times a day  lactulose Syrup 10 Gram(s) Oral two times a day  meropenem IVPB 500 milliGRAM(s) IV Intermittent every 8 hours  mirtazapine 15 milliGRAM(s) Oral at bedtime  multivitamin/minerals 1 Tablet(s) Oral daily  potassium chloride   Powder 20 milliEquivalent(s) Oral daily  senna 2 Tablet(s) Oral at bedtime    MEDICATIONS  (PRN):  acetaminophen  Suppository 650 milliGRAM(s) Rectal every 6 hours PRN For Temp greater than 38 C (100.4 F)  magnesium hydroxide Suspension 30 milliLiter(s) Oral daily PRN Constipation      REVIEW OF SYSTEMS:  Limited due to her underlying mental status     VITAL SIGNS:  T(C): 37.2 (01-26-18 @ 06:40), Max: 38.9 (01-26-18 @ 04:28)  T(F): 99 (01-26-18 @ 06:40), Max: 102 (01-26-18 @ 04:28)  HR: 111 (01-26-18 @ 06:40) (99 - 116)  BP: 118/67 (01-26-18 @ 06:40) (94/61 - 132/80)  RR: 16 (01-26-18 @ 06:40) (16 - 18)  SpO2: 97% (01-26-18 @ 06:40) (95% - 100%)  Wt(kg): --    PHYSICAL EXAM:    GENERAL: not in any distress  HEENT: Neck is supple, normocephalic, atraumatic   CHEST/LUNG: Clear to percussion bilaterally; No rales, rhonchi, wheezing  HEART: Regular rate and rhythm; No murmurs, rubs, or gallops  ABDOMEN: Soft, Nontender, Nondistended; Bowel sounds present, no rebound   EXTREMITIES:  2+ Peripheral Pulses, No clubbing, cyanosis, or edema  GENITOURINARY:   SKIN: No rashes or lesions  BACK: no pressor sore   NERVOUS SYSTEM:  Alert     LABS:                         8.1    11.36 )-----------( 544      ( 26 Jan 2018 07:32 )             24.2     01-26    135  |  101  |  9   ----------------------------<  117<H>  3.8   |  29  |  0.42<L>    Ca    9.6      26 Jan 2018 07:32    TPro  8.9<H>  /  Alb  2.1<L>  /  TBili  0.4  /  DBili  x   /  AST  29  /  ALT  23  /  AlkPhos  75  01-26    LIVER FUNCTIONS - ( 26 Jan 2018 07:32 )  Alb: 2.1 g/dL / Pro: 8.9 gm/dL / ALK PHOS: 75 U/L / ALT: 23 U/L / AST: 29 U/L / GGT: x                                   Culture Results:   No growth (01-23 @ 09:22)  Culture Results:   No growth to date. (01-23 @ 00:18)                Radiology:

## 2018-01-26 NOTE — PROGRESS NOTE ADULT - PROBLEM SELECTOR PLAN 1
Zosyn changed to Merrem, fever 101.4, as per ID,  WBC 11.36 [id number], s/p cystoscopy. Indium scan negative. appreciate ID note

## 2018-01-26 NOTE — PROGRESS NOTE ADULT - ASSESSMENT
persistent fever, tamx 102 today  , no leukocytosis , not looking toxic , not in any distress   Pos rhino virus which can be the cause of spiking persistent fever  Sepsis likely from acute viral syndrome vs bacteremia vs pyelonephritis , left ureter stone   blood culture simple Ecoli , fairly sensitive e coli , resistant to cipro, source likely from    surveillance blood culture neg so far   CT revealed left pyelonephritis , hydrourethroonephrosis , ureteral stones  CXR reviewed   on emperic coverage with jenny day 10 today , please continue jenny while in house   can switch to PO Augmentin 500 mg PO Q 12 hr for few more days when she clear to go.   Urology is following for indwelling obrien catheter for retention , ureteral stone   indium scan negative   Monitor for fever , vitals   supportive management for viral syndrome   Thanks you.

## 2018-01-26 NOTE — PROGRESS NOTE ADULT - SUBJECTIVE AND OBJECTIVE BOX
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  Pt has been more lethargic.  no n/v (12 Jan 2018 14:07)      INTERVAL HPI/OVERNIGHT EVENTS:  No new c/o afebrile. T max 101.4    MEDICATIONS  (STANDING):  ascorbic acid 500 milliGRAM(s) Oral two times a day  dextrose 5%. 1000 milliLiter(s) (63 mL/Hr) IV Continuous <Continuous>  enoxaparin Injectable 40 milliGRAM(s) SubCutaneous daily  ferrous    sulfate 325 milliGRAM(s) Oral daily  hydrocortisone 2.5% Cream 1 Application(s) Topical two times a day  lactulose Syrup 10 Gram(s) Oral two times a day  meropenem IVPB 500 milliGRAM(s) IV Intermittent every 8 hours  mirtazapine 15 milliGRAM(s) Oral at bedtime  multivitamin/minerals 1 Tablet(s) Oral daily  potassium chloride   Powder 20 milliEquivalent(s) Oral daily  senna 2 Tablet(s) Oral at bedtime    MEDICATIONS  (PRN):  acetaminophen  Suppository 650 milliGRAM(s) Rectal every 6 hours PRN For Temp greater than 38 C (100.4 F)  magnesium hydroxide Suspension 30 milliLiter(s) Oral daily PRN Constipation      FAMILY HISTORY:  No pertinent family history in first degree relatives      Allergies    No Known Allergies    Intolerances        PMH/PSH:  Decubital ulcer  Hemiparesis  Failure to thrive in adult  UTI (urinary tract infection)  Dehydration  Rhabdomyolysis  HTN (hypertension)  Neurogenic bladder disorder  Pressure ulcer of sacrum  UTI (urinary tract infection)  Embolism  DVT (deep venous thrombosis)  Anemia  Pneumonia  DM (diabetes mellitus)  High cholesterol  [place] chorea  No pertinent past medical history  No significant past surgical history        REVIEW OF SYSTEMS:  CONSTITUTIONAL: No weight loss,   EYES: No eye pain, visual disturbances, or discharge  ENMT:  No difficulty hearing, tinnitus, vertigo; No sinus or throat pain  NECK: No pain   BREASTS: No pain, masses, or nipple discharge  RESPIRATORY: No cough, wheezing, chills or hemoptysis; No shortness of breath  CARDIOVASCULAR: No chest pain, palpitations, dizziness, or leg swelling  GASTROINTESTINAL: No abdominal or epigastric pain. No nausea, vomiting, or hematemesis; No diarrhea or constipation. No melena or hematochezia.  GENITOURINARY: No dysuria, frequency, hematuria, or incontinence  NEUROLOGICAL: No headaches, memory loss, loss of strength, numbness, or tremors  SKIN: No itching, burning, rashes, or lesions   LYMPH NODES: No enlarged glands  ENDOCRINE: No heat or cold intolerance; No hair loss  PSYCHIATRIC: No depression, anxiety, mood swings, or difficulty sleeping  HEME/LYMPH: No easy bruising, or bleeding gums  ALLERGY AND IMMUNOLOGIC: No hives or eczema    Vital Signs Last 24 Hrs  T(C): 37.6 (26 Jan 2018 11:13), Max: 38.9 (26 Jan 2018 04:28)  T(F): 99.6 (26 Jan 2018 11:13), Max: 102 (26 Jan 2018 04:28)  HR: 100 (26 Jan 2018 11:13) (99 - 116)  BP: 94/58 (26 Jan 2018 11:13) (94/58 - 132/80)  BP(mean): --  RR: 18 (26 Jan 2018 11:13) (16 - 18)  SpO2: 99% (26 Jan 2018 11:13) (95% - 99%)    PHYSICAL EXAM:  GENERAL: NAD, well-groomed, well-developed  HEAD:  Atraumatic, Normocephalic  EYES: EOMI, PERRLA, conjunctiva and sclera clear  NECK: Supple, No JVD, Normal thyroid  NERVOUS SYSTEM:  Alert & Oriented   CHEST/LUNG: Clear to percussion bilaterally; No rales, rhonchi, wheezing, or rubs  HEART: Regular rate and rhythm; No murmurs, rubs, or gallops  ABDOMEN: Soft, Nontender, Nondistended; Bowel sounds present  EXTREMITIES:  2+ Peripheral Pulses, No clubbing, cyanosis, or edema  LYMPH: No lymphadenopathy noted  SKIN: No rashes or lesions    LAB                          8.1    11.36 )-----------( 544      ( 26 Jan 2018 07:32 )             24.2       CBC:  01-26 @ 07:32  WBC 11.36   Hgb 8.1   Hct 24.2   Plts 544  MCV 75.2  01-25 @ 08:03  WBC 10.62   Hgb 8.4   Hct 25.9   Plts 579  MCV 75.1  01-24 @ 08:14  WBC 10.35   Hgb 8.1   Hct 24.4   Plts 605  MCV 74.6  01-23 @ 09:32  WBC 9.7   Hgb 8.2   Hct 25.2   Plts 587  MCV 77.4  01-22 @ 07:21  WBC 9.1   Hgb 8.5   Hct 25.6   Plts 460  MCV 78.8  01-21 @ 06:37  WBC 11.9   Hgb 8.2   Hct 24.5   Plts 398  MCV 78.8  01-20 @ 07:30  WBC 11.2   Hgb 8.2   Hct 24.1   Plts 365  MCV 78.2      Chemistry:  01-26 @ 07:32  Na+ 135  K+ 3.8  Cl- 101  CO2 29  BUN 9  Cr 0.42     01-25 @ 08:03  Na+ 136  K+ 4.2  Cl- 100  CO2 28  BUN 12  Cr 0.50     01-24 @ 08:14  Na+ 138  K+ 4.0  Cl- 103  CO2 27  BUN 15  Cr 0.41     01-23 @ 09:32  Na+ 138  K+ 3.9  Cl- 102  CO2 28  BUN 11  Cr 0.48     01-22 @ 07:21  Na+ 138  K+ 3.6  Cl- 102  CO2 29  BUN 10  Cr 0.37     01-21 @ 06:37  Na+ 138  K+ 3.7  Cl- 103  CO2 30  BUN 14  Cr 0.34     01-20 @ 07:30  Na+ 139  K+ 4.0  Cl- 105  CO2 25  BUN 9  Cr 0.47         Glucose, Serum: 117 mg/dL (01-26 @ 07:32)  Glucose, Serum: 120 mg/dL (01-25 @ 08:03)  Glucose, Serum: 107 mg/dL (01-24 @ 08:14)  Glucose, Serum: 117 mg/dL (01-23 @ 09:32)  Glucose, Serum: 107 mg/dL (01-22 @ 07:21)  Glucose, Serum: 108 mg/dL (01-21 @ 06:37)  Glucose, Serum: 123 mg/dL (01-20 @ 07:30)      26 Jan 2018 07:32    135    |  101    |  9      ----------------------------<  117    3.8     |  29     |  0.42   25 Jan 2018 08:03    136    |  100    |  12     ----------------------------<  120    4.2     |  28     |  0.50   24 Jan 2018 08:14    138    |  103    |  15     ----------------------------<  107    4.0     |  27     |  0.41   23 Jan 2018 09:32    138    |  102    |  11     ----------------------------<  117    3.9     |  28     |  0.48   22 Jan 2018 07:21    138    |  102    |  10     ----------------------------<  107    3.6     |  29     |  0.37   21 Jan 2018 06:37    138    |  103    |  14     ----------------------------<  108    3.7     |  30     |  0.34   20 Jan 2018 07:30    139    |  105    |  9      ----------------------------<  123    4.0     |  25     |  0.47     Ca    9.6        26 Jan 2018 07:32  Ca    10.1       25 Jan 2018 08:03  Ca    10.0       24 Jan 2018 08:14  Ca    9.9        23 Jan 2018 09:32  Ca    9.6        22 Jan 2018 07:21  Ca    9.3        21 Jan 2018 06:37  Ca    9.0        20 Jan 2018 07:30  Phos  2.8       21 Jan 2018 06:37  Mg     2.1       21 Jan 2018 06:37    TPro  8.9    /  Alb  2.1    /  TBili  0.4    /  DBili  x      /  AST  29     /  ALT  23     /  AlkPhos  75     26 Jan 2018 07:32              CAPILLARY BLOOD GLUCOSE              RADIOLOGY & ADDITIONAL TESTS:    Imaging Personally Reviewed:  [ ] YES  [ ] NO    Consultant(s) Notes Reviewed:  [ ] YES  [ ] NO    Care Discussed with Consultants/Other Providers [ ] YES  [ ] NO

## 2018-01-26 NOTE — PROGRESS NOTE ADULT - ASSESSMENT
Subjective Complaints:  Historian:         REVIEW OF SYSTEMS:  Eyes:  Good vision, no reported pain  ENT:  No sore throat, pain, runny nose, dysphagia  CV:  No pain, palpitatioins, hypo/hypertension  Resp:  No dyspnea, cough, tachypnea, wheezing  GI:  No pain, nausea, vomiting, diarrhea, constipatiion  Muscle:  No pain, weakness  Neuro:  No weakness, tingling, memory problems  Psych:  No fatigue, insomnia, mood problems, depression  Endocrine:  No polyuria, polydypsia, cold/heat intolerance    Vital Signs Last 24 Hrs  T(C): 37.7 (26 Jan 2018 17:05), Max: 38.9 (26 Jan 2018 04:28)  T(F): 99.8 (26 Jan 2018 17:05), Max: 102 (26 Jan 2018 04:28)  HR: 110 (26 Jan 2018 17:05) (99 - 116)  BP: 102/68 (26 Jan 2018 17:05) (94/58 - 132/80)  BP(mean): --  RR: 16 (26 Jan 2018 17:05) (16 - 18)  SpO2: 98% (26 Jan 2018 17:05) (95% - 99%)    GENERAL PHYSICAL EXAM:  General:  Appears stated age, well-groomed, well-nourished, no distress  HEENT:  NC/AT, patent nares w/ pink mucosa, OP clear w/o lesions, PERRL, EOMI, conjunctivae clear, no thyromegaly, nodules, adenopathy, no JVD  Chest:  Full & symmetric excursion, no increased effort, breath sounds clear  Cardiovascular:  Regular rhythm, S1, S2, no murmur/rub/S3/S4, no carotid/femoral/abdominal bruit, radial/pedal pulses 2+, no edema  Abdomen:  Soft, non-tender, non-distended, normoactive bowel sounds, no HSM  Extremities:  Gait & station:   Digits:   Nails:   Joints, Bones, Muscles:   ROM:   Stability:  Skin:  No rash/erythema/ecchymoses/petechiae/wounds/abscess/warm/dry  Musculoskeletal:  Full ROM in all joints w/o swelling/tenderness/effusion    NEUROLOGICAL EXAM:  HENT:  Normocephalic head; atraumatic head.  Neck supple.  ENT: normal looking.  Mental State:    awake open eyes does not follws comnads arm leg contracted no seizure no chorea            8.1    11.36 )-----------( 544      ( 26 Jan 2018 07:32 )             24.2     01-26    135  |  101  |  9   ----------------------------<  117<H>  3.8   |  29  |  0.42<L>    Ca    9.6      26 Jan 2018 07:32    TPro  8.9<H>  /  Alb  2.1<L>  /  TBili  0.4  /  DBili  x   /  AST  29  /  ALT  23  /  AlkPhos  75  01-26   ass= awake open eyes  does not norm  commands  arm leg contracted no chorea movement uti s/p ureteral stent  anemia  metabolic encpehalaopthy         RADIOLOGY & ADDITIONAL STUDIES:        Recommendations: for sub acute rehab no seziure

## 2018-01-27 LAB
ALBUMIN SERPL ELPH-MCNC: 1.9 G/DL — LOW (ref 3.3–5)
ALP SERPL-CCNC: 67 U/L — SIGNIFICANT CHANGE UP (ref 40–120)
ALT FLD-CCNC: 18 U/L — SIGNIFICANT CHANGE UP (ref 12–78)
ANION GAP SERPL CALC-SCNC: 4 MMOL/L — LOW (ref 5–17)
AST SERPL-CCNC: 19 U/L — SIGNIFICANT CHANGE UP (ref 15–37)
BILIRUB SERPL-MCNC: 0.3 MG/DL — SIGNIFICANT CHANGE UP (ref 0.2–1.2)
BUN SERPL-MCNC: 8 MG/DL — SIGNIFICANT CHANGE UP (ref 7–23)
CALCIUM SERPL-MCNC: 9.2 MG/DL — SIGNIFICANT CHANGE UP (ref 8.5–10.1)
CHLORIDE SERPL-SCNC: 105 MMOL/L — SIGNIFICANT CHANGE UP (ref 96–108)
CO2 SERPL-SCNC: 29 MMOL/L — SIGNIFICANT CHANGE UP (ref 22–31)
CREAT SERPL-MCNC: 0.42 MG/DL — LOW (ref 0.5–1.3)
GLUCOSE SERPL-MCNC: 103 MG/DL — HIGH (ref 70–99)
HCT VFR BLD CALC: 23.1 % — LOW (ref 34.5–45)
HGB BLD-MCNC: 7.6 G/DL — LOW (ref 11.5–15.5)
LACTATE SERPL-SCNC: 0.8 MMOL/L — SIGNIFICANT CHANGE UP (ref 0.7–2)
MCHC RBC-ENTMCNC: 24.5 PG — LOW (ref 27–34)
MCHC RBC-ENTMCNC: 32.9 GM/DL — SIGNIFICANT CHANGE UP (ref 32–36)
MCV RBC AUTO: 74.5 FL — LOW (ref 80–100)
NRBC # BLD: 0 /100 WBCS — SIGNIFICANT CHANGE UP (ref 0–0)
PLATELET # BLD AUTO: 524 K/UL — HIGH (ref 150–400)
POTASSIUM SERPL-MCNC: 3.7 MMOL/L — SIGNIFICANT CHANGE UP (ref 3.5–5.3)
POTASSIUM SERPL-SCNC: 3.7 MMOL/L — SIGNIFICANT CHANGE UP (ref 3.5–5.3)
PROT SERPL-MCNC: 8.3 GM/DL — SIGNIFICANT CHANGE UP (ref 6–8.3)
RBC # BLD: 3.1 M/UL — LOW (ref 3.8–5.2)
RBC # FLD: 17.2 % — HIGH (ref 10.3–14.5)
SODIUM SERPL-SCNC: 138 MMOL/L — SIGNIFICANT CHANGE UP (ref 135–145)
WBC # BLD: 13.04 K/UL — HIGH (ref 3.8–10.5)
WBC # FLD AUTO: 13.04 K/UL — HIGH (ref 3.8–10.5)

## 2018-01-27 RX ORDER — IBUPROFEN 200 MG
400 TABLET ORAL ONCE
Qty: 0 | Refills: 0 | Status: COMPLETED | OUTPATIENT
Start: 2018-01-27 | End: 2018-01-27

## 2018-01-27 RX ORDER — SODIUM CHLORIDE 9 MG/ML
1000 INJECTION INTRAMUSCULAR; INTRAVENOUS; SUBCUTANEOUS ONCE
Qty: 0 | Refills: 0 | Status: COMPLETED | OUTPATIENT
Start: 2018-01-27 | End: 2018-01-27

## 2018-01-27 RX ADMIN — LACTULOSE 10 GRAM(S): 10 SOLUTION ORAL at 17:09

## 2018-01-27 RX ADMIN — MEROPENEM 100 MILLIGRAM(S): 1 INJECTION INTRAVENOUS at 05:57

## 2018-01-27 RX ADMIN — MEROPENEM 100 MILLIGRAM(S): 1 INJECTION INTRAVENOUS at 21:55

## 2018-01-27 RX ADMIN — Medication 325 MILLIGRAM(S): at 11:15

## 2018-01-27 RX ADMIN — Medication 500 MILLIGRAM(S): at 17:09

## 2018-01-27 RX ADMIN — Medication 500 MILLIGRAM(S): at 05:59

## 2018-01-27 RX ADMIN — Medication 1 TABLET(S): at 11:15

## 2018-01-27 RX ADMIN — SODIUM CHLORIDE 63 MILLILITER(S): 9 INJECTION, SOLUTION INTRAVENOUS at 05:59

## 2018-01-27 RX ADMIN — MIRTAZAPINE 15 MILLIGRAM(S): 45 TABLET, ORALLY DISINTEGRATING ORAL at 21:55

## 2018-01-27 RX ADMIN — MEROPENEM 100 MILLIGRAM(S): 1 INJECTION INTRAVENOUS at 13:00

## 2018-01-27 RX ADMIN — SENNA PLUS 2 TABLET(S): 8.6 TABLET ORAL at 21:55

## 2018-01-27 RX ADMIN — Medication 400 MILLIGRAM(S): at 01:44

## 2018-01-27 RX ADMIN — Medication 1 APPLICATION(S): at 17:09

## 2018-01-27 RX ADMIN — SODIUM CHLORIDE 500 MILLILITER(S): 9 INJECTION INTRAMUSCULAR; INTRAVENOUS; SUBCUTANEOUS at 01:32

## 2018-01-27 RX ADMIN — Medication 650 MILLIGRAM(S): at 21:56

## 2018-01-27 RX ADMIN — Medication 20 MILLIEQUIVALENT(S): at 11:15

## 2018-01-27 RX ADMIN — ENOXAPARIN SODIUM 40 MILLIGRAM(S): 100 INJECTION SUBCUTANEOUS at 11:15

## 2018-01-27 RX ADMIN — Medication 1 APPLICATION(S): at 05:59

## 2018-01-27 RX ADMIN — LACTULOSE 10 GRAM(S): 10 SOLUTION ORAL at 05:58

## 2018-01-27 NOTE — CHART NOTE - NSCHARTNOTEFT_GEN_A_CORE
Pt febrile to 103 and tachycardic to 130 overnight.   This is a 62Fw/ h/o [place]'s chorea, admitted w/ high fevers and lethargic. Pt was sleeping but easily awaken to touch, non verbal. No change in mental status as per RN. Not in any respiratory distress.  Pt is on meropenem for UTI. Followed by ID and neuro.   Left arm peripheral IV infiltrated, erythema present.    Vital Signs Last 24 Hrs  T(C): 38.3 (27 Jan 2018 03:28), Max: 39.4 (26 Jan 2018 22:13)  T(F): 101 (27 Jan 2018 03:28), Max: 103 (26 Jan 2018 22:13)  HR: 118 (27 Jan 2018 03:28) (100 - 134)  BP: 124/62 (27 Jan 2018 01:02) (94/58 - 130/70)  BP(mean): --  RR: 16 (27 Jan 2018 01:02) (16 - 18)  SpO2: 98% (27 Jan 2018 01:02) (97% - 99%)    cv - s1s2, tachy  lung s- CTA b/l  abd  +bs, soft ntnd  ext - no edema  upper ext - Left arm w/ IV infiltration, erythema noted. No increased warmth, no drainage.    62F a/w sepsis 2/2 acute viral syndrome vs bacteremia vs pyelonephritis/UTI. On meropenem. Overnight w/ fever and tachycardia  -blood cultures x2 repeated  -lactate wnl  -s/p 2l NS  -tylenol prn  -ibuprofen 400 mg X1, prn cooling blanket, icepacks.  D/w RN. Pt febrile to 103 and tachycardic to 130 overnight.   This is a 62Fw/ h/o [place]'s chorea, admitted w/ high fevers and lethargic. Pt was sleeping but easily awaken to touch, non verbal. No change in mental status as per RN. Not in any respiratory distress.  Pt is on meropenem for UTI. Followed by ID and neuro.   Left arm peripheral IV infiltrated, erythema present.    Vital Signs Last 24 Hrs  T(C): 38.3 (27 Jan 2018 03:28), Max: 39.4 (26 Jan 2018 22:13)  T(F): 101 (27 Jan 2018 03:28), Max: 103 (26 Jan 2018 22:13)  HR: 118 (27 Jan 2018 03:28) (100 - 134)  BP: 124/62 (27 Jan 2018 01:02) (94/58 - 130/70)  BP(mean): --  RR: 16 (27 Jan 2018 01:02) (16 - 18)  SpO2: 98% (27 Jan 2018 01:02) (97% - 99%)    cv - s1s2, tachy  lung s- CTA b/l  abd  +bs, soft ntnd  ext - no edema  upper ext - Left arm w/ IV infiltration, erythema noted. No increased warmth, no drainage.    62F a/w sepsis 2/2 acute viral syndrome vs bacteremia vs pyelonephritis/UTI. On meropenem. Overnight w/ fever and tachycardia  -blood cultures x2 repeated  -lactate wnl  -s/p 2l NS  -tylenol prn  -ibuprofen 400 mg X1, prn cooling blanket, icepacks.    Left Upper extremity w/ IV infiltration  -elevate  -prn warm compress  -monitor  D/w RN.

## 2018-01-27 NOTE — PROGRESS NOTE ADULT - SUBJECTIVE AND OBJECTIVE BOX
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  Pt has been more lethargic.  no n/v (12 Jan 2018 14:07)      INTERVAL HPI/OVERNIGHT EVENTS:  No new c/o. Fever (+), T max 103    MEDICATIONS  (STANDING):  ascorbic acid 500 milliGRAM(s) Oral two times a day  dextrose 5%. 1000 milliLiter(s) (63 mL/Hr) IV Continuous <Continuous>  enoxaparin Injectable 40 milliGRAM(s) SubCutaneous daily  ferrous    sulfate 325 milliGRAM(s) Oral daily  hydrocortisone 2.5% Cream 1 Application(s) Topical two times a day  lactulose Syrup 10 Gram(s) Oral two times a day  meropenem IVPB 500 milliGRAM(s) IV Intermittent every 8 hours  mirtazapine 15 milliGRAM(s) Oral at bedtime  multivitamin/minerals 1 Tablet(s) Oral daily  potassium chloride   Powder 20 milliEquivalent(s) Oral daily  senna 2 Tablet(s) Oral at bedtime    MEDICATIONS  (PRN):  acetaminophen  Suppository 650 milliGRAM(s) Rectal every 6 hours PRN For Temp greater than 38 C (100.4 F)  magnesium hydroxide Suspension 30 milliLiter(s) Oral daily PRN Constipation      FAMILY HISTORY:  No pertinent family history in first degree relatives      Allergies    No Known Allergies    Intolerances        PMH/PSH:  Decubital ulcer  Hemiparesis  Failure to thrive in adult  UTI (urinary tract infection)  Dehydration  Rhabdomyolysis  HTN (hypertension)  Neurogenic bladder disorder  Pressure ulcer of sacrum  UTI (urinary tract infection)  Embolism  DVT (deep venous thrombosis)  Anemia  Pneumonia  DM (diabetes mellitus)  High cholesterol  Otis chorea  No pertinent past medical history  No significant past surgical history        REVIEW OF SYSTEMS:  No new c/o  CONSTITUTIONAL: No weight loss, or fatigue  EYES: No eye pain, visual disturbances, or discharge  ENMT:  No difficulty hearing, tinnitus, vertigo; No sinus or throat pain  NECK: No pain or stiffness  BREASTS: No pain, masses, or nipple discharge  RESPIRATORY: No cough, wheezing, chills or hemoptysis; No shortness of breath  CARDIOVASCULAR: No chest pain, palpitations, dizziness, or leg swelling  GASTROINTESTINAL: No abdominal or epigastric pain. No nausea, vomiting, or hematemesis; No diarrhea or constipation. No melena or hematochezia.  GENITOURINARY: No dysuria, frequency, hematuria, or incontinence  NEUROLOGICAL: No headaches, memory loss, loss of strength, numbness, or tremors  SKIN: No itching, burning, rashes, or lesions   LYMPH NODES: No enlarged glands  ENDOCRINE: No heat or cold intolerance; No hair loss  MUSCULOSKELETAL: No joint pain or swelling; No muscle, back, or extremity pain  PSYCHIATRIC: No depression, anxiety, mood swings, or difficulty sleeping  HEME/LYMPH: No easy bruising, or bleeding gums  ALLERGY AND IMMUNOLOGIC: No hives or eczema    Vital Signs Last 24 Hrs  T(C): 38.7 (27 Jan 2018 21:10), Max: 39.4 (26 Jan 2018 22:13)  T(F): 101.7 (27 Jan 2018 21:10), Max: 103 (26 Jan 2018 22:13)  HR: 121 (27 Jan 2018 17:27) (103 - 134)  BP: 140/81 (27 Jan 2018 17:27) (117/75 - 140/81)  BP(mean): --  RR: 16 (27 Jan 2018 17:27) (16 - 17)  SpO2: 95% (27 Jan 2018 17:27) (95% - 98%)    PHYSICAL EXAM:  GENERAL: NAD, well-groomed, well-developed  HEAD:  Atraumatic, Normocephalic  EYES: EOMI, PERRLA, conjunctiva and sclera clear  NECK: Supple, No JVD, Normal thyroid  NERVOUS SYSTEM:  Alert   CHEST/LUNG: Clear to percussion bilaterally; No rales, rhonchi, wheezing, or rubs  HEART: Regular rate and rhythm; No murmurs, rubs, or gallops  ABDOMEN: Soft, Nontender, Nondistended; Bowel sounds present  EXTREMITIES:  2+ Peripheral Pulses, No clubbing, cyanosis, or edema  LYMPH: No lymphadenopathy noted  SKIN: No rashes or lesions    LAB                          7.6    13.04 )-----------( 524      ( 27 Jan 2018 07:51 )             23.1       CBC:  01-27 @ 07:51  WBC 13.04   Hgb 7.6   Hct 23.1   Plts 524  MCV 74.5  01-26 @ 07:32  WBC 11.36   Hgb 8.1   Hct 24.2   Plts 544  MCV 75.2  01-25 @ 08:03  WBC 10.62   Hgb 8.4   Hct 25.9   Plts 579  MCV 75.1  01-24 @ 08:14  WBC 10.35   Hgb 8.1   Hct 24.4   Plts 605  MCV 74.6  01-23 @ 09:32  WBC 9.7   Hgb 8.2   Hct 25.2   Plts 587  MCV 77.4  01-22 @ 07:21  WBC 9.1   Hgb 8.5   Hct 25.6   Plts 460  MCV 78.8  01-21 @ 06:37  WBC 11.9   Hgb 8.2   Hct 24.5   Plts 398  MCV 78.8      Chemistry:  01-27 @ 07:51  Na+ 138  K+ 3.7  Cl- 105  CO2 29  BUN 8  Cr 0.42     01-26 @ 07:32  Na+ 135  K+ 3.8  Cl- 101  CO2 29  BUN 9  Cr 0.42     01-25 @ 08:03  Na+ 136  K+ 4.2  Cl- 100  CO2 28  BUN 12  Cr 0.50     01-24 @ 08:14  Na+ 138  K+ 4.0  Cl- 103  CO2 27  BUN 15  Cr 0.41     01-23 @ 09:32  Na+ 138  K+ 3.9  Cl- 102  CO2 28  BUN 11  Cr 0.48     01-22 @ 07:21  Na+ 138  K+ 3.6  Cl- 102  CO2 29  BUN 10  Cr 0.37     01-21 @ 06:37  Na+ 138  K+ 3.7  Cl- 103  CO2 30  BUN 14  Cr 0.34         Glucose, Serum: 103 mg/dL (01-27 @ 07:51)  Glucose, Serum: 117 mg/dL (01-26 @ 07:32)  Glucose, Serum: 120 mg/dL (01-25 @ 08:03)  Glucose, Serum: 107 mg/dL (01-24 @ 08:14)  Glucose, Serum: 117 mg/dL (01-23 @ 09:32)  Glucose, Serum: 107 mg/dL (01-22 @ 07:21)  Glucose, Serum: 108 mg/dL (01-21 @ 06:37)      27 Jan 2018 07:51    138    |  105    |  8      ----------------------------<  103    3.7     |  29     |  0.42   26 Jan 2018 07:32    135    |  101    |  9      ----------------------------<  117    3.8     |  29     |  0.42   25 Jan 2018 08:03    136    |  100    |  12     ----------------------------<  120    4.2     |  28     |  0.50   24 Jan 2018 08:14    138    |  103    |  15     ----------------------------<  107    4.0     |  27     |  0.41   23 Jan 2018 09:32    138    |  102    |  11     ----------------------------<  117    3.9     |  28     |  0.48   22 Jan 2018 07:21    138    |  102    |  10     ----------------------------<  107    3.6     |  29     |  0.37   21 Jan 2018 06:37    138    |  103    |  14     ----------------------------<  108    3.7     |  30     |  0.34     Ca    9.2        27 Jan 2018 07:51  Ca    9.6        26 Jan 2018 07:32  Ca    10.1       25 Jan 2018 08:03  Ca    10.0       24 Jan 2018 08:14  Ca    9.9        23 Jan 2018 09:32  Ca    9.6        22 Jan 2018 07:21  Ca    9.3        21 Jan 2018 06:37  Phos  2.8       21 Jan 2018 06:37  Mg     2.1       21 Jan 2018 06:37    TPro  8.3    /  Alb  1.9    /  TBili  0.3    /  DBili  x      /  AST  19     /  ALT  18     /  AlkPhos  67     27 Jan 2018 07:51  TPro  8.9    /  Alb  2.1    /  TBili  0.4    /  DBili  x      /  AST  29     /  ALT  23     /  AlkPhos  75     26 Jan 2018 07:32              CAPILLARY BLOOD GLUCOSE              RADIOLOGY & ADDITIONAL TESTS:    Imaging Personally Reviewed:  [ ] YES  [ ] NO    Consultant(s) Notes Reviewed:  [ ] YES  [ ] NO    Care Discussed with Consultants/Other Providers [ ] YES  [ ] NO

## 2018-01-27 NOTE — PROGRESS NOTE ADULT - SUBJECTIVE AND OBJECTIVE BOX
Patient seen and examined bedside resting comfortably. No complaints offered.     T(F): 97 (01-27-18 @ 11:34), Max: 103 (01-26-18 @ 22:13)  HR: 111 (01-27-18 @ 11:34) (103 - 134)  BP: 124/76 (01-27-18 @ 11:34) (102/68 - 130/70)  RR: 17 (01-27-18 @ 11:34) (16 - 17)  SpO2: 98% (01-27-18 @ 11:34) (98% - 98%)    PHYSICAL EXAM:  General: NAD, WDWN, Alert  CV: +S1S2 regular rate and rhythm  Lung: clear to ausculation bilaterally, respirations nonlabored, good inspiratory effort  Abdomen: soft, NT/ND.   Extremities: bilateral upper and lower extremity contractures  : Obrien catheter in place draining clear yellow urine with some sediment in the tubing. 500ml/24hours recorded.     LABS:                        7.6    13.04 )-----------( 524      ( 27 Jan 2018 07:51 )             23.1     01-27    138  |  105  |  8   ----------------------------<  103<H>  3.7   |  29  |  0.42<L>    Ca    9.2      27 Jan 2018 07:51    TPro  8.3  /  Alb  1.9<L>  /  TBili  0.3  /  DBili  x   /  AST  19  /  ALT  18  /  AlkPhos  67  01-27    I&O's Detail    26 Jan 2018 07:01  -  27 Jan 2018 07:00  --------------------------------------------------------  IN:    dextrose 5%.: 1071 mL    Sodium Chloride 0.9% IV Bolus: 1000 mL    Solution: 100 mL  Total IN: 2171 mL    OUT:    Indwelling Catheter - Urethral: 500 mL  Total OUT: 500 mL    Total NET: 1671 mL    Impression: 61yo F with PMH Ashli's chorea, urinary retention due to neurogenic bladder, hemiparesis, decubitus ulcers admitted with sepsis 2/2 UTI and left hydronephrosis POD#5 s/p Left Ureteroscopy, with laser lithotripsy and stent insertion, with continued fevers, mild leukocytosis    Plan:  -continue obrien catheter for retention, monitor urine output and quality  -continue to monitor for fevers, tylenol prn.   -continue IV abx merrem and supportive measures for +RSV per ID  -continue medical management and supportive care  -continue lovenox for VTE ppx  -will discuss with Dr. Cleaning Patient seen and examined bedside resting comfortably. No complaints offered.     T(F): 97 (01-27-18 @ 11:34), Max: 103 (01-26-18 @ 22:13)  HR: 111 (01-27-18 @ 11:34) (103 - 134)  BP: 124/76 (01-27-18 @ 11:34) (102/68 - 130/70)  RR: 17 (01-27-18 @ 11:34) (16 - 17)  SpO2: 98% (01-27-18 @ 11:34) (98% - 98%)    PHYSICAL EXAM:  General: NAD, WDWN, Alert  CV: +S1S2 regular rate and rhythm  Lung: clear to ausculation bilaterally, respirations nonlabored, good inspiratory effort  Abdomen: soft, NT/ND.   Extremities: bilateral upper and lower extremity contractures  : Obrien catheter in place draining clear yellow urine with some sediment in the tubing. 500ml/24hours recorded.     LABS:                        7.6    13.04 )-----------( 524      ( 27 Jan 2018 07:51 )             23.1     01-27    138  |  105  |  8   ----------------------------<  103<H>  3.7   |  29  |  0.42<L>    Ca    9.2      27 Jan 2018 07:51    TPro  8.3  /  Alb  1.9<L>  /  TBili  0.3  /  DBili  x   /  AST  19  /  ALT  18  /  AlkPhos  67  01-27    I&O's Detail    26 Jan 2018 07:01  -  27 Jan 2018 07:00  --------------------------------------------------------  IN:    dextrose 5%.: 1071 mL    Sodium Chloride 0.9% IV Bolus: 1000 mL    Solution: 100 mL  Total IN: 2171 mL    OUT:    Indwelling Catheter - Urethral: 500 mL  Total OUT: 500 mL    Total NET: 1671 mL    Impression: 61yo F with PMH Ashli's chorea, urinary retention due to neurogenic bladder, hemiparesis, decubitus ulcers admitted with sepsis 2/2 UTI and left hydronephrosis POD#8 s/p Left Ureteroscopy, with laser lithotripsy and stent insertion, with continued fevers, mild leukocytosis    Plan:  -continue obrien catheter for retention, monitor urine output and quality  -continue to monitor for fevers, tylenol prn.   -continue IV abx merrem and supportive measures for +RSV per ID  -continue medical management and supportive care  -continue lovenox for VTE ppx  -will discuss with Dr. Cleaning

## 2018-01-27 NOTE — PROGRESS NOTE ADULT - PROBLEM SELECTOR PLAN 1
Zosyn changed to Merrem, fever 103, as per ID,  WBC 13.04 [id number], s/p cystoscopy. Indium scan negative. appreciate ID note  - D/C Merrem (?), will discuss with ID

## 2018-01-28 LAB
ANION GAP SERPL CALC-SCNC: 6 MMOL/L — SIGNIFICANT CHANGE UP (ref 5–17)
BUN SERPL-MCNC: 8 MG/DL — SIGNIFICANT CHANGE UP (ref 7–23)
CALCIUM SERPL-MCNC: 9.2 MG/DL — SIGNIFICANT CHANGE UP (ref 8.5–10.1)
CHLORIDE SERPL-SCNC: 103 MMOL/L — SIGNIFICANT CHANGE UP (ref 96–108)
CO2 SERPL-SCNC: 26 MMOL/L — SIGNIFICANT CHANGE UP (ref 22–31)
CREAT SERPL-MCNC: 0.33 MG/DL — LOW (ref 0.5–1.3)
CULTURE RESULTS: SIGNIFICANT CHANGE UP
GLUCOSE SERPL-MCNC: 115 MG/DL — HIGH (ref 70–99)
HCT VFR BLD CALC: 23 % — LOW (ref 34.5–45)
HGB BLD-MCNC: 7.6 G/DL — LOW (ref 11.5–15.5)
MCHC RBC-ENTMCNC: 24.4 PG — LOW (ref 27–34)
MCHC RBC-ENTMCNC: 33 GM/DL — SIGNIFICANT CHANGE UP (ref 32–36)
MCV RBC AUTO: 74 FL — LOW (ref 80–100)
NRBC # BLD: 0 /100 WBCS — SIGNIFICANT CHANGE UP (ref 0–0)
PLATELET # BLD AUTO: 478 K/UL — HIGH (ref 150–400)
POTASSIUM SERPL-MCNC: 3.9 MMOL/L — SIGNIFICANT CHANGE UP (ref 3.5–5.3)
POTASSIUM SERPL-SCNC: 3.9 MMOL/L — SIGNIFICANT CHANGE UP (ref 3.5–5.3)
RBC # BLD: 3.11 M/UL — LOW (ref 3.8–5.2)
RBC # FLD: 17.1 % — HIGH (ref 10.3–14.5)
SODIUM SERPL-SCNC: 135 MMOL/L — SIGNIFICANT CHANGE UP (ref 135–145)
SPECIMEN SOURCE: SIGNIFICANT CHANGE UP
WBC # BLD: 12.45 K/UL — HIGH (ref 3.8–10.5)
WBC # FLD AUTO: 12.45 K/UL — HIGH (ref 3.8–10.5)

## 2018-01-28 PROCEDURE — 71045 X-RAY EXAM CHEST 1 VIEW: CPT | Mod: 26

## 2018-01-28 RX ORDER — VANCOMYCIN HCL 1 G
1000 VIAL (EA) INTRAVENOUS ONCE
Qty: 0 | Refills: 0 | Status: COMPLETED | OUTPATIENT
Start: 2018-01-28 | End: 2018-01-28

## 2018-01-28 RX ORDER — VANCOMYCIN HCL 1 G
VIAL (EA) INTRAVENOUS
Qty: 0 | Refills: 0 | Status: DISCONTINUED | OUTPATIENT
Start: 2018-01-28 | End: 2018-01-29

## 2018-01-28 RX ORDER — VANCOMYCIN HCL 1 G
1000 VIAL (EA) INTRAVENOUS EVERY 12 HOURS
Qty: 0 | Refills: 0 | Status: DISCONTINUED | OUTPATIENT
Start: 2018-01-28 | End: 2018-01-29

## 2018-01-28 RX ADMIN — LACTULOSE 10 GRAM(S): 10 SOLUTION ORAL at 05:01

## 2018-01-28 RX ADMIN — MEROPENEM 100 MILLIGRAM(S): 1 INJECTION INTRAVENOUS at 05:01

## 2018-01-28 RX ADMIN — LACTULOSE 10 GRAM(S): 10 SOLUTION ORAL at 17:06

## 2018-01-28 RX ADMIN — Medication 250 MILLIGRAM(S): at 21:42

## 2018-01-28 RX ADMIN — Medication 1 TABLET(S): at 11:07

## 2018-01-28 RX ADMIN — Medication 1 APPLICATION(S): at 17:06

## 2018-01-28 RX ADMIN — Medication 650 MILLIGRAM(S): at 16:31

## 2018-01-28 RX ADMIN — ENOXAPARIN SODIUM 40 MILLIGRAM(S): 100 INJECTION SUBCUTANEOUS at 11:08

## 2018-01-28 RX ADMIN — Medication 650 MILLIGRAM(S): at 04:38

## 2018-01-28 RX ADMIN — MIRTAZAPINE 15 MILLIGRAM(S): 45 TABLET, ORALLY DISINTEGRATING ORAL at 21:42

## 2018-01-28 RX ADMIN — Medication 20 MILLIEQUIVALENT(S): at 11:07

## 2018-01-28 RX ADMIN — SENNA PLUS 2 TABLET(S): 8.6 TABLET ORAL at 21:42

## 2018-01-28 RX ADMIN — Medication 325 MILLIGRAM(S): at 11:07

## 2018-01-28 RX ADMIN — Medication 500 MILLIGRAM(S): at 05:01

## 2018-01-28 RX ADMIN — Medication 250 MILLIGRAM(S): at 09:14

## 2018-01-28 RX ADMIN — MEROPENEM 100 MILLIGRAM(S): 1 INJECTION INTRAVENOUS at 13:00

## 2018-01-28 RX ADMIN — Medication 500 MILLIGRAM(S): at 17:06

## 2018-01-28 NOTE — PROGRESS NOTE ADULT - SUBJECTIVE AND OBJECTIVE BOX
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  Pt has been more lethargic and barely communicable. Low grade fever still progressing.    Upon evaluation today pt is having fevers, she is slowly communicable due to her underlying mental status. She denies any symptoms. RN aware outside of room.         Allergies    No Known Allergies    Intolerances        MEDICATIONS  (STANDING):  ascorbic acid 500 milliGRAM(s) Oral two times a day  dextrose 5%. 1000 milliLiter(s) (63 mL/Hr) IV Continuous <Continuous>  enoxaparin Injectable 40 milliGRAM(s) SubCutaneous daily  ferrous    sulfate 325 milliGRAM(s) Oral daily  hydrocortisone 2.5% Cream 1 Application(s) Topical two times a day  lactulose Syrup 10 Gram(s) Oral two times a day  meropenem IVPB 500 milliGRAM(s) IV Intermittent every 8 hours  mirtazapine 15 milliGRAM(s) Oral at bedtime  multivitamin/minerals 1 Tablet(s) Oral daily  potassium chloride   Powder 20 milliEquivalent(s) Oral daily  senna 2 Tablet(s) Oral at bedtime  vancomycin  IVPB 1000 milliGRAM(s) IV Intermittent every 12 hours  vancomycin  IVPB        MEDICATIONS  (PRN):  acetaminophen  Suppository 650 milliGRAM(s) Rectal every 6 hours PRN For Temp greater than 38 C (100.4 F)  magnesium hydroxide Suspension 30 milliLiter(s) Oral daily PRN Constipation      REVIEW OF SYSTEMS:    CONSTITUTIONAL: ++ fever, chills, weight loss, or fatigue  HEENT: No sore throat, runny nose, ear ache  RESPIRATORY: No cough, wheezing, No shortness of breath  CARDIOVASCULAR: No chest pain, palpitations, dizziness  GASTROINTESTINAL: No abdominal pain. No nausea, vomiting, diarrhea  GENITOURINARY: No dysuria, increase frequency, hematuria, or incontinence  NEUROLOGICAL: No headaches, memory loss, loss of strength, numbness, or tremors, no weakness  EXTREMITY: No pedal edema BLE  SKIN: No itching, burning, rashes, or lesions     VITAL SIGNS:  T(C): 38.3 (01-28-18 @ 05:14), Max: 39.4 (01-28-18 @ 04:35)  T(F): 101 (01-28-18 @ 05:14), Max: 102.9 (01-28-18 @ 04:35)  HR: 110 (01-28-18 @ 05:14) (105 - 121)  BP: 124/64 (01-28-18 @ 05:14) (124/64 - 140/81)  RR: 14 (01-28-18 @ 05:14) (14 - 17)  SpO2: 98% (01-28-18 @ 05:14) (95% - 99%)  Wt(kg): --    PHYSICAL EXAM:    GENERAL: not in any distress, having fevers++   HEENT: Neck is supple, normocephalic, atraumatic   CHEST/LUNG: Clear to percussion bilaterally; No rales, rhonchi, wheezing  HEART: Regular rate and rhythm; No murmurs, rubs, or gallops  ABDOMEN: Soft, Nontender, Nondistended; Bowel sounds present, no rebound   EXTREMITIES:  2+ Peripheral Pulses, No clubbing, cyanosis, or edema  GENITOURINARY:   SKIN: No rashes or lesions  BACK: no pressor sore   NERVOUS SYSTEM:  Alert   PSYCH: normal affect     LABS:                         7.6    12.45 )-----------( 478      ( 28 Jan 2018 07:59 )             23.0     01-28    135  |  103  |  8   ----------------------------<  115<H>  3.9   |  26  |  0.33<L>    Ca    9.2      28 Jan 2018 07:59    TPro  8.3  /  Alb  1.9<L>  /  TBili  0.3  /  DBili  x   /  AST  19  /  ALT  18  /  AlkPhos  67  01-27    LIVER FUNCTIONS - ( 27 Jan 2018 07:51 )  Alb: 1.9 g/dL / Pro: 8.3 gm/dL / ALK PHOS: 67 U/L / ALT: 18 U/L / AST: 19 U/L / GGT: x                                   Culture Results:   No growth (01-23 @ 09:22)  Culture Results:   No growth at 5 days. (01-23 @ 00:18)                Radiology:

## 2018-01-28 NOTE — PROGRESS NOTE ADULT - ASSESSMENT
Subjective Complaints:  Historian:         REVIEW OF SYSTEMS:  Eyes:  Good vision, no reported pain  ENT:  No sore throat, pain, runny nose, dysphagia  CV:  No pain, palpitatioins, hypo/hypertension  Resp:  No dyspnea, cough, tachypnea, wheezing  GI:  No pain, nausea, vomiting, diarrhea, constipatiion  Muscle:  No pain, weakness  Neuro:  No weakness, tingling, memory problems  Psych:  No fatigue, insomnia, mood problems, depression  Endocrine:  No polyuria, polydypsia, cold/heat intolerance    Vital Signs Last 24 Hrs  T(C): 37.4 (28 Jan 2018 10:57), Max: 39.4 (28 Jan 2018 04:35)  T(F): 99.4 (28 Jan 2018 10:57), Max: 102.9 (28 Jan 2018 04:35)  HR: 113 (28 Jan 2018 10:57) (105 - 121)  BP: 103/71 (28 Jan 2018 10:57) (103/71 - 140/81)  BP(mean): --  RR: 17 (28 Jan 2018 10:57) (14 - 17)  SpO2: 97% (28 Jan 2018 10:57) (95% - 99%)    GENERAL PHYSICAL EXAM:  General:  Appears stated age, well-groomed, well-nourished, no distress  HEENT:  NC/AT, patent nares w/ pink mucosa, OP clear w/o lesions, PERRL, EOMI, conjunctivae clear, no thyromegaly, nodules, adenopathy, no JVD  Chest:  Full & symmetric excursion, no increased effort, breath sounds clear  Cardiovascular:  Regular rhythm, S1, S2, no murmur/rub/S3/S4, no carotid/femoral/abdominal bruit, radial/pedal pulses 2+, no edema  Abdomen:  Soft, non-tender, non-distended, normoactive bowel sounds, no HSM  Extremities:  Gait & station:   Digits:   Nails:   Joints, Bones, Muscles:   ROM:   Stability:  Skin:  No rash/erythema/ecchymoses/petechiae/wounds/abscess/warm/dry  Musculoskeletal:  Full ROM in all joints w/o swelling/tenderness/effusion    NEUROLOGICAL EXAM:  HENT:  Normocephalic head; atraumatic head.  Neck supple.  ENT: normal looking.  Mental State:    Alert   open eyes does not follws commnads arm leg contracted  uti                         7.6    12.45 )-----------( 478      ( 28 Jan 2018 07:59 )             23.0     01-28    135  |  103  |  8   ----------------------------<  115<H>  3.9   |  26  |  0.33<L>    Ca    9.2      28 Jan 2018 07:59    TPro  8.3  /  Alb  1.9<L>  /  TBili  0.3  /  DBili  x   /  AST  19  /  ALT  18  /  AlkPhos  67  01-27     ass= awake open eyes does not follws commands  arm leg contracted hx of depression no chorea  uti s/p ureteral stent  metabolic encpehaloathy       RADIOLOGY & ADDITIONAL STUDIES:        Recommendations: for sub    ACUTE REHAB  WILL FOLLOW

## 2018-01-28 NOTE — PROGRESS NOTE ADULT - ASSESSMENT
persistent fever , however stable BP and not in distress , looking tired   Pos rhino virus / Sepsis likely from acute viral syndrome vs bacteremia vs pyelonephritis , left ureter stone   blood culture simple Ecoli , fairly sensitive e coli , resistant to cipro, source likely from     surveillance blood culture neg so far   CT revealed left pyelonephritis , hydrourethroonephrosis , ureteral stones  on emperic coverage with jenny day 12th   will add on vanco   will reepat panculture and CXR   Urology is following for indwelling obrien catheter for retention , ureteral stone   indium scan negative   Monitor for fever , vitals   supportive management for viral syndrome   Floor RN informed.

## 2018-01-28 NOTE — PROGRESS NOTE ADULT - SUBJECTIVE AND OBJECTIVE BOX
Patient seen and examined bedside.   Fevers persist.  Pt without complaint.    T(F): 99.4 (01-28-18 @ 10:57), Max: 102.9 (01-28-18 @ 04:35)  HR: 113 (01-28-18 @ 10:57) (105 - 121)  BP: 103/71 (01-28-18 @ 10:57) (103/71 - 140/81)  RR: 17 (01-28-18 @ 10:57) (14 - 17)  SpO2: 97% (01-28-18 @ 10:57) (95% - 99%)      PHYSICAL EXAM:  General: NAD, alert and awake  HEENT: NCAT, EOMI, conjunctiva clear  Chest: nonlabored respirations, good inspiratory effort  Abdomen: soft, NTND  Extremities: contracted  : obrien catheter indwelling draining clear yellow urine    LABS:                        7.6    12.45 )-----------( 478      ( 28 Jan 2018 07:59 )             23.0   01-28    135  |  103  |  8   ----------------------------<  115<H>  3.9   |  26  |  0.33<L>    Ca    9.2      28 Jan 2018 07:59    TPro  8.3  /  Alb  1.9<L>  /  TBili  0.3  /  DBili  x   /  AST  19  /  ALT  18  /  AlkPhos  67  01-27    I&O's Detail    27 Jan 2018 07:01  -  28 Jan 2018 07:00  --------------------------------------------------------  IN:    Oral Fluid: 225 mL    Solution: 100 mL  Total IN: 325 mL    OUT:    Indwelling Catheter - Urethral: 1100 mL  Total OUT: 1100 mL    Total NET: -775 mL      Impression: 63yo F with PMH Tecumseh's chorea, urinary retention due to neurogenic bladder, hemiparesis, decubitus ulcers admitted with sepsis 2/2 UTI and left hydronephrosis POD#9 s/p Left Ureteroscopy, with laser lithotripsy and stent insertion, with continued fevers, mild leukocytosis    Plan:  -continue obrien catheter for retention, monitor urine output and quality  -continue to monitor for fevers, tylenol prn.   -continue IV abx merrem and supportive measures for +RSV per ID. F/u noted: blood cultures +e coli, vanco added. Indium scan normal  -continue medical management and supportive care  -continue lovenox for VTE ppx

## 2018-01-28 NOTE — PROGRESS NOTE ADULT - SUBJECTIVE AND OBJECTIVE BOX
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  Pt has been more lethargic.  no n/v (12 Jan 2018 14:07)      INTERVAL HPI/OVERNIGHT EVENTS:  Still having fever. Awake, communicative. No new c/o    MEDICATIONS  (STANDING):  ascorbic acid 500 milliGRAM(s) Oral two times a day  dextrose 5%. 1000 milliLiter(s) (63 mL/Hr) IV Continuous <Continuous>  enoxaparin Injectable 40 milliGRAM(s) SubCutaneous daily  ferrous    sulfate 325 milliGRAM(s) Oral daily  hydrocortisone 2.5% Cream 1 Application(s) Topical two times a day  lactulose Syrup 10 Gram(s) Oral two times a day  meropenem IVPB 500 milliGRAM(s) IV Intermittent every 8 hours  mirtazapine 15 milliGRAM(s) Oral at bedtime  multivitamin/minerals 1 Tablet(s) Oral daily  potassium chloride   Powder 20 milliEquivalent(s) Oral daily  senna 2 Tablet(s) Oral at bedtime  vancomycin  IVPB 1000 milliGRAM(s) IV Intermittent every 12 hours  vancomycin  IVPB        MEDICATIONS  (PRN):  acetaminophen  Suppository 650 milliGRAM(s) Rectal every 6 hours PRN For Temp greater than 38 C (100.4 F)  magnesium hydroxide Suspension 30 milliLiter(s) Oral daily PRN Constipation      FAMILY HISTORY:  No pertinent family history in first degree relatives      Allergies    No Known Allergies    Intolerances        PMH/PSH:  Decubital ulcer  Hemiparesis  Failure to thrive in adult  UTI (urinary tract infection)  Dehydration  Rhabdomyolysis  HTN (hypertension)  Neurogenic bladder disorder  Pressure ulcer of sacrum  UTI (urinary tract infection)  Embolism  DVT (deep venous thrombosis)  Anemia  Pneumonia  DM (diabetes mellitus)  High cholesterol  [place] chorea  No pertinent past medical history  No significant past surgical history        REVIEW OF SYSTEMS:  CONSTITUTIONAL: fever(+)  EYES: No eye pain, visual disturbances, or discharge  ENMT:  No difficulty hearing, tinnitus, vertigo; No sinus or throat pain  NECK: No pain or stiffness  BREASTS: No pain, masses, or nipple discharge  RESPIRATORY: No cough, wheezing, chills or hemoptysis; No shortness of breath  CARDIOVASCULAR: No chest pain, palpitations, dizziness, or leg swelling  GASTROINTESTINAL: No abdominal or epigastric pain. No nausea, vomiting, or hematemesis; No diarrhea or constipation. No melena or hematochezia.  GENITOURINARY: No dysuria, frequency, hematuria, or incontinence  NEUROLOGICAL: No headaches, memory loss,  numbness, or tremors  SKIN: No itching, burning, rashes, or lesions   LYMPH NODES: No enlarged glands  ENDOCRINE: No heat or cold intolerance; No hair loss  MUSCULOSKELETAL: No joint pain or swelling; No muscle, back, or extremity pain  PSYCHIATRIC: No depression, anxiety, mood swings, or difficulty sleeping  HEME/LYMPH: No easy bruising, or bleeding gums  ALLERGY AND IMMUNOLOGIC: No hives or eczema    Vital Signs Last 24 Hrs  T(C): 37.4 (28 Jan 2018 10:57), Max: 39.4 (28 Jan 2018 04:35)  T(F): 99.4 (28 Jan 2018 10:57), Max: 102.9 (28 Jan 2018 04:35)  HR: 113 (28 Jan 2018 10:57) (105 - 121)  BP: 103/71 (28 Jan 2018 10:57) (103/71 - 140/81)  BP(mean): --  RR: 17 (28 Jan 2018 10:57) (14 - 17)  SpO2: 97% (28 Jan 2018 10:57) (95% - 99%)    PHYSICAL EXAM:  GENERAL: NAD, well-groomed, well-developed  HEAD:  Atraumatic, Normocephalic  EYES: EOMI, PERRLA, conjunctiva and sclera clear  NECK: Supple, No JVD, Normal thyroid  NERVOUS SYSTEM:  Alert & Oriented   CHEST/LUNG: Clear to percussion bilaterally; No rales, rhonchi, wheezing, or rubs  HEART: Regular rate and rhythm; No murmurs, rubs, or gallops  ABDOMEN: Soft, Nontender, Nondistended; Bowel sounds present  EXTREMITIES:  2+ Peripheral Pulses, No clubbing, cyanosis, or edema  LYMPH: No lymphadenopathy noted  SKIN: No rashes or lesions    LAB                          7.6    12.45 )-----------( 478      ( 28 Jan 2018 07:59 )             23.0       CBC:  01-28 @ 07:59  WBC 12.45   Hgb 7.6   Hct 23.0   Plts 478  MCV 74.0  01-27 @ 07:51  WBC 13.04   Hgb 7.6   Hct 23.1   Plts 524  MCV 74.5  01-26 @ 07:32  WBC 11.36   Hgb 8.1   Hct 24.2   Plts 544  MCV 75.2  01-25 @ 08:03  WBC 10.62   Hgb 8.4   Hct 25.9   Plts 579  MCV 75.1  01-24 @ 08:14  WBC 10.35   Hgb 8.1   Hct 24.4   Plts 605  MCV 74.6  01-23 @ 09:32  WBC 9.7   Hgb 8.2   Hct 25.2   Plts 587  MCV 77.4  01-22 @ 07:21  WBC 9.1   Hgb 8.5   Hct 25.6   Plts 460  MCV 78.8      Chemistry:  01-28 @ 07:59  Na+ 135  K+ 3.9  Cl- 103  CO2 26  BUN 8  Cr 0.33     01-27 @ 07:51  Na+ 138  K+ 3.7  Cl- 105  CO2 29  BUN 8  Cr 0.42     01-26 @ 07:32  Na+ 135  K+ 3.8  Cl- 101  CO2 29  BUN 9  Cr 0.42     01-25 @ 08:03  Na+ 136  K+ 4.2  Cl- 100  CO2 28  BUN 12  Cr 0.50     01-24 @ 08:14  Na+ 138  K+ 4.0  Cl- 103  CO2 27  BUN 15  Cr 0.41     01-23 @ 09:32  Na+ 138  K+ 3.9  Cl- 102  CO2 28  BUN 11  Cr 0.48     01-22 @ 07:21  Na+ 138  K+ 3.6  Cl- 102  CO2 29  BUN 10  Cr 0.37         Glucose, Serum: 115 mg/dL (01-28 @ 07:59)  Glucose, Serum: 103 mg/dL (01-27 @ 07:51)  Glucose, Serum: 117 mg/dL (01-26 @ 07:32)  Glucose, Serum: 120 mg/dL (01-25 @ 08:03)  Glucose, Serum: 107 mg/dL (01-24 @ 08:14)  Glucose, Serum: 117 mg/dL (01-23 @ 09:32)  Glucose, Serum: 107 mg/dL (01-22 @ 07:21)      28 Jan 2018 07:59    135    |  103    |  8      ----------------------------<  115    3.9     |  26     |  0.33   27 Jan 2018 07:51    138    |  105    |  8      ----------------------------<  103    3.7     |  29     |  0.42   26 Jan 2018 07:32    135    |  101    |  9      ----------------------------<  117    3.8     |  29     |  0.42   25 Jan 2018 08:03    136    |  100    |  12     ----------------------------<  120    4.2     |  28     |  0.50   24 Jan 2018 08:14    138    |  103    |  15     ----------------------------<  107    4.0     |  27     |  0.41   23 Jan 2018 09:32    138    |  102    |  11     ----------------------------<  117    3.9     |  28     |  0.48   22 Jan 2018 07:21    138    |  102    |  10     ----------------------------<  107    3.6     |  29     |  0.37     Ca    9.2        28 Jan 2018 07:59  Ca    9.2        27 Jan 2018 07:51  Ca    9.6        26 Jan 2018 07:32  Ca    10.1       25 Jan 2018 08:03  Ca    10.0       24 Jan 2018 08:14  Ca    9.9        23 Jan 2018 09:32  Ca    9.6        22 Jan 2018 07:21    TPro  8.3    /  Alb  1.9    /  TBili  0.3    /  DBili  x      /  AST  19     /  ALT  18     /  AlkPhos  67     27 Jan 2018 07:51  TPro  8.9    /  Alb  2.1    /  TBili  0.4    /  DBili  x      /  AST  29     /  ALT  23     /  AlkPhos  75     26 Jan 2018 07:32              CAPILLARY BLOOD GLUCOSE              RADIOLOGY & ADDITIONAL TESTS:    Imaging Personally Reviewed:  [ ] YES  [ ] NO    Consultant(s) Notes Reviewed:  [ ] YES  [ ] NO    Care Discussed with Consultants/Other Providers [ ] YES  [ ] NO

## 2018-01-28 NOTE — PROGRESS NOTE ADULT - PROBLEM SELECTOR PLAN 1
Zosyn changed to Merrem, IV vanco added , fever 102.9, as per ID,  WBC 12.45 , s/p cystoscopy. Indium scan negative. appreciate ID note  - D/C Merrem (?),on IV vanco  will discuss with ID

## 2018-01-29 LAB
ANION GAP SERPL CALC-SCNC: 8 MMOL/L — SIGNIFICANT CHANGE UP (ref 5–17)
BUN SERPL-MCNC: 7 MG/DL — SIGNIFICANT CHANGE UP (ref 7–23)
CALCIUM SERPL-MCNC: 9.3 MG/DL — SIGNIFICANT CHANGE UP (ref 8.5–10.1)
CHLORIDE SERPL-SCNC: 100 MMOL/L — SIGNIFICANT CHANGE UP (ref 96–108)
CO2 SERPL-SCNC: 27 MMOL/L — SIGNIFICANT CHANGE UP (ref 22–31)
CREAT SERPL-MCNC: 0.37 MG/DL — LOW (ref 0.5–1.3)
GLUCOSE SERPL-MCNC: 112 MG/DL — HIGH (ref 70–99)
HCT VFR BLD CALC: 23.3 % — LOW (ref 34.5–45)
HGB BLD-MCNC: 7.8 G/DL — LOW (ref 11.5–15.5)
MAGNESIUM SERPL-MCNC: 2.1 MG/DL — SIGNIFICANT CHANGE UP (ref 1.6–2.6)
MCHC RBC-ENTMCNC: 24.5 PG — LOW (ref 27–34)
MCHC RBC-ENTMCNC: 33.5 GM/DL — SIGNIFICANT CHANGE UP (ref 32–36)
MCV RBC AUTO: 73.3 FL — LOW (ref 80–100)
NRBC # BLD: 0 /100 WBCS — SIGNIFICANT CHANGE UP (ref 0–0)
PHOSPHATE SERPL-MCNC: 2.5 MG/DL — SIGNIFICANT CHANGE UP (ref 2.5–4.5)
PLATELET # BLD AUTO: 414 K/UL — HIGH (ref 150–400)
POTASSIUM SERPL-MCNC: 4.1 MMOL/L — SIGNIFICANT CHANGE UP (ref 3.5–5.3)
POTASSIUM SERPL-SCNC: 4.1 MMOL/L — SIGNIFICANT CHANGE UP (ref 3.5–5.3)
PROCALCITONIN SERPL-MCNC: 0.13 NG/ML — HIGH (ref 0–0.04)
RBC # BLD: 3.18 M/UL — LOW (ref 3.8–5.2)
RBC # FLD: 16.9 % — HIGH (ref 10.3–14.5)
SODIUM SERPL-SCNC: 135 MMOL/L — SIGNIFICANT CHANGE UP (ref 135–145)
VANCOMYCIN TROUGH SERPL-MCNC: 16.6 UG/ML — SIGNIFICANT CHANGE UP (ref 10–20)
WBC # BLD: 10.91 K/UL — HIGH (ref 3.8–10.5)
WBC # FLD AUTO: 10.91 K/UL — HIGH (ref 3.8–10.5)

## 2018-01-29 RX ADMIN — Medication 1 TABLET(S): at 12:17

## 2018-01-29 RX ADMIN — Medication 500 MILLIGRAM(S): at 18:05

## 2018-01-29 RX ADMIN — ENOXAPARIN SODIUM 40 MILLIGRAM(S): 100 INJECTION SUBCUTANEOUS at 12:17

## 2018-01-29 RX ADMIN — SODIUM CHLORIDE 63 MILLILITER(S): 9 INJECTION, SOLUTION INTRAVENOUS at 05:11

## 2018-01-29 RX ADMIN — Medication 325 MILLIGRAM(S): at 12:18

## 2018-01-29 RX ADMIN — LACTULOSE 10 GRAM(S): 10 SOLUTION ORAL at 05:11

## 2018-01-29 RX ADMIN — LACTULOSE 10 GRAM(S): 10 SOLUTION ORAL at 18:05

## 2018-01-29 RX ADMIN — Medication 650 MILLIGRAM(S): at 18:30

## 2018-01-29 RX ADMIN — Medication 1 APPLICATION(S): at 18:05

## 2018-01-29 RX ADMIN — Medication 250 MILLIGRAM(S): at 05:11

## 2018-01-29 RX ADMIN — SENNA PLUS 2 TABLET(S): 8.6 TABLET ORAL at 21:39

## 2018-01-29 RX ADMIN — Medication 500 MILLIGRAM(S): at 05:12

## 2018-01-29 RX ADMIN — MIRTAZAPINE 15 MILLIGRAM(S): 45 TABLET, ORALLY DISINTEGRATING ORAL at 21:39

## 2018-01-29 RX ADMIN — Medication 20 MILLIEQUIVALENT(S): at 12:18

## 2018-01-29 RX ADMIN — SODIUM CHLORIDE 63 MILLILITER(S): 9 INJECTION, SOLUTION INTRAVENOUS at 18:06

## 2018-01-29 NOTE — PROGRESS NOTE ADULT - ASSESSMENT
persistent fever , however stable BP and not in distress   Pos rhino virus / Sepsis likely from acute viral syndrome vs bacteremia vs pyelonephritis , left ureter stone   blood culture simple Ecoli , fairly sensitive e coli , resistant to cipro, source likely from     surveillance blood culture neg so far   CT revealed left pyelonephritis , hydrourethroonephrosis , ureteral stones  on emperic coverage with jenny completed and was dicontinued yesterday.   repeated CXR - no consolidation   Urology is following for indwelling obrien catheter for retention , ureteral stone   Bone scan negative   will discontinue all antibiotics and monitor off antibiotics   supportive management for viral syndrome   Thanks you.

## 2018-01-29 NOTE — PROGRESS NOTE ADULT - SUBJECTIVE AND OBJECTIVE BOX
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  Pt has been more lethargic.  no n/v (12 Jan 2018 14:07)      INTERVAL HPI/OVERNIGHT EVENTS:  No new c/o. feels OK    MEDICATIONS  (STANDING):  ascorbic acid 500 milliGRAM(s) Oral two times a day  dextrose 5%. 1000 milliLiter(s) (63 mL/Hr) IV Continuous <Continuous>  enoxaparin Injectable 40 milliGRAM(s) SubCutaneous daily  ferrous    sulfate 325 milliGRAM(s) Oral daily  hydrocortisone 2.5% Cream 1 Application(s) Topical two times a day  lactulose Syrup 10 Gram(s) Oral two times a day  mirtazapine 15 milliGRAM(s) Oral at bedtime  multivitamin/minerals 1 Tablet(s) Oral daily  potassium chloride   Powder 20 milliEquivalent(s) Oral daily  senna 2 Tablet(s) Oral at bedtime    MEDICATIONS  (PRN):  acetaminophen  Suppository 650 milliGRAM(s) Rectal every 6 hours PRN For Temp greater than 38 C (100.4 F)  magnesium hydroxide Suspension 30 milliLiter(s) Oral daily PRN Constipation      FAMILY HISTORY:  No pertinent family history in first degree relatives      Allergies    No Known Allergies    Intolerances        PMH/PSH:  Decubital ulcer  Hemiparesis  Failure to thrive in adult  UTI (urinary tract infection)  Dehydration  Rhabdomyolysis  HTN (hypertension)  Neurogenic bladder disorder  Pressure ulcer of sacrum  UTI (urinary tract infection)  Embolism  DVT (deep venous thrombosis)  Anemia  Pneumonia  DM (diabetes mellitus)  High cholesterol  Ashli chorea  No pertinent past medical history  No significant past surgical history        REVIEW OF SYSTEMS:  CONSTITUTIONAL: No fever, weight loss, or fatigue  EYES: No eye pain, visual disturbances, or discharge  ENMT:  No difficulty hearing, tinnitus, vertigo; No sinus or throat pain  NECK: No pain or stiffness  BREASTS: No pain, masses, or nipple discharge  RESPIRATORY: No cough, wheezing, chills or hemoptysis; No shortness of breath  CARDIOVASCULAR: No chest pain, palpitations, dizziness, or leg swelling  GASTROINTESTINAL: No abdominal or epigastric pain. No nausea, vomiting, or hematemesis; No diarrhea or constipation. No melena or hematochezia.  GENITOURINARY: No dysuria, frequency, hematuria, or incontinence  NEUROLOGICAL: No headaches, memory loss, loss of strength, numbness, or tremors  SKIN: No itching, burning, rashes, or lesions   LYMPH NODES: No enlarged glands  ENDOCRINE: No heat or cold intolerance; No hair loss  MUSCULOSKELETAL: No joint pain or swelling; No muscle, back, or extremity pain  PSYCHIATRIC: No depression, anxiety, mood swings, or difficulty sleeping  HEME/LYMPH: No easy bruising, or bleeding gums  ALLERGY AND IMMUNOLOGIC: No hives or eczema    Vital Signs Last 24 Hrs  T(C): 36.7 (29 Jan 2018 12:05), Max: 38.6 (28 Jan 2018 17:40)  T(F): 98 (29 Jan 2018 12:05), Max: 101.5 (28 Jan 2018 17:40)  HR: 116 (29 Jan 2018 12:05) (113 - 122)  BP: 104/63 (29 Jan 2018 12:05) (104/63 - 148/77)  BP(mean): --  RR: 16 (29 Jan 2018 12:05) (14 - 16)  SpO2: 96% (29 Jan 2018 12:05) (96% - 98%)    PHYSICAL EXAM:  GENERAL: NAD, well-groomed, well-developed  HEAD:  Atraumatic, Normocephalic  EYES: EOMI, PERRLA, conjunctiva and sclera clear  NECK: Supple, No JVD, Normal thyroid  NERVOUS SYSTEM:  Alert & Oriented  CHEST/LUNG: Clear to percussion bilaterally; No rales, rhonchi, wheezing, or rubs  HEART: Regular rate and rhythm; No murmurs, rubs, or gallops  ABDOMEN: Soft, Nontender, Nondistended; Bowel sounds present  EXTREMITIES:  2+ Peripheral Pulses, No clubbing, cyanosis, or edema  LYMPH: No lymphadenopathy noted  SKIN: No rashes or lesions    LAB                          7.8    10.91 )-----------( 414      ( 29 Jan 2018 05:14 )             23.3       CBC:  01-29 @ 05:14  WBC 10.91   Hgb 7.8   Hct 23.3   Plts 414  MCV 73.3  01-28 @ 07:59  WBC 12.45   Hgb 7.6   Hct 23.0   Plts 478  MCV 74.0  01-27 @ 07:51  WBC 13.04   Hgb 7.6   Hct 23.1   Plts 524  MCV 74.5  01-26 @ 07:32  WBC 11.36   Hgb 8.1   Hct 24.2   Plts 544  MCV 75.2  01-25 @ 08:03  WBC 10.62   Hgb 8.4   Hct 25.9   Plts 579  MCV 75.1  01-24 @ 08:14  WBC 10.35   Hgb 8.1   Hct 24.4   Plts 605  MCV 74.6  01-23 @ 09:32  WBC 9.7   Hgb 8.2   Hct 25.2   Plts 587  MCV 77.4      Chemistry:  01-29 @ 05:14  Na+ 135  K+ 4.1  Cl- 100  CO2 27  BUN 7  Cr 0.37     01-28 @ 07:59  Na+ 135  K+ 3.9  Cl- 103  CO2 26  BUN 8  Cr 0.33     01-27 @ 07:51  Na+ 138  K+ 3.7  Cl- 105  CO2 29  BUN 8  Cr 0.42     01-26 @ 07:32  Na+ 135  K+ 3.8  Cl- 101  CO2 29  BUN 9  Cr 0.42     01-25 @ 08:03  Na+ 136  K+ 4.2  Cl- 100  CO2 28  BUN 12  Cr 0.50     01-24 @ 08:14  Na+ 138  K+ 4.0  Cl- 103  CO2 27  BUN 15  Cr 0.41     01-23 @ 09:32  Na+ 138  K+ 3.9  Cl- 102  CO2 28  BUN 11  Cr 0.48         Glucose, Serum: 112 mg/dL (01-29 @ 05:14)  Glucose, Serum: 115 mg/dL (01-28 @ 07:59)  Glucose, Serum: 103 mg/dL (01-27 @ 07:51)  Glucose, Serum: 117 mg/dL (01-26 @ 07:32)  Glucose, Serum: 120 mg/dL (01-25 @ 08:03)  Glucose, Serum: 107 mg/dL (01-24 @ 08:14)  Glucose, Serum: 117 mg/dL (01-23 @ 09:32)      29 Jan 2018 05:14    135    |  100    |  7      ----------------------------<  112    4.1     |  27     |  0.37   28 Jan 2018 07:59    135    |  103    |  8      ----------------------------<  115    3.9     |  26     |  0.33   27 Jan 2018 07:51    138    |  105    |  8      ----------------------------<  103    3.7     |  29     |  0.42   26 Jan 2018 07:32    135    |  101    |  9      ----------------------------<  117    3.8     |  29     |  0.42   25 Jan 2018 08:03    136    |  100    |  12     ----------------------------<  120    4.2     |  28     |  0.50   24 Jan 2018 08:14    138    |  103    |  15     ----------------------------<  107    4.0     |  27     |  0.41   23 Jan 2018 09:32    138    |  102    |  11     ----------------------------<  117    3.9     |  28     |  0.48     Ca    9.3        29 Jan 2018 05:14  Ca    9.2        28 Jan 2018 07:59  Ca    9.2        27 Jan 2018 07:51  Ca    9.6        26 Jan 2018 07:32  Ca    10.1       25 Jan 2018 08:03  Ca    10.0       24 Jan 2018 08:14  Ca    9.9        23 Jan 2018 09:32  Phos  2.5       29 Jan 2018 05:14  Mg     2.1       29 Jan 2018 05:14    TPro  8.3    /  Alb  1.9    /  TBili  0.3    /  DBili  x      /  AST  19     /  ALT  18     /  AlkPhos  67     27 Jan 2018 07:51  TPro  8.9    /  Alb  2.1    /  TBili  0.4    /  DBili  x      /  AST  29     /  ALT  23     /  AlkPhos  75     26 Jan 2018 07:32              CAPILLARY BLOOD GLUCOSE              RADIOLOGY & ADDITIONAL TESTS:    Imaging Personally Reviewed:  [ ] YES  [ ] NO    Consultant(s) Notes Reviewed:  [ ] YES  [ ] NO    Care Discussed with Consultants/Other Providers [ ] YES  [ ] NO

## 2018-01-29 NOTE — PROGRESS NOTE ADULT - SUBJECTIVE AND OBJECTIVE BOX
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  Pt has been more lethargic and barely communicable. Low grade fever still progressing.    Upon evaluation, pt was sleeping and answers to my ques, she denies any symptoms She said she is feeling ok.       Allergies    No Known Allergies    Intolerances        MEDICATIONS  (STANDING):  ascorbic acid 500 milliGRAM(s) Oral two times a day  dextrose 5%. 1000 milliLiter(s) (63 mL/Hr) IV Continuous <Continuous>  enoxaparin Injectable 40 milliGRAM(s) SubCutaneous daily  ferrous    sulfate 325 milliGRAM(s) Oral daily  hydrocortisone 2.5% Cream 1 Application(s) Topical two times a day  lactulose Syrup 10 Gram(s) Oral two times a day  mirtazapine 15 milliGRAM(s) Oral at bedtime  multivitamin/minerals 1 Tablet(s) Oral daily  potassium chloride   Powder 20 milliEquivalent(s) Oral daily  senna 2 Tablet(s) Oral at bedtime    MEDICATIONS  (PRN):  acetaminophen  Suppository 650 milliGRAM(s) Rectal every 6 hours PRN For Temp greater than 38 C (100.4 F)  magnesium hydroxide Suspension 30 milliLiter(s) Oral daily PRN Constipation      REVIEW OF SYSTEMS:    CONSTITUTIONAL: No fever, chills, weight loss, or fatigue  HEENT: No sore throat, runny nose, ear ache  RESPIRATORY: No cough, wheezing, No shortness of breath  CARDIOVASCULAR: No chest pain, palpitations, dizziness  GASTROINTESTINAL: No abdominal pain. No nausea, vomiting, diarrhea  GENITOURINARY: No dysuria, increase frequency, hematuria, or incontinence  NEUROLOGICAL: No headaches, memory loss, loss of strength, numbness, or tremors, no weakness  EXTREMITY: No pedal edema BLE  SKIN: No itching, burning, rashes, or lesions     VITAL SIGNS:  T(C): 37.8 (01-29-18 @ 05:20), Max: 38.6 (01-28-18 @ 17:40)  T(F): 100 (01-29-18 @ 05:20), Max: 101.5 (01-28-18 @ 17:40)  HR: 117 (01-29-18 @ 05:20) (113 - 122)  BP: 134/70 (01-29-18 @ 05:20) (103/71 - 148/77)  RR: 14 (01-29-18 @ 05:20) (14 - 17)  SpO2: 98% (01-29-18 @ 05:20) (97% - 98%)  Wt(kg): --    PHYSICAL EXAM:    GENERAL: not in any distress  HEENT: Neck is supple, normocephalic, atraumatic   CHEST/LUNG: Clear to percussion bilaterally; No rales, rhonchi, wheezing  HEART: Regular rate and rhythm; No murmurs, rubs, or gallops  ABDOMEN: Soft, Nontender, Nondistended; Bowel sounds present, no rebound   EXTREMITIES:  2+ Peripheral Pulses, No clubbing, cyanosis, or edema  SKIN: No rashes or lesions  BACK: no pressor sore   NERVOUS SYSTEM:  Alert and awake   PSYCH: normal affect     LABS:                         7.8    10.91 )-----------( 414      ( 29 Jan 2018 05:14 )             23.3     01-29    135  |  100  |  7   ----------------------------<  112<H>  4.1   |  27  |  0.37<L>    Ca    9.3      29 Jan 2018 05:14  Phos  2.5     01-29  Mg     2.1     01-29                          Vancomycin Level, Trough: 16.6 ug/mL (01-29 @ 05:14)        Culture Results:   No growth to date. (01-27 @ 11:41)  Culture Results:   No growth to date. (01-27 @ 11:41)  Culture Results:   No growth (01-23 @ 09:22)  Culture Results:   No growth at 5 days. (01-23 @ 00:18)                Radiology:

## 2018-01-29 NOTE — PROGRESS NOTE ADULT - PROBLEM SELECTOR PLAN 1
All antibiotics to be d/c'd , T nax 101.5  [id number]  5:40 PM, last temp 100 [id number]  5:20  AM,  WBC 10.91, precalcitonin 0.13 , s/p cystoscopy. Indium scan negative. appreciate ID note  -

## 2018-01-29 NOTE — PROGRESS NOTE ADULT - SUBJECTIVE AND OBJECTIVE BOX
Patient seen and examined bedside resting comfortably.    T(F): 100 (18 @ 05:20), Max: 101.5 (18 @ 17:40)  HR: 117 (18 @ 05:20) (113 - 122)  BP: 134/70 (18 @ 05:20) (116/66 - 148/77)  RR: 14 (18 @ 05:20) (14 - 16)  SpO2: 98% (18 @ 05:20) (98% - 98%)    PHYSICAL EXAM:  General: NAD, WDWN  CV: +S1S2 regular rate and rhythm  Lung: clear to ausculation bilaterally, respirations nonlabored, good inspiratory effort  Extremities: Bilateral upper and lower extremity contractures  : Obrien in place draining clear yellow urine with mild sediment in tubinml/24hours recorded. Obrien incorectly secured in STAT lock, properly positioned at bedside.     LABS:                        7.8    10.91 )-----------( 414      ( 2018 05:14 )             23.3         135  |  100  |  7   ----------------------------<  112<H>  4.1   |  27  |  0.37<L>    Ca    9.3      2018 05:14  Phos  2.5       Mg     2.1         I&O's Detail    2018 07:  -  2018 07:00  --------------------------------------------------------  IN:    dextrose 5%.: 756 mL    Oral Fluid: 120 mL    Solution: 500 mL  Total IN: 1376 mL    OUT:    Indwelling Catheter - Urethral: 750 mL  Total OUT: 750 mL    Total NET: 626 mL    Impression: 63yo F with PMH [place]'s chorea, urinary retention due to neurogenic bladder, hemiparesis, decubitus ulcers admitted with sepsis 2/2 UTI and left hydronephrosis POD#10 s/p Left Ureteroscopy, with laser lithotripsy and stent insertion, with continued fevers, mild leukocytosis    Plan:  -continue obrien catheter for retention, monitor urine output and quality  -continue to monitor for fevers, tylenol prn.   -supportive measures for +RSV  -off antibiotics per ID  -continue medical management and supportive care  -continue lovenox for VTE ppx  -will discuss with Dr. Cleaning

## 2018-01-29 NOTE — PROGRESS NOTE ADULT - ASSESSMENT
Subjective Complaints:  Historian:         REVIEW OF SYSTEMS:  Eyes:  Good vision, no reported pain  ENT:  No sore throat, pain, runny nose, dysphagia  CV:  No pain, palpitatioins, hypo/hypertension  Resp:  No dyspnea, cough, tachypnea, wheezing  GI:  No pain, nausea, vomiting, diarrhea, constipatiion  Muscle:  No pain, weakness  Neuro:  No weakness, tingling, memory problems  Psych:  No fatigue, insomnia, mood problems, depression  Endocrine:  No polyuria, polydypsia, cold/heat intolerance    Vital Signs Last 24 Hrs  T(C): 37.7 (29 Jan 2018 21:00), Max: 38.2 (28 Jan 2018 21:49)  T(F): 99.8 (29 Jan 2018 21:00), Max: 100.7 (28 Jan 2018 21:49)  HR: 108 (29 Jan 2018 17:56) (108 - 117)  BP: 121/74 (29 Jan 2018 17:56) (104/63 - 148/77)  BP(mean): --  RR: 16 (29 Jan 2018 17:56) (14 - 16)  SpO2: 95% (29 Jan 2018 17:56) (95% - 98%)    GENERAL PHYSICAL EXAM:  General:  Appears stated age, well-groomed, well-nourished, no distress  HEENT:  NC/AT, patent nares w/ pink mucosa, OP clear w/o lesions, PERRL, EOMI, conjunctivae clear, no thyromegaly, nodules, adenopathy, no JVD  Chest:  Full & symmetric excursion, no increased effort, breath sounds clear  Cardiovascular:  Regular rhythm, S1, S2, no murmur/rub/S3/S4, no carotid/femoral/abdominal bruit, radial/pedal pulses 2+, no edema  Abdomen:  Soft, non-tender, non-distended, normoactive bowel sounds, no HSM  Extremities:  Gait & station:   Digits:   Nails:   Joints, Bones, Muscles:   ROM:   Stability:  Skin:  No rash/erythema/ecchymoses/petechiae/wounds/abscess/warm/dry  Musculoskeletal:  Full ROM in all joints w/o swelling/tenderness/effusion    NEUROLOGICAL EXAM:  HENT:  Normocephalic head; atraumatic head.  Neck supple.  ENT: normal looking.  Mental State:      awake  open eyes does notf olws commnads arm leg contracted no chorea                       7.8    10.91 )-----------( 414      ( 29 Jan 2018 05:14 )             23.3     01-29    135  |  100  |  7   ----------------------------<  112<H>  4.1   |  27  |  0.37<L>    Ca    9.3      29 Jan 2018 05:14  Phos  2.5     01-29  Mg     2.1     01-29     ass= awake open eyes no  seizure uti s/p ureteral stent  kidney stone hx odf depression no chorea  arm legs contracted  metabolic encephalaopthy         RADIOLOGY & ADDITIONAL STUDIES:        Recommendations:   for sub acute rehab  will sagar

## 2018-01-29 NOTE — CHART NOTE - NSCHARTNOTEFT_GEN_A_CORE
Assessment: Pt with Ashli's Chorea, more lethargic, barely communicates with fever for the past 3 days. Prognosis is guarded.     Factors impacting intake: [ ] none [ ] nausea  [ ] vomiting [ ] diarrhea [ ] constipation  [ ]chewing problems [x ] swallowing issues  [x ] other: decreased labial, buccal and lingual strength.    1/25 - Diet Prescription: Diet, Dysphagia 1 Pureed-Honey Consistency Fluid:   No Carb Prosource (1pkg = 15gms Protein)     Qty per Day:  daily (01-25-18 @ 16:22)    Intake: 0 % Total feed - too lethargic to eat     Current Weight: 1/28 - 141 (64 kg), 1/25 - 117  53.5 kg) Edema 1/28 -> 2+ generalized -> (L & R) Foot & Hand  % Weight Change - 17% wt loss x 3 days ( This cannot be accurate ???)    Pertinent Medications: MEDICATIONS  (STANDING):  ascorbic acid 500 milliGRAM(s) Oral two times a day  dextrose 5%. 1000 milliLiter(s) (63 mL/Hr) IV Continuous <Continuous>  enoxaparin Injectable 40 milliGRAM(s) SubCutaneous daily  ferrous    sulfate 325 milliGRAM(s) Oral daily  hydrocortisone 2.5% Cream 1 Application(s) Topical two times a day  lactulose Syrup 10 Gram(s) Oral two times a day  mirtazapine 15 milliGRAM(s) Oral at bedtime  multivitamin/minerals 1 Tablet(s) Oral daily  potassium chloride   Powder 20 milliEquivalent(s) Oral daily  senna 2 Tablet(s) Oral at bedtime    MEDICATIONS  (PRN):  acetaminophen  Suppository 650 milliGRAM(s) Rectal every 6 hours PRN For Temp greater than 38 C (100.4 F)  magnesium hydroxide Suspension 30 milliLiter(s) Oral daily PRN Constipation    Pertinent Labs: 01-29 Na135 mmol/L Glu 112 mg/dL<H> K+ 4.1 mmol/L Cr  0.37 mg/dL<L> BUN 7 mg/dL Phos 2.5 mg/dL Alb n/a   PAB n/a        CAPILLARY BLOOD GLUCOSE      BM - not documented  Skin: Sacrum  stage lV, Left Lateral Malleous Stage IV    Estimated Needs:   [x ] no change since previous assessment  [ ] recalculated:     Previous Nutrition Diagnosis:   [ ] Inadequate Energy Intake [ ]Inadequate Oral Intake [ ] Excessive Energy Intake   [ ] Underweight [ ] Increased Nutrient Needs [ ] Overweight/Obesity   [ ] Altered GI Function [ ] Unintended Weight Loss [ ] Food & Nutrition Related Knowledge Deficit [x ] Malnutrition - in the context of Chronic Illness    Nutrition Diagnosis is [x ] ongoing  [ ] resolved [ ] not applicable     New Nutrition Diagnosis: [x ] not applicable       Interventions:   Recommend  [ ] Change Diet To:  [ ] Nutrition Supplement  [ ] Nutrition Support  [x ] Other:  Palliative Care Consult    Monitoring and Evaluation:   [ ] PO intake [ x ] Tolerance to diet prescription [ x ] weights [ x ] labs[ x ] follow up per protocol  [ ] other:

## 2018-01-29 NOTE — PROGRESS NOTE ADULT - SUBJECTIVE AND OBJECTIVE BOX
INTERVAL HPI/OVERNIGHT EVENTS:  resting comfortably, less secretions noted    Vital Signs Last 24 Hrs  T(C): 37.7 (29 Jan 2018 21:00), Max: 38.1 (29 Jan 2018 17:56)  T(F): 99.8 (29 Jan 2018 21:00), Max: 100.6 (29 Jan 2018 17:56)  HR: 108 (29 Jan 2018 17:56) (108 - 117)  BP: 121/74 (29 Jan 2018 17:56) (104/63 - 134/70)  BP(mean): --  RR: 16 (29 Jan 2018 17:56) (14 - 16)  SpO2: 95% (29 Jan 2018 17:56) (95% - 98%)        PHYSICAL EXAM:  GEN:         Awake, responsive and comfortable.  HEENT:    Normal.    RESP:      decreased rhonchi and upper airway noises  CVS:             Regular rate and rhythm.   ABD:         Soft, non-tender, non-distended;   :             No costovertebral angle tenderness  EXTR:            No clubbing, cyanosis or edema  CNS:              Intact sensory and motor function.        MEDICATIONS  (STANDING):  ascorbic acid 500 milliGRAM(s) Oral two times a day  dextrose 5%. 1000 milliLiter(s) (63 mL/Hr) IV Continuous <Continuous>  enoxaparin Injectable 40 milliGRAM(s) SubCutaneous daily  ferrous    sulfate 325 milliGRAM(s) Oral daily  hydrocortisone 2.5% Cream 1 Application(s) Topical two times a day  lactulose Syrup 10 Gram(s) Oral two times a day  mirtazapine 15 milliGRAM(s) Oral at bedtime  multivitamin/minerals 1 Tablet(s) Oral daily  potassium chloride   Powder 20 milliEquivalent(s) Oral daily  senna 2 Tablet(s) Oral at bedtime    MEDICATIONS  (PRN):  acetaminophen  Suppository 650 milliGRAM(s) Rectal every 6 hours PRN For Temp greater than 38 C (100.4 F)  magnesium hydroxide Suspension 30 milliLiter(s) Oral daily PRN Constipation        LABS:                        7.8    10.91 )-----------( 414      ( 29 Jan 2018 05:14 )             23.3     01-29    135  |  100  |  7   ----------------------------<  112<H>  4.1   |  27  |  0.37<L>    Ca    9.3      29 Jan 2018 05:14  Phos  2.5     01-29  Mg     2.1     01-29                RADIOLOGY & ADDITIONAL STUDIES:  < from: Xray Chest 1 View AP -PORTABLE-Routine (01.28.18 @ 11:47) >    EXAM:  XR CHEST PORTABLE ROUTINE 1V                            PROCEDURE DATE:  01/28/2018          INTERPRETATION:  CLINICAL INFORMATION:bed bound with fevers    TECHNIQUE: AP chest film. Comparison is made to 1/23/2018    FINDINGS:There is no focal consolidation or pleural effusion.  Heart size   is within normal limits. The osseous structures are intact. Multiple   prominent loops of bowel are noted.    IMPRESSION: No focal consolidation or pleural effusion.                SO ST M.D., ATTENDING RADIOLOGIST  This document has been electronically signed. Jan 28 2018 12:36PM                < end of copied text >    ASSESSMENT AND PLAN: no radiographic evidence of an acute pneumonic process.

## 2018-01-30 RX ADMIN — Medication 500 MILLIGRAM(S): at 05:43

## 2018-01-30 RX ADMIN — Medication 325 MILLIGRAM(S): at 12:25

## 2018-01-30 RX ADMIN — Medication 1 APPLICATION(S): at 05:42

## 2018-01-30 RX ADMIN — LACTULOSE 10 GRAM(S): 10 SOLUTION ORAL at 18:39

## 2018-01-30 RX ADMIN — SENNA PLUS 2 TABLET(S): 8.6 TABLET ORAL at 21:28

## 2018-01-30 RX ADMIN — Medication 500 MILLIGRAM(S): at 18:39

## 2018-01-30 RX ADMIN — ENOXAPARIN SODIUM 40 MILLIGRAM(S): 100 INJECTION SUBCUTANEOUS at 12:25

## 2018-01-30 RX ADMIN — Medication 20 MILLIEQUIVALENT(S): at 12:26

## 2018-01-30 RX ADMIN — Medication 1 APPLICATION(S): at 18:39

## 2018-01-30 RX ADMIN — Medication 650 MILLIGRAM(S): at 12:26

## 2018-01-30 RX ADMIN — Medication 1 TABLET(S): at 12:25

## 2018-01-30 RX ADMIN — SODIUM CHLORIDE 63 MILLILITER(S): 9 INJECTION, SOLUTION INTRAVENOUS at 21:28

## 2018-01-30 RX ADMIN — SODIUM CHLORIDE 63 MILLILITER(S): 9 INJECTION, SOLUTION INTRAVENOUS at 02:20

## 2018-01-30 RX ADMIN — LACTULOSE 10 GRAM(S): 10 SOLUTION ORAL at 05:43

## 2018-01-30 RX ADMIN — MIRTAZAPINE 15 MILLIGRAM(S): 45 TABLET, ORALLY DISINTEGRATING ORAL at 21:28

## 2018-01-30 NOTE — GOALS OF CARE CONVERSATION - PERSONAL ADVANCE DIRECTIVE - CONVERSATION DETAILS
Went to visit pt today but was asleep at time of visit. Later called & spoke with dtr/HCP Belem Ryan [phone] who informed me she was at pts bedside.  Went up to see pt & dtr but again pt asleep at time of visit.  Met with Dtr who is HCP.  We discussed Advance Directives, MOLST, & Advance Care planning.  Dtr informed me that pt has been a LTR at [facility] in [place] for at least 10-12 months, due pt requiring total care.  Pt has multiple co-morbidities, Sepsis, UTI, Pneumonia, HTN, DM, previous strke with residual Right hemiparesis, & a sacral decubitus that has been debrided, & Ashli's Chorea.  Educated Dtr about MOLST & Advance Directives & she returned good understanding. I also educated dtr about Palliative/Hospice & dtr returned good understanding but not ready to anything just yet.  I requested her to think about Advance Directives & speak with pt & also wishes for treatment & I would follow up in a day or so.

## 2018-01-30 NOTE — PROGRESS NOTE ADULT - SUBJECTIVE AND OBJECTIVE BOX
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  Pt has been more lethargic and barely communicable. Low grade fever still progressing.    Upon evaluation, pt look more alert and admit she is doing better and ok. No more fever spike.     No Known Allergies    Intolerances        MEDICATIONS  (STANDING):  ascorbic acid 500 milliGRAM(s) Oral two times a day  dextrose 5%. 1000 milliLiter(s) (63 mL/Hr) IV Continuous <Continuous>  enoxaparin Injectable 40 milliGRAM(s) SubCutaneous daily  ferrous    sulfate 325 milliGRAM(s) Oral daily  hydrocortisone 2.5% Cream 1 Application(s) Topical two times a day  lactulose Syrup 10 Gram(s) Oral two times a day  mirtazapine 15 milliGRAM(s) Oral at bedtime  multivitamin/minerals 1 Tablet(s) Oral daily  potassium chloride   Powder 20 milliEquivalent(s) Oral daily  senna 2 Tablet(s) Oral at bedtime        REVIEW OF SYSTEMS:    CONSTITUTIONAL: No fever, chills, weight loss, or fatigue  HEENT: No sore throat, runny nose, ear ache  RESPIRATORY: No cough, wheezing, No shortness of breath  CARDIOVASCULAR: No chest pain, palpitations, dizziness  GASTROINTESTINAL: No abdominal pain. No nausea, vomiting, diarrhea  GENITOURINARY: No dysuria, increase frequency, hematuria, or incontinence  NEUROLOGICAL: No headaches, memory loss, loss of strength, numbness, or tremors, no weakness  EXTREMITY: No pedal edema BLE  SKIN: No itching, burning, rashes, or lesions     VITAL SIGNS:  T(C): 37.3 (01-30-18 @ 06:00), Max: 38.1 (01-29-18 @ 17:56)  T(F): 99.2 (01-30-18 @ 06:00), Max: 100.6 (01-29-18 @ 17:56)  HR: 107 (01-30-18 @ 06:00) (94 - 116)  BP: 130/83 (01-30-18 @ 06:00) (104/63 - 148/66)  RR: 18 (01-30-18 @ 06:00) (16 - 18)  SpO2: 95% (01-30-18 @ 06:00) (95% - 100%)  Wt(kg): --    PHYSICAL EXAM:    GENERAL: not in any distress  HEENT: Neck is supple, normocephalic, atraumatic   CHEST/LUNG: Clear to percussion bilaterally; No rales, rhonchi, wheezing  HEART: Regular rate and rhythm; No murmurs, rubs, or gallops  ABDOMEN: Soft, Nontender, Nondistended; Bowel sounds present, no rebound   EXTREMITIES:  2+ Peripheral Pulses, No clubbing, cyanosis, or edema  GENITOURINARY:   SKIN: No rashes or lesions  BACK: no pressor sore   NERVOUS SYSTEM:  Alert and awake       LABS:                         7.8    10.91 )-----------( 414      ( 29 Jan 2018 05:14 )             23.3     01-29    135  |  100  |  7   ----------------------------<  112<H>  4.1   |  27  |  0.37<L>    Ca    9.3      29 Jan 2018 05:14  Phos  2.5     01-29  Mg     2.1     01-29                          Vancomycin Level, Trough: 16.6 ug/mL (01-29 @ 05:14)        Culture Results:   No growth to date. (01-28 @ 12:22)  Culture Results:   No growth to date. (01-27 @ 11:41)  Culture Results:   No growth to date. (01-27 @ 11:41)                Radiology:

## 2018-01-30 NOTE — PROGRESS NOTE ADULT - SUBJECTIVE AND OBJECTIVE BOX
Patient seen and examined at bedside in no distress.    T(F): 99.2 (01-30-18 @ 06:00), Max: 100.6 (01-29-18 @ 17:56)  HR: 107 (01-30-18 @ 06:00) (94 - 116)  BP: 130/83 (01-30-18 @ 06:00) (104/63 - 148/66)  RR: 18 (01-30-18 @ 06:00) (16 - 18)  SpO2: 95% (01-30-18 @ 06:00) (95% - 100%)    PHYSICAL EXAM:  General: Resting comfortably, NAD  Lung: Respirations nonlabored  Abdomen: Soft, NTND  Extremities: Contracted upper and lower extremities b/l   : Obrien catheter in place draining clear, yellow urine, output: 1400cc/24hrs    LABS:                        7.8    10.91 )-----------( 414      ( 29 Jan 2018 05:14 )             23.3     01-29    135  |  100  |  7   ----------------------------<  112<H>  4.1   |  27  |  0.37<L>    Ca    9.3      29 Jan 2018 05:14  Phos  2.5     01-29  Mg     2.1     01-29    Impression: 62 year old female with PMH Newton's chorea, urinary retention due to neurogenic bladder, hemiparesis, decubitus ulcers admitted with sepsis 2/2 UTI and left hydronephrosis POD#11 s/p Left Ureteroscopy, with laser lithotripsy and stent insertion, with persistent fevers, leukocytosis resolving  Plan:  -continue obrien catheter for retention, monitor urine output and quality  -antipyretics PRN  -supportive measures for +RSV  -monitor h/h   -continue medical management and supportive care

## 2018-01-30 NOTE — PROGRESS NOTE ADULT - SUBJECTIVE AND OBJECTIVE BOX
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  Pt has been more lethargic.  no n/v (12 Jan 2018 14:07)      INTERVAL HPI/OVERNIGHT EVENTS:  No new c/o. T max 100.6 at 5:50 PM Off all antibiotics    MEDICATIONS  (STANDING):  ascorbic acid 500 milliGRAM(s) Oral two times a day  dextrose 5%. 1000 milliLiter(s) (63 mL/Hr) IV Continuous <Continuous>  enoxaparin Injectable 40 milliGRAM(s) SubCutaneous daily  ferrous    sulfate 325 milliGRAM(s) Oral daily  hydrocortisone 2.5% Cream 1 Application(s) Topical two times a day  lactulose Syrup 10 Gram(s) Oral two times a day  mirtazapine 15 milliGRAM(s) Oral at bedtime  multivitamin/minerals 1 Tablet(s) Oral daily  potassium chloride   Powder 20 milliEquivalent(s) Oral daily  senna 2 Tablet(s) Oral at bedtime    MEDICATIONS  (PRN):  acetaminophen  Suppository 650 milliGRAM(s) Rectal every 6 hours PRN For Temp greater than 38 C (100.4 F)  magnesium hydroxide Suspension 30 milliLiter(s) Oral daily PRN Constipation      FAMILY HISTORY:  No pertinent family history in first degree relatives      Allergies    No Known Allergies    Intolerances        PMH/PSH:  Decubital ulcer  Hemiparesis  Failure to thrive in adult  UTI (urinary tract infection)  Dehydration  Rhabdomyolysis  HTN (hypertension)  Neurogenic bladder disorder  Pressure ulcer of sacrum  UTI (urinary tract infection)  Embolism  DVT (deep venous thrombosis)  Anemia  Pneumonia  DM (diabetes mellitus)  High cholesterol  Jolon chorea  No pertinent past medical history  No significant past surgical history        REVIEW OF SYSTEMS:  CONSTITUTIONAL: No weight loss,  EYES: No eye pain, visual disturbances, or discharge  ENMT:  No difficulty hearing, tinnitus, vertigo; No sinus or throat pain  NECK: No pain or stiffness  BREASTS: No pain, masses, or nipple discharge  RESPIRATORY: No cough, wheezing, chills or hemoptysis; No shortness of breath  CARDIOVASCULAR: No chest pain, palpitations, dizziness, or leg swelling  GASTROINTESTINAL: No abdominal or epigastric pain. No nausea, vomiting, or hematemesis; No diarrhea or constipation. No melena or hematochezia.  GENITOURINARY: No dysuria, frequency, hematuria, or incontinence  NEUROLOGICAL: No headaches,  numbness, or tremors  SKIN: No itching, burning, rashes, or lesions   LYMPH NODES: No enlarged glands  ENDOCRINE: No heat or cold intolerance; No hair loss  MUSCULOSKELETAL: No joint pain or swelling; No muscle, back, or extremity pain  PSYCHIATRIC: No depression, anxiety, mood swings, or difficulty sleeping  HEME/LYMPH: No easy bruising, or bleeding gums  ALLERGY AND IMMUNOLOGIC: No hives or eczema    Vital Signs Last 24 Hrs  T(C): 37.3 (30 Jan 2018 06:00), Max: 38.1 (29 Jan 2018 17:56)  T(F): 99.2 (30 Jan 2018 06:00), Max: 100.6 (29 Jan 2018 17:56)  HR: 107 (30 Jan 2018 06:00) (94 - 116)  BP: 130/83 (30 Jan 2018 06:00) (104/63 - 148/66)  BP(mean): --  RR: 18 (30 Jan 2018 06:00) (16 - 18)  SpO2: 95% (30 Jan 2018 06:00) (95% - 100%)    PHYSICAL EXAM:  GENERAL: NAD, well-groomed, well-developed  HEAD:  Atraumatic, Normocephalic  EYES: EOMI, PERRLA, conjunctiva and sclera clear  NECK: Supple, No JVD, Normal thyroid  NERVOUS SYSTEM:  Alert & Oriented   CHEST/LUNG: Clear to percussion bilaterally; No rales, rhonchi, wheezing, or rubs  HEART: Regular rate and rhythm; No murmurs, rubs, or gallops  ABDOMEN: Soft, Nontender, Nondistended; Bowel sounds present  EXTREMITIES:  2+ Peripheral Pulses, No clubbing, cyanosis, or edema  LYMPH: No lymphadenopathy noted  SKIN: No rashes or lesions    LAB                          7.8    10.91 )-----------( 414      ( 29 Jan 2018 05:14 )             23.3       CBC:  01-29 @ 05:14  WBC 10.91   Hgb 7.8   Hct 23.3   Plts 414  MCV 73.3  01-28 @ 07:59  WBC 12.45   Hgb 7.6   Hct 23.0   Plts 478  MCV 74.0  01-27 @ 07:51  WBC 13.04   Hgb 7.6   Hct 23.1   Plts 524  MCV 74.5  01-26 @ 07:32  WBC 11.36   Hgb 8.1   Hct 24.2   Plts 544  MCV 75.2  01-25 @ 08:03  WBC 10.62   Hgb 8.4   Hct 25.9   Plts 579  MCV 75.1  01-24 @ 08:14  WBC 10.35   Hgb 8.1   Hct 24.4   Plts 605  MCV 74.6      Chemistry:  01-29 @ 05:14  Na+ 135  K+ 4.1  Cl- 100  CO2 27  BUN 7  Cr 0.37     01-28 @ 07:59  Na+ 135  K+ 3.9  Cl- 103  CO2 26  BUN 8  Cr 0.33     01-27 @ 07:51  Na+ 138  K+ 3.7  Cl- 105  CO2 29  BUN 8  Cr 0.42     01-26 @ 07:32  Na+ 135  K+ 3.8  Cl- 101  CO2 29  BUN 9  Cr 0.42     01-25 @ 08:03  Na+ 136  K+ 4.2  Cl- 100  CO2 28  BUN 12  Cr 0.50     01-24 @ 08:14  Na+ 138  K+ 4.0  Cl- 103  CO2 27  BUN 15  Cr 0.41         Glucose, Serum: 112 mg/dL (01-29 @ 05:14)  Glucose, Serum: 115 mg/dL (01-28 @ 07:59)  Glucose, Serum: 103 mg/dL (01-27 @ 07:51)  Glucose, Serum: 117 mg/dL (01-26 @ 07:32)  Glucose, Serum: 120 mg/dL (01-25 @ 08:03)  Glucose, Serum: 107 mg/dL (01-24 @ 08:14)      29 Jan 2018 05:14    135    |  100    |  7      ----------------------------<  112    4.1     |  27     |  0.37   28 Jan 2018 07:59    135    |  103    |  8      ----------------------------<  115    3.9     |  26     |  0.33   27 Jan 2018 07:51    138    |  105    |  8      ----------------------------<  103    3.7     |  29     |  0.42   26 Jan 2018 07:32    135    |  101    |  9      ----------------------------<  117    3.8     |  29     |  0.42   25 Jan 2018 08:03    136    |  100    |  12     ----------------------------<  120    4.2     |  28     |  0.50   24 Jan 2018 08:14    138    |  103    |  15     ----------------------------<  107    4.0     |  27     |  0.41     Ca    9.3        29 Jan 2018 05:14  Ca    9.2        28 Jan 2018 07:59  Ca    9.2        27 Jan 2018 07:51  Ca    9.6        26 Jan 2018 07:32  Ca    10.1       25 Jan 2018 08:03  Ca    10.0       24 Jan 2018 08:14  Phos  2.5       29 Jan 2018 05:14  Mg     2.1       29 Jan 2018 05:14    TPro  8.3    /  Alb  1.9    /  TBili  0.3    /  DBili  x      /  AST  19     /  ALT  18     /  AlkPhos  67     27 Jan 2018 07:51  TPro  8.9    /  Alb  2.1    /  TBili  0.4    /  DBili  x      /  AST  29     /  ALT  23     /  AlkPhos  75     26 Jan 2018 07:32              CAPILLARY BLOOD GLUCOSE              RADIOLOGY & ADDITIONAL TESTS:    Imaging Personally Reviewed:  [ ] YES  [ ] NO    Consultant(s) Notes Reviewed:  [ ] YES  [ ] NO    Care Discussed with Consultants/Other Providers [ ] YES  [ ] NO

## 2018-01-30 NOTE — PROGRESS NOTE ADULT - ASSESSMENT
persistent fever ,resolved now ,  not in distress   Pos rhino virus / Sepsis likely from acute viral syndrome vs bacteremia vs pyelonephritis , left ureter stone   blood culture simple Ecoli , fairly sensitive e coli , resistant to cipro, source likely from     surveillance blood culture neg so far   S/P emperic coverage with jenny  Bone scan negative   pt is stable in monitoring OFF antibiotics   supportive management for viral syndrome   ID will sign off   Please call back for any concern.   Thanks you.

## 2018-01-30 NOTE — PROGRESS NOTE ADULT - ASSESSMENT
Subjective Complaints:  Historian:         REVIEW OF SYSTEMS:  Eyes:  Good vision, no reported pain  ENT:  No sore throat, pain, runny nose, dysphagia  CV:  No pain, palpitatioins, hypo/hypertension  Resp:  No dyspnea, cough, tachypnea, wheezing  GI:  No pain, nausea, vomiting, diarrhea, constipatiion  Muscle:  No pain, weakness  Neuro:  No weakness, tingling, memory problems  Psych:  No fatigue, insomnia, mood problems, depression  Endocrine:  No polyuria, polydypsia, cold/heat intolerance    Vital Signs Last 24 Hrs  T(C): 37.6 (30 Jan 2018 17:54), Max: 38.1 (30 Jan 2018 11:11)  T(F): 99.6 (30 Jan 2018 17:54), Max: 100.6 (30 Jan 2018 11:11)  HR: 103 (30 Jan 2018 17:54) (94 - 112)  BP: 109/67 (30 Jan 2018 17:54) (109/67 - 148/66)  BP(mean): --  RR: 16 (30 Jan 2018 17:54) (16 - 18)  SpO2: 99% (30 Jan 2018 17:54) (95% - 100%)    GENERAL PHYSICAL EXAM:  General:  Appears stated age, well-groomed, well-nourished, no distress  HEENT:  NC/AT, patent nares w/ pink mucosa, OP clear w/o lesions, PERRL, EOMI, conjunctivae clear, no thyromegaly, nodules, adenopathy, no JVD  Chest:  Full & symmetric excursion, no increased effort, breath sounds clear  Cardiovascular:  Regular rhythm, S1, S2, no murmur/rub/S3/S4, no carotid/femoral/abdominal bruit, radial/pedal pulses 2+, no edema  Abdomen:  Soft, non-tender, non-distended, normoactive bowel sounds, no HSM  Extremities:  Gait & station:   Digits:   Nails:   Joints, Bones, Muscles:   ROM:   Stability:  Skin:  No rash/erythema/ecchymoses/petechiae/wounds/abscess/warm/dry  Musculoskeletal:  Full ROM in all joints w/o swelling/tenderness/effusion    NEUROLOGICAL EXAM:  HENT:  Normocephalic head; atraumatic head.  Neck supple.  ENT: normal looking.  Mental State:        awake open eyes does not follws commnads   Alert.  10.91 )-----------( 414      ( 29 Jan 2018 05:14 )             23.3     01-29    135  |  100  |  7   ----------------------------<  112<H>  4.1   |  27  |  0.37<L>    Ca    9.3      29 Jan 2018 05:14  Phos  2.5     01-29  Mg     2.1     01-29     ass= awake open eyes  armleg contracted uti metabolic encephalaothy  s/p ureteral stent  no chorea         RADIOLOGY & ADDITIONAL STUDIES:        Recommendations: metabolic encpehaloathy  for sub acute rehab will follow

## 2018-01-30 NOTE — PROGRESS NOTE ADULT - PROBLEM SELECTOR PLAN 1
All antibiotics d/c'd , T nax 101.5  [id number]  5:40 PM, T max 100.6 [id number]  5 PM,  1/29/18 WBC 10.91, precalcitonin 0.13 , s/p cystoscopy. Indium scan negative. appreciate ID note  -

## 2018-01-31 LAB
ANION GAP SERPL CALC-SCNC: 9 MMOL/L — SIGNIFICANT CHANGE UP (ref 5–17)
BUN SERPL-MCNC: 7 MG/DL — SIGNIFICANT CHANGE UP (ref 7–23)
CALCIUM SERPL-MCNC: 9.5 MG/DL — SIGNIFICANT CHANGE UP (ref 8.5–10.1)
CHLORIDE SERPL-SCNC: 105 MMOL/L — SIGNIFICANT CHANGE UP (ref 96–108)
CO2 SERPL-SCNC: 26 MMOL/L — SIGNIFICANT CHANGE UP (ref 22–31)
CREAT SERPL-MCNC: 0.27 MG/DL — LOW (ref 0.5–1.3)
GLUCOSE SERPL-MCNC: 88 MG/DL — SIGNIFICANT CHANGE UP (ref 70–99)
HCT VFR BLD CALC: 22.4 % — LOW (ref 34.5–45)
HGB BLD-MCNC: 7.4 G/DL — LOW (ref 11.5–15.5)
MCHC RBC-ENTMCNC: 24.3 PG — LOW (ref 27–34)
MCHC RBC-ENTMCNC: 33 GM/DL — SIGNIFICANT CHANGE UP (ref 32–36)
MCV RBC AUTO: 73.7 FL — LOW (ref 80–100)
NRBC # BLD: 0 /100 WBCS — SIGNIFICANT CHANGE UP (ref 0–0)
PLATELET # BLD AUTO: 371 K/UL — SIGNIFICANT CHANGE UP (ref 150–400)
POTASSIUM SERPL-MCNC: 3.7 MMOL/L — SIGNIFICANT CHANGE UP (ref 3.5–5.3)
POTASSIUM SERPL-SCNC: 3.7 MMOL/L — SIGNIFICANT CHANGE UP (ref 3.5–5.3)
RBC # BLD: 3.04 M/UL — LOW (ref 3.8–5.2)
RBC # FLD: 17 % — HIGH (ref 10.3–14.5)
SODIUM SERPL-SCNC: 140 MMOL/L — SIGNIFICANT CHANGE UP (ref 135–145)
WBC # BLD: 7.94 K/UL — SIGNIFICANT CHANGE UP (ref 3.8–10.5)
WBC # FLD AUTO: 7.94 K/UL — SIGNIFICANT CHANGE UP (ref 3.8–10.5)

## 2018-01-31 RX ADMIN — SODIUM CHLORIDE 63 MILLILITER(S): 9 INJECTION, SOLUTION INTRAVENOUS at 17:36

## 2018-01-31 RX ADMIN — Medication 650 MILLIGRAM(S): at 11:56

## 2018-01-31 RX ADMIN — SENNA PLUS 2 TABLET(S): 8.6 TABLET ORAL at 21:54

## 2018-01-31 RX ADMIN — ENOXAPARIN SODIUM 40 MILLIGRAM(S): 100 INJECTION SUBCUTANEOUS at 11:56

## 2018-01-31 RX ADMIN — Medication 1 APPLICATION(S): at 17:36

## 2018-01-31 RX ADMIN — Medication 500 MILLIGRAM(S): at 17:36

## 2018-01-31 RX ADMIN — Medication 325 MILLIGRAM(S): at 11:56

## 2018-01-31 RX ADMIN — Medication 1 TABLET(S): at 11:56

## 2018-01-31 RX ADMIN — LACTULOSE 10 GRAM(S): 10 SOLUTION ORAL at 06:00

## 2018-01-31 RX ADMIN — Medication 500 MILLIGRAM(S): at 05:59

## 2018-01-31 RX ADMIN — Medication 650 MILLIGRAM(S): at 20:49

## 2018-01-31 RX ADMIN — Medication 20 MILLIEQUIVALENT(S): at 11:56

## 2018-01-31 RX ADMIN — MIRTAZAPINE 15 MILLIGRAM(S): 45 TABLET, ORALLY DISINTEGRATING ORAL at 21:54

## 2018-01-31 RX ADMIN — Medication 1 APPLICATION(S): at 05:59

## 2018-01-31 RX ADMIN — LACTULOSE 10 GRAM(S): 10 SOLUTION ORAL at 17:36

## 2018-01-31 NOTE — PROGRESS NOTE ADULT - SUBJECTIVE AND OBJECTIVE BOX
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  Pt has been more lethargic.  no n/v (12 Jan 2018 14:07)      INTERVAL HPI/OVERNIGHT EVENTS:  MS unchanged. No c/o    MEDICATIONS  (STANDING):  ascorbic acid 500 milliGRAM(s) Oral two times a day  dextrose 5%. 1000 milliLiter(s) (63 mL/Hr) IV Continuous <Continuous>  enoxaparin Injectable 40 milliGRAM(s) SubCutaneous daily  ferrous    sulfate 325 milliGRAM(s) Oral daily  hydrocortisone 2.5% Cream 1 Application(s) Topical two times a day  lactulose Syrup 10 Gram(s) Oral two times a day  mirtazapine 15 milliGRAM(s) Oral at bedtime  multivitamin/minerals 1 Tablet(s) Oral daily  potassium chloride   Powder 20 milliEquivalent(s) Oral daily  senna 2 Tablet(s) Oral at bedtime    MEDICATIONS  (PRN):  acetaminophen  Suppository 650 milliGRAM(s) Rectal every 6 hours PRN For Temp greater than 38 C (100.4 F)  magnesium hydroxide Suspension 30 milliLiter(s) Oral daily PRN Constipation      FAMILY HISTORY:  No pertinent family history in first degree relatives      Allergies    No Known Allergies    Intolerances        PMH/PSH:  Decubital ulcer  Hemiparesis  Failure to thrive in adult  UTI (urinary tract infection)  Dehydration  Rhabdomyolysis  HTN (hypertension)  Neurogenic bladder disorder  Pressure ulcer of sacrum  UTI (urinary tract infection)  Embolism  DVT (deep venous thrombosis)  Anemia  Pneumonia  DM (diabetes mellitus)  High cholesterol  Ashli chorea  No pertinent past medical history  No significant past surgical history        REVIEW OF SYSTEMS:  CONSTITUTIONAL: Fever   EYES: No eye pain, visual disturbances, or discharge  ENMT:  No difficulty hearing, tinnitus, vertigo; No sinus or throat pain  NECK: No pain or stiffness  BREASTS: No pain, masses, or nipple discharge  RESPIRATORY: No cough, wheezing, chills or hemoptysis; No shortness of breath  CARDIOVASCULAR: No chest pain, palpitations, dizziness, or leg swelling  GASTROINTESTINAL: No abdominal or epigastric pain. No nausea, vomiting, or hematemesis; No diarrhea or constipation. No melena or hematochezia.  GENITOURINARY: No dysuria, frequency, hematuria, or incontinence  NEUROLOGICAL: No headaches, memory loss, numbness, or tremors  SKIN: No itching, burning, rashes, or lesions   LYMPH NODES: No enlarged glands  ENDOCRINE: No heat or cold intolerance; No hair loss  MUSCULOSKELETAL: No joint pain or swelling; No muscle, back, or extremity pain  PSYCHIATRIC: No depression, anxiety, mood swings, or difficulty sleeping  HEME/LYMPH: No easy bruising, or bleeding gums  ALLERGY AND IMMUNOLOGIC: No hives or eczema    Vital Signs Last 24 Hrs  T(C): 37.7 (31 Jan 2018 05:17), Max: 38.1 (30 Jan 2018 11:11)  T(F): 99.8 (31 Jan 2018 05:17), Max: 100.6 (30 Jan 2018 11:11)  HR: 115 (31 Jan 2018 05:17) (103 - 115)  BP: 121/78 (31 Jan 2018 05:17) (109/67 - 138/87)  BP(mean): --  RR: 18 (31 Jan 2018 05:17) (16 - 18)  SpO2: 95% (31 Jan 2018 05:17) (95% - 99%)    PHYSICAL EXAM:  GENERAL: NAD, well-groomed, well-developed  HEAD:  Atraumatic, Normocephalic  EYES: EOMI, PERRLA, conjunctiva and sclera clear  NECK: Supple, No JVD, Normal thyroid  NERVOUS SYSTEM:  Alert & Oriented Good concentration;   CHEST/LUNG: Clear to percussion bilaterally; No rales, rhonchi, wheezing, or rubs  HEART: Regular rate and rhythm; No murmurs, rubs, or gallops  ABDOMEN: Soft, Nontender, Nondistended; Bowel sounds present  EXTREMITIES:  2+ Peripheral Pulses, No clubbing, cyanosis, or edema  LYMPH: No lymphadenopathy noted  SKIN: No rashes or lesions    LAB                          7.4    7.94  )-----------( 371      ( 31 Jan 2018 06:19 )             22.4       CBC:  01-31 @ 06:19  WBC 7.94   Hgb 7.4   Hct 22.4   Plts 371  MCV 73.7  01-29 @ 05:14  WBC 10.91   Hgb 7.8   Hct 23.3   Plts 414  MCV 73.3  01-28 @ 07:59  WBC 12.45   Hgb 7.6   Hct 23.0   Plts 478  MCV 74.0  01-27 @ 07:51  WBC 13.04   Hgb 7.6   Hct 23.1   Plts 524  MCV 74.5  01-26 @ 07:32  WBC 11.36   Hgb 8.1   Hct 24.2   Plts 544  MCV 75.2  01-25 @ 08:03  WBC 10.62   Hgb 8.4   Hct 25.9   Plts 579  MCV 75.1      Chemistry:  01-31 @ 06:19  Na+ 140  K+ 3.7  Cl- 105  CO2 26  BUN 7  Cr 0.27     01-29 @ 05:14  Na+ 135  K+ 4.1  Cl- 100  CO2 27  BUN 7  Cr 0.37     01-28 @ 07:59  Na+ 135  K+ 3.9  Cl- 103  CO2 26  BUN 8  Cr 0.33     01-27 @ 07:51  Na+ 138  K+ 3.7  Cl- 105  CO2 29  BUN 8  Cr 0.42     01-26 @ 07:32  Na+ 135  K+ 3.8  Cl- 101  CO2 29  BUN 9  Cr 0.42     01-25 @ 08:03  Na+ 136  K+ 4.2  Cl- 100  CO2 28  BUN 12  Cr 0.50         Glucose, Serum: 88 mg/dL (01-31 @ 06:19)  Glucose, Serum: 112 mg/dL (01-29 @ 05:14)  Glucose, Serum: 115 mg/dL (01-28 @ 07:59)  Glucose, Serum: 103 mg/dL (01-27 @ 07:51)  Glucose, Serum: 117 mg/dL (01-26 @ 07:32)  Glucose, Serum: 120 mg/dL (01-25 @ 08:03)      31 Jan 2018 06:19    140    |  105    |  7      ----------------------------<  88     3.7     |  26     |  0.27   29 Jan 2018 05:14    135    |  100    |  7      ----------------------------<  112    4.1     |  27     |  0.37   28 Jan 2018 07:59    135    |  103    |  8      ----------------------------<  115    3.9     |  26     |  0.33   27 Jan 2018 07:51    138    |  105    |  8      ----------------------------<  103    3.7     |  29     |  0.42   26 Jan 2018 07:32    135    |  101    |  9      ----------------------------<  117    3.8     |  29     |  0.42   25 Jan 2018 08:03    136    |  100    |  12     ----------------------------<  120    4.2     |  28     |  0.50     Ca    9.5        31 Jan 2018 06:19  Ca    9.3        29 Jan 2018 05:14  Ca    9.2        28 Jan 2018 07:59  Ca    9.2        27 Jan 2018 07:51  Ca    9.6        26 Jan 2018 07:32  Ca    10.1       25 Jan 2018 08:03  Phos  2.5       29 Jan 2018 05:14  Mg     2.1       29 Jan 2018 05:14    TPro  8.3    /  Alb  1.9    /  TBili  0.3    /  DBili  x      /  AST  19     /  ALT  18     /  AlkPhos  67     27 Jan 2018 07:51  TPro  8.9    /  Alb  2.1    /  TBili  0.4    /  DBili  x      /  AST  29     /  ALT  23     /  AlkPhos  75     26 Jan 2018 07:32              CAPILLARY BLOOD GLUCOSE              RADIOLOGY & ADDITIONAL TESTS:    Imaging Personally Reviewed:  [ ] YES  [ ] NO    Consultant(s) Notes Reviewed:  [ ] YES  [ ] NO    Care Discussed with Consultants/Other Providers [ ] YES  [ ] NO

## 2018-01-31 NOTE — PROGRESS NOTE ADULT - SUBJECTIVE AND OBJECTIVE BOX
Patient seen and examined bedside resting comfortably. No acute overnight event.   No complaints offered. Tmax 100.6/24hrs.     T(F): 99.8 (01-31-18 @ 05:17), Max: 100.6 (01-30-18 @ 11:11)  HR: 115 (01-31-18 @ 05:17) (103 - 115)  BP: 121/78 (01-31-18 @ 05:17) (109/67 - 138/87)  RR: 18 (01-31-18 @ 05:17) (16 - 18)  SpO2: 95% (01-31-18 @ 05:17) (95% - 99%)    PHYSICAL EXAM:    General: Resting comfortably, NAD. Alert and awake.   Lung: Respirations nonlabored, clear breath sounds b/l.   Abdomen: Soft, NTND  Extremities: Contracted upper and lower extremities b/l. Calf soft, non tedner b/l.   : Obrien catheter in place draining clear, yellow urine, output: 1450cc/24hrs    LABS:    I&O's Detail    29 Jan 2018 07:01  -  30 Jan 2018 07:00  --------------------------------------------------------  IN:    dextrose 5%.: 1512 mL    Oral Fluid: 225 mL  Total IN: 1737 mL    OUT:    Indwelling Catheter - Urethral: 1400 mL  Total OUT: 1400 mL    Total NET: 337 mL      30 Jan 2018 07:01  -  31 Jan 2018 06:34  --------------------------------------------------------  IN:    dextrose 5%.: 378 mL    Oral Fluid: 205 mL  Total IN: 583 mL    OUT:    Indwelling Catheter - Urethral: 1450 mL  Total OUT: 1450 mL    Total NET: -867 mL    Impression: 62 year old female with PMH Lane's chorea, urinary retention due to neurogenic bladder, hemiparesis, decubitus ulcers admitted with sepsis 2/2 UTI and left hydronephrosis POD#12 s/p Left Ureteroscopy, with laser lithotripsy and stent insertion, with persistent fevers, leukocytosis resolving.    Plan:  -continue obrien catheter for retention, monitor urine output and quality  -antipyretics PRN  -ID input noted, stable off ABX, supportive measures for +RSV  -f/u am labs, monitor h/h   -continue medical management and supportive care  -Will d/w Dr. Cleaning

## 2018-01-31 NOTE — PROGRESS NOTE ADULT - PROBLEM SELECTOR PLAN 1
All antibiotics d/c'd , T nax 101.5  [id number]  5:40 PM, T max 100.6 [id number]  5 PM,  100.6 1/30/18 11am WBC 7.94 , precalcitonin 0.13 , s/p cystoscopy. Indium scan negative. appreciate ID note  -

## 2018-02-01 LAB
APPEARANCE UR: CLEAR — SIGNIFICANT CHANGE UP
BACTERIA # UR AUTO: ABNORMAL
BILIRUB UR-MCNC: NEGATIVE — SIGNIFICANT CHANGE UP
COLOR SPEC: YELLOW — SIGNIFICANT CHANGE UP
CULTURE RESULTS: SIGNIFICANT CHANGE UP
CULTURE RESULTS: SIGNIFICANT CHANGE UP
DIFF PNL FLD: ABNORMAL
GLUCOSE UR QL: NEGATIVE MG/DL — SIGNIFICANT CHANGE UP
KETONES UR-MCNC: NEGATIVE — SIGNIFICANT CHANGE UP
LEUKOCYTE ESTERASE UR-ACNC: ABNORMAL
NITRITE UR-MCNC: NEGATIVE — SIGNIFICANT CHANGE UP
PH UR: 7 — SIGNIFICANT CHANGE UP (ref 5–8)
PROT UR-MCNC: 100 MG/DL
RBC CASTS # UR COMP ASSIST: ABNORMAL /HPF (ref 0–4)
SP GR SPEC: 1.01 — SIGNIFICANT CHANGE UP (ref 1.01–1.02)
SPECIMEN SOURCE: SIGNIFICANT CHANGE UP
SPECIMEN SOURCE: SIGNIFICANT CHANGE UP
UROBILINOGEN FLD QL: 4 MG/DL
WBC UR QL: >50

## 2018-02-01 RX ADMIN — Medication 500 MILLIGRAM(S): at 18:25

## 2018-02-01 RX ADMIN — ENOXAPARIN SODIUM 40 MILLIGRAM(S): 100 INJECTION SUBCUTANEOUS at 12:56

## 2018-02-01 RX ADMIN — LACTULOSE 10 GRAM(S): 10 SOLUTION ORAL at 18:25

## 2018-02-01 RX ADMIN — Medication 500 MILLIGRAM(S): at 05:35

## 2018-02-01 RX ADMIN — SENNA PLUS 2 TABLET(S): 8.6 TABLET ORAL at 22:19

## 2018-02-01 RX ADMIN — Medication 1 APPLICATION(S): at 05:36

## 2018-02-01 RX ADMIN — SODIUM CHLORIDE 63 MILLILITER(S): 9 INJECTION, SOLUTION INTRAVENOUS at 08:12

## 2018-02-01 RX ADMIN — Medication 325 MILLIGRAM(S): at 12:56

## 2018-02-01 RX ADMIN — LACTULOSE 10 GRAM(S): 10 SOLUTION ORAL at 05:35

## 2018-02-01 RX ADMIN — MIRTAZAPINE 15 MILLIGRAM(S): 45 TABLET, ORALLY DISINTEGRATING ORAL at 22:19

## 2018-02-01 RX ADMIN — Medication 1 TABLET(S): at 12:56

## 2018-02-01 RX ADMIN — Medication 20 MILLIEQUIVALENT(S): at 12:56

## 2018-02-01 RX ADMIN — Medication 1 APPLICATION(S): at 18:24

## 2018-02-01 RX ADMIN — Medication 650 MILLIGRAM(S): at 18:24

## 2018-02-01 NOTE — PROGRESS NOTE ADULT - ASSESSMENT
Subjective Complaints:  Historian:         REVIEW OF SYSTEMS:  Eyes:  Good vision, no reported pain  ENT:  No sore throat, pain, runny nose, dysphagia  CV:  No pain, palpitatioins, hypo/hypertension  Resp:  No dyspnea, cough, tachypnea, wheezing  GI:  No pain, nausea, vomiting, diarrhea, constipatiion  Muscle:  No pain, weakness  Neuro:  No weakness, tingling, memory problems  Psych:  No fatigue, insomnia, mood problems, depression  Endocrine:  No polyuria, polydypsia, cold/heat intolerance    Vital Signs Last 24 Hrs  T(C): 38.1 (2018 18:45), Max: 38.1 (2018 18:45)  T(F): 100.5 (2018 18:45), Max: 100.5 (2018 18:45)  HR: 110 (2018 18:45) (103 - 110)  BP: 133/83 (2018 18:45) (106/68 - 160/69)  BP(mean): --  RR: 16 (2018 18:45) (14 - 16)  SpO2: 96% (2018 18:45) (96% - 100%)    GENERAL PHYSICAL EXAM:  General:  Appears stated age, well-groomed, well-nourished, no distress  HEENT:  NC/AT, patent nares w/ pink mucosa, OP clear w/o lesions, PERRL, EOMI, conjunctivae clear, no thyromegaly, nodules, adenopathy, no JVD  Chest:  Full & symmetric excursion, no increased effort, breath sounds clear  Cardiovascular:  Regular rhythm, S1, S2, no murmur/rub/S3/S4, no carotid/femoral/abdominal bruit, radial/pedal pulses 2+, no edema  Abdomen:  Soft, non-tender, non-distended, normoactive bowel sounds, no HSM  Extremities:  Gait & station:   Digits:   Nails:   Joints, Bones, Muscles:   ROM:   Stability:  Skin:  No rash/erythema/ecchymoses/petechiae/wounds/abscess/warm/dry  Musculoskeletal:  Full ROM in all joints w/o swelling/tenderness/effusion    NEUROLOGICAL EXAM:  HENT:  Normocephalic head; atraumatic head.  Neck supple.  ENT: normal looking.  Mental State:    Alert.     open eyes does not follws commnads s/p uti s/p metabolic encpehlaaithy   arm legs contracted no chorea noticed                         7.4    7.94  )-----------( 371      ( 2018 06:19 )             22.4         140  |  105  |  7   ----------------------------<  88  3.7   |  26  |  0.27<L>    Ca    9.5      2018 06:19        Urinalysis Basic - ( 2018 14:55 )    Color: Yellow / Appearance: Clear / S.015 / pH: x  Gluc: x / Ketone: Negative  / Bili: Negative / Urobili: 4 mg/dL   Blood: x / Protein: 100 mg/dL / Nitrite: Negative   Leuk Esterase: Moderate / RBC: 6-10 /HPF / WBC >50   Sq Epi: x / Non Sq Epi: x / Bacteria: Many   ass=  awake open eyes does notf ollws commnads s/p uti kidney stone s/p ureteral stent nop chorea  arm legs contractted       RADIOLOGY & ADDITIONAL STUDIES:        Recommendations for sub acute rehab

## 2018-02-01 NOTE — PROGRESS NOTE ADULT - SUBJECTIVE AND OBJECTIVE BOX
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  Pt has been more lethargic.  no n/v (12 Jan 2018 14:07)      INTERVAL HPI/OVERNIGHT EVENTS:  No new c/o T max 100.3. Off all antibiotics. MS at baseline    MEDICATIONS  (STANDING):  ascorbic acid 500 milliGRAM(s) Oral two times a day  dextrose 5%. 1000 milliLiter(s) (63 mL/Hr) IV Continuous <Continuous>  enoxaparin Injectable 40 milliGRAM(s) SubCutaneous daily  ferrous    sulfate 325 milliGRAM(s) Oral daily  hydrocortisone 2.5% Cream 1 Application(s) Topical two times a day  lactulose Syrup 10 Gram(s) Oral two times a day  mirtazapine 15 milliGRAM(s) Oral at bedtime  multivitamin/minerals 1 Tablet(s) Oral daily  potassium chloride   Powder 20 milliEquivalent(s) Oral daily  senna 2 Tablet(s) Oral at bedtime    MEDICATIONS  (PRN):  acetaminophen  Suppository 650 milliGRAM(s) Rectal every 6 hours PRN For Temp greater than 38 C (100.4 F)  magnesium hydroxide Suspension 30 milliLiter(s) Oral daily PRN Constipation      FAMILY HISTORY:  No pertinent family history in first degree relatives      Allergies    No Known Allergies    Intolerances        PMH/PSH:  Decubital ulcer  Hemiparesis  Failure to thrive in adult  UTI (urinary tract infection)  Dehydration  Rhabdomyolysis  HTN (hypertension)  Neurogenic bladder disorder  Pressure ulcer of sacrum  UTI (urinary tract infection)  Embolism  DVT (deep venous thrombosis)  Anemia  Pneumonia  DM (diabetes mellitus)  High cholesterol  Ashli chorea  No pertinent past medical history  No significant past surgical history        REVIEW OF SYSTEMS:  CONSTITUTIONAL: No weight loss  EYES: No eye pain, visual disturbances, or discharge  ENMT:  No difficulty hearing, tinnitus, vertigo; No sinus or throat pain  NECK: No pain or stiffness  BREASTS: No pain, masses, or nipple discharge  RESPIRATORY: No cough, wheezing, chills or hemoptysis; No shortness of breath  CARDIOVASCULAR: No chest pain, palpitations, dizziness, or leg swelling  GASTROINTESTINAL: No abdominal or epigastric pain. No nausea, vomiting, or hematemesis; No diarrhea or constipation. No melena or hematochezia.  GENITOURINARY: No dysuria, frequency, hematuria, or incontinence  NEUROLOGICAL: No headaches, memory loss, loss of strength, numbness, or tremors  SKIN: No itching, burning, rashes, or lesions   LYMPH NODES: No enlarged glands  ENDOCRINE: No heat or cold intolerance; No hair loss  MUSCULOSKELETAL: No joint pain or swelling; No muscle, back, or extremity pain  PSYCHIATRIC: No depression, anxiety, mood swings, or difficulty sleeping  HEME/LYMPH: No easy bruising, or bleeding gums  ALLERGY AND IMMUNOLOGIC: No hives or eczema    Vital Signs Last 24 Hrs  T(C): 37.8 (01 Feb 2018 11:22), Max: 37.9 (31 Jan 2018 22:57)  T(F): 100 (01 Feb 2018 11:22), Max: 100.3 (31 Jan 2018 22:57)  HR: 108 (01 Feb 2018 11:22) (103 - 108)  BP: 106/68 (01 Feb 2018 11:22) (106/68 - 160/69)  BP(mean): --  RR: 16 (01 Feb 2018 11:22) (14 - 16)  SpO2: 100% (01 Feb 2018 11:22) (95% - 100%)    PHYSICAL EXAM:  GENERAL: NAD, well-groomed, well-developed  HEAD:  Atraumatic, Normocephalic  EYES: EOMI, PERRLA, conjunctiva and sclera clear  NECK: Supple, No JVD, Normal thyroid  NERVOUS SYSTEM:  Alert & Oriented   CHEST/LUNG: Clear to percussion bilaterally; No rales, rhonchi, wheezing, or rubs  HEART: Regular rate and rhythm; No murmurs, rubs, or gallops  ABDOMEN: Soft, Nontender, Nondistended; Bowel sounds present  EXTREMITIES:  2+ Peripheral Pulses, No clubbing, cyanosis, or edema  LYMPH: No lymphadenopathy noted  SKIN: No rashes or lesions    LAB                          7.4    7.94  )-----------( 371      ( 31 Jan 2018 06:19 )             22.4       CBC:  01-31 @ 06:19  WBC 7.94   Hgb 7.4   Hct 22.4   Plts 371  MCV 73.7  01-29 @ 05:14  WBC 10.91   Hgb 7.8   Hct 23.3   Plts 414  MCV 73.3  01-28 @ 07:59  WBC 12.45   Hgb 7.6   Hct 23.0   Plts 478  MCV 74.0  01-27 @ 07:51  WBC 13.04   Hgb 7.6   Hct 23.1   Plts 524  MCV 74.5  01-26 @ 07:32  WBC 11.36   Hgb 8.1   Hct 24.2   Plts 544  MCV 75.2      Chemistry:  01-31 @ 06:19  Na+ 140  K+ 3.7  Cl- 105  CO2 26  BUN 7  Cr 0.27     01-29 @ 05:14  Na+ 135  K+ 4.1  Cl- 100  CO2 27  BUN 7  Cr 0.37     01-28 @ 07:59  Na+ 135  K+ 3.9  Cl- 103  CO2 26  BUN 8  Cr 0.33     01-27 @ 07:51  Na+ 138  K+ 3.7  Cl- 105  CO2 29  BUN 8  Cr 0.42     01-26 @ 07:32  Na+ 135  K+ 3.8  Cl- 101  CO2 29  BUN 9  Cr 0.42         Glucose, Serum: 88 mg/dL (01-31 @ 06:19)  Glucose, Serum: 112 mg/dL (01-29 @ 05:14)  Glucose, Serum: 115 mg/dL (01-28 @ 07:59)  Glucose, Serum: 103 mg/dL (01-27 @ 07:51)  Glucose, Serum: 117 mg/dL (01-26 @ 07:32)      31 Jan 2018 06:19    140    |  105    |  7      ----------------------------<  88     3.7     |  26     |  0.27   29 Jan 2018 05:14    135    |  100    |  7      ----------------------------<  112    4.1     |  27     |  0.37   28 Jan 2018 07:59    135    |  103    |  8      ----------------------------<  115    3.9     |  26     |  0.33   27 Jan 2018 07:51    138    |  105    |  8      ----------------------------<  103    3.7     |  29     |  0.42   26 Jan 2018 07:32    135    |  101    |  9      ----------------------------<  117    3.8     |  29     |  0.42     Ca    9.5        31 Jan 2018 06:19  Ca    9.3        29 Jan 2018 05:14  Ca    9.2        28 Jan 2018 07:59  Ca    9.2        27 Jan 2018 07:51  Ca    9.6        26 Jan 2018 07:32  Phos  2.5       29 Jan 2018 05:14  Mg     2.1       29 Jan 2018 05:14    TPro  8.3    /  Alb  1.9    /  TBili  0.3    /  DBili  x      /  AST  19     /  ALT  18     /  AlkPhos  67     27 Jan 2018 07:51  TPro  8.9    /  Alb  2.1    /  TBili  0.4    /  DBili  x      /  AST  29     /  ALT  23     /  AlkPhos  75     26 Jan 2018 07:32              CAPILLARY BLOOD GLUCOSE              RADIOLOGY & ADDITIONAL TESTS:    Imaging Personally Reviewed:  [ ] YES  [ ] NO    Consultant(s) Notes Reviewed:  [ ] YES  [ ] NO    Care Discussed with Consultants/Other Providers [ ] YES  [ ] NO

## 2018-02-01 NOTE — CHART NOTE - NSCHARTNOTEFT_GEN_A_CORE
Assessment: 62 y.o. F pt c [place], contracted, lethargic, garbled speech.     Factors impacting intake: [ ] none [ ] nausea  [ ] vomiting [ ] diarrhea [ ] constipation  [ ]chewing problems [x ] swallowing issues  [x ] other: difficulty feeding self, lethargic     Diet Presciption: Diet, Dysphagia 1 Pureed-Honey Consistency Fluid:   No Carb Prosource (1pkg = 15gms Protein)     Qty per Day:  daily (01-25-18 @ 16:22)    Intake:     Current Weight: no new wts since 1/28: 64 kg  % Weight Change: gained 3 kg x 2 wks     Follow-up Nutrition focused physical exam conducted; Subcutaneous fat loss: [mild] Orbital fat pads region, [mild ]Buccal fat region, [moderate ]Triceps region,  [moderate ]Ribs region.  Muscle wasting: [moderate ]Temples region, [severe ]Clavicle region, [severe ]Shoulder region, [severe ]Scapula region, [WDL ]Interosseous region,  [mild ]thigh region, [moderate ]Calf region    Pertinent Medications: MEDICATIONS  (STANDING):  ascorbic acid 500 milliGRAM(s) Oral two times a day  dextrose 5%. 1000 milliLiter(s) (63 mL/Hr) IV Continuous <Continuous>  enoxaparin Injectable 40 milliGRAM(s) SubCutaneous daily  ferrous    sulfate 325 milliGRAM(s) Oral daily  hydrocortisone 2.5% Cream 1 Application(s) Topical two times a day  lactulose Syrup 10 Gram(s) Oral two times a day  mirtazapine 15 milliGRAM(s) Oral at bedtime  multivitamin/minerals 1 Tablet(s) Oral daily  potassium chloride   Powder 20 milliEquivalent(s) Oral daily  senna 2 Tablet(s) Oral at bedtime    MEDICATIONS  (PRN):  acetaminophen  Suppository 650 milliGRAM(s) Rectal every 6 hours PRN For Temp greater than 38 C (100.4 F)  magnesium hydroxide Suspension 30 milliLiter(s) Oral daily PRN Constipation    Pertinent Labs:    CAPILLARY BLOOD GLUCOSE        Skin: stage IV sacrum, stage IV L lateral malleous  No edema noted    Estimated Needs:   [x ] no change since previous assessment  [ ] recalculated:     Previous Nutrition Diagnosis:   [ ] Inadequate Energy Intake [ ]Inadequate Oral Intake [ ] Excessive Energy Intake   [ ] Underweight [ ] Increased Nutrient Needs [ ] Overweight/Obesity   [ ] Altered GI Function [ ] Unintended Weight Loss [ ] Food & Nutrition Related Knowledge Deficit [x ] Severe Malnutrition in context of chronic illness     Nutrition Diagnosis is [x ] ongoing  [ ] resolved [ ] not applicable     New Nutrition Diagnosis: [ x] not applicable       Interventions:   Recommend  [ ] Change Diet To:  [ ] Nutrition Supplement  [ ] Nutrition Support  [x ] Other: continue current nutrition plan of care    Monitoring and Evaluation:   [x ] PO intake [ x ] Tolerance to diet prescription [ x ] weights [ x ] labs[ x ] follow up per protocol  [ ] other: Assessment: 62 y.o. F pt c [place], contracted, lethargic, garbled speech.     Factors impacting intake: [ ] none [ ] nausea  [ ] vomiting [ ] diarrhea [ ] constipation  [ ]chewing problems [x ] swallowing issues  [x ] other: difficulty feeding self, lethargic     Diet Presciption: Diet, Dysphagia 1 Pureed-Honey Consistency Fluid:   No Carb Prosource (1pkg = 15gms Protein)     Qty per Day:  daily (01-25-18 @ 16:22)    Intake: lunch tray observed; 50% consumed. pt reported she was still hungry, awaiting CNA return.     Current Weight: no new wts since 1/28: 64 kg  % Weight Change: gained 3 kg x 2 wks     Follow-up Nutrition focused physical exam conducted; Subcutaneous fat loss: [mild] Orbital fat pads region, [mild ]Buccal fat region, [moderate ]Triceps region,  [moderate ]Ribs region.  Muscle wasting: [moderate ]Temples region, [severe ]Clavicle region, [severe ]Shoulder region, [severe ]Scapula region, [WDL ]Interosseous region,  [mild ]thigh region, [moderate ]Calf region    Pertinent Medications: MEDICATIONS  (STANDING):  ascorbic acid 500 milliGRAM(s) Oral two times a day  dextrose 5%. 1000 milliLiter(s) (63 mL/Hr) IV Continuous <Continuous>  enoxaparin Injectable 40 milliGRAM(s) SubCutaneous daily  ferrous    sulfate 325 milliGRAM(s) Oral daily  hydrocortisone 2.5% Cream 1 Application(s) Topical two times a day  lactulose Syrup 10 Gram(s) Oral two times a day  mirtazapine 15 milliGRAM(s) Oral at bedtime  multivitamin/minerals 1 Tablet(s) Oral daily  potassium chloride   Powder 20 milliEquivalent(s) Oral daily  senna 2 Tablet(s) Oral at bedtime    MEDICATIONS  (PRN):  acetaminophen  Suppository 650 milliGRAM(s) Rectal every 6 hours PRN For Temp greater than 38 C (100.4 F)  magnesium hydroxide Suspension 30 milliLiter(s) Oral daily PRN Constipation    Pertinent Labs:    CAPILLARY BLOOD GLUCOSE        Skin: stage IV sacrum, stage IV L lateral malleous  No edema noted    Estimated Needs:   [x ] no change since previous assessment  [ ] recalculated:     Previous Nutrition Diagnosis:   [ ] Inadequate Energy Intake [ ]Inadequate Oral Intake [ ] Excessive Energy Intake   [ ] Underweight [ ] Increased Nutrient Needs [ ] Overweight/Obesity   [ ] Altered GI Function [ ] Unintended Weight Loss [ ] Food & Nutrition Related Knowledge Deficit [x ] Severe Malnutrition in context of chronic illness     Nutrition Diagnosis is [x ] ongoing  [ ] resolved [ ] not applicable     New Nutrition Diagnosis: [ x] not applicable       Interventions:   Recommend  [ ] Change Diet To:  [ ] Nutrition Supplement  [ ] Nutrition Support  [x ] Other: continue current nutrition plan of care    Monitoring and Evaluation:   [x ] PO intake [ x ] Tolerance to diet prescription [ x ] weights [ x ] labs[ x ] follow up per protocol  [ ] other: Assessment: 62 y.o. F pt c [place], contracted, lethargic, garbled speech.     Factors impacting intake: [ ] none [ ] nausea  [ ] vomiting [ ] diarrhea [ ] constipation  [ ]chewing problems [x ] swallowing issues  [x ] other: difficulty feeding self, lethargic     Diet Presciption: Diet, Dysphagia 1 Pureed-Honey Consistency Fluid:   No Carb Prosource (1pkg = 15gms Protein)     Qty per Day:  daily (01-25-18 @ 16:22)    Intake: lunch tray observed; 65% consumed. total feed.     Current Weight: no new wts since 1/28: 64 kg  % Weight Change: gained 3 kg x 2 wks     Follow-up Nutrition focused physical exam conducted; Subcutaneous fat loss: [mild] Orbital fat pads region, [mild ]Buccal fat region, [moderate ]Triceps region,  [moderate ]Ribs region.  Muscle wasting: [moderate ]Temples region, [severe ]Clavicle region, [severe ]Shoulder region, [severe ]Scapula region, [WDL ]Interosseous region,  [mild ]thigh region, [moderate ]Calf region    Pertinent Medications: MEDICATIONS  (STANDING):  ascorbic acid 500 milliGRAM(s) Oral two times a day  dextrose 5%. 1000 milliLiter(s) (63 mL/Hr) IV Continuous <Continuous>  enoxaparin Injectable 40 milliGRAM(s) SubCutaneous daily  ferrous    sulfate 325 milliGRAM(s) Oral daily  hydrocortisone 2.5% Cream 1 Application(s) Topical two times a day  lactulose Syrup 10 Gram(s) Oral two times a day  mirtazapine 15 milliGRAM(s) Oral at bedtime  multivitamin/minerals 1 Tablet(s) Oral daily  potassium chloride   Powder 20 milliEquivalent(s) Oral daily  senna 2 Tablet(s) Oral at bedtime    MEDICATIONS  (PRN):  acetaminophen  Suppository 650 milliGRAM(s) Rectal every 6 hours PRN For Temp greater than 38 C (100.4 F)  magnesium hydroxide Suspension 30 milliLiter(s) Oral daily PRN Constipation    Pertinent Labs:    CAPILLARY BLOOD GLUCOSE        Skin: stage IV sacrum, stage IV L lateral malleous  No edema noted    Estimated Needs:   [x ] no change since previous assessment  [ ] recalculated:     Previous Nutrition Diagnosis:   [ ] Inadequate Energy Intake [ ]Inadequate Oral Intake [ ] Excessive Energy Intake   [ ] Underweight [ ] Increased Nutrient Needs [ ] Overweight/Obesity   [ ] Altered GI Function [ ] Unintended Weight Loss [ ] Food & Nutrition Related Knowledge Deficit [x ] Severe Malnutrition in context of chronic illness     Nutrition Diagnosis is [x ] ongoing  [ ] resolved [ ] not applicable     New Nutrition Diagnosis: [ x] not applicable       Interventions:   Recommend  [ ] Change Diet To:  [ ] Nutrition Supplement  [ ] Nutrition Support  [x ] Other: continue current nutrition plan of care    Monitoring and Evaluation:   [x ] PO intake [ x ] Tolerance to diet prescription [ x ] weights [ x ] labs[ x ] follow up per protocol  [ ] other:

## 2018-02-01 NOTE — PROGRESS NOTE ADULT - PROBLEM SELECTOR PLAN 1
All antibiotics d/c'd , T nax 101.5  [id number]  5:40 PM, T max 100.6 [id number]  5 PM,  100.6 1/30/18 11am  100.3 [id number] WBC 7.94 , precalcitonin 0.13 , s/p cystoscopy. Indium scan negative. appreciate ID note  -

## 2018-02-01 NOTE — PROGRESS NOTE ADULT - SUBJECTIVE AND OBJECTIVE BOX
Patient seen and examined bedside resting comfortably.  No complaints offered. No acute overnight event. Tmax 100.6/24hrs.  Denies chest pain, dyspnea, cough, abdominal pain, N/V.     T(F): 97.5 (02-01-18 @ 05:04), Max: 100.6 (01-31-18 @ 11:15)  HR: 103 (02-01-18 @ 05:04) (103 - 112)  BP: 160/69 (02-01-18 @ 05:04) (109/71 - 160/69)  RR: 14 (02-01-18 @ 05:04) (14 - 18)  SpO2: 98% (02-01-18 @ 05:04) (95% - 98%)    PHYSICAL EXAM:    General: Resting comfortably, NAD. Alert and awake.   Lung: Respirations nonlabored, clear breath sounds b/l.   Abdomen: Soft, NTND  Extremities: Contracted upper and lower extremities b/l. Calf soft, non tedner b/l.   : Obrien catheter in place draining clear, yellow urine, output: 1000cc/24hrs    LABS:                        7.4    7.94  )-----------( 371      ( 31 Jan 2018 06:19 )             22.4   01-31    140  |  105  |  7   ----------------------------<  88  3.7   |  26  |  0.27<L>    Ca    9.5      31 Jan 2018 06:19      I&O's Detail    30 Jan 2018 07:01  -  31 Jan 2018 07:00  --------------------------------------------------------  IN:    dextrose 5%.: 378 mL    Oral Fluid: 205 mL  Total IN: 583 mL    OUT:    Indwelling Catheter - Urethral: 1450 mL  Total OUT: 1450 mL    Total NET: -867 mL      31 Jan 2018 07:01  -  01 Feb 2018 06:26  --------------------------------------------------------  IN:    dextrose 5%.: 756 mL    Oral Fluid: 270 mL  Total IN: 1026 mL    OUT:    Indwelling Catheter - Urethral: 1000 mL  Total OUT: 1000 mL    Total NET: 26 mL    Impression: 62 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    Plan:  -continue obrien catheter for retention, monitor urine output and quality  -antipyretics PRN  -ID input noted, stable off ABX, supportive measures for +RSV  -f/u am labs, monitor h/h   -continue medical management and supportive care  -Will d/w Dr. Cleaning

## 2018-02-02 LAB
ALBUMIN SERPL ELPH-MCNC: 1.8 G/DL — LOW (ref 3.3–5)
ALP SERPL-CCNC: 66 U/L — SIGNIFICANT CHANGE UP (ref 40–120)
ALT FLD-CCNC: 18 U/L — SIGNIFICANT CHANGE UP (ref 12–78)
ANION GAP SERPL CALC-SCNC: 7 MMOL/L — SIGNIFICANT CHANGE UP (ref 5–17)
APPEARANCE UR: ABNORMAL
AST SERPL-CCNC: 18 U/L — SIGNIFICANT CHANGE UP (ref 15–37)
BILIRUB SERPL-MCNC: 0.3 MG/DL — SIGNIFICANT CHANGE UP (ref 0.2–1.2)
BILIRUB UR-MCNC: NEGATIVE — SIGNIFICANT CHANGE UP
BUN SERPL-MCNC: 9 MG/DL — SIGNIFICANT CHANGE UP (ref 7–23)
CALCIUM SERPL-MCNC: 9.7 MG/DL — SIGNIFICANT CHANGE UP (ref 8.5–10.1)
CHLORIDE SERPL-SCNC: 105 MMOL/L — SIGNIFICANT CHANGE UP (ref 96–108)
CO2 SERPL-SCNC: 27 MMOL/L — SIGNIFICANT CHANGE UP (ref 22–31)
COLOR SPEC: YELLOW — SIGNIFICANT CHANGE UP
CREAT SERPL-MCNC: 0.35 MG/DL — LOW (ref 0.5–1.3)
CULTURE RESULTS: SIGNIFICANT CHANGE UP
DIFF PNL FLD: ABNORMAL
GLUCOSE SERPL-MCNC: 90 MG/DL — SIGNIFICANT CHANGE UP (ref 70–99)
GLUCOSE UR QL: NEGATIVE MG/DL — SIGNIFICANT CHANGE UP
HCT VFR BLD CALC: 22.4 % — LOW (ref 34.5–45)
HGB BLD-MCNC: 7.5 G/DL — LOW (ref 11.5–15.5)
KETONES UR-MCNC: NEGATIVE — SIGNIFICANT CHANGE UP
LEUKOCYTE ESTERASE UR-ACNC: ABNORMAL
MCHC RBC-ENTMCNC: 24.8 PG — LOW (ref 27–34)
MCHC RBC-ENTMCNC: 33.5 GM/DL — SIGNIFICANT CHANGE UP (ref 32–36)
MCV RBC AUTO: 73.9 FL — LOW (ref 80–100)
NITRITE UR-MCNC: NEGATIVE — SIGNIFICANT CHANGE UP
NRBC # BLD: 0 /100 WBCS — SIGNIFICANT CHANGE UP (ref 0–0)
PH UR: 7 — SIGNIFICANT CHANGE UP (ref 5–8)
PLATELET # BLD AUTO: 373 K/UL — SIGNIFICANT CHANGE UP (ref 150–400)
POTASSIUM SERPL-MCNC: 3.7 MMOL/L — SIGNIFICANT CHANGE UP (ref 3.5–5.3)
POTASSIUM SERPL-SCNC: 3.7 MMOL/L — SIGNIFICANT CHANGE UP (ref 3.5–5.3)
PROCALCITONIN SERPL-MCNC: <0.05 NG/ML — SIGNIFICANT CHANGE UP (ref 0–0.04)
PROT SERPL-MCNC: 8 GM/DL — SIGNIFICANT CHANGE UP (ref 6–8.3)
PROT UR-MCNC: 100 MG/DL
RBC # BLD: 3.03 M/UL — LOW (ref 3.8–5.2)
RBC # FLD: 17.1 % — HIGH (ref 10.3–14.5)
SODIUM SERPL-SCNC: 139 MMOL/L — SIGNIFICANT CHANGE UP (ref 135–145)
SP GR SPEC: 1.01 — SIGNIFICANT CHANGE UP (ref 1.01–1.02)
SPECIMEN SOURCE: SIGNIFICANT CHANGE UP
UROBILINOGEN FLD QL: NEGATIVE MG/DL — SIGNIFICANT CHANGE UP
WBC # BLD: 7.33 K/UL — SIGNIFICANT CHANGE UP (ref 3.8–10.5)
WBC # FLD AUTO: 7.33 K/UL — SIGNIFICANT CHANGE UP (ref 3.8–10.5)

## 2018-02-02 PROCEDURE — 71045 X-RAY EXAM CHEST 1 VIEW: CPT | Mod: 26

## 2018-02-02 RX ORDER — CEFEPIME 1 G/1
1000 INJECTION, POWDER, FOR SOLUTION INTRAMUSCULAR; INTRAVENOUS EVERY 8 HOURS
Qty: 0 | Refills: 0 | Status: DISCONTINUED | OUTPATIENT
Start: 2018-02-02 | End: 2018-02-07

## 2018-02-02 RX ORDER — CEFEPIME 1 G/1
1000 INJECTION, POWDER, FOR SOLUTION INTRAMUSCULAR; INTRAVENOUS ONCE
Qty: 0 | Refills: 0 | Status: COMPLETED | OUTPATIENT
Start: 2018-02-02 | End: 2018-02-02

## 2018-02-02 RX ORDER — CEFEPIME 1 G/1
INJECTION, POWDER, FOR SOLUTION INTRAMUSCULAR; INTRAVENOUS
Qty: 0 | Refills: 0 | Status: DISCONTINUED | OUTPATIENT
Start: 2018-02-02 | End: 2018-02-07

## 2018-02-02 RX ADMIN — LACTULOSE 10 GRAM(S): 10 SOLUTION ORAL at 17:24

## 2018-02-02 RX ADMIN — SODIUM CHLORIDE 63 MILLILITER(S): 9 INJECTION, SOLUTION INTRAVENOUS at 12:08

## 2018-02-02 RX ADMIN — Medication 650 MILLIGRAM(S): at 00:27

## 2018-02-02 RX ADMIN — Medication 500 MILLIGRAM(S): at 17:25

## 2018-02-02 RX ADMIN — SENNA PLUS 2 TABLET(S): 8.6 TABLET ORAL at 21:37

## 2018-02-02 RX ADMIN — CEFEPIME 100 MILLIGRAM(S): 1 INJECTION, POWDER, FOR SOLUTION INTRAMUSCULAR; INTRAVENOUS at 21:37

## 2018-02-02 RX ADMIN — LACTULOSE 10 GRAM(S): 10 SOLUTION ORAL at 05:06

## 2018-02-02 RX ADMIN — CEFEPIME 100 MILLIGRAM(S): 1 INJECTION, POWDER, FOR SOLUTION INTRAMUSCULAR; INTRAVENOUS at 12:06

## 2018-02-02 RX ADMIN — Medication 20 MILLIEQUIVALENT(S): at 12:10

## 2018-02-02 RX ADMIN — Medication 325 MILLIGRAM(S): at 12:09

## 2018-02-02 RX ADMIN — MIRTAZAPINE 15 MILLIGRAM(S): 45 TABLET, ORALLY DISINTEGRATING ORAL at 21:37

## 2018-02-02 RX ADMIN — Medication 500 MILLIGRAM(S): at 05:06

## 2018-02-02 RX ADMIN — ENOXAPARIN SODIUM 40 MILLIGRAM(S): 100 INJECTION SUBCUTANEOUS at 12:08

## 2018-02-02 RX ADMIN — Medication 1 TABLET(S): at 12:09

## 2018-02-02 RX ADMIN — SODIUM CHLORIDE 63 MILLILITER(S): 9 INJECTION, SOLUTION INTRAVENOUS at 00:27

## 2018-02-02 RX ADMIN — Medication 1 APPLICATION(S): at 17:26

## 2018-02-02 NOTE — PROGRESS NOTE ADULT - SUBJECTIVE AND OBJECTIVE BOX
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  Pt has been more lethargic and barely communicable. Low grade fever still progressing.    Upon evaluation, pt look alert and admit she denies any symptoms. Family at bedside. Pt looks lethargic and tired , she is on obrien.          Allergies    No Known Allergies    Intolerances        MEDICATIONS  (STANDING):  ascorbic acid 500 milliGRAM(s) Oral two times a day  dextrose 5%. 1000 milliLiter(s) (63 mL/Hr) IV Continuous <Continuous>  enoxaparin Injectable 40 milliGRAM(s) SubCutaneous daily  ferrous    sulfate 325 milliGRAM(s) Oral daily  hydrocortisone 2.5% Cream 1 Application(s) Topical two times a day  lactulose Syrup 10 Gram(s) Oral two times a day  mirtazapine 15 milliGRAM(s) Oral at bedtime  multivitamin/minerals 1 Tablet(s) Oral daily  potassium chloride   Powder 20 milliEquivalent(s) Oral daily  senna 2 Tablet(s) Oral at bedtime          REVIEW OF SYSTEMS:    CONSTITUTIONAL: No fever, chills, weight loss, or fatigue  HEENT: No sore throat, runny nose, ear ache  RESPIRATORY: No cough, wheezing, No shortness of breath  CARDIOVASCULAR: No chest pain, palpitations, dizziness  GASTROINTESTINAL: No abdominal pain. No nausea, vomiting, diarrhea  GENITOURINARY: No dysuria, increase frequency, hematuria, or incontinence  NEUROLOGICAL: No headaches, memory loss, loss of strength, numbness, or tremors, no weakness  EXTREMITY: No pedal edema BLE  SKIN: No itching, burning, rashes, or lesions     VITAL SIGNS:  T(C): 36.5 (18 @ 05:21), Max: 38.2 (18 @ 21:30)  T(F): 97.7 (18 @ :21), Max: 100.8 (18 @ 21:30)  HR: 100 (18 @ 05:21) (100 - 116)  BP: 129/75 (18 @ 05:21) (106/68 - 140/74)  RR: 14 (18 @ 05:21) (14 - 16)  SpO2: 98% (18 @ 05:21) (96% - 100%)  Wt(kg): --    PHYSICAL EXAM:    GENERAL: not in any distress, looking tired and lethargic, however denies any symptoms.   HEENT: Neck is supple, normocephalic, atraumatic   CHEST/LUNG: Clear to percussion bilaterally; No rales, rhonchi, wheezing  HEART: Regular rate and rhythm; No murmurs, rubs, or gallops  ABDOMEN: Soft, Nontender, Nondistended; Bowel sounds present, no rebound   EXTREMITIES:  2+ Peripheral Pulses, No clubbing, cyanosis, or edema  GENITOURINARY: obrien in , there is some purulent discharge in obrien    SKIN: No rashes or lesions  BACK: no pressor sore   NERVOUS SYSTEM:  Alert , limited due to underlying mental status     LABS:                         7.5    7.33  )-----------( 373      ( 2018 06:39 )             22.4         139  |  105  |  9   ----------------------------<  90  3.7   |  27  |  0.35<L>    Ca    9.7      2018 06:39    TPro  8.0  /  Alb  1.8<L>  /  TBili  0.3  /  DBili  x   /  AST  18  /  ALT  18  /  AlkPhos  66      LIVER FUNCTIONS - ( 2018 06:39 )  Alb: 1.8 g/dL / Pro: 8.0 gm/dL / ALK PHOS: 66 U/L / ALT: 18 U/L / AST: 18 U/L / GGT: x             Urinalysis Basic - ( 2018 14:55 )    Color: Yellow / Appearance: Clear / S.015 / pH: x  Gluc: x / Ketone: Negative  / Bili: Negative / Urobili: 4 mg/dL   Blood: x / Protein: 100 mg/dL / Nitrite: Negative   Leuk Esterase: Moderate / RBC: 6-10 /HPF / WBC >50   Sq Epi: x / Non Sq Epi: x / Bacteria: Many                          Culture Results:   No growth to date. ( @ 12:22)  Culture Results:   No growth at 5 days. ( @ 11:41)  Culture Results:   No growth at 5 days. ( @ 11:41)                Radiology:

## 2018-02-02 NOTE — PROGRESS NOTE ADULT - SUBJECTIVE AND OBJECTIVE BOX
Gastroenterology  Patient seen and examined bedside resting comfortably.  No complaints offered. febrile TMax: 100.8    T(F): 97.7 (02-02-18 @ 05:21), Max: 100.8 (02-01-18 @ 21:30)  HR: 100 (02-02-18 @ 05:21) (100 - 116)  BP: 129/75 (02-02-18 @ 05:21) (129/75 - 140/74)  RR: 14 (02-02-18 @ 05:21) (14 - 16)  SpO2: 98% (02-02-18 @ 05:21) (96% - 98%)  Wt(kg): --  CAPILLARY BLOOD GLUCOSE      PHYSICAL EXAM:  General: NAD, WDWN.   Neuro:  Alert & responsive  HEENT: NCAT, EOMI, conjunctiva clear  CV: +S1+S2 regular rate and rhythm  Lung: clear to ausculation bilaterally, respirations nonlabored, good inspiratory effort  Abdomen: soft, Non tender, No Distension. Normal active BS  Extremities: no pedal edema or calf tenderness noted   : Obrien catheter in place draining clear, yellow urine, output: 1200cc/24hrs    LABS:                        7.5    7.33  )-----------( 373      ( 02 Feb 2018 06:39 )             22.4     02-02    139  |  105  |  9   ----------------------------<  90  3.7   |  27  |  0.35<L>    Ca    9.7      02 Feb 2018 06:39    TPro  8.0  /  Alb  1.8<L>  /  TBili  0.3  /  DBili  x   /  AST  18  /  ALT  18  /  AlkPhos  66  02-02    LIVER FUNCTIONS - ( 02 Feb 2018 06:39 )  Alb: 1.8 g/dL / Pro: 8.0 gm/dL / ALK PHOS: 66 U/L / ALT: 18 U/L / AST: 18 U/L / GGT: x             I&O's Detail    01 Feb 2018 07:01  -  02 Feb 2018 07:00  --------------------------------------------------------  IN:    dextrose 5%.: 1512 mL    Oral Fluid: 200 mL  Total IN: 1712 mL    OUT:    Indwelling Catheter - Urethral: 1200 mL  Total OUT: 1200 mL    Total NET: 512 mL        02-02 @ 06:39    139 | 105 | 9  /9.7 | -- | --  _______________________/  3.7 | 27 | 0.35                           \par       Impression: 62 year old female with PMH Ashli's chorea, urinary retention due to neurogenic bladder, hemiparesis, decubitus ulcers admitted with sepsis 2/2 UTI and left hydronephrosis POD#14 s/p Left Ureteroscopy, with laser lithotripsy and stent insertion, postop course c/b persistent fevers, and leukocytosis (resolved).    Plan:  -continue obrien catheter for retention, monitor urine output and quality  -antipyretics PRN  -ID input noted, stable off ABX, supportive measures for +RSV  -f/u am labs, monitor h/h   -continue medical management and supportive care  -Will d/w Dr. Cleaning

## 2018-02-02 NOTE — PROGRESS NOTE ADULT - SUBJECTIVE AND OBJECTIVE BOX
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  Pt has been more lethargic.  no n/v (2018 14:07)      INTERVAL HPI/OVERNIGHT EVENTS:  MS unchanged. No new c/o. Off antibiotics. T max 100.8    MEDICATIONS  (STANDING):  ascorbic acid 500 milliGRAM(s) Oral two times a day  dextrose 5%. 1000 milliLiter(s) (63 mL/Hr) IV Continuous <Continuous>  enoxaparin Injectable 40 milliGRAM(s) SubCutaneous daily  ferrous    sulfate 325 milliGRAM(s) Oral daily  hydrocortisone 2.5% Cream 1 Application(s) Topical two times a day  lactulose Syrup 10 Gram(s) Oral two times a day  mirtazapine 15 milliGRAM(s) Oral at bedtime  multivitamin/minerals 1 Tablet(s) Oral daily  potassium chloride   Powder 20 milliEquivalent(s) Oral daily  senna 2 Tablet(s) Oral at bedtime    MEDICATIONS  (PRN):  acetaminophen  Suppository 650 milliGRAM(s) Rectal every 6 hours PRN For Temp greater than 38 C (100.4 F)  magnesium hydroxide Suspension 30 milliLiter(s) Oral daily PRN Constipation      FAMILY HISTORY:  No pertinent family history in first degree relatives      Allergies    No Known Allergies    Intolerances        PMH/PSH:  Decubital ulcer  Hemiparesis  Failure to thrive in adult  UTI (urinary tract infection)  Dehydration  Rhabdomyolysis  HTN (hypertension)  Neurogenic bladder disorder  Pressure ulcer of sacrum  UTI (urinary tract infection)  Embolism  DVT (deep venous thrombosis)  Anemia  Pneumonia  DM (diabetes mellitus)  High cholesterol  Hastings chorea  No pertinent past medical history  No significant past surgical history        REVIEW OF SYSTEMS:  CONSTITUTIONAL: No , weight loss  EYES: No eye pain, visual disturbances, or discharge  ENMT:  No difficulty hearing, tinnitus, vertigo; No sinus or throat pain  NECK: No pain or stiffness  BREASTS: No pain, masses, or nipple discharge  RESPIRATORY: No cough, wheezing, chills or hemoptysis; No shortness of breath  CARDIOVASCULAR: No chest pain, palpitations, dizziness, or leg swelling  GASTROINTESTINAL: No abdominal or epigastric pain. No nausea, vomiting, or hematemesis; No diarrhea or constipation. No melena or hematochezia.  GENITOURINARY: No dysuria, frequency, hematuria, or incontinence  NEUROLOGICAL: No headaches, memory loss,  numbness, or tremors  SKIN: No itching, burning, rashes, or lesions   LYMPH NODES: No enlarged glands  ENDOCRINE: No heat or cold intolerance; No hair loss  MUSCULOSKELETAL: No joint pain or swelling; No muscle, back, or extremity pain  PSYCHIATRIC: No depression, anxiety, mood swings, or difficulty sleeping  HEME/LYMPH: No easy bruising, or bleeding gums  ALLERGY AND IMMUNOLOGIC: No hives or eczema    Vital Signs Last 24 Hrs  T(C): 36.5 (2018 05:21), Max: 38.2 (2018 21:30)  T(F): 97.7 (2018 05:21), Max: 100.8 (2018 21:30)  HR: 100 (2018 05:) (100 - 116)  BP: 129/75 (2018 05:21) (106/68 - 140/74)  BP(mean): --  RR: 14 (2018 05:21) (14 - 16)  SpO2: 98% (2018 05:21) (96% - 100%)    PHYSICAL EXAM:  GENERAL: NAD, well-groomed, well-developed  HEAD:  Atraumatic, Normocephalic  EYES: EOMI, PERRLA, conjunctiva and sclera clear  NECK: Supple, No JVD, Normal thyroid  NERVOUS SYSTEM:  Alert & Oriented  CHEST/LUNG: Clear to percussion bilaterally; No rales, rhonchi, wheezing, or rubs  HEART: Regular rate and rhythm; No murmurs, rubs, or gallops  ABDOMEN: Soft, Nontender, Nondistended; Bowel sounds present  EXTREMITIES:  2+ Peripheral Pulses, No clubbing, cyanosis, or edema  LYMPH: No lymphadenopathy noted  SKIN: No rashes or lesions    LAB                          7.5    7.33  )-----------( 373      ( 2018 06:39 )             22.4       CBC:   @ 06:39  WBC 7.33   Hgb 7.5   Hct 22.4   Plts 373  MCV 73.9   @ 06:19  WBC 7.94   Hgb 7.4   Hct 22.4   Plts 371  MCV 73.7   @ 05:14  WBC 10.91   Hgb 7.8   Hct 23.3   Plts 414  MCV 73.3   @ 07:59  WBC 12.45   Hgb 7.6   Hct 23.0   Plts 478  MCV 74.0   @ 07:51  WBC 13.04   Hgb 7.6   Hct 23.1   Plts 524  MCV 74.5      Chemistry:   @ 06:39  Na+ 139  K+ 3.7  Cl- 105  CO2 27  BUN 9  Cr 0.35      @ 06:19  Na+ 140  K+ 3.7  Cl- 105  CO2 26  BUN 7  Cr 0.27      @ 05:14  Na+ 135  K+ 4.1  Cl- 100  CO2 27  BUN 7  Cr 0.37      07:59  Na+ 135  K+ 3.9  Cl- 103  CO2 26  BUN 8  Cr 0.33      @ 07:51  Na+ 138  K+ 3.7  Cl- 105  CO2 29  BUN 8  Cr 0.42         Glucose, Serum: 90 mg/dL ( @ 06:39)  Glucose, Serum: 88 mg/dL ( @ 06:19)  Glucose, Serum: 112 mg/dL ( @ 05:14)  Glucose, Serum: 115 mg/dL ( @ 07:59)  Glucose, Serum: 103 mg/dL ( @ 07:51)      2018 06:39    139    |  105    |  9      ----------------------------<  90     3.7     |  27     |  0.35   2018 06:19    140    |  105    |  7      ----------------------------<  88     3.7     |  26     |  0.27   2018 05:14    135    |  100    |  7      ----------------------------<  112    4.1     |  27     |  0.37   2018 07:59    135    |  103    |  8      ----------------------------<  115    3.9     |  26     |  0.33   2018 07:51    138    |  105    |  8      ----------------------------<  103    3.7     |  29     |  0.42     Ca    9.7        2018 06:39  Ca    9.5        2018 06:19  Ca    9.3        2018 05:14  Ca    9.2        2018 07:59  Ca    9.2        2018 07:51  Phos  2.5       2018 05:14  Mg     2.1       2018 05:14    TPro  8.0    /  Alb  1.8    /  TBili  0.3    /  DBili  x      /  AST  18     /  ALT  18     /  AlkPhos  66     2018 06:39  TPro  8.3    /  Alb  1.9    /  TBili  0.3    /  DBili  x      /  AST  19     /  ALT  18     /  AlkPhos  67     2018 07:51          Urinalysis Basic - ( 2018 14:55 )    Color: Yellow / Appearance: Clear / S.015 / pH: x  Gluc: x / Ketone: Negative  / Bili: Negative / Urobili: 4 mg/dL   Blood: x / Protein: 100 mg/dL / Nitrite: Negative   Leuk Esterase: Moderate / RBC: 6-10 /HPF / WBC >50   Sq Epi: x / Non Sq Epi: x / Bacteria: Many        CAPILLARY BLOOD GLUCOSE              RADIOLOGY & ADDITIONAL TESTS:    Imaging Personally Reviewed:  [ ] YES  [ ] NO    Consultant(s) Notes Reviewed:  [ ] YES  [ ] NO    Care Discussed with Consultants/Other Providers [ ] YES  [ ] NO

## 2018-02-02 NOTE — PROGRESS NOTE ADULT - PROBLEM SELECTOR PLAN 1
All antibiotics d/c'd , T max 100.8  WBC 7.33 , precalcitonin 0.13 , s/p cystoscopy. Indium scan negative. appreciate ID note  - Will discuss

## 2018-02-02 NOTE — PROGRESS NOTE ADULT - ASSESSMENT
Receurrent fever ,  lethargic   Pos rhino virus / recent ho of Sepsis from acute viral syndrome vs bacteremia vs pyelonephritis , left ureter stone   blood culture simple Ecoli , fairly sensitive e coli , resistant to cipro, source likely from pyelo  pt received zosyn from 1/12 to 16 and then ejnny from 1/16 to 28, already received compltated dose for complicated UTI and bacteremia and surveillance blood culture neg so far   Nuclear Bone scan negative for infection and OM   She was off antibiotics and was monitoring and recurring fever and ID is called back to follow up   i saw some purulent discharge in obrien and Urinalysis is not clear , cannot rule out recurrent complicated UTI this time as pt is also poor historian   breann restart ceftazidime for broad-spectrum coverage    CXR reviewed no consolidation   FU cultures   supportive management for viral syndrome   Thanks you for reconsult   we will FU.

## 2018-02-02 NOTE — PROGRESS NOTE ADULT - ASSESSMENT
Subjective Complaints:  Historian:         REVIEW OF SYSTEMS:  Eyes:  Good vision, no reported pain  ENT:  No sore throat, pain, runny nose, dysphagia  CV:  No pain, palpitatioins, hypo/hypertension  Resp:  No dyspnea, cough, tachypnea, wheezing  GI:  No pain, nausea, vomiting, diarrhea, constipatiion  Muscle:  No pain, weakness  Neuro:  No weakness, tingling, memory problems  Psych:  No fatigue, insomnia, mood problems, depression  Endocrine:  No polyuria, polydypsia, cold/heat intolerance    Vital Signs Last 24 Hrs  T(C): 37.2 (2018 12:47), Max: 38.2 (2018 21:30)  T(F): 99 (2018 12:47), Max: 100.8 (2018 21:30)  HR: 106 (2018 12:47) (100 - 116)  BP: 147/85 (2018 12:47) (129/75 - 147/85)  BP(mean): --  RR: 17 (2018 12:47) (14 - 17)  SpO2: 98% (2018 12:47) (96% - 98%)    GENERAL PHYSICAL EXAM:  General:  Appears stated age, well-groomed, well-nourished, no distress  HEENT:  NC/AT, patent nares w/ pink mucosa, OP clear w/o lesions, PERRL, EOMI, conjunctivae clear, no thyromegaly, nodules, adenopathy, no JVD  Chest:  Full & symmetric excursion, no increased effort, breath sounds clear  Cardiovascular:  Regular rhythm, S1, S2, no murmur/rub/S3/S4, no carotid/femoral/abdominal bruit, radial/pedal pulses 2+, no edema  Abdomen:  Soft, non-tender, non-distended, normoactive bowel sounds, no HSM  Extremities:  Gait & station:   Digits:   Nails:   Joints, Bones, Muscles:   ROM:   Stability:  Skin:  No rash/erythema/ecchymoses/petechiae/wounds/abscess/warm/dry  Musculoskeletal:  Full ROM in all joints w/o swelling/tenderness/effusion    NEUROLOGICAL EXAM:  HENT:  Normocephalic head; atraumatic head.  Neck supple.  ENT: normal looking.  Mental State:    Alert.    open eyes does not follows commands arm leg contracted  uti  anemia                       7.5    7.33  )-----------( 373      ( 2018 06:39 )             22.4         139  |  105  |  9   ----------------------------<  90  3.7   |  27  |  0.35<L>    Ca    9.7      2018 06:39    TPro  8.0  /  Alb  1.8<L>  /  TBili  0.3  /  DBili  x   /  AST  18  /  ALT  18  /  AlkPhos  66        Urinalysis Basic - ( 2018 11:02 )    Color: Yellow / Appearance: very cloudy / S.010 / pH: x  Gluc: x / Ketone: Negative  / Bili: Negative / Urobili: Negative mg/dL   Blood: x / Protein: 100 mg/dL / Nitrite: Negative   Leuk Esterase: Moderate / RBC: 6-10 /HPF / WBC 11-25   Sq Epi: x / Non Sq Epi: Moderate / Bacteria: Many   ass= awaek aopen eyes does not follows commnads  arm leg contractede no chorea uti s/p enceophalothy  anemia       RADIOLOGY & ADDITIONAL STUDIES:        Recommendations for sub acute rehab no mike

## 2018-02-03 LAB
-  AMIKACIN: SIGNIFICANT CHANGE UP
-  AMPICILLIN/SULBACTAM: SIGNIFICANT CHANGE UP
-  AMPICILLIN: SIGNIFICANT CHANGE UP
-  AZTREONAM: SIGNIFICANT CHANGE UP
-  CEFAZOLIN: SIGNIFICANT CHANGE UP
-  CEFEPIME: SIGNIFICANT CHANGE UP
-  CEFOXITIN: SIGNIFICANT CHANGE UP
-  CEFTAZIDIME: SIGNIFICANT CHANGE UP
-  CEFTRIAXONE: SIGNIFICANT CHANGE UP
-  CIPROFLOXACIN: SIGNIFICANT CHANGE UP
-  ERTAPENEM: SIGNIFICANT CHANGE UP
-  IMIPENEM: SIGNIFICANT CHANGE UP
-  LEVOFLOXACIN: SIGNIFICANT CHANGE UP
-  MEROPENEM: SIGNIFICANT CHANGE UP
-  NITROFURANTOIN: SIGNIFICANT CHANGE UP
-  PIPERACILLIN/TAZOBACTAM: SIGNIFICANT CHANGE UP
-  TRIMETHOPRIM/SULFAMETHOXAZOLE: SIGNIFICANT CHANGE UP
BLD GP AB SCN SERPL QL: SIGNIFICANT CHANGE UP
CULTURE RESULTS: SIGNIFICANT CHANGE UP
METHOD TYPE: SIGNIFICANT CHANGE UP
ORGANISM # SPEC MICROSCOPIC CNT: SIGNIFICANT CHANGE UP
ORGANISM # SPEC MICROSCOPIC CNT: SIGNIFICANT CHANGE UP
SPECIMEN SOURCE: SIGNIFICANT CHANGE UP

## 2018-02-03 RX ADMIN — Medication 650 MILLIGRAM(S): at 16:57

## 2018-02-03 RX ADMIN — Medication 20 MILLIEQUIVALENT(S): at 11:46

## 2018-02-03 RX ADMIN — Medication 325 MILLIGRAM(S): at 11:46

## 2018-02-03 RX ADMIN — Medication 1 TABLET(S): at 11:46

## 2018-02-03 RX ADMIN — Medication 500 MILLIGRAM(S): at 16:56

## 2018-02-03 RX ADMIN — CEFEPIME 100 MILLIGRAM(S): 1 INJECTION, POWDER, FOR SOLUTION INTRAMUSCULAR; INTRAVENOUS at 05:49

## 2018-02-03 RX ADMIN — MIRTAZAPINE 15 MILLIGRAM(S): 45 TABLET, ORALLY DISINTEGRATING ORAL at 21:26

## 2018-02-03 RX ADMIN — Medication 1 APPLICATION(S): at 16:56

## 2018-02-03 RX ADMIN — LACTULOSE 10 GRAM(S): 10 SOLUTION ORAL at 16:56

## 2018-02-03 RX ADMIN — CEFEPIME 100 MILLIGRAM(S): 1 INJECTION, POWDER, FOR SOLUTION INTRAMUSCULAR; INTRAVENOUS at 21:26

## 2018-02-03 RX ADMIN — ENOXAPARIN SODIUM 40 MILLIGRAM(S): 100 INJECTION SUBCUTANEOUS at 11:46

## 2018-02-03 RX ADMIN — Medication 500 MILLIGRAM(S): at 05:49

## 2018-02-03 RX ADMIN — CEFEPIME 100 MILLIGRAM(S): 1 INJECTION, POWDER, FOR SOLUTION INTRAMUSCULAR; INTRAVENOUS at 14:01

## 2018-02-03 RX ADMIN — SODIUM CHLORIDE 63 MILLILITER(S): 9 INJECTION, SOLUTION INTRAVENOUS at 11:45

## 2018-02-03 RX ADMIN — LACTULOSE 10 GRAM(S): 10 SOLUTION ORAL at 05:50

## 2018-02-03 RX ADMIN — Medication 1 APPLICATION(S): at 05:50

## 2018-02-03 RX ADMIN — SENNA PLUS 2 TABLET(S): 8.6 TABLET ORAL at 21:26

## 2018-02-03 NOTE — CHART NOTE - NSCHARTNOTEFT_GEN_A_CORE
Hospitalist Medicine (NP)    Requested by Rn to obtain the blood transfusion consent. Explained to patient's daughter Shira ([phone]) regarding the need for blood transfusion. The risk and benefits were discussed, the risk including fever, chills, lower back pain, allergic reaction, and even death were explained. Verbalizes the understanding and consent was obtained. RN aware.

## 2018-02-03 NOTE — PROGRESS NOTE ADULT - SUBJECTIVE AND OBJECTIVE BOX
Patient seen and examined bedside resting comfortably, no acute issues overnight, no complaints offered.    T(F): 100.2 (02-03-18 @ 17:02), Max: 100.3 (02-02-18 @ 23:49)  HR: 111 (02-03-18 @ 17:02) (100 - 111)  BP: 112/64 (02-03-18 @ 17:02) (107/66 - 146/89)  RR: 18 (02-03-18 @ 17:02) (16 - 18)  SpO2: 97% (02-03-18 @ 17:02) (96% - 100%)  Wt(kg): --  CAPILLARY BLOOD GLUCOSE      PHYSICAL EXAM:  General: NAD, WDWN.   Neuro:  Alert & responsive  HEENT: NCAT, EOMI, conjunctiva clear  CV: +S1+S2 regular rate and rhythm  Lung: clear to ausculation bilaterally, respirations nonlabored, good inspiratory effort  Abdomen: soft, Non tender, No Distension. Normal active BS  Extremities: no pedal edema or calf tenderness noted   : Obrien catheter in place draining clear, yellow urine, output: 1000cc/24hrs    LABS:                        7.5    7.33  )-----------( 373      ( 02 Feb 2018 06:39 )             22.4   02-02    139  |  105  |  9   ----------------------------<  90  3.7   |  27  |  0.35<L>    Ca    9.7      02 Feb 2018 06:39    TPro  8.0  /  Alb  1.8<L>  /  TBili  0.3  /  DBili  x   /  AST  18  /  ALT  18  /  AlkPhos  66  02-02    I&O's Detail    02 Feb 2018 07:01  -  03 Feb 2018 07:00  --------------------------------------------------------  IN:    Oral Fluid: 200 mL    Solution: 150 mL  Total IN: 350 mL    OUT:    Indwelling Catheter - Urethral: 2200 mL  Total OUT: 2200 mL    Total NET: -1850 mL      03 Feb 2018 07:01  -  03 Feb 2018 17:20  --------------------------------------------------------  IN:    Oral Fluid: 300 mL  Total IN: 300 mL    OUT:    Indwelling Catheter - Urethral: 1000 mL  Total OUT: 1000 mL    Total NET: -700 mL      Impression: 62 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

## 2018-02-03 NOTE — PROGRESS NOTE ADULT - SUBJECTIVE AND OBJECTIVE BOX
INTERVAL HPI/OVERNIGHT EVENTS:  patient lying in bed - not takling to me    MEDICATIONS  (STANDING):  ascorbic acid 500 milliGRAM(s) Oral two times a day  cefepime  IVPB      cefepime  IVPB 1000 milliGRAM(s) IV Intermittent every 8 hours  dextrose 5%. 1000 milliLiter(s) (63 mL/Hr) IV Continuous <Continuous>  enoxaparin Injectable 40 milliGRAM(s) SubCutaneous daily  ferrous    sulfate 325 milliGRAM(s) Oral daily  hydrocortisone 2.5% Cream 1 Application(s) Topical two times a day  lactulose Syrup 10 Gram(s) Oral two times a day  mirtazapine 15 milliGRAM(s) Oral at bedtime  multivitamin/minerals 1 Tablet(s) Oral daily  potassium chloride   Powder 20 milliEquivalent(s) Oral daily  senna 2 Tablet(s) Oral at bedtime    MEDICATIONS  (PRN):  acetaminophen  Suppository 650 milliGRAM(s) Rectal every 6 hours PRN For Temp greater than 38 C (100.4 F)  magnesium hydroxide Suspension 30 milliLiter(s) Oral daily PRN Constipation      Allergies    No Known Allergies    Intolerances          Vital Signs Last 24 Hrs  T(C): 37.8 (2018 04:44), Max: 37.9 (2018 18:47)  T(F): 100 (2018 04:44), Max: 100.3 (2018 23:49)  HR: 100 (2018 04:44) (100 - 108)  BP: 132/80 (2018 04:44) (110/74 - 147/85)  BP(mean): --  RR: 16 (2018 04:44) (16 - 17)  SpO2: 100% (2018 04:44) (96% - 100%)    PHYSICAL EXAM:  GENERAL: thin black female in nad  HEENT ncat, no jvd, no thryomegally, no nuchal regitidy, + temporal wasting  NERVOUS SYSTEM: increaesed tone bilatareally, right weaker than left side  CHEST/LUNG: cta  HEART: rrr s1 s2  ABDOMEN: Soft, Nontender, Nondistended; Bowel sounds present  EXTREMITIES:  contracted bilaterally   obrien    LABS:  CAPILLARY BLOOD GLUCOSE                              7.5    7.33  )-----------( 373      ( 2018 06:39 )             22.4         139  |  105  |  9   ----------------------------<  90  3.7   |  27  |  0.35<L>    Ca    9.7      2018 06:39    TPro  8.0  /  Alb  1.8<L>  /  TBili  0.3  /  DBili  x   /  AST  18  /  ALT  18  /  AlkPhos  66        Urinalysis Basic - ( 2018 11:02 )    Color: Yellow / Appearance: very cloudy / S.010 / pH: x  Gluc: x / Ketone: Negative  / Bili: Negative / Urobili: Negative mg/dL   Blood: x / Protein: 100 mg/dL / Nitrite: Negative   Leuk Esterase: Moderate / RBC: 6-10 /HPF / WBC 11-25   Sq Epi: x / Non Sq Epi: Moderate / Bacteria: Many          Cultures:    Culture Results:   >100,000 CFU/ml Providencia stuartii ( @ 18:01)      A/P  anemia - may benefit from transfusion - left message for daughter in attempt to get consent for transfusion  ID on cefepime - still with low grade temps - + uti providenia stuartii - appreciate id help  neurogenic bladder - has obrien cather - appreciate urology help  neuro - aicha's chorea - no acute intervention as per neurology  hypokalemia - kcl  dehydration - ivf    Virgilio Broderick MD

## 2018-02-03 NOTE — PROGRESS NOTE ADULT - ASSESSMENT
Subjective Complaints:  Historian:         REVIEW OF SYSTEMS:  Eyes:  Good vision, no reported pain  ENT:  No sore throat, pain, runny nose, dysphagia  CV:  No pain, palpitatioins, hypo/hypertension  Resp:  No dyspnea, cough, tachypnea, wheezing  GI:  No pain, nausea, vomiting, diarrhea, constipatiion  Muscle:  No pain, weakness  Neuro:  No weakness, tingling, memory problems  Psych:  No fatigue, insomnia, mood problems, depression  Endocrine:  No polyuria, polydypsia, cold/heat intolerance    Vital Signs Last 24 Hrs  T(C): 37.9 (2018 17:45), Max: 37.9 (2018 23:49)  T(F): 100.2 (2018 17:45), Max: 100.3 (2018 23:49)  HR: 111 (2018 17:45) (100 - 111)  BP: 125/79 (2018 17:45) (107/66 - 132/80)  BP(mean): --  RR: 18 (2018 17:45) (16 - 18)  SpO2: 98% (2018 17:45) (96% - 100%)    GENERAL PHYSICAL EXAM:  General:  Appears stated age, well-groomed, well-nourished, no distress  HEENT:  NC/AT, patent nares w/ pink mucosa, OP clear w/o lesions, PERRL, EOMI, conjunctivae clear, no thyromegaly, nodules, adenopathy, no JVD  Chest:  Full & symmetric excursion, no increased effort, breath sounds clear  Cardiovascular:  Regular rhythm, S1, S2, no murmur/rub/S3/S4, no carotid/femoral/abdominal bruit, radial/pedal pulses 2+, no edema  Abdomen:  Soft, non-tender, non-distended, normoactive bowel sounds, no HSM  Extremities:  Gait & station:   Digits:   Nails:   Joints, Bones, Muscles:   ROM:   Stability:  Skin:  No rash/erythema/ecchymoses/petechiae/wounds/abscess/warm/dry  Musculoskeletal:  Full ROM in all joints w/o swelling/tenderness/effusion    NEUROLOGICAL EXAM:  HENT:  Normocephalic head; atraumatic head.  Neck supple.  ENT: normal looking.  Mental State:    Alert.    open eyes ar leg contracted  no chorea                       7.5    7.33  )-----------( 373      ( 2018 06:39 )             22.4     02-    139  |  105  |  9   ----------------------------<  90  3.7   |  27  |  0.35<L>    Ca    9.7      2018 06:39    TPro  8.0  /  Alb  1.8<L>  /  TBili  0.3  /  DBili  x   /  AST  18  /  ALT  18  /  AlkPhos  66        Urinalysis Basic - ( 2018 11:02 )    Color: Yellow / Appearance: very cloudy / S.010 / pH: x  Gluc: x / Ketone: Negative  / Bili: Negative / Urobili: Negative mg/dL   Blood: x / Protein: 100 mg/dL / Nitrite: Negative   Leuk Esterase: Moderate / RBC: 6-10 /HPF / WBC 11-25   Sq Epi: x / Non Sq Epi: Moderate / Bacteria: Many   ass= awake open eyes  arm leg contracted recieving blood transfusion  s/p uti encephakothy no chorea  s/p ureteral stent       RADIOLOGY & ADDITIONAL STUDIES:        Recommendations: for sub acute rehab

## 2018-02-04 LAB
ANION GAP SERPL CALC-SCNC: 7 MMOL/L — SIGNIFICANT CHANGE UP (ref 5–17)
BUN SERPL-MCNC: 8 MG/DL — SIGNIFICANT CHANGE UP (ref 7–23)
CALCIUM SERPL-MCNC: 9.3 MG/DL — SIGNIFICANT CHANGE UP (ref 8.5–10.1)
CHLORIDE SERPL-SCNC: 104 MMOL/L — SIGNIFICANT CHANGE UP (ref 96–108)
CO2 SERPL-SCNC: 25 MMOL/L — SIGNIFICANT CHANGE UP (ref 22–31)
CREAT SERPL-MCNC: 0.41 MG/DL — LOW (ref 0.5–1.3)
GLUCOSE SERPL-MCNC: 109 MG/DL — HIGH (ref 70–99)
HCT VFR BLD CALC: 27.2 % — LOW (ref 34.5–45)
HGB BLD-MCNC: 9.3 G/DL — LOW (ref 11.5–15.5)
MCHC RBC-ENTMCNC: 25.6 PG — LOW (ref 27–34)
MCHC RBC-ENTMCNC: 34.2 GM/DL — SIGNIFICANT CHANGE UP (ref 32–36)
MCV RBC AUTO: 74.9 FL — LOW (ref 80–100)
NRBC # BLD: 0 /100 WBCS — SIGNIFICANT CHANGE UP (ref 0–0)
PLATELET # BLD AUTO: 398 K/UL — SIGNIFICANT CHANGE UP (ref 150–400)
POTASSIUM SERPL-MCNC: 3.8 MMOL/L — SIGNIFICANT CHANGE UP (ref 3.5–5.3)
POTASSIUM SERPL-SCNC: 3.8 MMOL/L — SIGNIFICANT CHANGE UP (ref 3.5–5.3)
RBC # BLD: 3.63 M/UL — LOW (ref 3.8–5.2)
RBC # FLD: 17 % — HIGH (ref 10.3–14.5)
SODIUM SERPL-SCNC: 136 MMOL/L — SIGNIFICANT CHANGE UP (ref 135–145)
WBC # BLD: 7.51 K/UL — SIGNIFICANT CHANGE UP (ref 3.8–10.5)
WBC # FLD AUTO: 7.51 K/UL — SIGNIFICANT CHANGE UP (ref 3.8–10.5)

## 2018-02-04 RX ORDER — ACETAMINOPHEN 500 MG
650 TABLET ORAL EVERY 6 HOURS
Qty: 0 | Refills: 0 | Status: DISCONTINUED | OUTPATIENT
Start: 2018-02-04 | End: 2018-02-07

## 2018-02-04 RX ADMIN — CEFEPIME 100 MILLIGRAM(S): 1 INJECTION, POWDER, FOR SOLUTION INTRAMUSCULAR; INTRAVENOUS at 13:39

## 2018-02-04 RX ADMIN — SODIUM CHLORIDE 63 MILLILITER(S): 9 INJECTION, SOLUTION INTRAVENOUS at 11:31

## 2018-02-04 RX ADMIN — LACTULOSE 10 GRAM(S): 10 SOLUTION ORAL at 16:52

## 2018-02-04 RX ADMIN — Medication 1 APPLICATION(S): at 05:11

## 2018-02-04 RX ADMIN — Medication 500 MILLIGRAM(S): at 16:51

## 2018-02-04 RX ADMIN — Medication 325 MILLIGRAM(S): at 11:31

## 2018-02-04 RX ADMIN — CEFEPIME 100 MILLIGRAM(S): 1 INJECTION, POWDER, FOR SOLUTION INTRAMUSCULAR; INTRAVENOUS at 21:44

## 2018-02-04 RX ADMIN — Medication 500 MILLIGRAM(S): at 05:10

## 2018-02-04 RX ADMIN — Medication 20 MILLIEQUIVALENT(S): at 11:31

## 2018-02-04 RX ADMIN — Medication 650 MILLIGRAM(S): at 16:50

## 2018-02-04 RX ADMIN — SENNA PLUS 2 TABLET(S): 8.6 TABLET ORAL at 21:44

## 2018-02-04 RX ADMIN — ENOXAPARIN SODIUM 40 MILLIGRAM(S): 100 INJECTION SUBCUTANEOUS at 11:31

## 2018-02-04 RX ADMIN — Medication 650 MILLIGRAM(S): at 16:20

## 2018-02-04 RX ADMIN — CEFEPIME 100 MILLIGRAM(S): 1 INJECTION, POWDER, FOR SOLUTION INTRAMUSCULAR; INTRAVENOUS at 05:10

## 2018-02-04 RX ADMIN — LACTULOSE 10 GRAM(S): 10 SOLUTION ORAL at 05:10

## 2018-02-04 RX ADMIN — Medication 1 APPLICATION(S): at 16:51

## 2018-02-04 RX ADMIN — Medication 1 TABLET(S): at 11:31

## 2018-02-04 RX ADMIN — MIRTAZAPINE 15 MILLIGRAM(S): 45 TABLET, ORALLY DISINTEGRATING ORAL at 21:44

## 2018-02-04 NOTE — PROGRESS NOTE ADULT - SUBJECTIVE AND OBJECTIVE BOX
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  Pt has been more lethargic and barely communicable. Low grade fever still progressing.    Upon evaluation, pt denies any symptoms.     Allergies    No Known Allergies    Intolerances        MEDICATIONS  (STANDING):  ascorbic acid 500 milliGRAM(s) Oral two times a day  cefepime  IVPB      cefepime  IVPB 1000 milliGRAM(s) IV Intermittent every 8 hours  dextrose 5%. 1000 milliLiter(s) (63 mL/Hr) IV Continuous <Continuous>  enoxaparin Injectable 40 milliGRAM(s) SubCutaneous daily  ferrous    sulfate 325 milliGRAM(s) Oral daily  hydrocortisone 2.5% Cream 1 Application(s) Topical two times a day  lactulose Syrup 10 Gram(s) Oral two times a day  mirtazapine 15 milliGRAM(s) Oral at bedtime  multivitamin/minerals 1 Tablet(s) Oral daily  potassium chloride   Powder 20 milliEquivalent(s) Oral daily  senna 2 Tablet(s) Oral at bedtime          REVIEW OF SYSTEMS:    CONSTITUTIONAL: No fever, chills, weight loss, or fatigue  HEENT: No sore throat, runny nose, ear ache  RESPIRATORY: No cough, wheezing, No shortness of breath  CARDIOVASCULAR: No chest pain, palpitations, dizziness  GASTROINTESTINAL: No abdominal pain. No nausea, vomiting, diarrhea  GENITOURINARY: No dysuria, increase frequency, hematuria, or incontinence  NEUROLOGICAL: No headaches, memory loss, loss of strength, numbness, or tremors, no weakness  EXTREMITY: No pedal edema BLE  SKIN: No itching, burning, rashes, or lesions     VITAL SIGNS:  T(C): 37.2 (18 @ 05:02), Max: 37.9 (18 @ 17:02)  T(F): 99 (18 @ 05:02), Max: 100.2 (18 @ 17:02)  HR: 108 (18 @ 05:02) (96 - 111)  BP: 134/77 (18 @ 05:02) (107/66 - 134/77)  RR: 16 (18 @ 05:02) (16 - 18)  SpO2: 97% (18 @ 05:02) (96% - 98%)  Wt(kg): --    PHYSICAL EXAM:    GENERAL: not in any distress, tired under blanket  HEENT: Neck is supple, normocephalic, atraumatic   CHEST/LUNG: Clear to percussion bilaterally; No rales, rhonchi, wheezing  HEART: Regular rate and rhythm; No murmurs, rubs, or gallops  ABDOMEN: Soft, Nontender, Nondistended; Bowel sounds present, no rebound   EXTREMITIES:  2+ Peripheral Pulses, No clubbing, cyanosis, or edema  GENITOURINARY: Mcfarland cath in cloudy urine in Mcfarland   SKIN: No rashes or lesions  BACK: no pressor sore   NERVOUS SYSTEM:  Alert & Oriented X3, Good concentration  PSYCH: normal affect     LABS:                         9.3    7.51  )-----------( 398      ( 2018 06:18 )             27.2         136  |  104  |  8   ----------------------------<  109<H>  3.8   |  25  |  0.41<L>    Ca    9.3      2018 06:18          Urinalysis Basic - ( 2018 11:02 )    Color: Yellow / Appearance: very cloudy / S.010 / pH: x  Gluc: x / Ketone: Negative  / Bili: Negative / Urobili: Negative mg/dL   Blood: x / Protein: 100 mg/dL / Nitrite: Negative   Leuk Esterase: Moderate / RBC: 6-10 /HPF / WBC 11-25   Sq Epi: x / Non Sq Epi: Moderate / Bacteria: Many                          Culture Results:   No growth to date. ( @ 12:51)  Culture Results:   >100,000 CFU/ml Providencia stuartii ( @ 18:01)  Culture Results:   No growth at 5 days. ( @ 12:22)                Radiology:

## 2018-02-04 NOTE — PROGRESS NOTE ADULT - ASSESSMENT
Receurrent fever ,  lethargic , cloudy urine in obrien for urinary retention   Pos rhino virus / recent ho of Sepsis from acute viral syndrome vs bacteremia vs pyelonephritis , left ureter stone   blood culture simple Ecoli , fairly sensitive e coli , resistant to cipro, source likely from pyelo  pt received zosyn from 1/12 to 16 and then jenny from 1/16 to 28, completed dose for complicated UTI and bacteremia and surveillance blood culture neg so far   Nuclear Bone scan negative for infection and OM   She was stable off antibiotics and then developed recurring fever and repeated UA is still dirty and urien culture grew providentia.    Complicated UTI with Chronic urinary retention and Obrien   now on ceftazidime   CXR reviewed   FU repeated cultures   supportive management for viral syndrome   Consider to change Obrien if she still spiking , urology on board   or switch to PO if she has no more fever   we will FU

## 2018-02-04 NOTE — PROGRESS NOTE ADULT - SUBJECTIVE AND OBJECTIVE BOX
INTERVAL HPI/OVERNIGHT EVENTS:  pt more alert  recieved transfusion of 1 u prbc's yesturday  MEDICATIONS  (STANDING):  ascorbic acid 500 milliGRAM(s) Oral two times a day  cefepime  IVPB      cefepime  IVPB 1000 milliGRAM(s) IV Intermittent every 8 hours  dextrose 5%. 1000 milliLiter(s) (63 mL/Hr) IV Continuous <Continuous>  enoxaparin Injectable 40 milliGRAM(s) SubCutaneous daily  ferrous    sulfate 325 milliGRAM(s) Oral daily  hydrocortisone 2.5% Cream 1 Application(s) Topical two times a day  lactulose Syrup 10 Gram(s) Oral two times a day  mirtazapine 15 milliGRAM(s) Oral at bedtime  multivitamin/minerals 1 Tablet(s) Oral daily  potassium chloride   Powder 20 milliEquivalent(s) Oral daily  senna 2 Tablet(s) Oral at bedtime    MEDICATIONS  (PRN):  acetaminophen  Suppository 650 milliGRAM(s) Rectal every 6 hours PRN For Temp greater than 38 C (100.4 F)  magnesium hydroxide Suspension 30 milliLiter(s) Oral daily PRN Constipation      Allergies    No Known Allergies    Intolerances            Vital Signs Last 24 Hrs  T(C): 37.2 (2018 05:02), Max: 37.9 (2018 17:02)  T(F): 99 (2018 05:02), Max: 100.2 (2018 17:02)  HR: 108 (2018 05:02) (96 - 111)  BP: 134/77 (2018 05:02) (107/66 - 134/77)  BP(mean): --  RR: 16 (2018 05:02) (16 - 18)  SpO2: 97% (2018 05:02) (96% - 98%)    PHYSICAL EXAM:  GENERAL:thin black female in nad  HEENT ncat no jvd, no thyromegally, no nuchal regidity  NERVOUS SYSTEM: increaed tone - right side greater than left  CHEST/LUNG: cta  HEART: tachy, s1 s2  ABDOMEN: soft, nt/nd bs+ no hsm  EXTREMITIES: tr edema   obrien catheter    LABS:  CAPILLARY BLOOD GLUCOSE                              7.5    7.33  )-----------( 373      ( 2018 06:39 )             22.4     02-02    139  |  105  |  9   ----------------------------<  90  3.7   |  27  |  0.35<L>    Ca    9.7      2018 06:39    TPro  8.0  /  Alb  1.8<L>  /  TBili  0.3  /  DBili  x   /  AST  18  /  ALT  18  /  AlkPhos  66        Urinalysis Basic - ( 2018 11:02 )    Color: Yellow / Appearance: very cloudy / S.010 / pH: x  Gluc: x / Ketone: Negative  / Bili: Negative / Urobili: Negative mg/dL   Blood: x / Protein: 100 mg/dL / Nitrite: Negative   Leuk Esterase: Moderate / RBC: 6-10 /HPF / WBC 11-25   Sq Epi: x / Non Sq Epi: Moderate / Bacteria: Many          Cultures:    Culture Results:   No growth to date. ( @ 12:51)  Culture Results:   >100,000 CFU/ml Providencia stuartii ( @ 18:01)        RADIOLOGY & ADDITIONAL TESTS:  A/P  anemia - recieved 1 unit prb's - will recheck cbc - appreciate pa help in getting consent for transfusion  fever - no low grade - on cefepime - apprecite id help -   neurogenic bladder - continue obrien catheter as per urology  dehydration - on ivf, will recheck bmp  dvt risk - florinda Broderick MD

## 2018-02-04 NOTE — PROGRESS NOTE ADULT - SUBJECTIVE AND OBJECTIVE BOX
Patient seen and examined at bedside in no distress.  No complaints offered.    T(F): 99 (02-04-18 @ 05:02), Max: 100.2 (02-03-18 @ 17:02)  HR: 108 (02-04-18 @ 05:02) (96 - 111)  BP: 134/77 (02-04-18 @ 05:02) (107/66 - 134/77)  RR: 16 (02-04-18 @ 05:02) (16 - 18)  SpO2: 97% (02-04-18 @ 05:02) (96% - 98%)    PHYSICAL EXAM:  General: Awake, alert, NAD  CV: +S1S2 regular rate and rhythm  Lung: Respirations nonlabored  Abdomen: Soft, NTND  Extremities: Contracted  : Obrien catheter in place draining clear, yellow urine, output: 1900cc/24hrs    LABS:                        9.3    7.51  )-----------( 398      ( 04 Feb 2018 06:18 )             27.2     02-04    136  |  104  |  8   ----------------------------<  109<H>  3.8   |  25  |  0.41<L>    Ca    9.3      04 Feb 2018 06:18    Impression: 62 year old female with PMH Iroquois's chorea, urinary retention due to neurogenic bladder, hemiparesis, decubitus ulcers admitted with sepsis 2/2 UTI and left hydronephrosis POD#16 s/p Left Ureteroscopy, with laser lithotripsy and stent insertion, postop course c/b persistent fevers, and leukocytosis (resolved), a/w anemia  Plan:  -continue obrien catheter for retention, monitor urine output and quality  -antipyretics PRN  -transfuse PRN per medicine   -ABX per ID  -continue medical management and supportive care Patient seen and examined at bedside in no distress.  No complaints offered.    T(F): 99 (02-04-18 @ 05:02), Max: 100.2 (02-03-18 @ 17:02)  HR: 108 (02-04-18 @ 05:02) (96 - 111)  BP: 134/77 (02-04-18 @ 05:02) (107/66 - 134/77)  RR: 16 (02-04-18 @ 05:02) (16 - 18)  SpO2: 97% (02-04-18 @ 05:02) (96% - 98%)    PHYSICAL EXAM:  General: Awake, alert, NAD  CV: +S1S2 regular rate and rhythm  Lung: Respirations nonlabored  Abdomen: Soft, NTND  Extremities: Contracted  : Obrien catheter in place draining clear, yellow urine, output: 1900cc/24hrs    LABS:                        9.3    7.51  )-----------( 398      ( 04 Feb 2018 06:18 )             27.2     02-04    136  |  104  |  8   ----------------------------<  109<H>  3.8   |  25  |  0.41<L>    Ca    9.3      04 Feb 2018 06:18    Impression: 62 year old female with PMH Ashli's chorea, urinary retention due to neurogenic bladder, hemiparesis, decubitus ulcers admitted with sepsis 2/2 UTI and left hydronephrosis POD#16 s/p Left Ureteroscopy, with laser lithotripsy and stent insertion, postop course c/b persistent fevers, and leukocytosis (resolved), a/w anemia s/p 1uPRBC 2/3  Plan:  -continue obrien catheter for retention, monitor urine output and quality  -antipyretics PRN  -transfuse PRN per medicine   -ABX per ID  -continue medical management and supportive care

## 2018-02-05 LAB
ANION GAP SERPL CALC-SCNC: 9 MMOL/L — SIGNIFICANT CHANGE UP (ref 5–17)
BLD GP AB SCN SERPL QL: SIGNIFICANT CHANGE UP
BUN SERPL-MCNC: 10 MG/DL — SIGNIFICANT CHANGE UP (ref 7–23)
CALCIUM SERPL-MCNC: 9.9 MG/DL — SIGNIFICANT CHANGE UP (ref 8.5–10.1)
CHLORIDE SERPL-SCNC: 105 MMOL/L — SIGNIFICANT CHANGE UP (ref 96–108)
CO2 SERPL-SCNC: 24 MMOL/L — SIGNIFICANT CHANGE UP (ref 22–31)
CREAT SERPL-MCNC: 0.34 MG/DL — LOW (ref 0.5–1.3)
GLUCOSE SERPL-MCNC: 91 MG/DL — SIGNIFICANT CHANGE UP (ref 70–99)
HCT VFR BLD CALC: 28.6 % — LOW (ref 34.5–45)
HGB BLD-MCNC: 9.5 G/DL — LOW (ref 11.5–15.5)
MCHC RBC-ENTMCNC: 25.1 PG — LOW (ref 27–34)
MCHC RBC-ENTMCNC: 33.2 GM/DL — SIGNIFICANT CHANGE UP (ref 32–36)
MCV RBC AUTO: 75.7 FL — LOW (ref 80–100)
NRBC # BLD: 0 /100 WBCS — SIGNIFICANT CHANGE UP (ref 0–0)
PLATELET # BLD AUTO: 394 K/UL — SIGNIFICANT CHANGE UP (ref 150–400)
POTASSIUM SERPL-MCNC: 3.6 MMOL/L — SIGNIFICANT CHANGE UP (ref 3.5–5.3)
POTASSIUM SERPL-SCNC: 3.6 MMOL/L — SIGNIFICANT CHANGE UP (ref 3.5–5.3)
RBC # BLD: 3.78 M/UL — LOW (ref 3.8–5.2)
RBC # FLD: 17.9 % — HIGH (ref 10.3–14.5)
SODIUM SERPL-SCNC: 138 MMOL/L — SIGNIFICANT CHANGE UP (ref 135–145)
WBC # BLD: 7.47 K/UL — SIGNIFICANT CHANGE UP (ref 3.8–10.5)
WBC # FLD AUTO: 7.47 K/UL — SIGNIFICANT CHANGE UP (ref 3.8–10.5)

## 2018-02-05 PROCEDURE — 74177 CT ABD & PELVIS W/CONTRAST: CPT | Mod: 26

## 2018-02-05 RX ADMIN — ENOXAPARIN SODIUM 40 MILLIGRAM(S): 100 INJECTION SUBCUTANEOUS at 13:51

## 2018-02-05 RX ADMIN — CEFEPIME 100 MILLIGRAM(S): 1 INJECTION, POWDER, FOR SOLUTION INTRAMUSCULAR; INTRAVENOUS at 05:13

## 2018-02-05 RX ADMIN — CEFEPIME 100 MILLIGRAM(S): 1 INJECTION, POWDER, FOR SOLUTION INTRAMUSCULAR; INTRAVENOUS at 13:51

## 2018-02-05 RX ADMIN — Medication 650 MILLIGRAM(S): at 16:32

## 2018-02-05 RX ADMIN — MIRTAZAPINE 15 MILLIGRAM(S): 45 TABLET, ORALLY DISINTEGRATING ORAL at 22:37

## 2018-02-05 RX ADMIN — Medication 20 MILLIEQUIVALENT(S): at 13:51

## 2018-02-05 RX ADMIN — Medication 1 TABLET(S): at 13:50

## 2018-02-05 RX ADMIN — Medication 1 APPLICATION(S): at 17:27

## 2018-02-05 RX ADMIN — Medication 650 MILLIGRAM(S): at 00:21

## 2018-02-05 RX ADMIN — SENNA PLUS 2 TABLET(S): 8.6 TABLET ORAL at 22:38

## 2018-02-05 RX ADMIN — SODIUM CHLORIDE 63 MILLILITER(S): 9 INJECTION, SOLUTION INTRAVENOUS at 03:22

## 2018-02-05 RX ADMIN — Medication 500 MILLIGRAM(S): at 17:27

## 2018-02-05 RX ADMIN — LACTULOSE 10 GRAM(S): 10 SOLUTION ORAL at 17:27

## 2018-02-05 RX ADMIN — Medication 500 MILLIGRAM(S): at 05:13

## 2018-02-05 RX ADMIN — CEFEPIME 100 MILLIGRAM(S): 1 INJECTION, POWDER, FOR SOLUTION INTRAMUSCULAR; INTRAVENOUS at 22:38

## 2018-02-05 RX ADMIN — LACTULOSE 10 GRAM(S): 10 SOLUTION ORAL at 05:13

## 2018-02-05 RX ADMIN — SODIUM CHLORIDE 63 MILLILITER(S): 9 INJECTION, SOLUTION INTRAVENOUS at 22:39

## 2018-02-05 RX ADMIN — Medication 1 APPLICATION(S): at 05:13

## 2018-02-05 RX ADMIN — Medication 325 MILLIGRAM(S): at 13:50

## 2018-02-05 NOTE — PROGRESS NOTE ADULT - SUBJECTIVE AND OBJECTIVE BOX
Patient seen and examined bedside resting comfortably.  Mcfarland catheter draining well.      T(F): 99.6 (02-05-18 @ 12:02), Max: 101.5 (02-04-18 @ 16:36)  HR: 103 (02-05-18 @ 12:02) (101 - 118)  BP: 120/78 (02-05-18 @ 12:02) (98/60 - 129/84)  RR: 16 (02-05-18 @ 12:02) (16 - 20)  SpO2: 97% (02-05-18 @ 12:02) (94% - 98%)        PHYSICAL EXAM:  General: NAD, WDWN  Neuro:  Alert & conscious  HEENT: NCAT, EOMI, conjunctiva clear  Lung: respirations nonlabored, good inspiratory effort  Abdomen: soft, NTND.   Extremities: no pedal edema or calf tenderness noted   Mfcarland draining well.    LABS:                        9.5    7.47  )-----------( 394      ( 05 Feb 2018 07:44 )             28.6     02-05    138  |  105  |  10  ----------------------------<  91  3.6   |  24  |  0.34<L>    Ca    9.9      05 Feb 2018 07:44        I&O's Detail    04 Feb 2018 07:01  -  05 Feb 2018 07:00  --------------------------------------------------------  IN:    dextrose 5%.: 1450 mL    Oral Fluid: 340 mL    Solution: 200 mL  Total IN: 1990 mL    OUT:    Indwelling Catheter - Urethral: 1150 mL  Total OUT: 1150 mL    Total NET: 840 mL          wbc    02-05 @ 07:44    7.47    02-04 @ 06:18    7.51    02-02 @ 06:39    7.33    01-31 @ 06:19    7.94        cr   02-05 @ 07:44   0.34    02-04 @ 06:18   0.41    02-02 @ 06:39   0.35    01-31 @ 06:19   0.27

## 2018-02-05 NOTE — PROGRESS NOTE ADULT - SUBJECTIVE AND OBJECTIVE BOX
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  Pt has been more lethargic.  no n/v (12 Jan 2018 14:07)      INTERVAL HPI/OVERNIGHT EVENTS:  MS unchanged. Febrile. No new c/o    MEDICATIONS  (STANDING):  ascorbic acid 500 milliGRAM(s) Oral two times a day  cefepime  IVPB      cefepime  IVPB 1000 milliGRAM(s) IV Intermittent every 8 hours  dextrose 5%. 1000 milliLiter(s) (63 mL/Hr) IV Continuous <Continuous>  enoxaparin Injectable 40 milliGRAM(s) SubCutaneous daily  ferrous    sulfate 325 milliGRAM(s) Oral daily  hydrocortisone 2.5% Cream 1 Application(s) Topical two times a day  lactulose Syrup 10 Gram(s) Oral two times a day  mirtazapine 15 milliGRAM(s) Oral at bedtime  multivitamin/minerals 1 Tablet(s) Oral daily  potassium chloride   Powder 20 milliEquivalent(s) Oral daily  senna 2 Tablet(s) Oral at bedtime    MEDICATIONS  (PRN):  acetaminophen   Tablet 650 milliGRAM(s) Oral every 6 hours PRN For Temp greater than 38 C (100.4 F)  magnesium hydroxide Suspension 30 milliLiter(s) Oral daily PRN Constipation      FAMILY HISTORY:  No pertinent family history in first degree relatives      Allergies    No Known Allergies    Intolerances        PMH/PSH:  Decubital ulcer  Hemiparesis  Failure to thrive in adult  UTI (urinary tract infection)  Dehydration  Rhabdomyolysis  HTN (hypertension)  Neurogenic bladder disorder  Pressure ulcer of sacrum  UTI (urinary tract infection)  Embolism  DVT (deep venous thrombosis)  Anemia  Pneumonia  DM (diabetes mellitus)  High cholesterol  Ashli chorea  No pertinent past medical history  No significant past surgical history        REVIEW OF SYSTEMS:  CONSTITUTIONAL: No weight loss,   EYES: No eye pain, visual disturbances, or discharge  ENMT:  No difficulty hearing, tinnitus, vertigo; No sinus or throat pain  NECK: No pain or stiffness  BREASTS: No pain, masses, or nipple discharge  RESPIRATORY: No cough, wheezing, chills or hemoptysis; No shortness of breath  CARDIOVASCULAR: No chest pain, palpitations, dizziness, or leg swelling  GASTROINTESTINAL: No abdominal or epigastric pain. No nausea, vomiting, or hematemesis; No diarrhea or constipation. No melena or hematochezia.  GENITOURINARY: No dysuria, frequency, hematuria, or incontinence  NEUROLOGICAL: No headaches, memory loss, loss of strength, numbness, or tremors  SKIN: No itching, burning, rashes, or lesions   LYMPH NODES: No enlarged glands  ENDOCRINE: No heat or cold intolerance; No hair loss  MUSCULOSKELETAL: No joint pain or swelling; No muscle, back, or extremity pain  PSYCHIATRIC: No depression, anxiety, mood swings, or difficulty sleeping  HEME/LYMPH: No easy bruising, or bleeding gums  ALLERGY AND IMMUNOLOGIC: No hives or eczema    Vital Signs Last 24 Hrs  T(C): 38.1 (05 Feb 2018 16:59), Max: 38.1 (05 Feb 2018 16:59)  T(F): 100.6 (05 Feb 2018 16:59), Max: 100.6 (05 Feb 2018 16:59)  HR: 103 (05 Feb 2018 12:02) (101 - 103)  BP: 133/89 (05 Feb 2018 16:59) (98/60 - 133/89)  BP(mean): --  RR: 17 (05 Feb 2018 16:59) (16 - 18)  SpO2: 98% (05 Feb 2018 16:59) (96% - 98%)    PHYSICAL EXAM:  GENERAL: NAD, well-groomed, well-developed  HEAD:  Atraumatic, Normocephalic  EYES: EOMI, PERRLA, conjunctiva and sclera clear  NECK: Supple, No JVD, Normal thyroid  NERVOUS SYSTEM:  Alert & Oriented   CHEST/LUNG: Clear to percussion bilaterally; No rales, rhonchi, wheezing, or rubs  HEART: Regular rate and rhythm; No murmurs, rubs, or gallops  ABDOMEN: Soft, Nontender, Nondistended; Bowel sounds present  EXTREMITIES:  2+ Peripheral Pulses, No clubbing, cyanosis, or edema  LYMPH: No lymphadenopathy noted  SKIN: No rashes or lesions    LAB                          9.5    7.47  )-----------( 394      ( 05 Feb 2018 07:44 )             28.6       CBC:  02-05 @ 07:44  WBC 7.47   Hgb 9.5   Hct 28.6   Plts 394  MCV 75.7  02-04 @ 06:18  WBC 7.51   Hgb 9.3   Hct 27.2   Plts 398  MCV 74.9  02-02 @ 06:39  WBC 7.33   Hgb 7.5   Hct 22.4   Plts 373  MCV 73.9  01-31 @ 06:19  WBC 7.94   Hgb 7.4   Hct 22.4   Plts 371  MCV 73.7      Chemistry:  02-05 @ 07:44  Na+ 138  K+ 3.6  Cl- 105  CO2 24  BUN 10  Cr 0.34     02-04 @ 06:18  Na+ 136  K+ 3.8  Cl- 104  CO2 25  BUN 8  Cr 0.41     02-02 @ 06:39  Na+ 139  K+ 3.7  Cl- 105  CO2 27  BUN 9  Cr 0.35     01-31 @ 06:19  Na+ 140  K+ 3.7  Cl- 105  CO2 26  BUN 7  Cr 0.27         Glucose, Serum: 91 mg/dL (02-05 @ 07:44)  Glucose, Serum: 109 mg/dL (02-04 @ 06:18)  Glucose, Serum: 90 mg/dL (02-02 @ 06:39)  Glucose, Serum: 88 mg/dL (01-31 @ 06:19)      05 Feb 2018 07:44    138    |  105    |  10     ----------------------------<  91     3.6     |  24     |  0.34   04 Feb 2018 06:18    136    |  104    |  8      ----------------------------<  109    3.8     |  25     |  0.41   02 Feb 2018 06:39    139    |  105    |  9      ----------------------------<  90     3.7     |  27     |  0.35   31 Jan 2018 06:19    140    |  105    |  7      ----------------------------<  88     3.7     |  26     |  0.27     Ca    9.9        05 Feb 2018 07:44  Ca    9.3        04 Feb 2018 06:18  Ca    9.7        02 Feb 2018 06:39  Ca    9.5        31 Jan 2018 06:19    TPro  8.0    /  Alb  1.8    /  TBili  0.3    /  DBili  x      /  AST  18     /  ALT  18     /  AlkPhos  66     02 Feb 2018 06:39              CAPILLARY BLOOD GLUCOSE              RADIOLOGY & ADDITIONAL TESTS:    Imaging Personally Reviewed:  [ ] YES  [ ] NO    Consultant(s) Notes Reviewed:  [ ] YES  [ ] NO    Care Discussed with Consultants/Other Providers [ ] YES  [ ] NO

## 2018-02-05 NOTE — CONSULT NOTE ADULT - ASSESSMENT
The sacral ulcer is stage 4, clean not a likely source of sepsis,   would recommend VAC , otherwise foam dressings

## 2018-02-05 NOTE — PROGRESS NOTE ADULT - PROBLEM SELECTOR PLAN 1
On cefepime, T max 101.5  WBC 7.47 , precalcitonin 0.13 , s/p cystoscopy. Indium scan negative. appreciate ID note  - For removal of stents in AM

## 2018-02-05 NOTE — PROGRESS NOTE ADULT - ASSESSMENT
Pos rhino virus / recent ho of Sepsis from acute viral syndrome vs bacteremia vs pyelonephritis , left ureter stone  Recurrent fever , cloudy urine in obrien for urinary retention, ho of  Left Ureteroscopy, with laser lithotripsy and stent insertion, postop course c/b persistent fevers   blood culture simple Ecoli , fairly sensitive e coli , resistant to cipro, source likely from pyelo  ho of antibiotics   zosyn from 1/12 to 16 and then jenny from 1/16 to 28, completed dose for complicated UTI and bacteremia and surveillance blood culture neg so far   Nuclear Bone scan negative for infection and OM   She was stable off antibiotics and then developed recurring fever and repeated UA is still dirty and urine culture grew providentia    now on ceftazidime   will obtain abd and pelvic CT for ho of stent and recurrent fever   supportive management for viral syndrome as per PMD  Consider to change Obrien if she still spiking , urology on board   we will FU   Thanks you.

## 2018-02-05 NOTE — PROGRESS NOTE ADULT - SUBJECTIVE AND OBJECTIVE BOX
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  Pt has been more lethargic and barely communicable. Low grade fever still progressing.    Upon evaluation, pt denies any symptoms. Ho is limited due to her baseline mental status       Allergies    No Known Allergies    Intolerances        MEDICATIONS  (STANDING):  ascorbic acid 500 milliGRAM(s) Oral two times a day  cefepime  IVPB      cefepime  IVPB 1000 milliGRAM(s) IV Intermittent every 8 hours  dextrose 5%. 1000 milliLiter(s) (63 mL/Hr) IV Continuous <Continuous>  enoxaparin Injectable 40 milliGRAM(s) SubCutaneous daily  ferrous    sulfate 325 milliGRAM(s) Oral daily  hydrocortisone 2.5% Cream 1 Application(s) Topical two times a day  lactulose Syrup 10 Gram(s) Oral two times a day  mirtazapine 15 milliGRAM(s) Oral at bedtime  multivitamin/minerals 1 Tablet(s) Oral daily  potassium chloride   Powder 20 milliEquivalent(s) Oral daily  senna 2 Tablet(s) Oral at bedtime    MEDICATIONS  (PRN):  acetaminophen   Tablet 650 milliGRAM(s) Oral every 6 hours PRN For Temp greater than 38 C (100.4 F)  magnesium hydroxide Suspension 30 milliLiter(s) Oral daily PRN Constipation      REVIEW OF SYSTEMS:    CONSTITUTIONAL: No fever, chills, weight loss, or fatigue  HEENT: No sore throat, runny nose, ear ache  RESPIRATORY: No cough, wheezing, No shortness of breath  CARDIOVASCULAR: No chest pain, palpitations, dizziness  GASTROINTESTINAL: No abdominal pain. No nausea, vomiting, diarrhea  GENITOURINARY: No dysuria, increase frequency, hematuria, or incontinence  NEUROLOGICAL: No headaches, memory loss, loss of strength, numbness, or tremors, no weakness  EXTREMITY: No pedal edema BLE  SKIN: No itching, burning, rashes, or lesions     VITAL SIGNS:  T(C): 37.1 (02-05-18 @ 05:00), Max: 38.6 (02-04-18 @ 16:36)  T(F): 98.7 (02-05-18 @ 05:00), Max: 101.5 (02-04-18 @ 16:36)  HR: 101 (02-05-18 @ 05:00) (101 - 118)  BP: 112/77 (02-05-18 @ 05:00) (98/60 - 134/86)  RR: 18 (02-05-18 @ 05:00) (18 - 20)  SpO2: 98% (02-05-18 @ 05:00) (94% - 98%)  Wt(kg): --    PHYSICAL EXAM:    GENERAL: not in any distress , looking tired , chronic ill looking   HEENT: Neck is supple, normocephalic, atraumatic   CHEST/LUNG: Clear to percussion bilaterally; No rales, rhonchi, wheezing  HEART: Regular rate and rhythm; No murmurs, rubs, or gallops  ABDOMEN: Soft, Nontender, Nondistended; Bowel sounds present, no rebound   EXTREMITIES:  2+ Peripheral Pulses, No clubbing, cyanosis, or edema  GENITOURINARY:   SKIN: No rashes or lesions  BACK: no pressor sore   NERVOUS SYSTEM:  Alert & awake       LABS:                         9.5    7.47  )-----------( 394      ( 05 Feb 2018 07:44 )             28.6     02-05    138  |  105  |  10  ----------------------------<  91  3.6   |  24  |  0.34<L>    Ca    9.9      05 Feb 2018 07:44                                Culture Results:   No growth to date. (02-02 @ 12:51)  Culture Results:   >100,000 CFU/ml Providencia stuartii (02-01 @ 18:01)                Radiology:

## 2018-02-05 NOTE — CHART NOTE - NSCHARTNOTEFT_GEN_A_CORE
Assessment: 62 y.o. F pt c [place], hemiparesis, neurogenic bladder. lethargic and barely communicable. +BM 2/05.    Factors impacting intake: [ ] none [ ] nausea  [ ] vomiting [ ] diarrhea [ ] constipation  [ ]chewing problems [ x] swallowing issues  [x ] other: lethargy, unable to feed self    Diet Presciption: Diet, Dysphagia 1 Pureed-Honey Consistency Fluid:   No Carb Prosource (1pkg = 15gms Protein)     Qty per Day:  daily (01-25-18 @ 16:22)    Intake: fair/good %    Current Weight: no new wts since 1/28: 64 kg  % Weight Change    Pertinent Medications: MEDICATIONS  (STANDING):  ascorbic acid 500 milliGRAM(s) Oral two times a day  cefepime  IVPB      cefepime  IVPB 1000 milliGRAM(s) IV Intermittent every 8 hours  dextrose 5%. 1000 milliLiter(s) (63 mL/Hr) IV Continuous <Continuous>  enoxaparin Injectable 40 milliGRAM(s) SubCutaneous daily  ferrous    sulfate 325 milliGRAM(s) Oral daily  hydrocortisone 2.5% Cream 1 Application(s) Topical two times a day  lactulose Syrup 10 Gram(s) Oral two times a day  mirtazapine 15 milliGRAM(s) Oral at bedtime  multivitamin/minerals 1 Tablet(s) Oral daily  potassium chloride   Powder 20 milliEquivalent(s) Oral daily  senna 2 Tablet(s) Oral at bedtime    MEDICATIONS  (PRN):  acetaminophen   Tablet 650 milliGRAM(s) Oral every 6 hours PRN For Temp greater than 38 C (100.4 F)  magnesium hydroxide Suspension 30 milliLiter(s) Oral daily PRN Constipation    Pertinent Labs:    CAPILLARY BLOOD GLUCOSE        Skin: WDL. no edema noted    Estimated Needs:   [x ] no change since previous assessment  [ ] recalculated:     Previous Nutrition Diagnosis:   [ ] Inadequate Energy Intake [ ]Inadequate Oral Intake [ ] Excessive Energy Intake   [ ] Underweight [ ] Increased Nutrient Needs [ ] Overweight/Obesity   [ ] Altered GI Function [ ] Unintended Weight Loss [ ] Food & Nutrition Related Knowledge Deficit [x ] Severe Malnutrition in context of chronic illness    Nutrition Diagnosis is [x ] ongoing  [ ] resolved [ ] not applicable     New Nutrition Diagnosis: [x ] not applicable       Interventions:   Recommend  [ ] Change Diet To:  [ ] Nutrition Supplement  [ ] Nutrition Support  [x] Other: continue current nutrition plan of care    Monitoring and Evaluation:   [x ] PO intake [ x ] Tolerance to diet prescription [ x ] weights [ x ] labs[ x ] follow up per protocol  [ ] other:

## 2018-02-05 NOTE — PROGRESS NOTE ADULT - ASSESSMENT
persistent fever.    CT scan: no left hydronephrosis.    will schedule for removal of left ureteral stent.

## 2018-02-05 NOTE — CONSULT NOTE ADULT - SUBJECTIVE AND OBJECTIVE BOX
Patient is a 62y old  Female who presents with a chief complaint of 63 yo black female with fever up to 103+ (12 Jan 2018 14:07)   Wound consult called for the sacral ulcer management. Pt has been having sepsis  HPI:  63 yo black female with [place]'s chorea - with fevers for the past 3 days, last night went up to 103+.  Pt has been more lethargic.  no n/v (12 Jan 2018 14:07)    Decubital ulcer  Hemiparesis  Failure to thrive in adult  UTI (urinary tract infection)  Dehydration  Rhabdomyolysis  HTN (hypertension)  Neurogenic bladder disorder  Pressure ulcer of sacrum  UTI (urinary tract infection)  Embolism  DVT (deep venous thrombosis)  Anemia  Pneumonia  DM (diabetes mellitus)  High cholesterol  [place] chorea  No pertinent past medical history  No significant past surgical history    acetaminophen   Tablet 650 milliGRAM(s) Oral every 6 hours PRN  ascorbic acid 500 milliGRAM(s) Oral two times a day  cefepime  IVPB      cefepime  IVPB 1000 milliGRAM(s) IV Intermittent every 8 hours  dextrose 5%. 1000 milliLiter(s) IV Continuous <Continuous>  enoxaparin Injectable 40 milliGRAM(s) SubCutaneous daily  ferrous    sulfate 325 milliGRAM(s) Oral daily  hydrocortisone 2.5% Cream 1 Application(s) Topical two times a day  lactulose Syrup 10 Gram(s) Oral two times a day  magnesium hydroxide Suspension 30 milliLiter(s) Oral daily PRN  mirtazapine 15 milliGRAM(s) Oral at bedtime  multivitamin/minerals 1 Tablet(s) Oral daily  potassium chloride   Powder 20 milliEquivalent(s) Oral daily  senna 2 Tablet(s) Oral at bedtime    REVIEW OF SYSTEMS:    Constitutional: No fever, weight loss or fatigue  Eyes: No eye pain, visual disturbances, or discharge  ENMT:  No difficulty hearing, tinnitus, vertigo; No sinus or throat pain  Neck: No pain or stiffness  Respiratory: No cough, wheezing, chills or hemoptysis  Cardiovascular: No chest pain, palpitations, shortness of breath, dizziness or leg swelling  Gastrointestinal: No abdominal or epigastric pain. No nausea, vomiting or hematemesis; No diarrhea or constipation. No melena or hematochezia.  Genitourinary: No dysuria, frequency, hematuria or incontinence  Neurological: No headaches, memory loss, loss of strength, numbness or tremors  Skin: No itching, burning, rashes or lesions   Lymph Nodes: No enlarged glands  Endocrine: No heat or cold intolerance; No hair loss  Musculoskeletal: No joint pain or swelling; No muscle, back or extremity pain      Vital Signs Last 24 Hrs  T(C): 37.6 (05 Feb 2018 12:02), Max: 38.6 (04 Feb 2018 16:36)  T(F): 99.6 (05 Feb 2018 12:02), Max: 101.5 (04 Feb 2018 16:36)  HR: 103 (05 Feb 2018 12:02) (101 - 118)  BP: 120/78 (05 Feb 2018 12:02) (98/60 - 129/84)  BP(mean): --  RR: 16 (05 Feb 2018 12:02) (16 - 20)  SpO2: 97% (05 Feb 2018 12:02) (94% - 98%)  PHYSICAL EXAM:    General-- contracted non verbal. bed bound  Eyes: PERRL, EOM intact; conjunctiva and sclera clear  Head: Normocephalic; atraumatic  ENMT: No nasal discharge; airway clear  Neck: Supple; non tender; no masses  Respiratory: No wheezes, rales or rhonchi  Cardiovascular: Regular rate and rhythm. S1 and S2 Normal; No murmurs, gallops or rubs  Gastrointestinal: Soft non-tender non-distended; Normal bowel sounds; No hepatosplenomegaly  Extremities:  contracted  Feet:   Vascular: Peripheral pulses palpable weak, feet warm  Neurological: non verbal and ?/demented      Wounds:  SITE sacral  SIDE   STAGE4  DIMENSIONS 5x5x1.4 cms with 3 cms undermining from 9 to 1, no pus, no necrotic tisue and no need for debridement. No exposed bone and no abscess.                        9.5    7.47  )-----------( 394      ( 05 Feb 2018 07:44 )             28.6     02-05    138  |  105  |  10  ----------------------------<  91  3.6   |  24  |  0.34<L>    Ca    9.9      05 Feb 2018 07:44        Radiology

## 2018-02-06 LAB
ANION GAP SERPL CALC-SCNC: 7 MMOL/L — SIGNIFICANT CHANGE UP (ref 5–17)
APTT BLD: 30.7 SEC — SIGNIFICANT CHANGE UP (ref 27.5–37.4)
BUN SERPL-MCNC: 8 MG/DL — SIGNIFICANT CHANGE UP (ref 7–23)
CALCIUM SERPL-MCNC: 9.4 MG/DL — SIGNIFICANT CHANGE UP (ref 8.5–10.1)
CHLORIDE SERPL-SCNC: 105 MMOL/L — SIGNIFICANT CHANGE UP (ref 96–108)
CO2 SERPL-SCNC: 26 MMOL/L — SIGNIFICANT CHANGE UP (ref 22–31)
CREAT SERPL-MCNC: 0.36 MG/DL — LOW (ref 0.5–1.3)
GLUCOSE SERPL-MCNC: 91 MG/DL — SIGNIFICANT CHANGE UP (ref 70–99)
HCT VFR BLD CALC: 25.9 % — LOW (ref 34.5–45)
HGB BLD-MCNC: 8.6 G/DL — LOW (ref 11.5–15.5)
INR BLD: 1.47 RATIO — HIGH (ref 0.88–1.16)
MCHC RBC-ENTMCNC: 25.1 PG — LOW (ref 27–34)
MCHC RBC-ENTMCNC: 33.2 GM/DL — SIGNIFICANT CHANGE UP (ref 32–36)
MCV RBC AUTO: 75.7 FL — LOW (ref 80–100)
NRBC # BLD: 0 /100 WBCS — SIGNIFICANT CHANGE UP (ref 0–0)
PLATELET # BLD AUTO: 392 K/UL — SIGNIFICANT CHANGE UP (ref 150–400)
POTASSIUM SERPL-MCNC: 3.5 MMOL/L — SIGNIFICANT CHANGE UP (ref 3.5–5.3)
POTASSIUM SERPL-SCNC: 3.5 MMOL/L — SIGNIFICANT CHANGE UP (ref 3.5–5.3)
PROTHROM AB SERPL-ACNC: 16.1 SEC — HIGH (ref 9.8–12.7)
RBC # BLD: 3.42 M/UL — LOW (ref 3.8–5.2)
RBC # FLD: 18.4 % — HIGH (ref 10.3–14.5)
SODIUM SERPL-SCNC: 138 MMOL/L — SIGNIFICANT CHANGE UP (ref 135–145)
WBC # BLD: 7.26 K/UL — SIGNIFICANT CHANGE UP (ref 3.8–10.5)
WBC # FLD AUTO: 7.26 K/UL — SIGNIFICANT CHANGE UP (ref 3.8–10.5)

## 2018-02-06 RX ADMIN — Medication 500 MILLIGRAM(S): at 05:18

## 2018-02-06 RX ADMIN — SENNA PLUS 2 TABLET(S): 8.6 TABLET ORAL at 21:55

## 2018-02-06 RX ADMIN — LACTULOSE 10 GRAM(S): 10 SOLUTION ORAL at 18:49

## 2018-02-06 RX ADMIN — Medication 1 APPLICATION(S): at 05:19

## 2018-02-06 RX ADMIN — CEFEPIME 100 MILLIGRAM(S): 1 INJECTION, POWDER, FOR SOLUTION INTRAMUSCULAR; INTRAVENOUS at 21:55

## 2018-02-06 RX ADMIN — Medication 325 MILLIGRAM(S): at 13:07

## 2018-02-06 RX ADMIN — Medication 20 MILLIEQUIVALENT(S): at 13:07

## 2018-02-06 RX ADMIN — ENOXAPARIN SODIUM 40 MILLIGRAM(S): 100 INJECTION SUBCUTANEOUS at 13:07

## 2018-02-06 RX ADMIN — LACTULOSE 10 GRAM(S): 10 SOLUTION ORAL at 05:19

## 2018-02-06 RX ADMIN — Medication 1 APPLICATION(S): at 18:49

## 2018-02-06 RX ADMIN — CEFEPIME 100 MILLIGRAM(S): 1 INJECTION, POWDER, FOR SOLUTION INTRAMUSCULAR; INTRAVENOUS at 13:07

## 2018-02-06 RX ADMIN — Medication 1 TABLET(S): at 13:07

## 2018-02-06 RX ADMIN — SODIUM CHLORIDE 63 MILLILITER(S): 9 INJECTION, SOLUTION INTRAVENOUS at 18:48

## 2018-02-06 RX ADMIN — Medication 500 MILLIGRAM(S): at 18:49

## 2018-02-06 RX ADMIN — MIRTAZAPINE 15 MILLIGRAM(S): 45 TABLET, ORALLY DISINTEGRATING ORAL at 21:55

## 2018-02-06 RX ADMIN — CEFEPIME 100 MILLIGRAM(S): 1 INJECTION, POWDER, FOR SOLUTION INTRAMUSCULAR; INTRAVENOUS at 05:18

## 2018-02-06 NOTE — PROGRESS NOTE ADULT - SUBJECTIVE AND OBJECTIVE BOX
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  Pt has been more lethargic and barely communicable. Low grade fever still progressing.    Upon evaluation, pt denies any symptoms except flank pain intermittently , no hip pin.     Allergies      No Known Allergies    Intolerances        MEDICATIONS  (STANDING):  ascorbic acid 500 milliGRAM(s) Oral two times a day  cefepime  IVPB      cefepime  IVPB 1000 milliGRAM(s) IV Intermittent every 8 hours  dextrose 5%. 1000 milliLiter(s) (63 mL/Hr) IV Continuous <Continuous>  enoxaparin Injectable 40 milliGRAM(s) SubCutaneous daily  ferrous    sulfate 325 milliGRAM(s) Oral daily  hydrocortisone 2.5% Cream 1 Application(s) Topical two times a day  lactulose Syrup 10 Gram(s) Oral two times a day  mirtazapine 15 milliGRAM(s) Oral at bedtime  multivitamin/minerals 1 Tablet(s) Oral daily  potassium chloride   Powder 20 milliEquivalent(s) Oral daily  senna 2 Tablet(s) Oral at bedtime    MEDICATIONS  (PRN):  acetaminophen   Tablet 650 milliGRAM(s) Oral every 6 hours PRN For Temp greater than 38 C (100.4 F)  magnesium hydroxide Suspension 30 milliLiter(s) Oral daily PRN Constipation      REVIEW OF SYSTEMS:    CONSTITUTIONAL: No fever, chills, weight loss, or fatigue  HEENT: No sore throat, runny nose, ear ache  RESPIRATORY: No cough, wheezing, No shortness of breath  CARDIOVASCULAR: No chest pain, palpitations, dizziness  GASTROINTESTINAL: No abdominal pain. No nausea, vomiting, diarrhea  GENITOURINARY: No dysuria, increase frequency, hematuria, or incontinence. Flank pain ++   NEUROLOGICAL: No headaches, memory loss, loss of strength, numbness, or tremors, no weakness  EXTREMITY: No pedal edema BLE  SKIN: No itching, burning, rashes, or lesions     VITAL SIGNS:  T(C): 36.6 (02-06-18 @ 12:05), Max: 38.1 (02-05-18 @ 16:59)  T(F): 97.8 (02-06-18 @ 12:05), Max: 100.6 (02-05-18 @ 16:59)  HR: 99 (02-06-18 @ 12:05) (99 - 104)  BP: 111/74 (02-06-18 @ 12:05) (108/68 - 150/86)  RR: 17 (02-06-18 @ 12:05) (15 - 17)  SpO2: 98% (02-06-18 @ 12:05) (98% - 98%)  Wt(kg): --    PHYSICAL EXAM:    GENERAL: not in any distress, contracted , tired looking   HEENT: Neck is supple, normocephalic, atraumatic   CHEST/LUNG: Clear to percussion bilaterally; No rales, rhonchi, wheezing  HEART: Regular rate and rhythm; No murmurs, rubs, or gallops  ABDOMEN: Soft, Nontender, Nondistended; Bowel sounds present, no rebound   EXTREMITIES:  2+ Peripheral Pulses, No clubbing, cyanosis, or edema  GENITOURINARY:   SKIN: No rashes or lesions  BACK: no pressor sore   NERVOUS SYSTEM:  Alert & Oriented X3, Good concentration  PSYCH: normal affect     LABS:                         8.6    7.26  )-----------( 392      ( 06 Feb 2018 07:15 )             25.9     02-06    138  |  105  |  8   ----------------------------<  91  3.5   |  26  |  0.36<L>    Ca    9.4      06 Feb 2018 07:15        PT/INR - ( 06 Feb 2018 07:15 )   PT: 16.1 sec;   INR: 1.47 ratio         PTT - ( 06 Feb 2018 07:15 )  PTT:30.7 sec                        Culture Results:   No growth to date. (02-02 @ 12:51)  Culture Results:   >100,000 CFU/ml Providencia stuartii (02-01 @ 18:01)                Radiology:

## 2018-02-06 NOTE — PROGRESS NOTE ADULT - SUBJECTIVE AND OBJECTIVE BOX
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  Pt has been more lethargic.  no n/v (12 Jan 2018 14:07)      INTERVAL HPI/OVERNIGHT EVENTS:  No new c/o. Tmax 100.6. See Ua. See CT scan. Removal of stents postponed to [id number].    MEDICATIONS  (STANDING):  ascorbic acid 500 milliGRAM(s) Oral two times a day  cefepime  IVPB      cefepime  IVPB 1000 milliGRAM(s) IV Intermittent every 8 hours  dextrose 5%. 1000 milliLiter(s) (63 mL/Hr) IV Continuous <Continuous>  enoxaparin Injectable 40 milliGRAM(s) SubCutaneous daily  ferrous    sulfate 325 milliGRAM(s) Oral daily  hydrocortisone 2.5% Cream 1 Application(s) Topical two times a day  lactulose Syrup 10 Gram(s) Oral two times a day  mirtazapine 15 milliGRAM(s) Oral at bedtime  multivitamin/minerals 1 Tablet(s) Oral daily  potassium chloride   Powder 20 milliEquivalent(s) Oral daily  senna 2 Tablet(s) Oral at bedtime    MEDICATIONS  (PRN):  acetaminophen   Tablet 650 milliGRAM(s) Oral every 6 hours PRN For Temp greater than 38 C (100.4 F)  magnesium hydroxide Suspension 30 milliLiter(s) Oral daily PRN Constipation      FAMILY HISTORY:  No pertinent family history in first degree relatives      Allergies    No Known Allergies    Intolerances        PMH/PSH:  Decubital ulcer  Hemiparesis  Failure to thrive in adult  UTI (urinary tract infection)  Dehydration  Rhabdomyolysis  HTN (hypertension)  Neurogenic bladder disorder  Pressure ulcer of sacrum  UTI (urinary tract infection)  Embolism  DVT (deep venous thrombosis)  Anemia  Pneumonia  DM (diabetes mellitus)  High cholesterol  [place] chorea  No pertinent past medical history  No significant past surgical history        REVIEW OF SYSTEMS:  CONSTITUTIONAL: No weight loss,  EYES: No eye pain, visual disturbances, or discharge  ENMT:  No difficulty hearing, tinnitus, vertigo; No sinus or throat pain  NECK: No pain or stiffness  BREASTS: No pain, masses, or nipple discharge  RESPIRATORY: No cough, wheezing, chills or hemoptysis; No shortness of breath  CARDIOVASCULAR: No chest pain, palpitations, dizziness, or leg swelling  GASTROINTESTINAL: No abdominal or epigastric pain. No nausea, vomiting, or hematemesis; No diarrhea or constipation. No melena or hematochezia.  GENITOURINARY: No dysuria, frequency, hematuria, or incontinence  NEUROLOGICAL: No headaches, memory loss, loss of strength, numbness, or tremors  SKIN: No itching, burning, rashes, or lesions   LYMPH NODES: No enlarged glands  ENDOCRINE: No heat or cold intolerance; No hair loss  MUSCULOSKELETAL: No joint pain or swelling; No muscle, back, or extremity pain  PSYCHIATRIC: No depression, anxiety, mood swings, or difficulty sleeping  HEME/LYMPH: No easy bruising, or bleeding gums  ALLERGY AND IMMUNOLOGIC: No hives or eczema    Vital Signs Last 24 Hrs  T(C): 36.6 (06 Feb 2018 12:05), Max: 38.1 (05 Feb 2018 16:59)  T(F): 97.8 (06 Feb 2018 12:05), Max: 100.6 (05 Feb 2018 16:59)  HR: 99 (06 Feb 2018 12:05) (99 - 104)  BP: 111/74 (06 Feb 2018 12:05) (108/68 - 150/86)  BP(mean): --  RR: 17 (06 Feb 2018 12:05) (15 - 17)  SpO2: 98% (06 Feb 2018 12:05) (98% - 98%)    PHYSICAL EXAM:  GENERAL: NAD, well-groomed, well-developed  HEAD:  Atraumatic, Normocephalic  EYES: EOMI, PERRLA, conjunctiva and sclera clear  NECK: Supple, No JVD, Normal thyroid  NERVOUS SYSTEM:  Alert  CHEST/LUNG: Clear to percussion bilaterally; No rales, rhonchi, wheezing, or rubs  HEART: Regular rate and rhythm; No murmurs, rubs, or gallops  ABDOMEN: Soft, Nontender, Nondistended; Bowel sounds present  EXTREMITIES:  2+ Peripheral Pulses, No clubbing, cyanosis, or edema  LYMPH: No lymphadenopathy noted  SKIN: No rashes or lesions    LAB                          8.6    7.26  )-----------( 392      ( 06 Feb 2018 07:15 )             25.9       CBC:  02-06 @ 07:15  WBC 7.26   Hgb 8.6   Hct 25.9   Plts 392  MCV 75.7  02-05 @ 07:44  WBC 7.47   Hgb 9.5   Hct 28.6   Plts 394  MCV 75.7  02-04 @ 06:18  WBC 7.51   Hgb 9.3   Hct 27.2   Plts 398  MCV 74.9  02-02 @ 06:39  WBC 7.33   Hgb 7.5   Hct 22.4   Plts 373  MCV 73.9  01-31 @ 06:19  WBC 7.94   Hgb 7.4   Hct 22.4   Plts 371  MCV 73.7      Chemistry:  02-06 @ 07:15  Na+ 138  K+ 3.5  Cl- 105  CO2 26  BUN 8  Cr 0.36     02-05 @ 07:44  Na+ 138  K+ 3.6  Cl- 105  CO2 24  BUN 10  Cr 0.34     02-04 @ 06:18  Na+ 136  K+ 3.8  Cl- 104  CO2 25  BUN 8  Cr 0.41     02-02 @ 06:39  Na+ 139  K+ 3.7  Cl- 105  CO2 27  BUN 9  Cr 0.35     01-31 @ 06:19  Na+ 140  K+ 3.7  Cl- 105  CO2 26  BUN 7  Cr 0.27         Glucose, Serum: 91 mg/dL (02-06 @ 07:15)  Glucose, Serum: 91 mg/dL (02-05 @ 07:44)  Glucose, Serum: 109 mg/dL (02-04 @ 06:18)  Glucose, Serum: 90 mg/dL (02-02 @ 06:39)  Glucose, Serum: 88 mg/dL (01-31 @ 06:19)      06 Feb 2018 07:15    138    |  105    |  8      ----------------------------<  91     3.5     |  26     |  0.36   05 Feb 2018 07:44    138    |  105    |  10     ----------------------------<  91     3.6     |  24     |  0.34   04 Feb 2018 06:18    136    |  104    |  8      ----------------------------<  109    3.8     |  25     |  0.41   02 Feb 2018 06:39    139    |  105    |  9      ----------------------------<  90     3.7     |  27     |  0.35   31 Jan 2018 06:19    140    |  105    |  7      ----------------------------<  88     3.7     |  26     |  0.27     Ca    9.4        06 Feb 2018 07:15  Ca    9.9        05 Feb 2018 07:44  Ca    9.3        04 Feb 2018 06:18  Ca    9.7        02 Feb 2018 06:39  Ca    9.5        31 Jan 2018 06:19    TPro  8.0    /  Alb  1.8    /  TBili  0.3    /  DBili  x      /  AST  18     /  ALT  18     /  AlkPhos  66     02 Feb 2018 06:39      PT/INR - ( 06 Feb 2018 07:15 )   PT: 16.1 sec;   INR: 1.47 ratio         PTT - ( 06 Feb 2018 07:15 )  PTT:30.7 sec        CAPILLARY BLOOD GLUCOSE              RADIOLOGY & ADDITIONAL TESTS:    Imaging Personally Reviewed:  [ ] YES  [ ] NO    Consultant(s) Notes Reviewed:  [ ] YES  [ ] NO    Care Discussed with Consultants/Other Providers [ ] YES  [ ] NO

## 2018-02-06 NOTE — PROGRESS NOTE ADULT - ASSESSMENT
Subjective Complaints:  Historian:         REVIEW OF SYSTEMS:  Eyes:  Good vision, no reported pain  ENT:  No sore throat, pain, runny nose, dysphagia  CV:  No pain, palpitatioins, hypo/hypertension  Resp:  No dyspnea, cough, tachypnea, wheezing  GI:  No pain, nausea, vomiting, diarrhea, constipatiion  Muscle:  No pain, weakness  Neuro:  No weakness, tingling, memory problems  Psych:  No fatigue, insomnia, mood problems, depression  Endocrine:  No polyuria, polydypsia, cold/heat intolerance    Vital Signs Last 24 Hrs  T(C): 36.6 (06 Feb 2018 12:05), Max: 36.9 (06 Feb 2018 05:51)  T(F): 97.8 (06 Feb 2018 12:05), Max: 98.4 (06 Feb 2018 05:51)  HR: 99 (06 Feb 2018 12:05) (99 - 104)  BP: 111/74 (06 Feb 2018 12:05) (108/68 - 150/86)  BP(mean): --  RR: 17 (06 Feb 2018 12:05) (15 - 17)  SpO2: 98% (06 Feb 2018 12:05) (98% - 98%)    GENERAL PHYSICAL EXAM:  General:  Appears stated age, well-groomed, well-nourished, no distress  HEENT:  NC/AT, patent nares w/ pink mucosa, OP clear w/o lesions, PERRL, EOMI, conjunctivae clear, no thyromegaly, nodules, adenopathy, no JVD  Chest:  Full & symmetric excursion, no increased effort, breath sounds clear  Cardiovascular:  Regular rhythm, S1, S2, no murmur/rub/S3/S4, no carotid/femoral/abdominal bruit, radial/pedal pulses 2+, no edema  Abdomen:  Soft, non-tender, non-distended, normoactive bowel sounds, no HSM  Extremities:  Gait & station:   Digits:   Nails:   Joints, Bones, Muscles:   ROM:   Stability:  Skin:  No rash/erythema/ecchymoses/petechiae/wounds/abscess/warm/dry  Musculoskeletal:  Full ROM in all joints w/o swelling/tenderness/effusion    NEUROLOGICAL EXAM:  HENT:  Normocephalic head; atraumatic head.  Neck supple.  ENT: normal looking.  Mental State:    Alert.   open eyes arem leg contracted no chorea            8.6    7.26  )-----------( 392      ( 06 Feb 2018 07:15 )             25.9     02-06    138  |  105  |  8   ----------------------------<  91  3.5   |  26  |  0.36<L>    Ca    9.4      06 Feb 2018 07:15      PT/INR - ( 06 Feb 2018 07:15 )   PT: 16.1 sec;   INR: 1.47 ratio         PTT - ( 06 Feb 2018 07:15 )  PTT:30.7 sec      RADIOLOGY & ADDITIONAL STUDIES:     ass= awake open eyes arm leg contracted  uti s/p ureteral stent no chorea  anemia     Recommendations for sub acute rehab w

## 2018-02-06 NOTE — PROGRESS NOTE ADULT - PROBLEM SELECTOR PLAN 1
On cefepime, T max 100.6   , precalcitonin 0.13 , s/p cystoscopy. Indium scan negative. appreciate ID note  - For removal of stents in AM, Ortho consult regarding right hip effussion

## 2018-02-06 NOTE — CONSULT NOTE ADULT - CONSULT REASON
Sacral pressure ulcer
abnormal ecg
fever, sepsis, left ureteral stone with Hydro, Right renal stone
fevers and lethargy
gram negative rods blood
rule out right septic hip
fever lethargic

## 2018-02-06 NOTE — CONSULT NOTE ADULT - ASSESSMENT
Patient is 61yF with chronic right hip subluxation and synovitis  - xray pelvis pending  -esr/crp pending  -based on negative NM bone scan and history of inflammatory synovitis on past CT, low likelihood of acute septic arthritis  - if source of fever/infection remains unknown, aspiration of joint by Interventional Radiology recommended  - c/w abx per ID  -c/w care per primary team  -will d/w attending

## 2018-02-06 NOTE — CONSULT NOTE ADULT - CONSULT REQUESTED DATE/TIME
05-Feb-2018 13:45
06-Feb-2018 23:47
12-Jan-2018
12-Jan-2018 15:19
17-Jan-2018 00:00
18-Jan-2018
12-Jan-2018 18:51

## 2018-02-06 NOTE — CONSULT NOTE ADULT - SUBJECTIVE AND OBJECTIVE BOX
Pt is a 62yF with Rock[place]'s disease admitted to [facility] on 1/12 with sepsis and UTI. Orthopedics consulted to rule out septic hip. Effusion noted on CT abd/pelvis. History difficult to attain 2/2 patients baseline mental status. Patient states she is in no pain and does not experience pain with ROM. She is currently on abx for FUO.        PMH:  Decubital ulcer  Hemiparesis  Failure to thrive in adult  UTI (urinary tract infection)  Dehydration  Rhabdomyolysis  HTN (hypertension)  Neurogenic bladder disorder  Pressure ulcer of sacrum  UTI (urinary tract infection)  Embolism  DVT (deep venous thrombosis)  Anemia  Pneumonia  DM (diabetes mellitus)  High cholesterol  [place] chorea  No pertinent past medical history    PSH:  No significant past surgical history    ICU Vital Signs Last 24 Hrs  T(C): 37.6 (06 Feb 2018 23:24), Max: 37.6 (06 Feb 2018 23:24)  T(F): 99.7 (06 Feb 2018 23:24), Max: 99.7 (06 Feb 2018 23:24)  HR: 100 (06 Feb 2018 23:24) (97 - 102)  BP: 103/62 (06 Feb 2018 23:24) (103/62 - 118/85)  BP(mean): --  ABP: --  ABP(mean): --  RR: 19 (06 Feb 2018 23:24) (16 - 19)  SpO2: 99% (06 Feb 2018 23:24) (96% - 99%)    PE:  Pt resting comfortably in bed.  There are upper and lower flexion contracture. Right hip is flexed, adducted, internally rotated.  No distress with ROM of R Hip  no erythema or warmth at site  no apparant bony ttp    Imaging:  NM Bone Scan: Neg for OM  CT A/P (2/5): Right hip subluxation with possible effusion  CT A/P (1/ 16): right hip subluxation and inflammatory synovitis  Xray: pending

## 2018-02-06 NOTE — PROGRESS NOTE ADULT - SUBJECTIVE AND OBJECTIVE BOX
Patient seen and examined bedside resting comfortably.  Mcfarland catheter draining well.    T(F): 97.8 (02-06-18 @ 12:05), Max: 100.6 (02-05-18 @ 16:59)  HR: 99 (02-06-18 @ 12:05) (99 - 104)  BP: 111/74 (02-06-18 @ 12:05) (108/68 - 150/86)  RR: 17 (02-06-18 @ 12:05) (15 - 17)  SpO2: 98% (02-06-18 @ 12:05) (98% - 98%)        PHYSICAL EXAM:  General: NAD, WDWN  Neuro:  Alert & conscious  HEENT: NCAT, EOMI, conjunctiva clear  Lung: respirations nonlabored, good inspiratory effort  Abdomen: soft, NTND.   Extremities: no pedal edema or calf tenderness noted       LABS:                        8.6    7.26  )-----------( 392      ( 06 Feb 2018 07:15 )             25.9     02-06    138  |  105  |  8   ----------------------------<  91  3.5   |  26  |  0.36<L>    Ca    9.4      06 Feb 2018 07:15      PT/INR - ( 06 Feb 2018 07:15 )   PT: 16.1 sec;   INR: 1.47 ratio         PTT - ( 06 Feb 2018 07:15 )  PTT:30.7 sec  I&O's Detail    05 Feb 2018 07:01  -  06 Feb 2018 07:00  --------------------------------------------------------  IN:    dextrose 5%.: 750 mL  Total IN: 750 mL    OUT:    Indwelling Catheter - Urethral: 1301 mL  Total OUT: 1301 mL    Total NET: -551 mL          wbc    02-06 @ 07:15    7.26    02-05 @ 07:44    7.47    02-04 @ 06:18    7.51    02-02 @ 06:39    7.33    01-31 @ 06:19    7.94        cr   02-06 @ 07:15   0.36    02-05 @ 07:44   0.34    02-04 @ 06:18   0.41    02-02 @ 06:39   0.35    01-31 @ 06:19   0.27

## 2018-02-06 NOTE — PROGRESS NOTE ADULT - ASSESSMENT
Pos rhino virus / recent ho of Sepsis from acute viral syndrome vs bacteremia vs pyelonephritis , left ureter stone  Recurrent fever , cloudy urine in obrien for urinary retention, ho of  Left Ureteroscopy, with laser lithotripsy and stent insertion, postop course c/b persistent fevers   blood culture simple Ecoli , fairly sensitive e coli , resistant to cipro, source likely from pyelo  zosyn from 1/12 to 16 and then jenny from 1/16 to 28, completed dose for complicated UTI and bacteremia and surveillance blood culture neg so far   Nuclear Bone scan negative for infection and OM   She was stable off antibiotics and then developed recurring fever and repeated UA is still dirty and urine culture grew providentia    now on ceftazidime     Abd and pelvic CT reviewed , unclear hip joint effusion, no clinical symptoms , may benefit from ortho consult to r/o septic arthritis for evaluation of poss aspiration ?      Pt is for stent removal , will refer to urology     Consider to change Obrien if she still spiking , urology on board

## 2018-02-07 ENCOUNTER — RESULT REVIEW (OUTPATIENT)
Age: 63
End: 2018-02-07

## 2018-02-07 LAB
ANION GAP SERPL CALC-SCNC: 6 MMOL/L — SIGNIFICANT CHANGE UP (ref 5–17)
BUN SERPL-MCNC: 7 MG/DL — SIGNIFICANT CHANGE UP (ref 7–23)
CALCIUM SERPL-MCNC: 9.5 MG/DL — SIGNIFICANT CHANGE UP (ref 8.5–10.1)
CHLORIDE SERPL-SCNC: 106 MMOL/L — SIGNIFICANT CHANGE UP (ref 96–108)
CO2 SERPL-SCNC: 25 MMOL/L — SIGNIFICANT CHANGE UP (ref 22–31)
CREAT SERPL-MCNC: 0.41 MG/DL — LOW (ref 0.5–1.3)
CRP SERPL-MCNC: 4.7 MG/DL — HIGH (ref 0–0.4)
CULTURE RESULTS: SIGNIFICANT CHANGE UP
ERYTHROCYTE [SEDIMENTATION RATE] IN BLOOD: 115 MM/HR — HIGH (ref 0–20)
GLUCOSE SERPL-MCNC: 103 MG/DL — HIGH (ref 70–99)
HCT VFR BLD CALC: 27.5 % — LOW (ref 34.5–45)
HGB BLD-MCNC: 9.2 G/DL — LOW (ref 11.5–15.5)
MCHC RBC-ENTMCNC: 25.5 PG — LOW (ref 27–34)
MCHC RBC-ENTMCNC: 33.5 GM/DL — SIGNIFICANT CHANGE UP (ref 32–36)
MCV RBC AUTO: 76.2 FL — LOW (ref 80–100)
NRBC # BLD: 0 /100 WBCS — SIGNIFICANT CHANGE UP (ref 0–0)
PLATELET # BLD AUTO: 418 K/UL — HIGH (ref 150–400)
POTASSIUM SERPL-MCNC: 3.5 MMOL/L — SIGNIFICANT CHANGE UP (ref 3.5–5.3)
POTASSIUM SERPL-SCNC: 3.5 MMOL/L — SIGNIFICANT CHANGE UP (ref 3.5–5.3)
RBC # BLD: 3.61 M/UL — LOW (ref 3.8–5.2)
RBC # FLD: 18.5 % — HIGH (ref 10.3–14.5)
SODIUM SERPL-SCNC: 137 MMOL/L — SIGNIFICANT CHANGE UP (ref 135–145)
SPECIMEN SOURCE: SIGNIFICANT CHANGE UP
WBC # BLD: 7.79 K/UL — SIGNIFICANT CHANGE UP (ref 3.8–10.5)
WBC # FLD AUTO: 7.79 K/UL — SIGNIFICANT CHANGE UP (ref 3.8–10.5)

## 2018-02-07 PROCEDURE — 72170 X-RAY EXAM OF PELVIS: CPT | Mod: 26

## 2018-02-07 PROCEDURE — 88300 SURGICAL PATH GROSS: CPT | Mod: 26,59

## 2018-02-07 RX ORDER — MAGNESIUM HYDROXIDE 400 MG/1
30 TABLET, CHEWABLE ORAL DAILY
Qty: 0 | Refills: 0 | Status: DISCONTINUED | OUTPATIENT
Start: 2018-02-07 | End: 2018-02-14

## 2018-02-07 RX ORDER — ACETAMINOPHEN 500 MG
650 TABLET ORAL EVERY 6 HOURS
Qty: 0 | Refills: 0 | Status: DISCONTINUED | OUTPATIENT
Start: 2018-02-07 | End: 2018-02-14

## 2018-02-07 RX ORDER — LACTULOSE 10 G/15ML
10 SOLUTION ORAL
Qty: 0 | Refills: 0 | Status: DISCONTINUED | OUTPATIENT
Start: 2018-02-07 | End: 2018-02-14

## 2018-02-07 RX ORDER — SODIUM CHLORIDE 9 MG/ML
1000 INJECTION, SOLUTION INTRAVENOUS
Qty: 0 | Refills: 0 | Status: DISCONTINUED | OUTPATIENT
Start: 2018-02-07 | End: 2018-02-07

## 2018-02-07 RX ORDER — MULTIVIT-MIN/FERROUS GLUCONATE 9 MG/15 ML
1 LIQUID (ML) ORAL DAILY
Qty: 0 | Refills: 0 | Status: DISCONTINUED | OUTPATIENT
Start: 2018-02-07 | End: 2018-02-14

## 2018-02-07 RX ORDER — SODIUM CHLORIDE 9 MG/ML
1000 INJECTION, SOLUTION INTRAVENOUS
Qty: 0 | Refills: 0 | Status: DISCONTINUED | OUTPATIENT
Start: 2018-02-07 | End: 2018-02-14

## 2018-02-07 RX ORDER — ASCORBIC ACID 60 MG
500 TABLET,CHEWABLE ORAL
Qty: 0 | Refills: 0 | Status: DISCONTINUED | OUTPATIENT
Start: 2018-02-07 | End: 2018-02-14

## 2018-02-07 RX ORDER — MEROPENEM 1 G/30ML
500 INJECTION INTRAVENOUS EVERY 8 HOURS
Qty: 0 | Refills: 0 | Status: DISCONTINUED | OUTPATIENT
Start: 2018-02-07 | End: 2018-02-07

## 2018-02-07 RX ORDER — FERROUS SULFATE 325(65) MG
325 TABLET ORAL DAILY
Qty: 0 | Refills: 0 | Status: DISCONTINUED | OUTPATIENT
Start: 2018-02-07 | End: 2018-02-14

## 2018-02-07 RX ORDER — SENNA PLUS 8.6 MG/1
2 TABLET ORAL AT BEDTIME
Qty: 0 | Refills: 0 | Status: DISCONTINUED | OUTPATIENT
Start: 2018-02-07 | End: 2018-02-14

## 2018-02-07 RX ORDER — ENOXAPARIN SODIUM 100 MG/ML
40 INJECTION SUBCUTANEOUS DAILY
Qty: 0 | Refills: 0 | Status: DISCONTINUED | OUTPATIENT
Start: 2018-02-07 | End: 2018-02-08

## 2018-02-07 RX ORDER — POTASSIUM CHLORIDE 20 MEQ
20 PACKET (EA) ORAL DAILY
Qty: 0 | Refills: 0 | Status: DISCONTINUED | OUTPATIENT
Start: 2018-02-07 | End: 2018-02-14

## 2018-02-07 RX ORDER — HYDROCORTISONE 1 %
1 OINTMENT (GRAM) TOPICAL
Qty: 0 | Refills: 0 | Status: DISCONTINUED | OUTPATIENT
Start: 2018-02-07 | End: 2018-02-14

## 2018-02-07 RX ORDER — CEFEPIME 1 G/1
1000 INJECTION, POWDER, FOR SOLUTION INTRAMUSCULAR; INTRAVENOUS EVERY 8 HOURS
Qty: 0 | Refills: 0 | Status: DISCONTINUED | OUTPATIENT
Start: 2018-02-07 | End: 2018-02-12

## 2018-02-07 RX ORDER — MORPHINE SULFATE 50 MG/1
2 CAPSULE, EXTENDED RELEASE ORAL
Qty: 0 | Refills: 0 | Status: DISCONTINUED | OUTPATIENT
Start: 2018-02-07 | End: 2018-02-07

## 2018-02-07 RX ORDER — MIRTAZAPINE 45 MG/1
15 TABLET, ORALLY DISINTEGRATING ORAL AT BEDTIME
Qty: 0 | Refills: 0 | Status: DISCONTINUED | OUTPATIENT
Start: 2018-02-07 | End: 2018-02-14

## 2018-02-07 RX ORDER — POTASSIUM CHLORIDE 20 MEQ
20 PACKET (EA) ORAL DAILY
Qty: 0 | Refills: 0 | Status: DISCONTINUED | OUTPATIENT
Start: 2018-02-07 | End: 2018-02-07

## 2018-02-07 RX ADMIN — Medication 500 MILLIGRAM(S): at 06:01

## 2018-02-07 RX ADMIN — Medication 500 MILLIGRAM(S): at 18:44

## 2018-02-07 RX ADMIN — SODIUM CHLORIDE 63 MILLILITER(S): 9 INJECTION, SOLUTION INTRAVENOUS at 06:01

## 2018-02-07 RX ADMIN — CEFEPIME 100 MILLIGRAM(S): 1 INJECTION, POWDER, FOR SOLUTION INTRAMUSCULAR; INTRAVENOUS at 22:39

## 2018-02-07 RX ADMIN — SODIUM CHLORIDE 63 MILLILITER(S): 9 INJECTION, SOLUTION INTRAVENOUS at 18:45

## 2018-02-07 RX ADMIN — CEFEPIME 100 MILLIGRAM(S): 1 INJECTION, POWDER, FOR SOLUTION INTRAMUSCULAR; INTRAVENOUS at 06:01

## 2018-02-07 RX ADMIN — Medication 1 APPLICATION(S): at 06:02

## 2018-02-07 RX ADMIN — CEFEPIME 100 MILLIGRAM(S): 1 INJECTION, POWDER, FOR SOLUTION INTRAMUSCULAR; INTRAVENOUS at 14:06

## 2018-02-07 RX ADMIN — Medication 325 MILLIGRAM(S): at 11:47

## 2018-02-07 RX ADMIN — MIRTAZAPINE 15 MILLIGRAM(S): 45 TABLET, ORALLY DISINTEGRATING ORAL at 22:38

## 2018-02-07 RX ADMIN — SENNA PLUS 2 TABLET(S): 8.6 TABLET ORAL at 22:38

## 2018-02-07 RX ADMIN — LACTULOSE 10 GRAM(S): 10 SOLUTION ORAL at 06:02

## 2018-02-07 RX ADMIN — SODIUM CHLORIDE 50 MILLILITER(S): 9 INJECTION, SOLUTION INTRAVENOUS at 18:09

## 2018-02-07 RX ADMIN — Medication 1 TABLET(S): at 11:46

## 2018-02-07 NOTE — PROGRESS NOTE ADULT - SUBJECTIVE AND OBJECTIVE BOX
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  Pt has been more lethargic.  no n/v (12 Jan 2018 14:07)      INTERVAL HPI/OVERNIGHT EVENTS:  No new c/o. MS at baseline . For removal of stent today.  Tmax 100.6 @ 4:59 PM    MEDICATIONS  (STANDING):  ascorbic acid 500 milliGRAM(s) Oral two times a day  cefepime  IVPB      cefepime  IVPB 1000 milliGRAM(s) IV Intermittent every 8 hours  dextrose 5%. 1000 milliLiter(s) (63 mL/Hr) IV Continuous <Continuous>  enoxaparin Injectable 40 milliGRAM(s) SubCutaneous daily  ferrous    sulfate 325 milliGRAM(s) Oral daily  hydrocortisone 2.5% Cream 1 Application(s) Topical two times a day  lactulose Syrup 10 Gram(s) Oral two times a day  mirtazapine 15 milliGRAM(s) Oral at bedtime  multivitamin/minerals 1 Tablet(s) Oral daily  potassium chloride   Powder 20 milliEquivalent(s) Oral daily  senna 2 Tablet(s) Oral at bedtime    MEDICATIONS  (PRN):  acetaminophen   Tablet 650 milliGRAM(s) Oral every 6 hours PRN For Temp greater than 38 C (100.4 F)  magnesium hydroxide Suspension 30 milliLiter(s) Oral daily PRN Constipation      FAMILY HISTORY:  No pertinent family history in first degree relatives      Allergies    No Known Allergies    Intolerances        PMH/PSH:  Decubital ulcer  Hemiparesis  Failure to thrive in adult  UTI (urinary tract infection)  Dehydration  Rhabdomyolysis  HTN (hypertension)  Neurogenic bladder disorder  Pressure ulcer of sacrum  UTI (urinary tract infection)  Embolism  DVT (deep venous thrombosis)  Anemia  Pneumonia  DM (diabetes mellitus)  High cholesterol  [place] chorea  No pertinent past medical history  No significant past surgical history        REVIEW OF SYSTEMS:  CONSTITUTIONAL: No weight loss,   EYES: No eye pain, visual disturbances, or discharge  ENMT:  No difficulty hearing, tinnitus, vertigo; No sinus or throat pain  NECK: No pain or stiffness  BREASTS: No pain, masses, or nipple discharge  RESPIRATORY: No cough, wheezing, chills or hemoptysis; No shortness of breath  CARDIOVASCULAR: No chest pain, palpitations, dizziness, or leg swelling  GASTROINTESTINAL: No abdominal or epigastric pain. No nausea, vomiting, or hematemesis; No diarrhea or constipation. No melena or hematochezia.  GENITOURINARY: No dysuria, frequency, hematuria, or incontinence  NEUROLOGICAL: No headaches, memory loss, loss of strength, numbness, or tremors  SKIN: No itching, burning, rashes, or lesions   LYMPH NODES: No enlarged glands  ENDOCRINE: No heat or cold intolerance; No hair loss  MUSCULOSKELETAL: No joint pain or swelling; No muscle, back, or extremity pain  PSYCHIATRIC: No depression, anxiety, mood swings, or difficulty sleeping  HEME/LYMPH: No easy bruising, or bleeding gums  ALLERGY AND IMMUNOLOGIC: No hives or eczema    Vital Signs Last 24 Hrs  T(C): 37.6 (07 Feb 2018 05:14), Max: 37.6 (06 Feb 2018 23:24)  T(F): 99.6 (07 Feb 2018 05:14), Max: 99.7 (06 Feb 2018 23:24)  HR: 104 (07 Feb 2018 05:14) (97 - 104)  BP: 113/73 (07 Feb 2018 05:14) (103/62 - 118/85)  BP(mean): --  RR: 18 (07 Feb 2018 05:14) (17 - 19)  SpO2: 99% (07 Feb 2018 05:14) (96% - 99%)    PHYSICAL EXAM:  GENERAL: NAD, well-groomed, well-developed  HEAD:  Atraumatic, Normocephalic  EYES: EOMI, PERRLA, conjunctiva and sclera clear  ENMT: No tonsillar erythema, exudates, or enlargement; Moist mucous membranes, Good dentition, No lesions  NECK: Supple, No JVD, Normal thyroid  NERVOUS SYSTEM:  Alert & Oriented X3, Good concentration; Motor Strength 5/5 B/L upper and lower extremities; DTRs 2+ intact and symmetric  CHEST/LUNG: Clear to percussion bilaterally; No rales, rhonchi, wheezing, or rubs  HEART: Regular rate and rhythm; No murmurs, rubs, or gallops  ABDOMEN: Soft, Nontender, Nondistended; Bowel sounds present  EXTREMITIES:  2+ Peripheral Pulses, No clubbing, cyanosis, or edema  LYMPH: No lymphadenopathy noted  SKIN: No rashes or lesions    LAB                          9.2    7.79  )-----------( 418      ( 07 Feb 2018 08:16 )             27.5       CBC:  02-07 @ 08:16  WBC 7.79   Hgb 9.2   Hct 27.5   Plts 418  MCV 76.2  02-06 @ 07:15  WBC 7.26   Hgb 8.6   Hct 25.9   Plts 392  MCV 75.7  02-05 @ 07:44  WBC 7.47   Hgb 9.5   Hct 28.6   Plts 394  MCV 75.7  02-04 @ 06:18  WBC 7.51   Hgb 9.3   Hct 27.2   Plts 398  MCV 74.9  02-02 @ 06:39  WBC 7.33   Hgb 7.5   Hct 22.4   Plts 373  MCV 73.9      Chemistry:  02-06 @ 07:15  Na+ 138  K+ 3.5  Cl- 105  CO2 26  BUN 8  Cr 0.36     02-05 @ 07:44  Na+ 138  K+ 3.6  Cl- 105  CO2 24  BUN 10  Cr 0.34     02-04 @ 06:18  Na+ 136  K+ 3.8  Cl- 104  CO2 25  BUN 8  Cr 0.41     02-02 @ 06:39  Na+ 139  K+ 3.7  Cl- 105  CO2 27  BUN 9  Cr 0.35         Glucose, Serum: 91 mg/dL (02-06 @ 07:15)  Glucose, Serum: 91 mg/dL (02-05 @ 07:44)  Glucose, Serum: 109 mg/dL (02-04 @ 06:18)  Glucose, Serum: 90 mg/dL (02-02 @ 06:39)      06 Feb 2018 07:15    138    |  105    |  8      ----------------------------<  91     3.5     |  26     |  0.36   05 Feb 2018 07:44    138    |  105    |  10     ----------------------------<  91     3.6     |  24     |  0.34   04 Feb 2018 06:18    136    |  104    |  8      ----------------------------<  109    3.8     |  25     |  0.41   02 Feb 2018 06:39    139    |  105    |  9      ----------------------------<  90     3.7     |  27     |  0.35     Ca    9.4        06 Feb 2018 07:15  Ca    9.9        05 Feb 2018 07:44  Ca    9.3        04 Feb 2018 06:18  Ca    9.7        02 Feb 2018 06:39    TPro  8.0    /  Alb  1.8    /  TBili  0.3    /  DBili  x      /  AST  18     /  ALT  18     /  AlkPhos  66     02 Feb 2018 06:39      PT/INR - ( 06 Feb 2018 07:15 )   PT: 16.1 sec;   INR: 1.47 ratio         PTT - ( 06 Feb 2018 07:15 )  PTT:30.7 sec        CAPILLARY BLOOD GLUCOSE              RADIOLOGY & ADDITIONAL TESTS:    Imaging Personally Reviewed:  [ ] YES  [ ] NO    Consultant(s) Notes Reviewed:  [ ] YES  [ ] NO    Care Discussed with Consultants/Other Providers [ ] YES  [ ] NO Patient is a 62y old  Female who presents with a chief complaint of 63 yo black female with fever up to 103+ (12 Jan 2018 14:07)      HPI:  63 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  Pt has been more lethargic.  no n/v (12 Jan 2018 14:07)      INTERVAL HPI/OVERNIGHT EVENTS:  No new c/o. MS at baseline . For removal of stent today.  Afebrile x 24 hours     MEDICATIONS  (STANDING):  ascorbic acid 500 milliGRAM(s) Oral two times a day  cefepime  IVPB      cefepime  IVPB 1000 milliGRAM(s) IV Intermittent every 8 hours  dextrose 5%. 1000 milliLiter(s) (63 mL/Hr) IV Continuous <Continuous>  enoxaparin Injectable 40 milliGRAM(s) SubCutaneous daily  ferrous    sulfate 325 milliGRAM(s) Oral daily  hydrocortisone 2.5% Cream 1 Application(s) Topical two times a day  lactulose Syrup 10 Gram(s) Oral two times a day  mirtazapine 15 milliGRAM(s) Oral at bedtime  multivitamin/minerals 1 Tablet(s) Oral daily  potassium chloride   Powder 20 milliEquivalent(s) Oral daily  senna 2 Tablet(s) Oral at bedtime    MEDICATIONS  (PRN):  acetaminophen   Tablet 650 milliGRAM(s) Oral every 6 hours PRN For Temp greater than 38 C (100.4 F)  magnesium hydroxide Suspension 30 milliLiter(s) Oral daily PRN Constipation      FAMILY HISTORY:  No pertinent family history in first degree relatives      Allergies    No Known Allergies    Intolerances        PMH/PSH:  Decubital ulcer  Hemiparesis  Failure to thrive in adult  UTI (urinary tract infection)  Dehydration  Rhabdomyolysis  HTN (hypertension)  Neurogenic bladder disorder  Pressure ulcer of sacrum  UTI (urinary tract infection)  Embolism  DVT (deep venous thrombosis)  Anemia  Pneumonia  DM (diabetes mellitus)  High cholesterol  [place] chorea  No pertinent past medical history  No significant past surgical history        REVIEW OF SYSTEMS:  CONSTITUTIONAL: No weight loss,   EYES: No eye pain, visual disturbances, or discharge  ENMT:  No difficulty hearing, tinnitus, vertigo; No sinus or throat pain  NECK: No pain or stiffness  BREASTS: No pain, masses, or nipple discharge  RESPIRATORY: No cough, wheezing, chills or hemoptysis; No shortness of breath  CARDIOVASCULAR: No chest pain, palpitations, dizziness, or leg swelling  GASTROINTESTINAL: No abdominal or epigastric pain. No nausea, vomiting, or hematemesis; No diarrhea or constipation. No melena or hematochezia.  GENITOURINARY: No dysuria, frequency, hematuria, or incontinence  NEUROLOGICAL: No headaches, memory loss, loss of strength, numbness, or tremors  SKIN: No itching, burning, rashes, or lesions   LYMPH NODES: No enlarged glands  ENDOCRINE: No heat or cold intolerance; No hair loss  MUSCULOSKELETAL: No joint pain or swelling; No muscle, back, or extremity pain  PSYCHIATRIC: No depression, anxiety, mood swings, or difficulty sleeping  HEME/LYMPH: No easy bruising, or bleeding gums  ALLERGY AND IMMUNOLOGIC: No hives or eczema    Vital Signs Last 24 Hrs  T(C): 37.6 (07 Feb 2018 05:14), Max: 37.6 (06 Feb 2018 23:24)  T(F): 99.6 (07 Feb 2018 05:14), Max: 99.7 (06 Feb 2018 23:24)  HR: 104 (07 Feb 2018 05:14) (97 - 104)  BP: 113/73 (07 Feb 2018 05:14) (103/62 - 118/85)  BP(mean): --  RR: 18 (07 Feb 2018 05:14) (17 - 19)  SpO2: 99% (07 Feb 2018 05:14) (96% - 99%)    PHYSICAL EXAM:  GENERAL: NAD, well-groomed, well-developed  HEAD:  Atraumatic, Normocephalic  EYES: EOMI, PERRLA, conjunctiva and sclera clear  ENMT: No tonsillar erythema, exudates, or enlargement; Moist mucous membranes, Good dentition, No lesions  NECK: Supple, No JVD, Normal thyroid  NERVOUS SYSTEM:  Alert & Oriented   CHEST/LUNG: Clear to percussion bilaterally; No rales, rhonchi, wheezing, or rubs  HEART: Regular rate and rhythm; No murmurs, rubs, or gallops  ABDOMEN: Soft, Nontender, Nondistended; Bowel sounds present  EXTREMITIES:  2+ Peripheral Pulses, No clubbing, cyanosis, or edema  LYMPH: No lymphadenopathy noted  SKIN: No rashes or lesions    LAB                          9.2    7.79  )-----------( 418      ( 07 Feb 2018 08:16 )             27.5       CBC:  02-07 @ 08:16  WBC 7.79   Hgb 9.2   Hct 27.5   Plts 418  MCV 76.2  02-06 @ 07:15  WBC 7.26   Hgb 8.6   Hct 25.9   Plts 392  MCV 75.7  02-05 @ 07:44  WBC 7.47   Hgb 9.5   Hct 28.6   Plts 394  MCV 75.7  02-04 @ 06:18  WBC 7.51   Hgb 9.3   Hct 27.2   Plts 398  MCV 74.9  02-02 @ 06:39  WBC 7.33   Hgb 7.5   Hct 22.4   Plts 373  MCV 73.9      Chemistry:  02-06 @ 07:15  Na+ 138  K+ 3.5  Cl- 105  CO2 26  BUN 8  Cr 0.36     02-05 @ 07:44  Na+ 138  K+ 3.6  Cl- 105  CO2 24  BUN 10  Cr 0.34     02-04 @ 06:18  Na+ 136  K+ 3.8  Cl- 104  CO2 25  BUN 8  Cr 0.41     02-02 @ 06:39  Na+ 139  K+ 3.7  Cl- 105  CO2 27  BUN 9  Cr 0.35         Glucose, Serum: 91 mg/dL (02-06 @ 07:15)  Glucose, Serum: 91 mg/dL (02-05 @ 07:44)  Glucose, Serum: 109 mg/dL (02-04 @ 06:18)  Glucose, Serum: 90 mg/dL (02-02 @ 06:39)      06 Feb 2018 07:15    138    |  105    |  8      ----------------------------<  91     3.5     |  26     |  0.36   05 Feb 2018 07:44    138    |  105    |  10     ----------------------------<  91     3.6     |  24     |  0.34   04 Feb 2018 06:18    136    |  104    |  8      ----------------------------<  109    3.8     |  25     |  0.41   02 Feb 2018 06:39    139    |  105    |  9      ----------------------------<  90     3.7     |  27     |  0.35     Ca    9.4        06 Feb 2018 07:15  Ca    9.9        05 Feb 2018 07:44  Ca    9.3        04 Feb 2018 06:18  Ca    9.7        02 Feb 2018 06:39    TPro  8.0    /  Alb  1.8    /  TBili  0.3    /  DBili  x      /  AST  18     /  ALT  18     /  AlkPhos  66     02 Feb 2018 06:39      PT/INR - ( 06 Feb 2018 07:15 )   PT: 16.1 sec;   INR: 1.47 ratio         PTT - ( 06 Feb 2018 07:15 )  PTT:30.7 sec        CAPILLARY BLOOD GLUCOSE              RADIOLOGY & ADDITIONAL TESTS:    Imaging Personally Reviewed:  [ ] YES  [ ] NO    Consultant(s) Notes Reviewed:  [ ] YES  [ ] NO    Care Discussed with Consultants/Other Providers [ ] YES  [ ] NO

## 2018-02-07 NOTE — BRIEF OPERATIVE NOTE - PROCEDURE
<<-----Click on this checkbox to enter Procedure Ureteroscopy  02/09/2018  with removal of ureteral stent and stones  Active  Tushar Cleaning

## 2018-02-07 NOTE — BRIEF OPERATIVE NOTE - POST-OP DX
Hydronephrosis  02/09/2018  with stent and small ureteral stones  Active  Tushar Cleaning  Ureteral stone with hydronephrosis  01/19/2018  l;eft pyelonephritis  Active  Tushar Cleaning

## 2018-02-07 NOTE — BRIEF OPERATIVE NOTE - PRE-OP DX
Hydronephrosis  02/09/2018    Active  Tushar Cleaning  Ureteral stone with hydronephrosis  01/19/2018  pyelonephritis  Active  Tushar Cleaning

## 2018-02-07 NOTE — PROGRESS NOTE ADULT - PROBLEM SELECTOR PLAN 1
On cefepime, afebrile x 24 hours   WBC 7.79 , precalcitonin 0.13 ,  Indium scan negative. appreciate ID note  - For removal of stents today, Ortho consult regarding right hip effusion noted

## 2018-02-08 LAB
ANION GAP SERPL CALC-SCNC: 6 MMOL/L — SIGNIFICANT CHANGE UP (ref 5–17)
BUN SERPL-MCNC: 6 MG/DL — LOW (ref 7–23)
CALCIUM SERPL-MCNC: 9.3 MG/DL — SIGNIFICANT CHANGE UP (ref 8.5–10.1)
CHLORIDE SERPL-SCNC: 106 MMOL/L — SIGNIFICANT CHANGE UP (ref 96–108)
CO2 SERPL-SCNC: 26 MMOL/L — SIGNIFICANT CHANGE UP (ref 22–31)
CREAT SERPL-MCNC: 0.4 MG/DL — LOW (ref 0.5–1.3)
GLUCOSE SERPL-MCNC: 91 MG/DL — SIGNIFICANT CHANGE UP (ref 70–99)
HCT VFR BLD CALC: 26.1 % — LOW (ref 34.5–45)
HGB BLD-MCNC: 8.5 G/DL — LOW (ref 11.5–15.5)
MCHC RBC-ENTMCNC: 24.9 PG — LOW (ref 27–34)
MCHC RBC-ENTMCNC: 32.6 GM/DL — SIGNIFICANT CHANGE UP (ref 32–36)
MCV RBC AUTO: 76.3 FL — LOW (ref 80–100)
NRBC # BLD: 0 /100 WBCS — SIGNIFICANT CHANGE UP (ref 0–0)
PLATELET # BLD AUTO: 399 K/UL — SIGNIFICANT CHANGE UP (ref 150–400)
POTASSIUM SERPL-MCNC: 3.5 MMOL/L — SIGNIFICANT CHANGE UP (ref 3.5–5.3)
POTASSIUM SERPL-SCNC: 3.5 MMOL/L — SIGNIFICANT CHANGE UP (ref 3.5–5.3)
RBC # BLD: 3.42 M/UL — LOW (ref 3.8–5.2)
RBC # FLD: 18.7 % — HIGH (ref 10.3–14.5)
SODIUM SERPL-SCNC: 138 MMOL/L — SIGNIFICANT CHANGE UP (ref 135–145)
WBC # BLD: 7.32 K/UL — SIGNIFICANT CHANGE UP (ref 3.8–10.5)
WBC # FLD AUTO: 7.32 K/UL — SIGNIFICANT CHANGE UP (ref 3.8–10.5)

## 2018-02-08 RX ADMIN — Medication 1 TABLET(S): at 11:30

## 2018-02-08 RX ADMIN — SODIUM CHLORIDE 63 MILLILITER(S): 9 INJECTION, SOLUTION INTRAVENOUS at 16:58

## 2018-02-08 RX ADMIN — SODIUM CHLORIDE 63 MILLILITER(S): 9 INJECTION, SOLUTION INTRAVENOUS at 05:18

## 2018-02-08 RX ADMIN — ENOXAPARIN SODIUM 40 MILLIGRAM(S): 100 INJECTION SUBCUTANEOUS at 11:30

## 2018-02-08 RX ADMIN — Medication 1 APPLICATION(S): at 16:55

## 2018-02-08 RX ADMIN — SENNA PLUS 2 TABLET(S): 8.6 TABLET ORAL at 21:54

## 2018-02-08 RX ADMIN — Medication 500 MILLIGRAM(S): at 16:54

## 2018-02-08 RX ADMIN — Medication 500 MILLIGRAM(S): at 05:20

## 2018-02-08 RX ADMIN — MIRTAZAPINE 15 MILLIGRAM(S): 45 TABLET, ORALLY DISINTEGRATING ORAL at 21:54

## 2018-02-08 RX ADMIN — CEFEPIME 100 MILLIGRAM(S): 1 INJECTION, POWDER, FOR SOLUTION INTRAMUSCULAR; INTRAVENOUS at 05:19

## 2018-02-08 RX ADMIN — CEFEPIME 100 MILLIGRAM(S): 1 INJECTION, POWDER, FOR SOLUTION INTRAMUSCULAR; INTRAVENOUS at 13:39

## 2018-02-08 RX ADMIN — Medication 20 MILLIEQUIVALENT(S): at 11:30

## 2018-02-08 RX ADMIN — Medication 650 MILLIGRAM(S): at 18:22

## 2018-02-08 RX ADMIN — Medication 1 APPLICATION(S): at 05:19

## 2018-02-08 RX ADMIN — LACTULOSE 10 GRAM(S): 10 SOLUTION ORAL at 16:56

## 2018-02-08 RX ADMIN — Medication 325 MILLIGRAM(S): at 11:30

## 2018-02-08 RX ADMIN — LACTULOSE 10 GRAM(S): 10 SOLUTION ORAL at 05:20

## 2018-02-08 RX ADMIN — CEFEPIME 100 MILLIGRAM(S): 1 INJECTION, POWDER, FOR SOLUTION INTRAMUSCULAR; INTRAVENOUS at 21:54

## 2018-02-08 NOTE — PROGRESS NOTE ADULT - PROBLEM SELECTOR PLAN 1
On cefepime, afebrile x 24 hours   WBC 7.32 , precalcitonin 0.13 ,  Indium scan negative. appreciate ID note  - For removal of stents today, Ortho consult regarding right hip effusion noted On cefepime, afebrile x 24 hours   CRP 4.70  WBC 7.32 , precalcitonin 0.13 ,  Indium scan negative. appreciate ID note  - For removal of stents yesterday, Ortho consult regarding right hip effusion noted. Will order aspiration by IR in AM

## 2018-02-08 NOTE — PROGRESS NOTE ADULT - ASSESSMENT
Subjective Complaints:  Historian:         REVIEW OF SYSTEMS:  Eyes:  Good vision, no reported pain  ENT:  No sore throat, pain, runny nose, dysphagia  CV:  No pain, palpitatioins, hypo/hypertension  Resp:  No dyspnea, cough, tachypnea, wheezing  GI:  No pain, nausea, vomiting, diarrhea, constipatiion  Muscle:  No pain, weakness  Neuro:  No weakness, tingling, memory problems  Psych:  No fatigue, insomnia, mood problems, depression  Endocrine:  No polyuria, polydypsia, cold/heat intolerance    Vital Signs Last 24 Hrs  T(C): 38.6 (08 Feb 2018 17:23), Max: 38.6 (08 Feb 2018 17:23)  T(F): 101.4 (08 Feb 2018 17:23), Max: 101.4 (08 Feb 2018 17:23)  HR: 108 (08 Feb 2018 17:23) (96 - 108)  BP: 107/69 (08 Feb 2018 17:23) (105/65 - 149/83)  BP(mean): --  RR: 16 (08 Feb 2018 17:23) (16 - 18)  SpO2: 99% (08 Feb 2018 17:23) (97% - 99%)    GENERAL PHYSICAL EXAM:  General:  Appears stated age, well-groomed, well-nourished, no distress  HEENT:  NC/AT, patent nares w/ pink mucosa, OP clear w/o lesions, PERRL, EOMI, conjunctivae clear, no thyromegaly, nodules, adenopathy, no JVD  Chest:  Full & symmetric excursion, no increased effort, breath sounds clear  Cardiovascular:  Regular rhythm, S1, S2, no murmur/rub/S3/S4, no carotid/femoral/abdominal bruit, radial/pedal pulses 2+, no edema  Abdomen:  Soft, non-tender, non-distended, normoactive bowel sounds, no HSM  Extremities:  Gait & station:   Digits:   Nails:   Joints, Bones, Muscles:   ROM:   Stability:  Skin:  No rash/erythema/ecchymoses/petechiae/wounds/abscess/warm/dry  Musculoskeletal:  Full ROM in all joints w/o swelling/tenderness/effusion    NEUROLOGICAL EXAM:  HENT:  Normocephalic head; atraumatic head.  Neck supple.  ENT: normal looking.  Mental State:    Alert.    open eyes  arm legs contracted  nos  eziure no chorea  hx of depression             8.5    7.32  )-----------( 399      ( 08 Feb 2018 08:32 )             26.1     02-08    138  |  106  |  6<L>  ----------------------------<  91  3.5   |  26  |  0.40<L>    Ca    9.3      08 Feb 2018 08:32     ass-=    awake open eyes does  not follws commmnads arm leg contracted uti s/p ureteral stent s/p cystoscopy  metaBOLIC ENCPEHALAOTHY  NO   SEIZURE         RADIOLOGY & ADDITIONAL STUDIES:        Recommendations: FOR SUB ACUTE REHAB

## 2018-02-08 NOTE — PROGRESS NOTE ADULT - SUBJECTIVE AND OBJECTIVE BOX
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  Pt has been more lethargic and barely communicable. Low grade fever still progressing.    Upon evaluation, pt is having breakfast by aid, without any complaint       Allergies    No Known Allergies    Intolerances        MEDICATIONS  (STANDING):  ascorbic acid 500 milliGRAM(s) Oral two times a day  cefepime  IVPB 1000 milliGRAM(s) IV Intermittent every 8 hours  dextrose 5%. 1000 milliLiter(s) (63 mL/Hr) IV Continuous <Continuous>  enoxaparin Injectable 40 milliGRAM(s) SubCutaneous daily  ferrous    sulfate 325 milliGRAM(s) Oral daily  hydrocortisone 2.5% Cream 1 Application(s) Topical two times a day  lactulose Syrup 10 Gram(s) Oral two times a day  mirtazapine 15 milliGRAM(s) Oral at bedtime  multivitamin/minerals 1 Tablet(s) Oral daily  potassium chloride   Powder 20 milliEquivalent(s) Oral daily  senna 2 Tablet(s) Oral at bedtime    MEDICATIONS  (PRN):  acetaminophen   Tablet 650 milliGRAM(s) Oral every 6 hours PRN For Temp greater than 38 C (100.4 F)  magnesium hydroxide Suspension 30 milliLiter(s) Oral daily PRN Constipation      REVIEW OF SYSTEMS:    CONSTITUTIONAL: No fever, chills, weight loss, or fatigue  HEENT: No sore throat, runny nose, ear ache  RESPIRATORY: No cough, wheezing, No shortness of breath  CARDIOVASCULAR: No chest pain, palpitations, dizziness  GASTROINTESTINAL: No abdominal pain. No nausea, vomiting, diarrhea  GENITOURINARY: No dysuria, increase frequency, hematuria, or incontinence  NEUROLOGICAL: No headaches, memory loss, loss of strength, numbness, or tremors, no weakness  EXTREMITY: No pedal edema BLE  SKIN: No itching, burning, rashes, or lesions     VITAL SIGNS:  T(C): 37.3 (02-08-18 @ 09:19), Max: 37.3 (02-08-18 @ 05:21)  T(F): 99.2 (02-08-18 @ 09:19), Max: 99.2 (02-08-18 @ 05:21)  HR: 99 (02-08-18 @ 09:19) (95 - 105)  BP: 114/70 (02-08-18 @ 09:19) (105/65 - 149/83)  RR: 18 (02-08-18 @ 09:19) (15 - 19)  SpO2: 98% (02-08-18 @ 09:19) (96% - 100%)  Wt(kg): --    PHYSICAL EXAM:    GENERAL: not in any distress, contracted   HEENT: Neck is supple, normocephalic, atraumatic   CHEST/LUNG: Clear to percussion bilaterally; No rales, rhonchi, wheezing  HEART: Regular rate and rhythm; No murmurs, rubs, or gallops  ABDOMEN: Soft, Nontender, Nondistended; Bowel sounds present, no rebound   EXTREMITIES:  2+ Peripheral Pulses, No clubbing, cyanosis, or edema  GENITOURINARY:   SKIN: No rashes or lesions  BACK: no pressor sore   NERVOUS SYSTEM:  Alert     LABS:                         8.5    7.32  )-----------( 399      ( 08 Feb 2018 08:32 )             26.1     02-08    138  |  106  |  6<L>  ----------------------------<  91  3.5   |  26  |  0.40<L>    Ca    9.3      08 Feb 2018 08:32                      Sedimentation Rate, Erythrocyte: 115 mm/hr (02-06 @ 23:03)            Culture Results:   No growth to date. (02-05 @ 15:20)  Culture Results:   No growth at 5 days. (02-02 @ 12:51)  Culture Results:   >100,000 CFU/ml Providencia stuartii (02-01 @ 18:01)                Radiology:

## 2018-02-08 NOTE — PROGRESS NOTE ADULT - ASSESSMENT
Pos rhino virus / recent ho of Sepsis from acute viral syndrome vs bacteremia vs pyelonephritis , left ureter stone  Recurrent fever , cloudy urine in [place] for urinary retention, ho of  Left Ureteroscopy, with laser lithotripsy and stent insertion, postop course c/b persistent fevers   blood culture simple Ecoli , fairly sensitive e coli , resistant to cipro, source likely from pyelo  zosyn from 1/12 to 16 and then jenny from 1/16 to 28, completed dose for complicated UTI and bacteremia and surveillance blood culture neg so far   Nuclear Bone scan negative for infection and OM   She was stable off antibiotics and then developed recurring fever and repeated UA is still dirty and urine culture grew providentia    now on ceftazidime     Abd and pelvic CT reviewed , unclear hip joint effusion, no clinical symptoms , may benefit from ortho consult to r/o septic arthritis for evaluation of poss aspiration ?    Discussed with Dr Cleaning for stent removal   Will refer to ortho for hip jt aspiration if we can   Palliative on board   We can discontinue antibiotics after stent removal  Thanks you.

## 2018-02-08 NOTE — CHART NOTE - NSCHARTNOTEFT_GEN_A_CORE
Assessment: 62 y.o. F pt LOS 27 days. [place] chorea, severely contracted, nonverbal for the most part . +BM 2/07    Factors impacting intake: [ ] none [ ] nausea  [ ] vomiting [ ] diarrhea [ ] constipation  [ ]chewing problems [ x] swallowing issues  [x] other: lethargy    Diet Presciption: Diet, Dysphagia 1 Pureed-Honey Consistency Fluid:   No Carb Prosource (1pkg = 15gms Protein)     Qty per Day:  daily (02-07-18 @ 17:54)    Intake: lunch tray observed ~50% consumed. CNA said she ate more by breakfast.  brought Ensure from home and she drank it. Ensure not appropriate for Honey thick diet rx. will order Magic Cup. ProSource packet was @ bedside from breakfast.     Current Weight: no new wts since 1/28. will tell RN   % Weight Change    Follow-up Nutrition focused physical exam conducted; Subcutaneous fat loss: [moderate] Orbital fat pads region, [severe ]Buccal fat region, [unable to lift arms, contracted ]Triceps region,  [unable ]Ribs region.  Muscle wasting: [severe ]Temples region, [severe ]Clavicle region, [severe ]Shoulder region, [severe ]Scapula region, [moderate ]Interosseous region,  [unable ]thigh region, [unable-boots, contracted ]Calf region    Pertinent Medications: MEDICATIONS  (STANDING):  ascorbic acid 500 milliGRAM(s) Oral two times a day  cefepime  IVPB 1000 milliGRAM(s) IV Intermittent every 8 hours  dextrose 5%. 1000 milliLiter(s) (63 mL/Hr) IV Continuous <Continuous>  enoxaparin Injectable 40 milliGRAM(s) SubCutaneous daily  ferrous    sulfate 325 milliGRAM(s) Oral daily  hydrocortisone 2.5% Cream 1 Application(s) Topical two times a day  lactulose Syrup 10 Gram(s) Oral two times a day  mirtazapine 15 milliGRAM(s) Oral at bedtime  multivitamin/minerals 1 Tablet(s) Oral daily  potassium chloride   Powder 20 milliEquivalent(s) Oral daily  senna 2 Tablet(s) Oral at bedtime    MEDICATIONS  (PRN):  acetaminophen   Tablet 650 milliGRAM(s) Oral every 6 hours PRN For Temp greater than 38 C (100.4 F)  magnesium hydroxide Suspension 30 milliLiter(s) Oral daily PRN Constipation    Pertinent Labs: 02-08 Na138 mmol/L Glu 91 mg/dL K+ 3.5 mmol/L Cr  0.40 mg/dL<L> BUN 6 mg/dL<L> Phos n/a   Alb n/a   PAB n/a        CAPILLARY BLOOD GLUCOSE        Skin: WDL. generalized edema 1+    Estimated Needs:   [ x] no change since previous assessment  [ ] recalculated:     Previous Nutrition Diagnosis:   [ ] Inadequate Energy Intake [ ]Inadequate Oral Intake [ ] Excessive Energy Intake   [ ] Underweight [ ] Increased Nutrient Needs [ ] Overweight/Obesity   [ ] Altered GI Function [ ] Unintended Weight Loss [ ] Food & Nutrition Related Knowledge Deficit   [x ] Severe Malnutrition in context of chronic illness     Nutrition Diagnosis is [x ] ongoing  [ ] resolved [ ] not applicable     New Nutrition Diagnosis: [x ] not applicable       Interventions:   Recommend  [ ] Change Diet To:  [x ] Nutrition Supplement: Magic Cup supplement x 3/day (870 kcals, 27g pro)  [ ] Nutrition Support  [ ] Other:     Monitoring and Evaluation:   [x ] PO intake [ x ] Tolerance to diet prescription [ x ] weights [ x ] labs[ x ] follow up per protocol  [ ] other:

## 2018-02-08 NOTE — PROGRESS NOTE ADULT - SUBJECTIVE AND OBJECTIVE BOX
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  Pt has been more lethargic.  no n/v (12 Jan 2018 14:07)  PAST MEDICAL & SURGICAL HISTORY:  Decubital ulcer: of sacrum - s/p debreedment  Hemiparesis: right side  Failure to thrive in adult  UTI (urinary tract infection)  Dehydration  Rhabdomyolysis: h/o  HTN (hypertension)  Neurogenic bladder disorder  Pressure ulcer of sacrum  UTI (urinary tract infection)  DVT (deep venous thrombosis): right upper extreamity  Anemia  Pneumonia  DM (diabetes mellitus)  High cholesterol  Ashli chorea  No significant past surgical history  HPI:        SYMPTOMS---	                   DIDILITY    OTHER REVIEW OF SYSTEMS:  UNABLE TO OBTAIN  due to: SEDATION, CONFUSION    Baseline ADLs (prior to admission):  Independent [ ] moderately [ ] fully   Dependent   [ ] moderately [ ]fully    	                 T(C): 37.3 (02-08-18 @ 09:19), Max: 37.3 (02-08-18 @ 05:21)  T(F): 99.2 (02-08-18 @ 09:19), Max: 99.2 (02-08-18 @ 05:21)  HR: 99 (02-08-18 @ 09:19) (95 - 105)  BP: 114/70 (02-08-18 @ 09:19) (105/65 - 149/83)  RR: 18 (02-08-18 @ 09:19) (15 - 19)  SpO2: 98% (02-08-18 @ 09:19) (96% - 100%)  Wt(kg): --      GENERAL:    EYES : non icteric :               Other:     HEENT:      	atraumatic, normocephalic, PEERLA, sclera anicteric, dry oral mucosa   NECK:          Trach:        Vent:  CVS:          Tachypnic:               Bradycardic:              Normal:		   RESP:        tachypnic:                Dyspenic :                  Comfortable:	  GI:          NGT/PEG 	  :      obrien      	  MUSC:       	Normal	Weakness	  Edema	   NEURO:     	No focal deficit	  Focal  PSYCH:           Alert	Oriented	  Lethargic     SKIN:         	Normal	  Other  LYMPH:      	Normal	  Other  	                     ALLERGIES: No Known Allergies    MEDICATIONS  (STANDING):  ascorbic acid 500 milliGRAM(s) Oral two times a day  cefepime  IVPB 1000 milliGRAM(s) IV Intermittent every 8 hours  dextrose 5%. 1000 milliLiter(s) (63 mL/Hr) IV Continuous <Continuous>  enoxaparin Injectable 40 milliGRAM(s) SubCutaneous daily  ferrous    sulfate 325 milliGRAM(s) Oral daily  hydrocortisone 2.5% Cream 1 Application(s) Topical two times a day  lactulose Syrup 10 Gram(s) Oral two times a day  mirtazapine 15 milliGRAM(s) Oral at bedtime  multivitamin/minerals 1 Tablet(s) Oral daily  potassium chloride   Powder 20 milliEquivalent(s) Oral daily  senna 2 Tablet(s) Oral at bedtime    MEDICATIONS  (PRN):  acetaminophen   Tablet 650 milliGRAM(s) Oral every 6 hours PRN For Temp greater than 38 C (100.4 F)  magnesium hydroxide Suspension 30 milliLiter(s) Oral daily PRN Constipation    	                   LABS REVIEWED    H/H: 8.5/26.1   02-08 @ 08:32    BUN/CR: 6/0.40  02-08 @ 08:32    Albumin: --  02-08 @ 08:32    CRP/SED RATE: --/-- 02-08 @ 08:32    Sodium: 138  02-08 @ 08:32                  	  ADVANCED DIRECTIVES:   FULL CODE [x   ]  DNR [  ]    DNI [  ]     MOLST [  ]  PSYCHOSOCIAL-SPIRITUAL ASSESSMENT:       Reviewed       GOALS OF CARE DISCUSSION       Palliative care info/counseling provided	           Family meeting       Advanced Directives addressed	           See previous Palliative Medicine Note  	        REFERRALS	        Palliative Med        Unit SW/Case Mgmt              Speech/Swallow        Hospice             Nutrition         Functional Assessment: KPS:    <30           PPS: poor      FINAL IMPRESSION AND RECOMMENDATIONS: Multiple chronic disease burden w hx of prmature degenerative conditions , hx of multiple admissions   from home   DNR/DNI refused , overall prognosis v poor  sx controlled

## 2018-02-08 NOTE — PROGRESS NOTE ADULT - SUBJECTIVE AND OBJECTIVE BOX
Patient seen and examined at bedside resting comfortably. No complaints offered.     T(F): 98.5 (02-08-18 @ 11:42), Max: 99.2 (02-08-18 @ 05:21)  HR: 108 (02-08-18 @ 11:42) (95 - 108)  BP: 112/63 (02-08-18 @ 11:42) (105/65 - 149/83)  RR: 17 (02-08-18 @ 11:42) (15 - 19)  SpO2: 99% (02-08-18 @ 11:42) (96% - 100%)    PHYSICAL EXAM:  General: NAD, WDWN  CV: +S1S2 regular rate and rhythm  Lung: respirations nonlabored, good inspiratory effort  Extremities: bilateral upper and lower extremity contractures  : obrien cathter in place draining clear yellow urine: 1200ml/24hours    LABS:                        8.5    7.32  )-----------( 399      ( 08 Feb 2018 08:32 )             26.1     02-08    138  |  106  |  6<L>  ----------------------------<  91  3.5   |  26  |  0.40<L>    Ca    9.3      08 Feb 2018 08:32    I&O's Detail    07 Feb 2018 07:01  -  08 Feb 2018 07:00  --------------------------------------------------------  IN:    dextrose 5%.: 756 mL    lactated ringers.: 25 mL    Solution: 100 mL  Total IN: 881 mL    OUT:    Indwelling Catheter - Urethral: 1200 mL  Total OUT: 1200 mL    Total NET: -319 mL    08 Feb 2018 07:01  -  08 Feb 2018 12:48  --------------------------------------------------------  IN:    Oral Fluid: 240 mL  Total IN: 240 mL    OUT:  Total OUT: 0 mL    Total NET: 240 mL    Impression: 62 year old female with PMH Ralls's chorea, urinary retention due to neurogenic bladder, hemiparesis, decubitus ulcers admitted with sepsis 2/2 UTI and left hydronephrosis POD#16 s/p Left Ureteroscopy, with laser lithotripsy and stent insertion, postop course c/b persistent fevers, and leukocytosis (resolved), a/w anemia s/p 1uPRBC 2/3. Now s/p stent removal on 2/7    Plan:  -continue obrien catheter for retention, monitor urine output and quality  -ABX per ID  -continue medical management and supportive care  -monitor vital signs  -will discuss with Dr. Cleaning

## 2018-02-09 ENCOUNTER — TRANSCRIPTION ENCOUNTER (OUTPATIENT)
Age: 63
End: 2018-02-09

## 2018-02-09 LAB
ALBUMIN SERPL ELPH-MCNC: 1.9 G/DL — LOW (ref 3.3–5)
ALP SERPL-CCNC: 70 U/L — SIGNIFICANT CHANGE UP (ref 40–120)
ALT FLD-CCNC: 19 U/L — SIGNIFICANT CHANGE UP (ref 12–78)
ANION GAP SERPL CALC-SCNC: 6 MMOL/L — SIGNIFICANT CHANGE UP (ref 5–17)
AST SERPL-CCNC: 23 U/L — SIGNIFICANT CHANGE UP (ref 15–37)
B PERT IGG+IGM PNL SER: ABNORMAL
BILIRUB SERPL-MCNC: 0.2 MG/DL — SIGNIFICANT CHANGE UP (ref 0.2–1.2)
BUN SERPL-MCNC: 7 MG/DL — SIGNIFICANT CHANGE UP (ref 7–23)
CALCIUM SERPL-MCNC: 9.4 MG/DL — SIGNIFICANT CHANGE UP (ref 8.5–10.1)
CHLORIDE SERPL-SCNC: 106 MMOL/L — SIGNIFICANT CHANGE UP (ref 96–108)
CO2 SERPL-SCNC: 26 MMOL/L — SIGNIFICANT CHANGE UP (ref 22–31)
COLOR FLD: YELLOW — SIGNIFICANT CHANGE UP
CREAT SERPL-MCNC: 0.34 MG/DL — LOW (ref 0.5–1.3)
CRP SERPL-MCNC: 3.6 MG/DL — HIGH (ref 0–0.4)
FLUID INTAKE SUBSTANCE CLASS: SIGNIFICANT CHANGE UP
FLUID SEGMENTED GRANULOCYTES: 36 % — SIGNIFICANT CHANGE UP
GLUCOSE SERPL-MCNC: 99 MG/DL — SIGNIFICANT CHANGE UP (ref 70–99)
GRAM STN FLD: SIGNIFICANT CHANGE UP
HCT VFR BLD CALC: 28.2 % — LOW (ref 34.5–45)
HGB BLD-MCNC: 9.3 G/DL — LOW (ref 11.5–15.5)
LYMPHOCYTES # FLD: 25 % — SIGNIFICANT CHANGE UP
MCHC RBC-ENTMCNC: 25.3 PG — LOW (ref 27–34)
MCHC RBC-ENTMCNC: 33 GM/DL — SIGNIFICANT CHANGE UP (ref 32–36)
MCV RBC AUTO: 76.6 FL — LOW (ref 80–100)
MONOS+MACROS # FLD: 39 % — SIGNIFICANT CHANGE UP
NRBC # BLD: 0 /100 WBCS — SIGNIFICANT CHANGE UP (ref 0–0)
PLATELET # BLD AUTO: 410 K/UL — HIGH (ref 150–400)
POTASSIUM SERPL-MCNC: 3.7 MMOL/L — SIGNIFICANT CHANGE UP (ref 3.5–5.3)
POTASSIUM SERPL-SCNC: 3.7 MMOL/L — SIGNIFICANT CHANGE UP (ref 3.5–5.3)
PROCALCITONIN SERPL-MCNC: <0.05 NG/ML — SIGNIFICANT CHANGE UP (ref 0–0.04)
PROT SERPL-MCNC: 8.4 GM/DL — HIGH (ref 6–8.3)
RBC # BLD: 3.68 M/UL — LOW (ref 3.8–5.2)
RBC # FLD: 18.7 % — HIGH (ref 10.3–14.5)
RCV VOL RI: 8000 /UL — HIGH (ref 0–5)
SODIUM SERPL-SCNC: 138 MMOL/L — SIGNIFICANT CHANGE UP (ref 135–145)
SPECIMEN SOURCE: SIGNIFICANT CHANGE UP
SURGICAL PATHOLOGY FINAL REPORT - CH: SIGNIFICANT CHANGE UP
TOTAL NUCLEATED CELL COUNT, BODY FLUID: 394 /UL — HIGH (ref 0–5)
TUBE TYPE: SIGNIFICANT CHANGE UP
WBC # BLD: 6.96 K/UL — SIGNIFICANT CHANGE UP (ref 3.8–10.5)
WBC # FLD AUTO: 6.96 K/UL — SIGNIFICANT CHANGE UP (ref 3.8–10.5)

## 2018-02-09 PROCEDURE — 77012 CT SCAN FOR NEEDLE BIOPSY: CPT | Mod: 26

## 2018-02-09 PROCEDURE — 10022: CPT

## 2018-02-09 RX ADMIN — Medication 1 APPLICATION(S): at 17:12

## 2018-02-09 RX ADMIN — LACTULOSE 10 GRAM(S): 10 SOLUTION ORAL at 06:14

## 2018-02-09 RX ADMIN — CEFEPIME 100 MILLIGRAM(S): 1 INJECTION, POWDER, FOR SOLUTION INTRAMUSCULAR; INTRAVENOUS at 17:11

## 2018-02-09 RX ADMIN — Medication 500 MILLIGRAM(S): at 17:11

## 2018-02-09 RX ADMIN — CEFEPIME 100 MILLIGRAM(S): 1 INJECTION, POWDER, FOR SOLUTION INTRAMUSCULAR; INTRAVENOUS at 06:11

## 2018-02-09 RX ADMIN — Medication 20 MILLIEQUIVALENT(S): at 11:44

## 2018-02-09 RX ADMIN — SENNA PLUS 2 TABLET(S): 8.6 TABLET ORAL at 21:41

## 2018-02-09 RX ADMIN — CEFEPIME 100 MILLIGRAM(S): 1 INJECTION, POWDER, FOR SOLUTION INTRAMUSCULAR; INTRAVENOUS at 21:42

## 2018-02-09 RX ADMIN — Medication 500 MILLIGRAM(S): at 06:13

## 2018-02-09 RX ADMIN — MIRTAZAPINE 15 MILLIGRAM(S): 45 TABLET, ORALLY DISINTEGRATING ORAL at 21:40

## 2018-02-09 RX ADMIN — LACTULOSE 10 GRAM(S): 10 SOLUTION ORAL at 17:11

## 2018-02-09 RX ADMIN — Medication 325 MILLIGRAM(S): at 11:44

## 2018-02-09 RX ADMIN — Medication 1 APPLICATION(S): at 06:15

## 2018-02-09 RX ADMIN — Medication 1 TABLET(S): at 11:44

## 2018-02-09 RX ADMIN — SODIUM CHLORIDE 63 MILLILITER(S): 9 INJECTION, SOLUTION INTRAVENOUS at 11:43

## 2018-02-09 NOTE — BRIEF OPERATIVE NOTE - OPERATION/FINDINGS
left ureteral stent, small fragments of stones
small ureteral stones and ureteral stent in left ureter
multiple left ureteral stones, right renal stones

## 2018-02-09 NOTE — PROGRESS NOTE ADULT - SUBJECTIVE AND OBJECTIVE BOX
Patient seen and examined bedside resting comfortably.  Febrile to 101.4 overnight.   No new events. Pt without complaint.    T(F): 98.6 (02-09-18 @ 12:49), Max: 101.4 (02-08-18 @ 17:23)  HR: 100 (02-09-18 @ 12:49) (98 - 108)  BP: 127/82 (02-09-18 @ 12:49) (103/66 - 127/82)  RR: 17 (02-09-18 @ 12:49) (16 - 18)  SpO2: 99% (02-09-18 @ 12:49) (98% - 99%)  Wt(kg): --  CAPILLARY BLOOD GLUCOSE    PHYSICAL EXAM:  General: NAD, alert and awake  HEENT: NCAT, conjunctiva clear  Chest: nonlabored respirations, good inspiratory effort  Abdomen: soft, NTND  Extremities: contracted  : obrien catheter indwelling draining clear yellow urine    LABS:                        9.3    6.96  )-----------( 410      ( 09 Feb 2018 08:16 )             28.2   02-09    138  |  106  |  7   ----------------------------<  99  3.7   |  26  |  0.34<L>    Ca    9.4      09 Feb 2018 08:16    TPro  8.4<H>  /  Alb  1.9<L>  /  TBili  0.2  /  DBili  x   /  AST  23  /  ALT  19  /  AlkPhos  70  02-09    I&O's Detail    08 Feb 2018 07:01  -  09 Feb 2018 07:00  --------------------------------------------------------  IN:    dextrose 5%.: 1492 mL    Oral Fluid: 410 mL    Solution: 100 mL  Total IN: 2002 mL    OUT:    Indwelling Catheter - Urethral: 900 mL  Total OUT: 900 mL    Total NET: 1102 mL      Impression: 62 year old female with PMH Ashli's chorea, urinary retention due to neurogenic bladder, hemiparesis, decubitus ulcers admitted with sepsis 2/2 UTI and left hydronephrosis s/p Left Ureteroscopy, with laser lithotripsy and stent insertion on 1/19/18, postop course c/b persistent fevers,  anemia s/p 1uPRBC 2/3. Now s/p ureteral stent removal on 2/7, still with fevers.    Plan:  -continue obrien catheter for retention, monitor urine output and quality  -ABX per ID: Follow up noted, for d/c prior to patient d/c  -continue medical management and supportive care

## 2018-02-09 NOTE — PROGRESS NOTE ADULT - SUBJECTIVE AND OBJECTIVE BOX
Patient s/p right hip joint aspiration.  Pt has a h/o FUO, right hip joint collection    Operators: VELIA Thompson MD    Findings:  A 20g x 15cm Chiba needle was advanced into the right hip joint under CT imaging with local anesthetic and 4mLs of serous fluid was aspirated and sent to the lab for fluid analysis.  Pt tolerated procedure well.      Complications: None.    Blood loss: 0        ASSESSMENT:  61 yo female s/p successful right hip joint aspiration.  Pt has a h/o FUO, right hip joint collection    PLAN:  -f/u gs/cx and cell count results  -continue abx Patient s/p right hip joint aspiration.  Pt has a h/o FUO, right hip joint fluid collection    Operators: VELIA Thompson MD    Findings:  A 20g x 15cm Chiba needle was advanced into the right hip joint under CT imaging with local anesthetic and 4mLs of serous fluid was aspirated and sent to the lab for fluid analysis.  Pt tolerated procedure well.      Complications: None.    Blood loss: 0        ASSESSMENT:  61 yo female s/p successful right hip joint aspiration.  Pt has a h/o FUO, right hip joint fluid collection    PLAN:  -f/u gs/cx and cell count results  -continue abx

## 2018-02-09 NOTE — PROGRESS NOTE ADULT - PROBLEM SELECTOR PLAN 1
On cefepime,    CRP 4.70  WBC 6.96, precalcitonin 0.13 ,  Indium scan negative. appreciate ID note  - For removal of stents yesterday, Ortho consult regarding right hip effusion noted. Aspiration by IR today. On cefepime,    CRP 4.70  - 3.60 WBC 6.96, precalcitonin 0.13  - pending ,  Indium scan negative. appreciate ID note  - For removal of stents yesterday, Ortho consult regarding right hip effusion noted. Aspiration by IR today.

## 2018-02-09 NOTE — PROGRESS NOTE ADULT - ASSESSMENT
Subjective Complaints:  Historian:         REVIEW OF SYSTEMS:  Eyes:  Good vision, no reported pain  ENT:  No sore throat, pain, runny nose, dysphagia  CV:  No pain, palpitatioins, hypo/hypertension  Resp:  No dyspnea, cough, tachypnea, wheezing  GI:  No pain, nausea, vomiting, diarrhea, constipatiion  Muscle:  No pain, weakness  Neuro:  No weakness, tingling, memory problems  Psych:  No fatigue, insomnia, mood problems, depression  Endocrine:  No polyuria, polydypsia, cold/heat intolerance    Vital Signs Last 24 Hrs  T(C): 37 (09 Feb 2018 12:49), Max: 37.1 (09 Feb 2018 00:51)  T(F): 98.6 (09 Feb 2018 12:49), Max: 98.8 (09 Feb 2018 00:51)  HR: 103 (09 Feb 2018 15:15) (98 - 103)  BP: 141/82 (09 Feb 2018 15:15) (103/66 - 141/82)  BP(mean): --  RR: 16 (09 Feb 2018 15:15) (16 - 17)  SpO2: 98% (09 Feb 2018 15:15) (98% - 99%)    GENERAL PHYSICAL EXAM:  General:  Appears stated age, well-groomed, well-nourished, no distress  HEENT:  NC/AT, patent nares w/ pink mucosa, OP clear w/o lesions, PERRL, EOMI, conjunctivae clear, no thyromegaly, nodules, adenopathy, no JVD  Chest:  Full & symmetric excursion, no increased effort, breath sounds clear  Cardiovascular:  Regular rhythm, S1, S2, no murmur/rub/S3/S4, no carotid/femoral/abdominal bruit, radial/pedal pulses 2+, no edema  Abdomen:  Soft, non-tender, non-distended, normoactive bowel sounds, no HSM  Extremities:  Gait & station:   Digits:   Nails:   Joints, Bones, Muscles:   ROM:   Stability:  Skin:  No rash/erythema/ecchymoses/petechiae/wounds/abscess/warm/dry  Musculoskeletal:  Full ROM in all joints w/o swelling/tenderness/effusion    NEUROLOGICAL EXAM:  HENT:  Normocephalic head; atraumatic head.  Neck supple.  ENT: normal looking.  Mental State:    Alert.   oepn eyes arm leg contracted no chorea s/p uti  metabolic encpehalaothy                       9.3    6.96  )-----------( 410      ( 09 Feb 2018 08:16 )             28.2     02-09    138  |  106  |  7   ----------------------------<  99  3.7   |  26  |  0.34<L>    Ca    9.4      09 Feb 2018 08:16    TPro  8.4<H>  /  Alb  1.9<L>  /  TBili  0.2  /  DBili  x   /  AST  23  /  ALT  19  /  AlkPhos  70  02-09     ass= awake open  eyes arm leg contracted fopr removal stent no chorea anemeia   metabolic encpehalaothy  no seziure       RADIOLOGY & ADDITIONAL STUDIES:        Recommendations: for removal stent  sub acute   rehab

## 2018-02-09 NOTE — PROGRESS NOTE ADULT - SUBJECTIVE AND OBJECTIVE BOX
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  Pt has been more lethargic.  no n/v (12 Jan 2018 14:07)      INTERVAL HPI/OVERNIGHT EVENTS:  No new c/o . T max 101.4    MEDICATIONS  (STANDING):  ascorbic acid 500 milliGRAM(s) Oral two times a day  cefepime  IVPB 1000 milliGRAM(s) IV Intermittent every 8 hours  dextrose 5%. 1000 milliLiter(s) (63 mL/Hr) IV Continuous <Continuous>  ferrous    sulfate 325 milliGRAM(s) Oral daily  hydrocortisone 2.5% Cream 1 Application(s) Topical two times a day  lactulose Syrup 10 Gram(s) Oral two times a day  mirtazapine 15 milliGRAM(s) Oral at bedtime  multivitamin/minerals 1 Tablet(s) Oral daily  potassium chloride   Powder 20 milliEquivalent(s) Oral daily  senna 2 Tablet(s) Oral at bedtime    MEDICATIONS  (PRN):  acetaminophen   Tablet 650 milliGRAM(s) Oral every 6 hours PRN For Temp greater than 38 C (100.4 F)  magnesium hydroxide Suspension 30 milliLiter(s) Oral daily PRN Constipation      FAMILY HISTORY:  No pertinent family history in first degree relatives      Allergies    No Known Allergies    Intolerances        PMH/PSH:  Decubital ulcer  Hemiparesis  Failure to thrive in adult  UTI (urinary tract infection)  Dehydration  Rhabdomyolysis  HTN (hypertension)  Neurogenic bladder disorder  Pressure ulcer of sacrum  UTI (urinary tract infection)  Embolism  DVT (deep venous thrombosis)  Anemia  Pneumonia  DM (diabetes mellitus)  High cholesterol  Ashli chorea  No pertinent past medical history  No significant past surgical history        REVIEW OF SYSTEMS:  CONSTITUTIONAL: No weight loss,   EYES: No eye pain, visual disturbances, or discharge  ENMT:  No difficulty hearing, tinnitus, vertigo; No sinus or throat pain  NECK: No pain or stiffness  BREASTS: No pain, masses, or nipple discharge  RESPIRATORY: No cough, wheezing, chills or hemoptysis; No shortness of breath  CARDIOVASCULAR: No chest pain, palpitations, dizziness, or leg swelling  GASTROINTESTINAL: No abdominal or epigastric pain. No nausea, vomiting, or hematemesis; No diarrhea or constipation. No melena or hematochezia.  GENITOURINARY: No dysuria, frequency, hematuria, or incontinence  NEUROLOGICAL: No headaches, memory loss, loss of strength, numbness, or tremors  SKIN: No itching, burning, rashes, or lesions   LYMPH NODES: No enlarged glands  ENDOCRINE: No heat or cold intolerance; No hair loss  MUSCULOSKELETAL: No joint pain or swelling; No muscle, back, or extremity pain  PSYCHIATRIC: No depression, anxiety, mood swings, or difficulty sleeping  HEME/LYMPH: No easy bruising, or bleeding gums  ALLERGY AND IMMUNOLOGIC: No hives or eczema    Vital Signs Last 24 Hrs  T(C): 37 (09 Feb 2018 12:49), Max: 38.6 (08 Feb 2018 17:23)  T(F): 98.6 (09 Feb 2018 12:49), Max: 101.4 (08 Feb 2018 17:23)  HR: 100 (09 Feb 2018 12:49) (98 - 108)  BP: 127/82 (09 Feb 2018 12:49) (103/66 - 127/82)  BP(mean): --  RR: 17 (09 Feb 2018 12:49) (16 - 17)  SpO2: 99% (09 Feb 2018 12:49) (98% - 99%)    PHYSICAL EXAM:  GENERAL: NAD, well-groomed, well-developed  HEAD:  Atraumatic, Normocephalic  EYES: EOMI, PERRLA, conjunctiva and sclera clear  NECK: Supple, No JVD, Normal thyroid  NERVOUS SYSTEM:  Alert   CHEST/LUNG: Clear to percussion bilaterally; No rales, rhonchi, wheezing, or rubs  HEART: Regular rate and rhythm; No murmurs, rubs, or gallops  ABDOMEN: Soft, Nontender, Nondistended; Bowel sounds present  EXTREMITIES:  2+ Peripheral Pulses, No clubbing, cyanosis, or edema  LYMPH: No lymphadenopathy noted  SKIN: No rashes or lesions    LAB                          9.3    6.96  )-----------( 410      ( 09 Feb 2018 08:16 )             28.2       CBC:  02-09 @ 08:16  WBC 6.96   Hgb 9.3   Hct 28.2   Plts 410  MCV 76.6  02-08 @ 08:32  WBC 7.32   Hgb 8.5   Hct 26.1   Plts 399  MCV 76.3  02-07 @ 08:16  WBC 7.79   Hgb 9.2   Hct 27.5   Plts 418  MCV 76.2  02-06 @ 07:15  WBC 7.26   Hgb 8.6   Hct 25.9   Plts 392  MCV 75.7  02-05 @ 07:44  WBC 7.47   Hgb 9.5   Hct 28.6   Plts 394  MCV 75.7  02-04 @ 06:18  WBC 7.51   Hgb 9.3   Hct 27.2   Plts 398  MCV 74.9      Chemistry:  02-09 @ 08:16  Na+ 138  K+ 3.7  Cl- 106  CO2 26  BUN 7  Cr 0.34     02-08 @ 08:32  Na+ 138  K+ 3.5  Cl- 106  CO2 26  BUN 6  Cr 0.40     02-07 @ 08:16  Na+ 137  K+ 3.5  Cl- 106  CO2 25  BUN 7  Cr 0.41     02-06 @ 07:15  Na+ 138  K+ 3.5  Cl- 105  CO2 26  BUN 8  Cr 0.36     02-05 @ 07:44  Na+ 138  K+ 3.6  Cl- 105  CO2 24  BUN 10  Cr 0.34     02-04 @ 06:18  Na+ 136  K+ 3.8  Cl- 104  CO2 25  BUN 8  Cr 0.41         Glucose, Serum: 99 mg/dL (02-09 @ 08:16)  Glucose, Serum: 91 mg/dL (02-08 @ 08:32)  Glucose, Serum: 103 mg/dL (02-07 @ 08:16)  Glucose, Serum: 91 mg/dL (02-06 @ 07:15)  Glucose, Serum: 91 mg/dL (02-05 @ 07:44)  Glucose, Serum: 109 mg/dL (02-04 @ 06:18)      09 Feb 2018 08:16    138    |  106    |  7      ----------------------------<  99     3.7     |  26     |  0.34   08 Feb 2018 08:32    138    |  106    |  6      ----------------------------<  91     3.5     |  26     |  0.40   07 Feb 2018 08:16    137    |  106    |  7      ----------------------------<  103    3.5     |  25     |  0.41   06 Feb 2018 07:15    138    |  105    |  8      ----------------------------<  91     3.5     |  26     |  0.36   05 Feb 2018 07:44    138    |  105    |  10     ----------------------------<  91     3.6     |  24     |  0.34   04 Feb 2018 06:18    136    |  104    |  8      ----------------------------<  109    3.8     |  25     |  0.41     Ca    9.4        09 Feb 2018 08:16  Ca    9.3        08 Feb 2018 08:32  Ca    9.5        07 Feb 2018 08:16  Ca    9.4        06 Feb 2018 07:15  Ca    9.9        05 Feb 2018 07:44  Ca    9.3        04 Feb 2018 06:18    TPro  8.4    /  Alb  1.9    /  TBili  0.2    /  DBili  x      /  AST  23     /  ALT  19     /  AlkPhos  70     09 Feb 2018 08:16              CAPILLARY BLOOD GLUCOSE          C-Reactive Protein, Serum: 3.60 mg/dL (02-09 @ 10:02)  C-Reactive Protein, Serum: 4.70 mg/dL (02-07 @ 08:12)      RADIOLOGY & ADDITIONAL TESTS:    Imaging Personally Reviewed:  [ ] YES  [ ] NO    Consultant(s) Notes Reviewed:  [ ] YES  [ ] NO    Care Discussed with Consultants/Other Providers [ ] YES  [ ] NO

## 2018-02-09 NOTE — BRIEF OPERATIVE NOTE - PROCEDURE
<<-----Click on this checkbox to enter Procedure Ureteroscopy  02/09/2018  with removal of ureteral stent  Active  [facility]

## 2018-02-09 NOTE — PROGRESS NOTE ADULT - ASSESSMENT
Pos rhino virus / recent ho of Sepsis from acute viral syndrome vs bacteremia vs pyelonephritis , left ureter stone, complicated with Gram neg bacteremia   Recurrent fever , ho of  Left Ureteroscopy, with laser lithotripsy and stent insertion, postop course c/b persistent fevers , S/p Stent removal on 2/7   Fairly sensitive e coli Bacteremia resistant to cipro   zosyn from 1/12 to 16 and then jenny from 1/16 to 28, completed dose for complicated UTI and bacteremia and surveillance blood culture neg so far   Nuclear Bone scan negative for infection and OM   She was stable off antibiotics and then developed recurring fever and repeated UA is still dirty and urine culture grew providentia    now on ceftazidime   Spoke with RN and KILO Lee  Her Tmax is 101.4 which is rectal temp , otherwise pt is stable BP and HR, no leukocytosis , no symptoms, bedbound contracted lady with chronic ill appearing  we did extensive Fever work up and did not find impressive source, stent was removed   I would recommend not to measure rectal temp   Can change the obrien before she goes   can discontinue antibiotics when she discharge at that point     Palliative on board

## 2018-02-09 NOTE — PROGRESS NOTE ADULT - SUBJECTIVE AND OBJECTIVE BOX
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  Pt has been more lethargic and barely communicable. Low grade fever still progressing.    Upon evaluation, pt is with her , she said she is doing better, chronic ill looking, no acute distress or acute problems.     Allergies    No Known Allergies    Intolerances        MEDICATIONS  (STANDING):  ascorbic acid 500 milliGRAM(s) Oral two times a day  cefepime  IVPB 1000 milliGRAM(s) IV Intermittent every 8 hours  dextrose 5%. 1000 milliLiter(s) (63 mL/Hr) IV Continuous <Continuous>  ferrous    sulfate 325 milliGRAM(s) Oral daily  hydrocortisone 2.5% Cream 1 Application(s) Topical two times a day  lactulose Syrup 10 Gram(s) Oral two times a day  mirtazapine 15 milliGRAM(s) Oral at bedtime  multivitamin/minerals 1 Tablet(s) Oral daily  potassium chloride   Powder 20 milliEquivalent(s) Oral daily  senna 2 Tablet(s) Oral at bedtime        REVIEW OF SYSTEMS:    CONSTITUTIONAL: No fever, chills, weight loss, or fatigue  HEENT: No sore throat, runny nose, ear ache  RESPIRATORY: No cough, wheezing, No shortness of breath  CARDIOVASCULAR: No chest pain, palpitations, dizziness  GASTROINTESTINAL: No abdominal pain. No nausea, vomiting, diarrhea  GENITOURINARY: No dysuria, increase frequency, hematuria, or incontinence  NEUROLOGICAL: No headaches, memory loss, loss of strength, numbness, or tremors, no weakness  EXTREMITY: No pedal edema BLE  SKIN: No itching, burning, rashes, or lesions     VITAL SIGNS:  T(C): 37 (02-09-18 @ 06:01), Max: 38.6 (02-08-18 @ 17:23)  T(F): 98.6 (02-09-18 @ 06:01), Max: 101.4 (02-08-18 @ 17:23)  HR: 102 (02-09-18 @ 06:01) (98 - 108)  BP: 121/79 (02-09-18 @ 06:01) (103/66 - 121/79)  RR: 17 (02-09-18 @ 06:01) (16 - 18)  SpO2: 98% (02-09-18 @ 06:01) (98% - 99%)  Wt(kg): --    PHYSICAL EXAM:    GENERAL: not in any distress, not looking toxic, chronic ill looking , contracted   HEENT: Neck is supple, normocephalic, atraumatic   CHEST/LUNG: Clear to percussion bilaterally; No rales, rhonchi, wheezing  HEART: Regular rate and rhythm; No murmurs, rubs, or gallops  ABDOMEN: Soft, Nontender, Nondistended; Bowel sounds present, no rebound   EXTREMITIES:  2+ Peripheral Pulses, No clubbing, cyanosis, or edema  GENITOURINARY: benign , obrien with clear urine   SKIN: No rashes or lesions  BACK: no pressor sore   NERVOUS SYSTEM:  Alert     LABS:                         9.3    6.96  )-----------( 410      ( 09 Feb 2018 08:16 )             28.2     02-09    138  |  106  |  7   ----------------------------<  99  3.7   |  26  |  0.34<L>    Ca    9.4      09 Feb 2018 08:16    TPro  8.4<H>  /  Alb  1.9<L>  /  TBili  0.2  /  DBili  x   /  AST  23  /  ALT  19  /  AlkPhos  70  02-09    LIVER FUNCTIONS - ( 09 Feb 2018 08:16 )  Alb: 1.9 g/dL / Pro: 8.4 gm/dL / ALK PHOS: 70 U/L / ALT: 19 U/L / AST: 23 U/L / GGT: x                         Sedimentation Rate, Erythrocyte: 115 mm/hr (02-06 @ 23:03)            Culture Results:   No growth to date. (02-05 @ 15:20)  Culture Results:   No growth at 5 days. (02-02 @ 12:51)                Radiology:

## 2018-02-10 LAB
CULTURE RESULTS: SIGNIFICANT CHANGE UP
SPECIMEN SOURCE: SIGNIFICANT CHANGE UP

## 2018-02-10 RX ADMIN — Medication 500 MILLIGRAM(S): at 17:50

## 2018-02-10 RX ADMIN — Medication 325 MILLIGRAM(S): at 12:18

## 2018-02-10 RX ADMIN — CEFEPIME 100 MILLIGRAM(S): 1 INJECTION, POWDER, FOR SOLUTION INTRAMUSCULAR; INTRAVENOUS at 14:11

## 2018-02-10 RX ADMIN — Medication 20 MILLIEQUIVALENT(S): at 12:17

## 2018-02-10 RX ADMIN — Medication 1 APPLICATION(S): at 05:55

## 2018-02-10 RX ADMIN — CEFEPIME 100 MILLIGRAM(S): 1 INJECTION, POWDER, FOR SOLUTION INTRAMUSCULAR; INTRAVENOUS at 22:10

## 2018-02-10 RX ADMIN — LACTULOSE 10 GRAM(S): 10 SOLUTION ORAL at 17:51

## 2018-02-10 RX ADMIN — Medication 1 APPLICATION(S): at 17:51

## 2018-02-10 RX ADMIN — Medication 1 TABLET(S): at 12:17

## 2018-02-10 RX ADMIN — LACTULOSE 10 GRAM(S): 10 SOLUTION ORAL at 05:54

## 2018-02-10 RX ADMIN — Medication 500 MILLIGRAM(S): at 05:54

## 2018-02-10 RX ADMIN — SENNA PLUS 2 TABLET(S): 8.6 TABLET ORAL at 22:10

## 2018-02-10 RX ADMIN — SODIUM CHLORIDE 63 MILLILITER(S): 9 INJECTION, SOLUTION INTRAVENOUS at 05:53

## 2018-02-10 RX ADMIN — MIRTAZAPINE 15 MILLIGRAM(S): 45 TABLET, ORALLY DISINTEGRATING ORAL at 22:10

## 2018-02-10 RX ADMIN — CEFEPIME 100 MILLIGRAM(S): 1 INJECTION, POWDER, FOR SOLUTION INTRAMUSCULAR; INTRAVENOUS at 05:55

## 2018-02-10 NOTE — PROGRESS NOTE ADULT - SUBJECTIVE AND OBJECTIVE BOX
Patient seen and examined bedside resting comfortably.  No complaints offered. + flatus/BM. Denies nausea and vomiting. Tolerating diet.  Obrien in place. Denies urinary symptoms and suprapubic pain.  Denies chest pain, dyspnea, cough.  Afebrile overnight    T(F): 98 (02-10-18 @ 10:38), Max: 98 (02-09-18 @ 23:24)  HR: 105 (02-10-18 @ 10:38) (104 - 106)  BP: 169/84 (02-10-18 @ 10:38) (145/78 - 169/84)  RR: 15 (02-10-18 @ 10:38) (15 - 15)  SpO2: 99% (02-10-18 @ 10:38) (98% - 99%)    PHYSICAL EXAM:  General: NAD, WDWN  Neuro:  Alert and oriented. expressive aphasia.   HEENT: NCAT, EOMI, conjunctiva clear  CV: +S1+S2 regular rhythm. borderline tachycardia  Lung: clear to ausculation bilaterally, respirations nonlabored, good inspiratory effort  Abdomen: soft, NTND. Normoactive BS  : obrien in place draining clear yellow urine without sediments or clots. no suprapubic tenderness. I: [id number] O:[id number]  Extremities: no pedal edema or calf tenderness noted. 2+ distal pulses b/l    LABS:                        9.3    6.96  )-----------( 410      ( 09 Feb 2018 08:16 )             28.2     02-09    138  |  106  |  7   ----------------------------<  99  3.7   |  26  |  0.34<L>    Ca    9.4      09 Feb 2018 08:16    TPro  8.4<H>  /  Alb  1.9<L>  /  TBili  0.2  /  DBili  x   /  AST  23  /  ALT  19  /  AlkPhos  70  02-09    Culture Results:   No growth to date. (02-05 @ 15:20)    Impression: 62 year old female with PMH Cobb's chorea, urinary retention due to neurogenic bladder, hemiparesis, decubitus ulcers admitted with sepsis 2/2 UTI and left hydronephrosis s/p Left Ureteroscopy, with laser lithotripsy and stent insertion on 1/19/18, postop course c/b persistent fevers,  anemia s/p 1uPRBC 2/3. Now s/p ureteral stent removal on 2/7, now with resolved fevers    Plan:  -continue abx per ID  -continue VTE prophylaxis   -Increase activity with PT, OOB, Ambulate  -educated on proper incentive spirometry use  -continue local wound care  -f/u AM labs  -will discuss with surgical attending Patient seen and examined bedside resting comfortably.  No complaints offered. + flatus/BM. Denies nausea and vomiting. Tolerating diet.  Obrien in place. Denies urinary symptoms and suprapubic pain.  Denies chest pain, dyspnea, cough.  Afebrile overnight    T(F): 98 (02-10-18 @ 10:38), Max: 98 (02-09-18 @ 23:24)  HR: 105 (02-10-18 @ 10:38) (104 - 106)  BP: 169/84 (02-10-18 @ 10:38) (145/78 - 169/84)  RR: 15 (02-10-18 @ 10:38) (15 - 15)  SpO2: 99% (02-10-18 @ 10:38) (98% - 99%)    PHYSICAL EXAM:  General: NAD, WDWN  Neuro:  Alert and oriented. expressive aphasia.   HEENT: NCAT, EOMI, conjunctiva clear  CV: +S1+S2 regular rhythm. borderline tachycardia  Lung: clear to ausculation bilaterally, respirations nonlabored, good inspiratory effort  Abdomen: soft, NTND. Normoactive BS  : obrien in place draining clear yellow urine without sediments or clots. no suprapubic tenderness. I: [id number] O:[id number]  Extremities: no pedal edema or calf tenderness noted. 2+ distal pulses b/l    LABS:                        9.3    6.96  )-----------( 410      ( 09 Feb 2018 08:16 )             28.2     02-09    138  |  106  |  7   ----------------------------<  99  3.7   |  26  |  0.34<L>    Ca    9.4      09 Feb 2018 08:16    TPro  8.4<H>  /  Alb  1.9<L>  /  TBili  0.2  /  DBili  x   /  AST  23  /  ALT  19  /  AlkPhos  70  02-09    Culture Results:   No growth to date. (02-05 @ 15:20)    Impression: 62 year old female with PMH McCracken's chorea, urinary retention due to neurogenic bladder, hemiparesis, decubitus ulcers admitted with sepsis 2/2 UTI and left hydronephrosis s/p Left Ureteroscopy, with laser lithotripsy and stent insertion on 1/19/18, postop course c/b persistent fevers,  anemia s/p 1uPRBC 2/3. Now s/p ureteral stent removal on 2/7, now with resolved fevers    Plan:  -continue obrien catheter for urinary retention with I+O monitoring  -continue abx. per ID, may dc abx and change obrien prior to discharge  -continue VTE prophylaxis   -continue local wound care  -f/u AM labs  -continue medical/palliative management and supportive care  -discuss with Dr. Cleaning Patient seen and examined bedside resting comfortably.  No complaints offered. + flatus/BM. Denies nausea and vomiting. Tolerating diet.  Obrien in place. Denies urinary symptoms and suprapubic pain.  Denies chest pain, dyspnea, cough.  Afebrile overnight    T(F): 98 (02-10-18 @ 10:38), Max: 98 (02-09-18 @ 23:24)  HR: 105 (02-10-18 @ 10:38) (104 - 106)  BP: 169/84 (02-10-18 @ 10:38) (145/78 - 169/84)  RR: 15 (02-10-18 @ 10:38) (15 - 15)  SpO2: 99% (02-10-18 @ 10:38) (98% - 99%)    PHYSICAL EXAM:  General: NAD, WDWN  Neuro:  Alert and oriented. expressive aphasia.   HEENT: NCAT, EOMI, conjunctiva clear  CV: +S1+S2 regular rhythm. borderline tachycardia  Lung: clear to ausculation bilaterally, respirations nonlabored, good inspiratory effort  Abdomen: soft, NTND. Normoactive BS  : obrien in place draining clear yellow urine without sediments or clots. no suprapubic tenderness. I: [id number] O:[id number]  Extremities: no pedal edema or calf tenderness noted. 2+ distal pulses b/l    LABS:                        9.3    6.96  )-----------( 410      ( 09 Feb 2018 08:16 )             28.2     02-09    138  |  106  |  7   ----------------------------<  99  3.7   |  26  |  0.34<L>    Ca    9.4      09 Feb 2018 08:16    TPro  8.4<H>  /  Alb  1.9<L>  /  TBili  0.2  /  DBili  x   /  AST  23  /  ALT  19  /  AlkPhos  70  02-09    Culture Results:   No growth to date. (02-05 @ 15:20)    Impression: 62 year old female with PMH Converse's chorea, urinary retention due to neurogenic bladder, hemiparesis, decubitus ulcers admitted with sepsis 2/2 UTI and left hydronephrosis s/p Left Ureteroscopy, with laser lithotripsy and stent insertion on 1/19/18, postop course c/b persistent fevers,  anemia s/p 1uPRBC 2/3. Now s/p ureteral stent removal on 2/7, s/p IR drainage of R hip fluid collection, now with resolved fevers    Plan:  -continue obrien catheter for urinary retention with I+O monitoring  -continue abx. per ID, may dc abx and change obrien prior to discharge  -f/u gs/cx and cell count results per IR  -continue VTE prophylaxis   -continue local wound care  -f/u AM labs  -continue medical/palliative management and supportive care  -discuss with Dr. Cleaning

## 2018-02-10 NOTE — PROGRESS NOTE ADULT - SUBJECTIVE AND OBJECTIVE BOX
nt is a 62y old  Female who presents with a chief complaint of 61 yo black female with fever up to 103+ (12 Jan 2018 14:07)      HPI:  61 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  Pt has been more lethargic.  no n/v (12 Jan 2018 14:07)      INTERVAL HPI/OVERNIGHT EVENTS:  No new c/o . T max 101.4    MEDICATIONS  (STANDING):  ascorbic acid 500 milliGRAM(s) Oral two times a day  cefepime  IVPB 1000 milliGRAM(s) IV Intermittent every 8 hours  dextrose 5%. 1000 milliLiter(s) (63 mL/Hr) IV Continuous <Continuous>  ferrous    sulfate 325 milliGRAM(s) Oral daily  hydrocortisone 2.5% Cream 1 Application(s) Topical two times a day  lactulose Syrup 10 Gram(s) Oral two times a day  mirtazapine 15 milliGRAM(s) Oral at bedtime  multivitamin/minerals 1 Tablet(s) Oral daily  potassium chloride   Powder 20 milliEquivalent(s) Oral daily  senna 2 Tablet(s) Oral at bedtime    MEDICATIONS  (PRN):  acetaminophen   Tablet 650 milliGRAM(s) Oral every 6 hours PRN For Temp greater than 38 C (100.4 F)  magnesium hydroxide Suspension 30 milliLiter(s) Oral daily PRN Constipation      FAMILY HISTORY:  No pertinent family history in first degree relatives      Allergies    No Known Allergies    Intolerances        PMH/PSH:  Decubital ulcer  Hemiparesis  Failure to thrive in adult  UTI (urinary tract infection)  Dehydration  Rhabdomyolysis  HTN (hypertension)  Neurogenic bladder disorder  Pressure ulcer of sacrum  UTI (urinary tract infection)  Embolism  DVT (deep venous thrombosis)  Anemia  Pneumonia  DM (diabetes mellitus)  High cholesterol  Ashli chorea  No pertinent past medical history  No significant past surgical history        REVIEW OF SYSTEMS:  CONSTITUTIONAL: No weight loss,   EYES: No eye pain, visual disturbances, or discharge  ENMT:  No difficulty hearing, tinnitus, vertigo; No sinus or throat pain  NECK: No pain or stiffness  BREASTS: No pain, masses, or nipple discharge  RESPIRATORY: No cough, wheezing, chills or hemoptysis; No shortness of breath  CARDIOVASCULAR: No chest pain, palpitations, dizziness, or leg swelling  GASTROINTESTINAL: No abdominal or epigastric pain. No nausea, vomiting, or hematemesis; No diarrhea or constipation. No melena or hematochezia.  GENITOURINARY: No dysuria, frequency, hematuria, or incontinence  NEUROLOGICAL: No headaches, memory loss, loss of strength, numbness, or tremors  SKIN: No itching, burning, rashes, or lesions   LYMPH NODES: No enlarged glands  ENDOCRINE: No heat or cold intolerance; No hair loss  MUSCULOSKELETAL: No joint pain or swelling; No muscle, back, or extremity pain  PSYCHIATRIC: No depression, anxiety, mood swings, or difficulty sleeping  HEME/LYMPH: No easy bruising, or bleeding gums  ALLERGY AND IMMUNOLOGIC: No hives or eczema    Vital Signs Last 24 Hrs  T(C): 37 (09 Feb 2018 12:49), Max: 38.6 (08 Feb 2018 17:23)  T(F): 98.6 (09 Feb 2018 12:49), Max: 101.4 (08 Feb 2018 17:23)  HR: 100 (09 Feb 2018 12:49) (98 - 108)  BP: 127/82 (09 Feb 2018 12:49) (103/66 - 127/82)  BP(mean): --  RR: 17 (09 Feb 2018 12:49) (16 - 17)  SpO2: 99% (09 Feb 2018 12:49) (98% - 99%)    PHYSICAL EXAM:  GENERAL: NAD, well-groomed, well-developed  HEAD:  Atraumatic, Normocephalic  EYES: EOMI, PERRLA, conjunctiva and sclera clear  NECK: Supple, No JVD, Normal thyroid  NERVOUS SYSTEM:  Alert   CHEST/LUNG: Clear to percussion bilaterally; No rales, rhonchi, wheezing, or rubs  HEART: Regular rate and rhythm; No murmurs, rubs, or gallops  ABDOMEN: Soft, Nontender, Nondistended; Bowel sounds present  EXTREMITIES:  2+ Peripheral Pulses, No clubbing, cyanosis, or edema  LYMPH: No lymphadenopathy noted  SKIN: No rashes or lesions    LAB                          9.3    6.96  )-----------( 410      ( 09 Feb 2018 08:16 )             28.2       CBC:  02-09 @ 08:16  WBC 6.96   Hgb 9.3   Hct 28.2   Plts 410  MCV 76.6  02-08 @ 08:32  WBC 7.32   Hgb 8.5   Hct 26.1   Plts 399  MCV 76.3  02-07 @ 08:16  WBC 7.79   Hgb 9.2   Hct 27.5   Plts 418  MCV 76.2  02-06 @ 07:15  WBC 7.26   Hgb 8.6   Hct 25.9   Plts 392  MCV 75.7  02-05 @ 07:44  WBC 7.47   Hgb 9.5   Hct 28.6   Plts 394  MCV 75.7  02-04 @ 06:18  WBC 7.51   Hgb 9.3   Hct 27.2   Plts 398  MCV 74.9      Chemistry:  02-09 @ 08:16  Na+ 138  K+ 3.7  Cl- 106  CO2 26  BUN 7  Cr 0.34     02-08 @ 08:32  Na+ 138  K+ 3.5  Cl- 106  CO2 26  BUN 6  Cr 0.40     02-07 @ 08:16  Na+ 137  K+ 3.5  Cl- 106  CO2 25  BUN 7  Cr 0.41     02-06 @ 07:15  Na+ 138  K+ 3.5  Cl- 105  CO2 26  BUN 8  Cr 0.36     02-05 @ 07:44  Na+ 138  K+ 3.6  Cl- 105  CO2 24  BUN 10  Cr 0.34     02-04 @ 06:18  Na+ 136  K+ 3.8  Cl- 104  CO2 25  BUN 8  Cr 0.41         Glucose, Serum: 99 mg/dL (02-09 @ 08:16)  Glucose, Serum: 91 mg/dL (02-08 @ 08:32)  Glucose, Serum: 103 mg/dL (02-07 @ 08:16)  Glucose, Serum: 91 mg/dL (02-06 @ 07:15)  Glucose, Serum: 91 mg/dL (02-05 @ 07:44)  Glucose, Serum: 109 mg/dL (02-04 @ 06:18)      09 Feb 2018 08:16    138    |  106    |  7      ----------------------------<  99     3.7     |  26     |  0.34   08 Feb 2018 08:32    138    |  106    |  6      ----------------------------<  91     3.5     |  26     |  0.40   07 Feb 2018 08:16    137    |  106    |  7      ----------------------------<  103    3.5     |  25     |  0.41   06 Feb 2018 07:15    138    |  105    |  8      ----------------------------<  91     3.5     |  26     |  0.36   05 Feb 2018 07:44    138    |  105    |  10     ----------------------------<  91     3.6     |  24     |  0.34   04 Feb 2018 06:18    136    |  104    |  8      ----------------------------<  109    3.8     |  25     |  0.41     Ca    9.4        09 Feb 2018 08:16  Ca    9.3        08 Feb 2018 08:32  Ca    9.5        07 Feb 2018 08:16  Ca    9.4        06 Feb 2018 07:15  Ca    9.9        05 Feb 2018 07:44  Ca    9.3        04 Feb 2018 06:18    TPro  8.4    /  Alb  1.9    /  TBili  0.2    /  DBili  x      /  AST  23     /  ALT  19     /  AlkPhos  70     09 Feb 2018 08:16              CAPILLARY BLOOD GLUCOSE          C-Reactive Protein, Serum: 3.60 mg/dL (02-09 @ 10:02)  C-Reactive Protein, Serum: 4.70 mg/dL (02-07 @ 08:12)      RADIOLOGY & ADDITIONAL TESTS:    Imaging Personally Reviewed:  [ ] YES  [ ] NO    Consultant(s) Notes Reviewed:  [ ] YES  [ ] NO    Care Discussed with Consultants/Other Providers [ ] YES  [ ] NO    Assessment and Plan:   · Assessment	    RADIOLOGY & ADDITIONAL TESTS:  A/P  fever - cooling blanket  sepsis - + blood cx to e coli - suseptable to zosyn  rsv + supportive measures  62 yr old female with fever, UTI.  Getting tap of hip to see if infection present.  10-49 k E Coli in urine - but this likely taken after pt on abx - continue zosyn - appreciate id help  respiratory status - improved  dehydration - iv fluid d5 w, recheck bmp  renal insufficiency - improved on ivf  cv - felt stable by cardiology - echocardiogram pending  neuro - reported h/o of Clements's chorea - neurology does not recommend retesting - felt stable by neurology - appreciate neurolgy help  prognsis - guarded  d/w with pt and daught         Problem/Plan - 1:  ·  Problem: Urinary tract infection without hematuria, site unspecified.  Plan: On cefepime,    CRP 4.70  - 3.60 WBC 6.96, precalcitonin 0.13  - pending ,  Indium scan negative. appreciate ID note  - For removal of stents yesterday, Ortho consult regarding right hip effusion noted. Aspiration by IR today.     Problem/Plan - 2:  ·  Problem: Anemia, unspecified type.  Plan: supportive care.      Problem/Plan - 3:  ·  Problem: Hypertension, unspecified type.  Plan: 127/82.      Problem/Plan - 4:  ·  Problem: Clements chorea.  Plan: as per neurology, copper  level is slightly elevated  / Zinc levels normal.      Problem/Plan - 5:  ·  Problem: Kidney stones.  Plan: S/p cystoscopy.      Problem/Plan - 6:  Problem: Hypokalemia. Plan: nml.     Problem/Plan - 7:  ·  Problem: Pneumonia due to infectious organism, unspecified laterality, unspecified part of lung.  Plan: NAD.   Being followed by interventional radiology  tap hip to see if infection

## 2018-02-11 LAB
ALBUMIN SERPL ELPH-MCNC: 1.9 G/DL — LOW (ref 3.3–5)
ALP SERPL-CCNC: 68 U/L — SIGNIFICANT CHANGE UP (ref 40–120)
ALT FLD-CCNC: 13 U/L — SIGNIFICANT CHANGE UP (ref 12–78)
ANION GAP SERPL CALC-SCNC: 9 MMOL/L — SIGNIFICANT CHANGE UP (ref 5–17)
AST SERPL-CCNC: 16 U/L — SIGNIFICANT CHANGE UP (ref 15–37)
BILIRUB SERPL-MCNC: 0.2 MG/DL — SIGNIFICANT CHANGE UP (ref 0.2–1.2)
BUN SERPL-MCNC: 6 MG/DL — LOW (ref 7–23)
CALCIUM SERPL-MCNC: 9.4 MG/DL — SIGNIFICANT CHANGE UP (ref 8.5–10.1)
CHLORIDE SERPL-SCNC: 106 MMOL/L — SIGNIFICANT CHANGE UP (ref 96–108)
CO2 SERPL-SCNC: 24 MMOL/L — SIGNIFICANT CHANGE UP (ref 22–31)
CREAT SERPL-MCNC: 0.33 MG/DL — LOW (ref 0.5–1.3)
GLUCOSE SERPL-MCNC: 90 MG/DL — SIGNIFICANT CHANGE UP (ref 70–99)
HCT VFR BLD CALC: 27.9 % — LOW (ref 34.5–45)
HGB BLD-MCNC: 9.1 G/DL — LOW (ref 11.5–15.5)
MCHC RBC-ENTMCNC: 25 PG — LOW (ref 27–34)
MCHC RBC-ENTMCNC: 32.6 GM/DL — SIGNIFICANT CHANGE UP (ref 32–36)
MCV RBC AUTO: 76.6 FL — LOW (ref 80–100)
NRBC # BLD: 0 /100 WBCS — SIGNIFICANT CHANGE UP (ref 0–0)
PLATELET # BLD AUTO: 423 K/UL — HIGH (ref 150–400)
POTASSIUM SERPL-MCNC: 3.8 MMOL/L — SIGNIFICANT CHANGE UP (ref 3.5–5.3)
POTASSIUM SERPL-SCNC: 3.8 MMOL/L — SIGNIFICANT CHANGE UP (ref 3.5–5.3)
PROT SERPL-MCNC: 8.5 GM/DL — HIGH (ref 6–8.3)
RBC # BLD: 3.64 M/UL — LOW (ref 3.8–5.2)
RBC # FLD: 18.7 % — HIGH (ref 10.3–14.5)
SODIUM SERPL-SCNC: 139 MMOL/L — SIGNIFICANT CHANGE UP (ref 135–145)
WBC # BLD: 6.75 K/UL — SIGNIFICANT CHANGE UP (ref 3.8–10.5)
WBC # FLD AUTO: 6.75 K/UL — SIGNIFICANT CHANGE UP (ref 3.8–10.5)

## 2018-02-11 RX ADMIN — MIRTAZAPINE 15 MILLIGRAM(S): 45 TABLET, ORALLY DISINTEGRATING ORAL at 21:38

## 2018-02-11 RX ADMIN — SODIUM CHLORIDE 63 MILLILITER(S): 9 INJECTION, SOLUTION INTRAVENOUS at 13:34

## 2018-02-11 RX ADMIN — Medication 500 MILLIGRAM(S): at 17:29

## 2018-02-11 RX ADMIN — Medication 1 TABLET(S): at 12:08

## 2018-02-11 RX ADMIN — CEFEPIME 100 MILLIGRAM(S): 1 INJECTION, POWDER, FOR SOLUTION INTRAMUSCULAR; INTRAVENOUS at 21:38

## 2018-02-11 RX ADMIN — Medication 20 MILLIEQUIVALENT(S): at 12:08

## 2018-02-11 RX ADMIN — Medication 1 APPLICATION(S): at 06:08

## 2018-02-11 RX ADMIN — SENNA PLUS 2 TABLET(S): 8.6 TABLET ORAL at 21:38

## 2018-02-11 RX ADMIN — Medication 1 APPLICATION(S): at 17:29

## 2018-02-11 RX ADMIN — Medication 325 MILLIGRAM(S): at 12:08

## 2018-02-11 RX ADMIN — LACTULOSE 10 GRAM(S): 10 SOLUTION ORAL at 17:29

## 2018-02-11 RX ADMIN — CEFEPIME 100 MILLIGRAM(S): 1 INJECTION, POWDER, FOR SOLUTION INTRAMUSCULAR; INTRAVENOUS at 06:07

## 2018-02-11 RX ADMIN — LACTULOSE 10 GRAM(S): 10 SOLUTION ORAL at 06:07

## 2018-02-11 RX ADMIN — Medication 500 MILLIGRAM(S): at 06:07

## 2018-02-11 RX ADMIN — CEFEPIME 100 MILLIGRAM(S): 1 INJECTION, POWDER, FOR SOLUTION INTRAMUSCULAR; INTRAVENOUS at 13:34

## 2018-02-11 NOTE — PROGRESS NOTE ADULT - ASSESSMENT
Subjective Complaints:  Historian:         REVIEW OF SYSTEMS:  Eyes:  Good vision, no reported pain  ENT:  No sore throat, pain, runny nose, dysphagia  CV:  No pain, palpitatioins, hypo/hypertension  Resp:  No dyspnea, cough, tachypnea, wheezing  GI:  No pain, nausea, vomiting, diarrhea, constipatiion  Muscle:  No pain, weakness  Neuro:  No weakness, tingling, memory problems  Psych:  No fatigue, insomnia, mood problems, depression  Endocrine:  No polyuria, polydypsia, cold/heat intolerance    Vital Signs Last 24 Hrs  T(C): 37.1 (11 Feb 2018 11:44), Max: 37.9 (10 Feb 2018 17:50)  T(F): 98.8 (11 Feb 2018 11:44), Max: 100.2 (10 Feb 2018 17:50)  HR: 103 (11 Feb 2018 11:44) (103 - 113)  BP: 114/73 (11 Feb 2018 11:44) (113/72 - 158/77)  BP(mean): --  RR: 17 (11 Feb 2018 11:44) (14 - 17)  SpO2: 98% (11 Feb 2018 11:44) (98% - 100%)    GENERAL PHYSICAL EXAM:  General:  Appears stated age, well-groomed, well-nourished, no distress  HEENT:  NC/AT, patent nares w/ pink mucosa, OP clear w/o lesions, PERRL, EOMI, conjunctivae clear, no thyromegaly, nodules, adenopathy, no JVD  Chest:  Full & symmetric excursion, no increased effort, breath sounds clear  Cardiovascular:  Regular rhythm, S1, S2, no murmur/rub/S3/S4, no carotid/femoral/abdominal bruit, radial/pedal pulses 2+, no edema  Abdomen:  Soft, non-tender, non-distended, normoactive bowel sounds, no HSM  Extremities:  Gait & station:   Digits:   Nails:   Joints, Bones, Muscles:   ROM:   Stability:  Skin:  No rash/erythema/ecchymoses/petechiae/wounds/abscess/warm/dry  Musculoskeletal:  Full ROM in all joints w/o swelling/tenderness/effusion    NEUROLOGICAL EXAM:  HENT:  Normocephalic head; atraumatic head.  Neck supple.  ENT: normal looking.  Mental State:    Alert.   open eyes does not follows commnads arm leg contracted no chorea                       9.1    6.75  )-----------( 423      ( 11 Feb 2018 08:50 )             27.9     02-11    139  |  106  |  6<L>  ----------------------------<  90  3.8   |  24  |  0.33<L>    Ca    9.4      11 Feb 2018 08:50    TPro  8.5<H>  /  Alb  1.9<L>  /  TBili  0.2  /  DBili  x   /  AST  16  /  ALT  13  /  AlkPhos  68  02-11     awaek open eyes  arm leg contracted s/p uti  removal of stent no chorea anemia for sub acute rehab will folow metabolic encpehalaothy       RADIOLOGY & ADDITIONAL STUDIES:        Recommendations: for sub acute rehab

## 2018-02-11 NOTE — PROGRESS NOTE ADULT - SUBJECTIVE AND OBJECTIVE BOX
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  Pt has been more lethargic and barely communicable. Low grade fever still progressing.    Upon evaluation, pt denies any symptoms, having her breakfast comfortably.       Allergies    No Known Allergies    Intolerances        MEDICATIONS  (STANDING):  ascorbic acid 500 milliGRAM(s) Oral two times a day  cefepime  IVPB 1000 milliGRAM(s) IV Intermittent every 8 hours  dextrose 5%. 1000 milliLiter(s) (63 mL/Hr) IV Continuous <Continuous>  ferrous    sulfate 325 milliGRAM(s) Oral daily  hydrocortisone 2.5% Cream 1 Application(s) Topical two times a day  lactulose Syrup 10 Gram(s) Oral two times a day  mirtazapine 15 milliGRAM(s) Oral at bedtime  multivitamin/minerals 1 Tablet(s) Oral daily  potassium chloride   Powder 20 milliEquivalent(s) Oral daily  senna 2 Tablet(s) Oral at bedtime    MEDICATIONS  (PRN):  acetaminophen   Tablet 650 milliGRAM(s) Oral every 6 hours PRN For Temp greater than 38 C (100.4 F)  magnesium hydroxide Suspension 30 milliLiter(s) Oral daily PRN Constipation      REVIEW OF SYSTEMS:    CONSTITUTIONAL: No fever, chills, weight loss, or fatigue  HEENT: No sore throat, runny nose, ear ache  RESPIRATORY: No cough, wheezing, No shortness of breath  CARDIOVASCULAR: No chest pain, palpitations, dizziness  GASTROINTESTINAL: No abdominal pain. No nausea, vomiting, diarrhea  GENITOURINARY: No dysuria, increase frequency, hematuria, or incontinence  NEUROLOGICAL: No headaches, memory loss, loss of strength, numbness, or tremors, no weakness  EXTREMITY: No pedal edema BLE  SKIN: No itching, burning, rashes, or lesions     VITAL SIGNS:  T(C): 37.5 (02-11-18 @ 05:27), Max: 37.9 (02-10-18 @ 17:50)  T(F): 99.5 (02-11-18 @ 05:27), Max: 100.2 (02-10-18 @ 17:50)  HR: 106 (02-11-18 @ 05:27) (104 - 113)  BP: 113/72 (02-11-18 @ 05:27) (113/72 - 169/84)  RR: 15 (02-11-18 @ 05:27) (14 - 16)  SpO2: 98% (02-11-18 @ 05:27) (98% - 100%)  Wt(kg): --    PHYSICAL EXAM:    GENERAL: not in any distress, having breakfast with aid.   HEENT: Neck is supple, normocephalic, atraumatic   CHEST/LUNG: Clear to percussion bilaterally; No rales, rhonchi, wheezing  HEART: Regular rate and rhythm; No murmurs, rubs, or gallops  ABDOMEN: Soft, Nontender, Nondistended; Bowel sounds present, no rebound   EXTREMITIES:  2+ Peripheral Pulses, No clubbing, cyanosis, or edema  GENITOURINARY: benign   SKIN: No rashes or lesions  BACK: no pressor sore   NERVOUS SYSTEM:  Alert & Oriented X3, Good concentration  PSYCH: normal affect     LABS:                         9.1    6.75  )-----------( 423      ( 11 Feb 2018 08:50 )             27.9     02-11    139  |  106  |  6<L>  ----------------------------<  90  3.8   |  24  |  0.33<L>    Ca    9.4      11 Feb 2018 08:50    TPro  8.5<H>  /  Alb  1.9<L>  /  TBili  0.2  /  DBili  x   /  AST  16  /  ALT  13  /  AlkPhos  68  02-11    LIVER FUNCTIONS - ( 11 Feb 2018 08:50 )  Alb: 1.9 g/dL / Pro: 8.5 gm/dL / ALK PHOS: 68 U/L / ALT: 13 U/L / AST: 16 U/L / GGT: x                         Sedimentation Rate, Erythrocyte: 115 mm/hr (02-06 @ 23:03)            Culture Results:   No growth (02-09 @ 17:34)  Culture Results:   No growth to date. (02-09 @ 15:48)  Culture Results:   No growth at 5 days. (02-05 @ 15:20)                Radiology:

## 2018-02-11 NOTE — PROGRESS NOTE ADULT - SUBJECTIVE AND OBJECTIVE BOX
· Subjective and Objective: 	  61 yo woman with [place]'s chorea , initially presented with fevers for the past 3 days, last night went up to 103+.  Pt has been more lethargic and barely communicable. Low grade fever still progressing.    Upon evaluation, pt denies any symptoms, having her breakfast comfortably.       Allergies    No Known Allergies    Intolerances        MEDICATIONS  (STANDING):  ascorbic acid 500 milliGRAM(s) Oral two times a day  cefepime  IVPB 1000 milliGRAM(s) IV Intermittent every 8 hours  dextrose 5%. 1000 milliLiter(s) (63 mL/Hr) IV Continuous <Continuous>  ferrous    sulfate 325 milliGRAM(s) Oral daily  hydrocortisone 2.5% Cream 1 Application(s) Topical two times a day  lactulose Syrup 10 Gram(s) Oral two times a day  mirtazapine 15 milliGRAM(s) Oral at bedtime  multivitamin/minerals 1 Tablet(s) Oral daily  potassium chloride   Powder 20 milliEquivalent(s) Oral daily  senna 2 Tablet(s) Oral at bedtime    MEDICATIONS  (PRN):  acetaminophen   Tablet 650 milliGRAM(s) Oral every 6 hours PRN For Temp greater than 38 C (100.4 F)  magnesium hydroxide Suspension 30 milliLiter(s) Oral daily PRN Constipation      REVIEW OF SYSTEMS:    CONSTITUTIONAL: No fever, chills, weight loss, or fatigue  HEENT: No sore throat, runny nose, ear ache  RESPIRATORY: No cough, wheezing, No shortness of breath  CARDIOVASCULAR: No chest pain, palpitations, dizziness  GASTROINTESTINAL: No abdominal pain. No nausea, vomiting, diarrhea  GENITOURINARY: No dysuria, increase frequency, hematuria, or incontinence  NEUROLOGICAL: No headaches, memory loss, loss of strength, numbness, or tremors, no weakness  EXTREMITY: No pedal edema BLE  SKIN: No itching, burning, rashes, or lesions     VITAL SIGNS:  T(C): 37.5 (02-11-18 @ 05:27), Max: 37.9 (02-10-18 @ 17:50)  T(F): 99.5 (02-11-18 @ 05:27), Max: 100.2 (02-10-18 @ 17:50)  HR: 106 (02-11-18 @ 05:27) (104 - 113)  BP: 113/72 (02-11-18 @ 05:27) (113/72 - 169/84)  RR: 15 (02-11-18 @ 05:27) (14 - 16)  SpO2: 98% (02-11-18 @ 05:27) (98% - 100%)  Wt(kg): --    PHYSICAL EXAM:    GENERAL: not in any distress, having breakfast with aid.   HEENT: Neck is supple, normocephalic, atraumatic   CHEST/LUNG: Clear to percussion bilaterally; No rales, rhonchi, wheezing  HEART: Regular rate and rhythm; No murmurs, rubs, or gallops  ABDOMEN: Soft, Nontender, Nondistended; Bowel sounds present, no rebound   EXTREMITIES:  2+ Peripheral Pulses, No clubbing, cyanosis, or edema  GENITOURINARY: benign   SKIN: No rashes or lesions  BACK: no pressor sore   NERVOUS SYSTEM:  Alert & Oriented X3, Good concentration  PSYCH: normal affect     LABS:                         9.1    6.75  )-----------( 423      ( 11 Feb 2018 08:50 )             27.9     02-11    139  |  106  |  6<L>  ----------------------------<  90  3.8   |  24  |  0.33<L>    Ca    9.4      11 Feb 2018 08:50    TPro  8.5<H>  /  Alb  1.9<L>  /  TBili  0.2  /  DBili  x   /  AST  16  /  ALT  13  /  AlkPhos  68  02-11    LIVER FUNCTIONS - ( 11 Feb 2018 08:50 )  Alb: 1.9 g/dL / Pro: 8.5 gm/dL / ALK PHOS: 68 U/L / ALT: 13 U/L / AST: 16 U/L / GGT: x                         Sedimentation Rate, Erythrocyte: 115 mm/hr (02-06 @ 23:03)            Culture Results:   No growth (02-09 @ 17:34)  Culture Results:   No growth to date. (02-09 @ 15:48)  Culture Results:   No growth at 5 days. (02-05 @ 15:20)                Radiology:          Assessment and Plan:   · Assessment		    Pos rhino virus / recent ho of Sepsis from acute viral syndrome vs bacteremia vs pyelonephritis , left ureter stone, complicated with Gram neg bacteremia   Recurrent fever , ho of  Left Ureteroscopy, with laser lithotripsy and stent insertion, postop course c/b persistent fevers , S/p Stent removal on 2/7   Fairly sensitive e coli Bacteremia resistant to cipro   zosyn from 1/12 to 16 and then jenny from 1/16 to 28, completed dose for complicated UTI and bacteremia and surveillance blood culture neg so far   Nuclear Bone scan negative for infection and OM   She was stable off antibiotics and then developed recurring fever and repeated UA is still dirty and urine culture grew providentia    now on ceftazidime    Her Tmax is 101.4 which is rectal temp , otherwise pt is stable BP and HR, no leukocytosis , no symptoms, bedbound contracted lady with chronic ill appearing  we did extensive Fever work up and did not find impressive source, stent was removed   ID feels that patient can be taken of antimicrobial agent.

## 2018-02-11 NOTE — PROGRESS NOTE ADULT - ASSESSMENT
Pos rhino virus / recent ho of Sepsis from acute viral syndrome vs bacteremia vs pyelonephritis , left ureter stone, complicated with Gram neg bacteremia   Recurrent fever , ho of  Left Ureteroscopy, with laser lithotripsy and stent insertion, postop course c/b persistent fevers , S/p Stent removal on 2/7   Fairly sensitive e coli Bacteremia resistant to cipro   zosyn from 1/12 to 16 and then jenny from 1/16 to 28, completed dose for complicated UTI and bacteremia and surveillance blood culture neg so far   Nuclear Bone scan negative for infection and OM   She was stable off antibiotics and then developed recurring fever and repeated UA is still dirty and urine culture grew providentia    now on ceftazidime    Her Tmax is 101.4 which is rectal temp , otherwise pt is stable BP and HR, no leukocytosis , no symptoms, bedbound contracted lady with chronic ill appearing  we did extensive Fever work up and did not find impressive source, stent was removed   I would recommend not to measure rectal temp     Patient is clear to go from the ID point of view without any antibiotics  ID will sign off

## 2018-02-12 RX ORDER — ENOXAPARIN SODIUM 100 MG/ML
40 INJECTION SUBCUTANEOUS DAILY
Qty: 0 | Refills: 0 | Status: DISCONTINUED | OUTPATIENT
Start: 2018-02-12 | End: 2018-02-14

## 2018-02-12 RX ADMIN — Medication 500 MILLIGRAM(S): at 05:33

## 2018-02-12 RX ADMIN — ENOXAPARIN SODIUM 40 MILLIGRAM(S): 100 INJECTION SUBCUTANEOUS at 22:34

## 2018-02-12 RX ADMIN — CEFEPIME 100 MILLIGRAM(S): 1 INJECTION, POWDER, FOR SOLUTION INTRAMUSCULAR; INTRAVENOUS at 05:32

## 2018-02-12 RX ADMIN — Medication 1 APPLICATION(S): at 05:33

## 2018-02-12 RX ADMIN — LACTULOSE 10 GRAM(S): 10 SOLUTION ORAL at 17:37

## 2018-02-12 RX ADMIN — Medication 650 MILLIGRAM(S): at 17:38

## 2018-02-12 RX ADMIN — Medication 325 MILLIGRAM(S): at 12:03

## 2018-02-12 RX ADMIN — Medication 500 MILLIGRAM(S): at 17:37

## 2018-02-12 RX ADMIN — SODIUM CHLORIDE 63 MILLILITER(S): 9 INJECTION, SOLUTION INTRAVENOUS at 05:32

## 2018-02-12 RX ADMIN — CEFEPIME 100 MILLIGRAM(S): 1 INJECTION, POWDER, FOR SOLUTION INTRAMUSCULAR; INTRAVENOUS at 13:53

## 2018-02-12 RX ADMIN — MIRTAZAPINE 15 MILLIGRAM(S): 45 TABLET, ORALLY DISINTEGRATING ORAL at 22:34

## 2018-02-12 RX ADMIN — Medication 1 TABLET(S): at 12:03

## 2018-02-12 RX ADMIN — Medication 1 APPLICATION(S): at 17:37

## 2018-02-12 RX ADMIN — Medication 20 MILLIEQUIVALENT(S): at 12:03

## 2018-02-12 RX ADMIN — SENNA PLUS 2 TABLET(S): 8.6 TABLET ORAL at 22:34

## 2018-02-12 RX ADMIN — LACTULOSE 10 GRAM(S): 10 SOLUTION ORAL at 05:33

## 2018-02-12 NOTE — CHART NOTE - NSCHARTNOTEFT_GEN_A_CORE
Assessment: 62 y.o. F pt c [place] chorea, severely contracted. s/p ureteroscopy c removal of ureteral stones and ureteral stent in L ureter. +BM 2/09. DNR/DNI refused. overall poor prognosis as per palliative.     Factors impacting intake: [ ] none [ ] nausea  [ ] vomiting [ ] diarrhea [ ] constipation  [ ]chewing problems [ x] swallowing issues  [ ] other:     Diet Presciption: Diet, Dysphagia 1 Pureed-Honey Consistency Fluid:   No Carb Prosource (1pkg = 15gms Protein)     Qty per Day:  daily (02-07-18 @ 17:54)  Magic Cup supplement x3/day (870 kcals, 27g pro)    Intake: Good. pt ate >75% breakfast this am. CNA reports her  has been bringing her Ensure and shes drinking it. she was asking for it by breakfast today since  wasn't here but unable to give due to honey thick liquid diet rx. Magic Cup provided.     Current Weight: 2/12: 53 kg 2/10: 61 kg (?)   % Weight Change    Pertinent Medications: MEDICATIONS  (STANDING):  ascorbic acid 500 milliGRAM(s) Oral two times a day  cefepime  IVPB 1000 milliGRAM(s) IV Intermittent every 8 hours  dextrose 5%. 1000 milliLiter(s) (63 mL/Hr) IV Continuous <Continuous>  ferrous    sulfate 325 milliGRAM(s) Oral daily  hydrocortisone 2.5% Cream 1 Application(s) Topical two times a day  lactulose Syrup 10 Gram(s) Oral two times a day  mirtazapine 15 milliGRAM(s) Oral at bedtime  multivitamin/minerals 1 Tablet(s) Oral daily  potassium chloride   Powder 20 milliEquivalent(s) Oral daily  senna 2 Tablet(s) Oral at bedtime    MEDICATIONS  (PRN):  acetaminophen   Tablet 650 milliGRAM(s) Oral every 6 hours PRN For Temp greater than 38 C (100.4 F)  magnesium hydroxide Suspension 30 milliLiter(s) Oral daily PRN Constipation    Pertinent Labs:    CAPILLARY BLOOD GLUCOSE        Skin: WDL. no edema noted     Estimated Needs:   [x ] no change since previous assessment  [ ] recalculated:     Previous Nutrition Diagnosis:   [ ] Inadequate Energy Intake [ ]Inadequate Oral Intake [ ] Excessive Energy Intake   [ ] Underweight [ ] Increased Nutrient Needs [ ] Overweight/Obesity   [ ] Altered GI Function [ ] Unintended Weight Loss [ ] Food & Nutrition Related Knowledge Deficit [x ] Severe Malnutrition in context of chronic illness     Nutrition Diagnosis is [x ] ongoing  [ ] resolved [ ] not applicable     New Nutrition Diagnosis: [x ] not applicable       Interventions:   Recommend  [ ] Change Diet To:  [ ] Nutrition Supplement  [ ] Nutrition Support  [ x] Other: continue current nutrition plan of care     Monitoring and Evaluation:   [x ] PO intake [ x ] Tolerance to diet prescription [ x ] weights [ x ] labs[ x ] follow up per protocol  [ ] other:

## 2018-02-12 NOTE — PROGRESS NOTE ADULT - ASSESSMENT
Subjective Complaints:  Historian:         REVIEW OF SYSTEMS:  Eyes:  Good vision, no reported pain  ENT:  No sore throat, pain, runny nose, dysphagia  CV:  No pain, palpitatioins, hypo/hypertension  Resp:  No dyspnea, cough, tachypnea, wheezing  GI:  No pain, nausea, vomiting, diarrhea, constipatiion  Muscle:  No pain, weakness  Neuro:  No weakness, tingling, memory problems  Psych:  No fatigue, insomnia, mood problems, depression  Endocrine:  No polyuria, polydypsia, cold/heat intolerance    Vital Signs Last 24 Hrs  T(C): 38.1 (12 Feb 2018 17:26), Max: 38.1 (12 Feb 2018 17:26)  T(F): 100.6 (12 Feb 2018 17:26), Max: 100.6 (12 Feb 2018 17:26)  HR: 109 (12 Feb 2018 17:26) (108 - 116)  BP: 116/75 (12 Feb 2018 17:26) (115/75 - 180/80)  BP(mean): --  RR: 16 (12 Feb 2018 17:26) (14 - 16)  SpO2: 95% (12 Feb 2018 17:26) (95% - 100%)    GENERAL PHYSICAL EXAM:  General:  Appears stated age, well-groomed, well-nourished, no distress  HEENT:  NC/AT, patent nares w/ pink mucosa, OP clear w/o lesions, PERRL, EOMI, conjunctivae clear, no thyromegaly, nodules, adenopathy, no JVD  Chest:  Full & symmetric excursion, no increased effort, breath sounds clear  Cardiovascular:  Regular rhythm, S1, S2, no murmur/rub/S3/S4, no carotid/femoral/abdominal bruit, radial/pedal pulses 2+, no edema  Abdomen:  Soft, non-tender, non-distended, normoactive bowel sounds, no HSM  Extremities:  Gait & station:   Digits:   Nails:   Joints, Bones, Muscles:   ROM:   Stability:  Skin:  No rash/erythema/ecchymoses/petechiae/wounds/abscess/warm/dry  Musculoskeletal:  Full ROM in all joints w/o swelling/tenderness/effusion    NEUROLOGICAL EXAM:  HENT:  Normocephalic head; atraumatic head.  Neck supple.  ENT: normal looking.  Mental State:    Alert.        open eyes does     not follws commnads          arm legs contracted no chorea 6.75  )-----------( 423      ( 11 Feb 2018 08:50 )             27.9     02-11    139  |  106  |  6<L>  ----------------------------<  90  3.8   |  24  |  0.33<L>    Ca    9.4      11 Feb 2018 08:50    TPro  8.5<H>  /  Alb  1.9<L>  /  TBili  0.2  /  DBili  x   /  AST  16  /  ALT  13  /  AlkPhos  68  02-11     ass= awake open eyes no  seziure  s/p uti s/p removal of   stent arm legs conatracted  quadroparesis       RADIOLOGY & ADDITIONAL STUDIES:        Recommendations for sub acuter rehab  metabolic ence[halaothy

## 2018-02-12 NOTE — PROGRESS NOTE ADULT - PROBLEM SELECTOR PLAN 1
On cefepime,    CRP 4.70  - 3.60  WBC 6.75, precalcitonin 0.13  - pending ,  Indium scan negative. appreciate ID note  - Removal of stents , Ortho consult regarding right hip effusion noted. Aspiration by IR today. On cefepime,    CRP 4.70  - 3.60  WBC 6.75, precalcitonin < 0.05  -  Indium scan negative. appreciate ID note  - Removal of stents , Discussed with Ortho results of tap. " no signs of infection "  - will d/c antibiotics and observe

## 2018-02-12 NOTE — PROGRESS NOTE ADULT - SUBJECTIVE AND OBJECTIVE BOX
Patient seen and examined bedside resting comfortably.  [place] draining well.      T(F): 99.4 (02-12-18 @ 11:10), Max: 100 (02-11-18 @ 22:56)  HR: 108 (02-12-18 @ 11:10) (108 - 116)  BP: 115/75 (02-12-18 @ 11:10) (115/75 - 180/80)  RR: 16 (02-12-18 @ 11:10) (14 - 16)  SpO2: 100% (02-12-18 @ 11:10) (95% - 100%)        PHYSICAL EXAM:  General: NAD, WDWN  Neuro:  Alert & conscious  HEENT: NCAT, EOMI, conjunctiva clear  Lung: respirations nonlabored, good inspiratory effort  Abdomen: soft, NTND.   Extremities: no pedal edema or calf tenderness noted   LABS:                        9.1    6.75  )-----------( 423      ( 11 Feb 2018 08:50 )             27.9     02-11    139  |  106  |  6<L>  ----------------------------<  90  3.8   |  24  |  0.33<L>    Ca    9.4      11 Feb 2018 08:50    TPro  8.5<H>  /  Alb  1.9<L>  /  TBili  0.2  /  DBili  x   /  AST  16  /  ALT  13  /  AlkPhos  68  02-11      I&O's Detail    11 Feb 2018 07:01  -  12 Feb 2018 07:00  --------------------------------------------------------  IN:    Oral Fluid: 225 mL  Total IN: 225 mL    OUT:    Indwelling Catheter - Urethral: 500 mL  Total OUT: 500 mL    Total NET: -275 mL      12 Feb 2018 07:01  -  12 Feb 2018 16:27  --------------------------------------------------------  IN:    Oral Fluid: 240 mL  Total IN: 240 mL    OUT:    Indwelling Catheter - Urethral: 800 mL  Total OUT: 800 mL    Total NET: -560 mL          wbc    02-11 @ 08:50    6.75    02-09 @ 08:16    6.96    02-08 @ 08:32    7.32    02-07 @ 08:16    7.79    02-06 @ 07:15    7.26        cr   02-11 @ 08:50   0.33    02-09 @ 08:16   0.34    02-08 @ 08:32   0.40    02-07 @ 08:16   0.41    02-06 @ 07:15   0.36

## 2018-02-13 LAB
ANION GAP SERPL CALC-SCNC: 7 MMOL/L — SIGNIFICANT CHANGE UP (ref 5–17)
BUN SERPL-MCNC: 8 MG/DL — SIGNIFICANT CHANGE UP (ref 7–23)
CALCIUM SERPL-MCNC: 9.6 MG/DL — SIGNIFICANT CHANGE UP (ref 8.5–10.1)
CHLORIDE SERPL-SCNC: 106 MMOL/L — SIGNIFICANT CHANGE UP (ref 96–108)
CO2 SERPL-SCNC: 27 MMOL/L — SIGNIFICANT CHANGE UP (ref 22–31)
CREAT SERPL-MCNC: 0.31 MG/DL — LOW (ref 0.5–1.3)
CRP SERPL-MCNC: 3.8 MG/DL — HIGH (ref 0–0.4)
GLUCOSE SERPL-MCNC: 102 MG/DL — HIGH (ref 70–99)
HCT VFR BLD CALC: 28.4 % — LOW (ref 34.5–45)
HGB BLD-MCNC: 9.3 G/DL — LOW (ref 11.5–15.5)
MCHC RBC-ENTMCNC: 25.1 PG — LOW (ref 27–34)
MCHC RBC-ENTMCNC: 32.7 GM/DL — SIGNIFICANT CHANGE UP (ref 32–36)
MCV RBC AUTO: 76.8 FL — LOW (ref 80–100)
NRBC # BLD: 0 /100 WBCS — SIGNIFICANT CHANGE UP (ref 0–0)
PLATELET # BLD AUTO: 410 K/UL — HIGH (ref 150–400)
POTASSIUM SERPL-MCNC: 3.5 MMOL/L — SIGNIFICANT CHANGE UP (ref 3.5–5.3)
POTASSIUM SERPL-SCNC: 3.5 MMOL/L — SIGNIFICANT CHANGE UP (ref 3.5–5.3)
RBC # BLD: 3.7 M/UL — LOW (ref 3.8–5.2)
RBC # FLD: 18.5 % — HIGH (ref 10.3–14.5)
SODIUM SERPL-SCNC: 140 MMOL/L — SIGNIFICANT CHANGE UP (ref 135–145)
WBC # BLD: 6.98 K/UL — SIGNIFICANT CHANGE UP (ref 3.8–10.5)
WBC # FLD AUTO: 6.98 K/UL — SIGNIFICANT CHANGE UP (ref 3.8–10.5)

## 2018-02-13 RX ADMIN — SENNA PLUS 2 TABLET(S): 8.6 TABLET ORAL at 21:10

## 2018-02-13 RX ADMIN — Medication 500 MILLIGRAM(S): at 17:37

## 2018-02-13 RX ADMIN — Medication 1 APPLICATION(S): at 06:38

## 2018-02-13 RX ADMIN — Medication 20 MILLIEQUIVALENT(S): at 11:18

## 2018-02-13 RX ADMIN — Medication 1 APPLICATION(S): at 17:38

## 2018-02-13 RX ADMIN — ENOXAPARIN SODIUM 40 MILLIGRAM(S): 100 INJECTION SUBCUTANEOUS at 11:18

## 2018-02-13 RX ADMIN — LACTULOSE 10 GRAM(S): 10 SOLUTION ORAL at 06:38

## 2018-02-13 RX ADMIN — MIRTAZAPINE 15 MILLIGRAM(S): 45 TABLET, ORALLY DISINTEGRATING ORAL at 21:10

## 2018-02-13 RX ADMIN — Medication 500 MILLIGRAM(S): at 06:38

## 2018-02-13 RX ADMIN — LACTULOSE 10 GRAM(S): 10 SOLUTION ORAL at 17:38

## 2018-02-13 RX ADMIN — Medication 325 MILLIGRAM(S): at 11:18

## 2018-02-13 RX ADMIN — Medication 1 TABLET(S): at 11:18

## 2018-02-13 NOTE — PROGRESS NOTE ADULT - PROBLEM SELECTOR PLAN 1
On cefepime,    CRP 4.70  - 3.60  - 3.80 WBC 6.98, precalcitonin < 0.05  -  Indium scan negative. appreciate ID note  - Removal of stents , Discussed with Ortho results of tap. " no signs of infection "  - will d/c antibiotics and observe

## 2018-02-13 NOTE — PROGRESS NOTE ADULT - ASSESSMENT
Subjective Complaints:  Historian:         REVIEW OF SYSTEMS:  Eyes:  Good vision, no reported pain  ENT:  No sore throat, pain, runny nose, dysphagia  CV:  No pain, palpitatioins, hypo/hypertension  Resp:  No dyspnea, cough, tachypnea, wheezing  GI:  No pain, nausea, vomiting, diarrhea, constipatiion  Muscle:  No pain, weakness  Neuro:  No weakness, tingling, memory problems  Psych:  No fatigue, insomnia, mood problems, depression  Endocrine:  No polyuria, polydypsia, cold/heat intolerance    Vital Signs Last 24 Hrs  T(C): 37.8 (13 Feb 2018 18:21), Max: 37.8 (13 Feb 2018 18:21)  T(F): 100 (13 Feb 2018 18:21), Max: 100 (13 Feb 2018 18:21)  HR: 121 (13 Feb 2018 18:21) (89 - 121)  BP: 120/72 (13 Feb 2018 18:21) (116/67 - 148/82)  BP(mean): --  RR: 16 (13 Feb 2018 18:21) (14 - 18)  SpO2: 97% (13 Feb 2018 18:21) (96% - 99%)    GENERAL PHYSICAL EXAM:  General:  Appears stated age, well-groomed, well-nourished, no distress  HEENT:  NC/AT, patent nares w/ pink mucosa, OP clear w/o lesions, PERRL, EOMI, conjunctivae clear, no thyromegaly, nodules, adenopathy, no JVD  Chest:  Full & symmetric excursion, no increased effort, breath sounds clear  Cardiovascular:  Regular rhythm, S1, S2, no murmur/rub/S3/S4, no carotid/femoral/abdominal bruit, radial/pedal pulses 2+, no edema  Abdomen:  Soft, non-tender, non-distended, normoactive bowel sounds, no HSM  Extremities:  Gait & station:   Digits:   Nails:   Joints, Bones, Muscles:   ROM:   Stability:  Skin:  No rash/erythema/ecchymoses/petechiae/wounds/abscess/warm/dry  Musculoskeletal:  Full ROM in all joints w/o swelling/tenderness/effusion    NEUROLOGICAL EXAM:  HENT:  Normocephalic head; atraumatic head.  Neck supple.  ENT: normal looking.  Mental State:    Alert.         awake open eyes s does notm follws commnads  arm leg contracted  no chorea uti no seziure  metabolic encephalaothy LABS:                        9.3    6.98  )-----------( 410      ( 13 Feb 2018 07:57 )             28.4     02-13    140  |  106  |  8   ----------------------------<  102<H>  3.5   |  27  |  0.31<L>    Ca    9.6      13 Feb 2018 07:57  ass= awake open eyes does not follws commnads arm leg contracted  metabolic encpehaloartthhy  uti  removal stent           RADIOLOGY & ADDITIONAL STUDIES:        Recommendations:  for sub acute rehab will follow

## 2018-02-13 NOTE — PROGRESS NOTE ADULT - SUBJECTIVE AND OBJECTIVE BOX
Patient seen and examined bedside resting comfortably.  No complaints offered.  No new events.   Obrien draining well.    T(F): 97 (02-13-18 @ 04:59), Max: 100.6 (02-12-18 @ 17:26)  HR: 104 (02-13-18 @ 04:59) (102 - 109)  BP: 135/78 (02-13-18 @ 04:59) (115/75 - 148/82)  RR: 15 (02-13-18 @ 04:59) (14 - 16)  SpO2: 97% (02-13-18 @ 04:59) (95% - 100%)      PHYSICAL EXAM:    General: NAD, alert and awake  HEENT: NCAT, EOMI, conjunctiva clear  Chest: nonlabored respirations, CTA b/l.  Abdomen: soft, NT/ND.   Extremities: Calf soft, nontender b/l. Contracted.  : No suprapubic tenderness or bladder distention.  Obrien draining clear yellow urine.    LABS:                        9.1    6.75  )-----------( 423      ( 11 Feb 2018 08:50 )             27.9   02-11    139  |  106  |  6<L>  ----------------------------<  90  3.8   |  24  |  0.33<L>    Ca    9.4      11 Feb 2018 08:50    TPro  8.5<H>  /  Alb  1.9<L>  /  TBili  0.2  /  DBili  x   /  AST  16  /  ALT  13  /  AlkPhos  68  02-11    I&O's Detail    11 Feb 2018 07:01  -  12 Feb 2018 07:00  --------------------------------------------------------  IN:    Oral Fluid: 225 mL  Total IN: 225 mL    OUT:    Indwelling Catheter - Urethral: 500 mL  Total OUT: 500 mL    Total NET: -275 mL      12 Feb 2018 07:01  -  13 Feb 2018 06:18  --------------------------------------------------------  IN:    dextrose 5%.: 756 mL    Oral Fluid: 465 mL  Total IN: 1221 mL    OUT:    Indwelling Catheter - Urethral: 1400 mL  Total OUT: 1400 mL    Total NET: -179 mL    Impression: 62 year old female with PMH [place]'s chorea, urinary retention due to neurogenic bladder, hemiparesis, decubitus ulcers admitted with sepsis 2/2 UTI and left hydronephrosis s/p Left Ureteroscopy, with laser lithotripsy and stent insertion on 1/19/18, postop course c/b persistent fevers,  anemia s/p 1uPRBC 2/3. Now s/p ureteral stent removal on 2/7, s/p IR drainage of R hip fluid collection, results show no signs of infection, has been stable off abx with fewer low grade temps.    Plan:  -continue obrien catheter for urinary retention with I+O monitoring  -continue medical/palliative management and supportive care  -discuss with Dr. Cleaning

## 2018-02-13 NOTE — PROGRESS NOTE ADULT - SUBJECTIVE AND OBJECTIVE BOX
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  Pt has been more lethargic.  no n/v (12 Jan 2018 14:07)      INTERVAL HPI/OVERNIGHT EVENTS:  No new c/o. T max 100.6    MEDICATIONS  (STANDING):  ascorbic acid 500 milliGRAM(s) Oral two times a day  dextrose 5%. 1000 milliLiter(s) (63 mL/Hr) IV Continuous <Continuous>  enoxaparin Injectable 40 milliGRAM(s) SubCutaneous daily  ferrous    sulfate 325 milliGRAM(s) Oral daily  hydrocortisone 2.5% Cream 1 Application(s) Topical two times a day  lactulose Syrup 10 Gram(s) Oral two times a day  mirtazapine 15 milliGRAM(s) Oral at bedtime  multivitamin/minerals 1 Tablet(s) Oral daily  potassium chloride   Powder 20 milliEquivalent(s) Oral daily  senna 2 Tablet(s) Oral at bedtime    MEDICATIONS  (PRN):  acetaminophen   Tablet 650 milliGRAM(s) Oral every 6 hours PRN For Temp greater than 38 C (100.4 F)  magnesium hydroxide Suspension 30 milliLiter(s) Oral daily PRN Constipation      FAMILY HISTORY:  No pertinent family history in first degree relatives      Allergies    No Known Allergies    Intolerances        PMH/PSH:  Decubital ulcer  Hemiparesis  Failure to thrive in adult  UTI (urinary tract infection)  Dehydration  Rhabdomyolysis  HTN (hypertension)  Neurogenic bladder disorder  Pressure ulcer of sacrum  UTI (urinary tract infection)  Embolism  DVT (deep venous thrombosis)  Anemia  Pneumonia  DM (diabetes mellitus)  High cholesterol  Ashli chorea  No pertinent past medical history  No significant past surgical history        REVIEW OF SYSTEMS:  CONSTITUTIONAL: No weight loss, or fatigue  EYES: No eye pain, visual disturbances, or discharge  ENMT:  No difficulty hearing, tinnitus, vertigo; No sinus or throat pain  NECK: No pain or stiffness  BREASTS: No pain, masses, or nipple discharge  RESPIRATORY: No cough, wheezing, chills or hemoptysis; No shortness of breath  CARDIOVASCULAR: No chest pain, palpitations, dizziness, or leg swelling  GASTROINTESTINAL: No abdominal or epigastric pain. No nausea, vomiting, or hematemesis; No diarrhea or constipation. No melena or hematochezia.  GENITOURINARY: No dysuria, frequency, hematuria, or incontinence  NEUROLOGICAL: No headaches,  numbness, or tremors  SKIN: No itching, burning, rashes, or lesions   LYMPH NODES: No enlarged glands  ENDOCRINE: No heat or cold intolerance; No hair loss  MUSCULOSKELETAL: No joint pain or swelling; No muscle, back, or extremity pain  PSYCHIATRIC: No depression, anxiety, mood swings, or difficulty sleeping  HEME/LYMPH: No easy bruising, or bleeding gums  ALLERGY AND IMMUNOLOGIC: No hives or eczema    Vital Signs Last 24 Hrs  T(C): 37.8 (13 Feb 2018 18:21), Max: 37.8 (13 Feb 2018 18:21)  T(F): 100 (13 Feb 2018 18:21), Max: 100 (13 Feb 2018 18:21)  HR: 121 (13 Feb 2018 18:21) (89 - 121)  BP: 120/72 (13 Feb 2018 18:21) (116/67 - 148/82)  BP(mean): --  RR: 16 (13 Feb 2018 18:21) (14 - 18)  SpO2: 97% (13 Feb 2018 18:21) (96% - 99%)    PHYSICAL EXAM:  GENERAL: NAD, well-groomed, well-developed  HEAD:  Atraumatic, Normocephalic  EYES: EOMI, PERRLA, conjunctiva and sclera clear  ENMT: No tonsillar erythema, exudates, or enlargement; Moist mucous membranes, Good dentition, No lesions  NECK: Supple, No JVD, Normal thyroid  NERVOUS SYSTEM:  Alert & Oriented x 1  CHEST/LUNG: Clear to percussion bilaterally; No rales, rhonchi, wheezing, or rubs  HEART: Regular rate and rhythm; No murmurs, rubs, or gallops  ABDOMEN: Soft, Nontender, Nondistended; Bowel sounds present  EXTREMITIES:  2+ Peripheral Pulses, No clubbing, cyanosis, or edema  LYMPH: No lymphadenopathy noted  SKIN: No rashes or lesions    LAB                          9.3    6.98  )-----------( 410      ( 13 Feb 2018 07:57 )             28.4       CBC:  02-13 @ 07:57  WBC 6.98   Hgb 9.3   Hct 28.4   Plts 410  MCV 76.8  02-11 @ 08:50  WBC 6.75   Hgb 9.1   Hct 27.9   Plts 423  MCV 76.6  02-09 @ 08:16  WBC 6.96   Hgb 9.3   Hct 28.2   Plts 410  MCV 76.6  02-08 @ 08:32  WBC 7.32   Hgb 8.5   Hct 26.1   Plts 399  MCV 76.3  02-07 @ 08:16  WBC 7.79   Hgb 9.2   Hct 27.5   Plts 418  MCV 76.2      Chemistry:  02-13 @ 07:57  Na+ 140  K+ 3.5  Cl- 106  CO2 27  BUN 8  Cr 0.31     02-11 @ 08:50  Na+ 139  K+ 3.8  Cl- 106  CO2 24  BUN 6  Cr 0.33     02-09 @ 08:16  Na+ 138  K+ 3.7  Cl- 106  CO2 26  BUN 7  Cr 0.34     02-08 @ 08:32  Na+ 138  K+ 3.5  Cl- 106  CO2 26  BUN 6  Cr 0.40     02-07 @ 08:16  Na+ 137  K+ 3.5  Cl- 106  CO2 25  BUN 7  Cr 0.41         Glucose, Serum: 102 mg/dL (02-13 @ 07:57)  Glucose, Serum: 90 mg/dL (02-11 @ 08:50)  Glucose, Serum: 99 mg/dL (02-09 @ 08:16)  Glucose, Serum: 91 mg/dL (02-08 @ 08:32)  Glucose, Serum: 103 mg/dL (02-07 @ 08:16)      13 Feb 2018 07:57    140    |  106    |  8      ----------------------------<  102    3.5     |  27     |  0.31   11 Feb 2018 08:50    139    |  106    |  6      ----------------------------<  90     3.8     |  24     |  0.33   09 Feb 2018 08:16    138    |  106    |  7      ----------------------------<  99     3.7     |  26     |  0.34   08 Feb 2018 08:32    138    |  106    |  6      ----------------------------<  91     3.5     |  26     |  0.40   07 Feb 2018 08:16    137    |  106    |  7      ----------------------------<  103    3.5     |  25     |  0.41     Ca    9.6        13 Feb 2018 07:57  Ca    9.4        11 Feb 2018 08:50  Ca    9.4        09 Feb 2018 08:16  Ca    9.3        08 Feb 2018 08:32  Ca    9.5        07 Feb 2018 08:16    TPro  8.5    /  Alb  1.9    /  TBili  0.2    /  DBili  x      /  AST  16     /  ALT  13     /  AlkPhos  68     11 Feb 2018 08:50  TPro  8.4    /  Alb  1.9    /  TBili  0.2    /  DBili  x      /  AST  23     /  ALT  19     /  AlkPhos  70     09 Feb 2018 08:16              CAPILLARY BLOOD GLUCOSE          C-Reactive Protein, Serum: 3.80 mg/dL (02-13 @ 09:58)  C-Reactive Protein, Serum: 3.60 mg/dL (02-09 @ 10:02)  C-Reactive Protein, Serum: 4.70 mg/dL (02-07 @ 08:12)      RADIOLOGY & ADDITIONAL TESTS:    Imaging Personally Reviewed:  [ ] YES  [ ] NO    Consultant(s) Notes Reviewed:  [ ] YES  [ ] NO    Care Discussed with Consultants/Other Providers [ ] YES  [ ] NO

## 2018-02-14 ENCOUNTER — TRANSCRIPTION ENCOUNTER (OUTPATIENT)
Age: 63
End: 2018-02-14

## 2018-02-14 VITALS
SYSTOLIC BLOOD PRESSURE: 120 MMHG | OXYGEN SATURATION: 99 % | RESPIRATION RATE: 18 BRPM | TEMPERATURE: 99 F | HEART RATE: 105 BPM | DIASTOLIC BLOOD PRESSURE: 60 MMHG

## 2018-02-14 LAB
ALBUMIN SERPL ELPH-MCNC: 1.9 G/DL — LOW (ref 3.3–5)
ALP SERPL-CCNC: 65 U/L — SIGNIFICANT CHANGE UP (ref 40–120)
ALT FLD-CCNC: 12 U/L — SIGNIFICANT CHANGE UP (ref 12–78)
ANION GAP SERPL CALC-SCNC: 5 MMOL/L — SIGNIFICANT CHANGE UP (ref 5–17)
AST SERPL-CCNC: 15 U/L — SIGNIFICANT CHANGE UP (ref 15–37)
BILIRUB SERPL-MCNC: 0.3 MG/DL — SIGNIFICANT CHANGE UP (ref 0.2–1.2)
BUN SERPL-MCNC: 7 MG/DL — SIGNIFICANT CHANGE UP (ref 7–23)
CALCIUM SERPL-MCNC: 9.3 MG/DL — SIGNIFICANT CHANGE UP (ref 8.5–10.1)
CHLORIDE SERPL-SCNC: 103 MMOL/L — SIGNIFICANT CHANGE UP (ref 96–108)
CO2 SERPL-SCNC: 28 MMOL/L — SIGNIFICANT CHANGE UP (ref 22–31)
CREAT SERPL-MCNC: 0.35 MG/DL — LOW (ref 0.5–1.3)
CULTURE RESULTS: SIGNIFICANT CHANGE UP
CULTURE RESULTS: SIGNIFICANT CHANGE UP
GLUCOSE SERPL-MCNC: 96 MG/DL — SIGNIFICANT CHANGE UP (ref 70–99)
GRAM STN FLD: SIGNIFICANT CHANGE UP
HCT VFR BLD CALC: 27.6 % — LOW (ref 34.5–45)
HGB BLD-MCNC: 9.1 G/DL — LOW (ref 11.5–15.5)
MCHC RBC-ENTMCNC: 25.3 PG — LOW (ref 27–34)
MCHC RBC-ENTMCNC: 33 GM/DL — SIGNIFICANT CHANGE UP (ref 32–36)
MCV RBC AUTO: 76.7 FL — LOW (ref 80–100)
NRBC # BLD: 0 /100 WBCS — SIGNIFICANT CHANGE UP (ref 0–0)
PLATELET # BLD AUTO: 408 K/UL — HIGH (ref 150–400)
POTASSIUM SERPL-MCNC: 4 MMOL/L — SIGNIFICANT CHANGE UP (ref 3.5–5.3)
POTASSIUM SERPL-SCNC: 4 MMOL/L — SIGNIFICANT CHANGE UP (ref 3.5–5.3)
PROT SERPL-MCNC: 8.3 GM/DL — SIGNIFICANT CHANGE UP (ref 6–8.3)
RBC # BLD: 3.6 M/UL — LOW (ref 3.8–5.2)
RBC # FLD: 18.5 % — HIGH (ref 10.3–14.5)
SODIUM SERPL-SCNC: 136 MMOL/L — SIGNIFICANT CHANGE UP (ref 135–145)
SPECIMEN SOURCE: SIGNIFICANT CHANGE UP
SPECIMEN SOURCE: SIGNIFICANT CHANGE UP
WBC # BLD: 7.74 K/UL — SIGNIFICANT CHANGE UP (ref 3.8–10.5)
WBC # FLD AUTO: 7.74 K/UL — SIGNIFICANT CHANGE UP (ref 3.8–10.5)

## 2018-02-14 RX ORDER — HYDROCORTISONE 1 %
1 OINTMENT (GRAM) TOPICAL
Qty: 0 | Refills: 0 | COMMUNITY
Start: 2018-02-14

## 2018-02-14 RX ORDER — POTASSIUM CHLORIDE 20 MEQ
1 PACKET (EA) ORAL
Qty: 0 | Refills: 0 | COMMUNITY
Start: 2018-02-14

## 2018-02-14 RX ADMIN — ENOXAPARIN SODIUM 40 MILLIGRAM(S): 100 INJECTION SUBCUTANEOUS at 11:32

## 2018-02-14 RX ADMIN — LACTULOSE 10 GRAM(S): 10 SOLUTION ORAL at 05:12

## 2018-02-14 RX ADMIN — Medication 20 MILLIEQUIVALENT(S): at 11:32

## 2018-02-14 RX ADMIN — Medication 500 MILLIGRAM(S): at 05:15

## 2018-02-14 RX ADMIN — Medication 325 MILLIGRAM(S): at 11:32

## 2018-02-14 RX ADMIN — SODIUM CHLORIDE 63 MILLILITER(S): 9 INJECTION, SOLUTION INTRAVENOUS at 05:11

## 2018-02-14 RX ADMIN — Medication 1 APPLICATION(S): at 05:14

## 2018-02-14 RX ADMIN — Medication 1 TABLET(S): at 11:32

## 2018-02-14 NOTE — PROGRESS NOTE ADULT - PROBLEM SELECTOR PROBLEM 2
Anemia, unspecified type
Rhabdomyolysis
Anemia, unspecified type
Rhabdomyolysis
Anemia, unspecified type
Rhabdomyolysis
Anemia, unspecified type
Rhabdomyolysis

## 2018-02-14 NOTE — DISCHARGE NOTE ADULT - MEDICATION SUMMARY - MEDICATIONS TO STOP TAKING
I will STOP taking the medications listed below when I get home from the hospital:    Cipro 500 mg oral tablet  -- 1 tab(s) by mouth every 12 hours

## 2018-02-14 NOTE — DISCHARGE NOTE ADULT - HOSPITAL COURSE
The patient had  a UTI. However, patient continued to have fevers despite eradicating infection. Indium scan/Insertion&removal ureteral stent/ID w/u was essentially negative. Patient treated with multiple antibiotics. Now T max 100 off antibiotics

## 2018-02-14 NOTE — PROGRESS NOTE ADULT - PROBLEM SELECTOR PLAN 2
supportive care

## 2018-02-14 NOTE — PROGRESS NOTE ADULT - PROBLEM SELECTOR PROBLEM 4
UTI (urinary tract infection)
[place] chorea
[place] chorea
[place] chorea
UTI (urinary tract infection)
[place] chorea
[place] chorea
[place] chorea
[place] chorea
[place] chorea
[place] chorea
[place] chorea
[place] chorea
[place] chorea
[place] chorea
[place] chorea
[place] chorea
[place] chorea
[place] chorea
[place] chorea
[place] chorea
[place] chorea
[place] chorea
[place] chorea
UTI (urinary tract infection)
[place] chorea
[place] chorea

## 2018-02-14 NOTE — PROGRESS NOTE ADULT - PROBLEM SELECTOR PROBLEM 1
Urinary tract infection without hematuria, site unspecified
Neurogenic bladder disorder
Urinary tract infection without hematuria, site unspecified
Neurogenic bladder disorder
Urinary tract infection without hematuria, site unspecified
Neurogenic bladder disorder
Urinary tract infection without hematuria, site unspecified
Neurogenic bladder disorder
Urinary tract infection without hematuria, site unspecified

## 2018-02-14 NOTE — DISCHARGE NOTE ADULT - CARE PROVIDER_API CALL
Virgilio Broderick (MD), Medicine  [address] [place] [address]  Phone: [phone]  Fax: [phone]

## 2018-02-14 NOTE — PROGRESS NOTE ADULT - PROBLEM SELECTOR PLAN 7
Off all antibiotics
NAD
On Merrem, appreciate Pulmonary consult ( Dr Pantoja ) , repeat c x ray negative
NAD
On Merrem, appreciate Pulmonary consult ( Dr Pantoja ) , repeat c x ray negative
On Merrem/vanco, appreciate Pulmonary consult ( Dr Pantoja ) , repeat c x ray negative
NAD
Off all antibiotics
On Merrem, appreciate Pulmonary consult ( Dr Pantoja ) , repeat c x ray negative
On Merrem, will get Pulmonary consult ( Dr Pantoja ) to see patient today
NAD
On Merrem, appreciate Pulmonary consult ( Dr Pantoja ) , repeat c x ray negative
On Merrem, appreciate Pulmonary consult ( Dr Pantoja ) , repeat c x ray negative
On Merrem, will get Pulmonary consult ( Dr Pantoja )

## 2018-02-14 NOTE — PROGRESS NOTE ADULT - PROBLEM SELECTOR PROBLEM 7
Pneumonia due to infectious organism, unspecified laterality, unspecified part of lung

## 2018-02-14 NOTE — PROGRESS NOTE ADULT - PROBLEM SELECTOR PLAN 1
On cefepime,    CRP 4.70  - 3.60  - 3.80 WBC 7.74, precalcitonin < 0.05  -  Indium scan negative. appreciate ID note  - Removal of stents , Discussed with Ortho results of tap. " no signs of infection "  - will d/c home

## 2018-02-14 NOTE — PROGRESS NOTE ADULT - PROBLEM SELECTOR PROBLEM 3
Pressure ulcer of sacrum
Hypertension, unspecified type
Pressure ulcer of sacrum
Hypertension, unspecified type
Pressure ulcer of sacrum
Hypertension, unspecified type
Pressure ulcer of sacrum
Hypertension, unspecified type

## 2018-02-14 NOTE — DISCHARGE NOTE ADULT - CARE PLAN
Principal Discharge DX:	Sepsis, due to unspecified organism  Goal:	afebrile, off antibiotics  Assessment and plan of treatment:	f/u temps  Secondary Diagnosis:	Decubitus ulcer of sacral region, unspecified ulcer stage  Secondary Diagnosis:	Renal stone  Secondary Diagnosis:	Non-traumatic rhabdomyolysis  Secondary Diagnosis:	Urinary tract infection with hematuria, site unspecified

## 2018-02-14 NOTE — DISCHARGE NOTE ADULT - MEDICATION SUMMARY - MEDICATIONS TO TAKE
I will START or STAY ON the medications listed below when I get home from the hospital:    Tylenol 325 mg oral tablet  -- 2 tab(s) by mouth every 6 hours, As Needed  -- Indication: For Pain    Milk of Magnesia 8% oral suspension  -- 30 square centimeter by mouth once a day, As Needed constipation  -- Indication: For PUD    mirtazapine 15 mg oral tablet  -- 1 tab(s) by mouth once a day (at bedtime)  -- Indication: For Antidepressant    simvastatin 20 mg oral tablet  -- 1 tab(s) by mouth once a day (at bedtime)  -- Indication: For HLD    hydrocortisone 2.5% topical cream  -- 1 application on skin 2 times a day  -- Indication: For Rash    ferrous sulfate 160 mg (50 mg elemental iron) oral tablet, extended release  -- 2 tab(s) by mouth once a day wtih meals  -- Indication: For Anemia    lactulose 10 g/15 mL oral syrup  -- 15 milliliter(s) by mouth 2 times a day  -- Indication: For constipation    Senna 8.6 mg oral tablet  -- 2 tab(s) by mouth once a day (at bedtime)  -- Indication: For constipation    potassium chloride 20 mEq oral powder for reconstitution  -- 1 packet(s) by mouth once a day  -- Indication: For Hypokalemia    Theragener-M oral tablet  -- 1 tab(s) by mouth once a day  -- Indication: For Nutrtion    Vitamin C 500 mg oral tablet  -- 1 tab(s) by mouth 2 times a day  -- Indication: For Nutrition

## 2018-02-14 NOTE — DISCHARGE NOTE ADULT - SECONDARY DIAGNOSIS.
Decubitus ulcer of sacral region, unspecified ulcer stage Renal stone Non-traumatic rhabdomyolysis Urinary tract infection with hematuria, site unspecified

## 2018-02-14 NOTE — PROGRESS NOTE ADULT - PROBLEM SELECTOR PROBLEM 6
Hemiparesis
Hypokalemia

## 2018-02-14 NOTE — PROGRESS NOTE ADULT - SUBJECTIVE AND OBJECTIVE BOX
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  Pt has been more lethargic.  no n/v (12 Jan 2018 14:07)      INTERVAL HPI/OVERNIGHT EVENTS:  No new c/o   Tmax 100     MEDICATIONS  (STANDING):  ascorbic acid 500 milliGRAM(s) Oral two times a day  dextrose 5%. 1000 milliLiter(s) (63 mL/Hr) IV Continuous <Continuous>  enoxaparin Injectable 40 milliGRAM(s) SubCutaneous daily  ferrous    sulfate 325 milliGRAM(s) Oral daily  hydrocortisone 2.5% Cream 1 Application(s) Topical two times a day  lactulose Syrup 10 Gram(s) Oral two times a day  mirtazapine 15 milliGRAM(s) Oral at bedtime  multivitamin/minerals 1 Tablet(s) Oral daily  potassium chloride   Powder 20 milliEquivalent(s) Oral daily  senna 2 Tablet(s) Oral at bedtime    MEDICATIONS  (PRN):  acetaminophen   Tablet 650 milliGRAM(s) Oral every 6 hours PRN For Temp greater than 38 C (100.4 F)  magnesium hydroxide Suspension 30 milliLiter(s) Oral daily PRN Constipation      FAMILY HISTORY:  No pertinent family history in first degree relatives      Allergies    No Known Allergies    Intolerances        PMH/PSH:  Decubital ulcer  Hemiparesis  Failure to thrive in adult  UTI (urinary tract infection)  Dehydration  Rhabdomyolysis  HTN (hypertension)  Neurogenic bladder disorder  Pressure ulcer of sacrum  UTI (urinary tract infection)  Embolism  DVT (deep venous thrombosis)  Anemia  Pneumonia  DM (diabetes mellitus)  High cholesterol  [place] chorea  No pertinent past medical history  No significant past surgical history        REVIEW OF SYSTEMS:  CONSTITUTIONAL: No weight loss, or fatigue  EYES: No eye pain, visual disturbances, or discharge  ENMT:  No difficulty hearing, tinnitus, vertigo; No sinus or throat pain  NECK: No pain or stiffness  BREASTS: No pain, masses, or nipple discharge  RESPIRATORY: No cough, wheezing, chills or hemoptysis; No shortness of breath  CARDIOVASCULAR: No chest pain, palpitations, dizziness, or leg swelling  GASTROINTESTINAL: No abdominal or epigastric pain. No nausea, vomiting, or hematemesis; No diarrhea or constipation. No melena or hematochezia.  GENITOURINARY: No dysuria, frequency, hematuria, or incontinence  NEUROLOGICAL: No headaches, memory loss, loss of strength, numbness, or tremors  SKIN: No itching, burning, rashes, or lesions   LYMPH NODES: No enlarged glands  ENDOCRINE: No heat or cold intolerance; No hair loss  MUSCULOSKELETAL: No joint pain or swelling; No muscle, back, or extremity pain  PSYCHIATRIC: No depression, anxiety, mood swings, or difficulty sleeping  HEME/LYMPH: No easy bruising, or bleeding gums  ALLERGY AND IMMUNOLOGIC: No hives or eczema    Vital Signs Last 24 Hrs  T(C): 37.4 (14 Feb 2018 05:09), Max: 37.8 (13 Feb 2018 18:21)  T(F): 99.4 (14 Feb 2018 05:09), Max: 100 (13 Feb 2018 18:21)  HR: 115 (14 Feb 2018 05:09) (89 - 121)  BP: 117/74 (14 Feb 2018 05:09) (116/67 - 126/76)  BP(mean): --  RR: 16 (14 Feb 2018 05:09) (16 - 18)  SpO2: 97% (14 Feb 2018 05:09) (96% - 99%)    PHYSICAL EXAM:  GENERAL: NAD, well-groomed, well-developed  HEAD:  Atraumatic, Normocephalic  EYES: EOMI, PERRLA, conjunctiva and sclera clear  NECK: Supple, No JVD, Normal thyroid  NERVOUS SYSTEM:  Alert & Oriented X3, Good concentration; Motor Strength 5/5 B/L upper and lower extremities;   CHEST/LUNG: Clear to percussion bilaterally; No rales, rhonchi, wheezing, or rubs  HEART: Regular rate and rhythm; No murmurs, rubs, or gallops  ABDOMEN: Soft, Nontender, Nondistended; Bowel sounds present  EXTREMITIES:  2+ Peripheral Pulses, No clubbing, cyanosis, or edema  LYMPH: No lymphadenopathy noted  SKIN: No rashes or lesions    LAB                          9.1    7.74  )-----------( 408      ( 14 Feb 2018 08:03 )             27.6       CBC:  02-14 @ 08:03  WBC 7.74   Hgb 9.1   Hct 27.6   Plts 408  MCV 76.7  02-13 @ 07:57  WBC 6.98   Hgb 9.3   Hct 28.4   Plts 410  MCV 76.8  02-11 @ 08:50  WBC 6.75   Hgb 9.1   Hct 27.9   Plts 423  MCV 76.6  02-09 @ 08:16  WBC 6.96   Hgb 9.3   Hct 28.2   Plts 410  MCV 76.6  02-08 @ 08:32  WBC 7.32   Hgb 8.5   Hct 26.1   Plts 399  MCV 76.3      Chemistry:  02-14 @ 08:03  Na+ 136  K+ 4.0  Cl- 103  CO2 28  BUN 7  Cr 0.35     02-13 @ 07:57  Na+ 140  K+ 3.5  Cl- 106  CO2 27  BUN 8  Cr 0.31     02-11 @ 08:50  Na+ 139  K+ 3.8  Cl- 106  CO2 24  BUN 6  Cr 0.33     02-09 @ 08:16  Na+ 138  K+ 3.7  Cl- 106  CO2 26  BUN 7  Cr 0.34     02-08 @ 08:32  Na+ 138  K+ 3.5  Cl- 106  CO2 26  BUN 6  Cr 0.40         Glucose, Serum: 96 mg/dL (02-14 @ 08:03)  Glucose, Serum: 102 mg/dL (02-13 @ 07:57)  Glucose, Serum: 90 mg/dL (02-11 @ 08:50)  Glucose, Serum: 99 mg/dL (02-09 @ 08:16)  Glucose, Serum: 91 mg/dL (02-08 @ 08:32)      14 Feb 2018 08:03    136    |  103    |  7      ----------------------------<  96     4.0     |  28     |  0.35   13 Feb 2018 07:57    140    |  106    |  8      ----------------------------<  102    3.5     |  27     |  0.31   11 Feb 2018 08:50    139    |  106    |  6      ----------------------------<  90     3.8     |  24     |  0.33   09 Feb 2018 08:16    138    |  106    |  7      ----------------------------<  99     3.7     |  26     |  0.34   08 Feb 2018 08:32    138    |  106    |  6      ----------------------------<  91     3.5     |  26     |  0.40     Ca    9.3        14 Feb 2018 08:03  Ca    9.6        13 Feb 2018 07:57  Ca    9.4        11 Feb 2018 08:50  Ca    9.4        09 Feb 2018 08:16  Ca    9.3        08 Feb 2018 08:32    TPro  8.3    /  Alb  1.9    /  TBili  0.3    /  DBili  x      /  AST  15     /  ALT  12     /  AlkPhos  65     14 Feb 2018 08:03  TPro  8.5    /  Alb  1.9    /  TBili  0.2    /  DBili  x      /  AST  16     /  ALT  13     /  AlkPhos  68     11 Feb 2018 08:50  TPro  8.4    /  Alb  1.9    /  TBili  0.2    /  DBili  x      /  AST  23     /  ALT  19     /  AlkPhos  70     09 Feb 2018 08:16              CAPILLARY BLOOD GLUCOSE          C-Reactive Protein, Serum: 3.80 mg/dL (02-13 @ 09:58)  C-Reactive Protein, Serum: 3.60 mg/dL (02-09 @ 10:02)      RADIOLOGY & ADDITIONAL TESTS:    Imaging Personally Reviewed:  [ ] YES  [ ] NO    Consultant(s) Notes Reviewed:  [ ] YES  [ ] NO    Care Discussed with Consultants/Other Providers [ ] YES  [ ] NO

## 2018-02-14 NOTE — DISCHARGE NOTE ADULT - PATIENT PORTAL LINK FT
You can access the [facility] Patient Portal, offered by [facility], by registering with the following website: [email or web address]

## 2018-02-14 NOTE — PROGRESS NOTE ADULT - PROBLEM SELECTOR PROBLEM 5
Dehydration
Kidney stones
Dehydration
Kidney stones
Dehydration
Kidney stones

## 2018-02-14 NOTE — PROGRESS NOTE ADULT - SUBJECTIVE AND OBJECTIVE BOX
Patient seen and examined bedside resting comfortably.  No complaints offered. Has been medically cleared for Discharge to rehab, awaiting placement.  Obrien draining well.     T(F): 99.4 (02-14-18 @ 05:09), Max: 100 (02-13-18 @ 18:21)  HR: 115 (02-14-18 @ 05:09) (89 - 121)  BP: 117/74 (02-14-18 @ 05:09) (116/67 - 126/76)  RR: 16 (02-14-18 @ 05:09) (16 - 18)  SpO2: 97% (02-14-18 @ 05:09) (96% - 99%)    PHYSICAL EXAM:    General: NAD, alert and awake  HEENT: NCAT, EOMI, conjunctiva clear  Chest: nonlabored respirations, CTA b/l.  Abdomen: soft, NT/ND.   Extremities: Calf soft, nontender b/l. Contracted.  : No suprapubic tenderness or bladder distention.  Obrien draining clear yellow urine.    LABS:                        9.1    7.74  )-----------( 408      ( 14 Feb 2018 08:03 )             27.6   02-14    136  |  103  |  7   ----------------------------<  96  4.0   |  28  |  0.35<L>    Ca    9.3      14 Feb 2018 08:03    TPro  8.3  /  Alb  1.9<L>  /  TBili  0.3  /  DBili  x   /  AST  15  /  ALT  12  /  AlkPhos  65  02-14    I&O's Detail    13 Feb 2018 07:01  -  14 Feb 2018 07:00  --------------------------------------------------------  IN:    dextrose 5%.: 1512 mL    Oral Fluid: 50 mL  Total IN: 1562 mL    OUT:    Indwelling Catheter - Urethral: 1050 mL  Total OUT: 1050 mL    Total NET: 512 mL    Impression: 62 year old female with PMH Ashli's chorea, urinary retention due to neurogenic bladder, hemiparesis, decubitus ulcers admitted with sepsis 2/2 UTI and left hydronephrosis s/p Left Ureteroscopy, with laser lithotripsy and stent insertion on 1/19/18, postop course c/b persistent fevers,  anemia s/p 1uPRBC 2/3. Now s/p ureteral stent removal on 2/7, s/p IR drainage of R hip fluid collection, results show no signs of infection, has been stable off abx, afebrile.    Plan:  -continue obrien catheter for urinary retention with I+O monitoring  -continue medical/palliative management and supportive care  -await rehab placement  -discuss with Dr. Cleaning

## 2018-02-14 NOTE — PROGRESS NOTE ADULT - ASSESSMENT
Pos rhino virus / recent ho of Sepsis from acute viral syndrome vs bacteremia vs pyelonephritis , left ureter stone, complicated with Gram neg bacteremia   Recurrent fever , ho of  Left Ureteroscopy, with laser lithotripsy and stent insertion, postop course c/b persistent fevers , S/p Stent removal on 2/7   Fairly sensitive e coli Bacteremia resistant to cipro   zosyn from 1/12 to 16 and then jenny from 1/16 to 28, completed dose for complicated UTI and bacteremia and surveillance blood culture neg so far   Nuclear Bone scan negative for infection and OM   She was stable off antibiotics and then developed recurring fever and repeated UA is still dirty and urine culture grew providentia    now on ceftazidime    Her Tmax is 101.4 which is rectal temp , otherwise pt is stable BP and HR, no leukocytosis , no symptoms, bedbound contracted lady with chronic ill appearing  we did extensive Fever work up and did not find impressive source, stent was removed   I would recommend not to measure rectal temp   pt spike 1006 on 3/12 and no more spike after that Tmax is only 100 yesterday and today.     Patient is clear to go from the ID point of view without any antibiotics  ID will sign off

## 2018-02-14 NOTE — PROGRESS NOTE ADULT - PROBLEM SELECTOR PLAN 6
nml
Will supplement

## 2018-02-14 NOTE — PROGRESS NOTE ADULT - SUBJECTIVE AND OBJECTIVE BOX
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  Pt has been more lethargic and barely communicable. Low grade fever still progressing.    Upon evaluation, pt denies any symptoms, she is waiting for breakfast, [place] clear urine draining.      Allergies    No Known Allergies    Intolerances        MEDICATIONS  (STANDING):  ascorbic acid 500 milliGRAM(s) Oral two times a day  dextrose 5%. 1000 milliLiter(s) (63 mL/Hr) IV Continuous <Continuous>  enoxaparin Injectable 40 milliGRAM(s) SubCutaneous daily  ferrous    sulfate 325 milliGRAM(s) Oral daily  hydrocortisone 2.5% Cream 1 Application(s) Topical two times a day  lactulose Syrup 10 Gram(s) Oral two times a day  mirtazapine 15 milliGRAM(s) Oral at bedtime  multivitamin/minerals 1 Tablet(s) Oral daily  potassium chloride   Powder 20 milliEquivalent(s) Oral daily  senna 2 Tablet(s) Oral at bedtime    MEDICATIONS  (PRN):  acetaminophen   Tablet 650 milliGRAM(s) Oral every 6 hours PRN For Temp greater than 38 C (100.4 F)  magnesium hydroxide Suspension 30 milliLiter(s) Oral daily PRN Constipation      REVIEW OF SYSTEMS:    CONSTITUTIONAL: No fever, chills, weight loss, or fatigue  HEENT: No sore throat, runny nose, ear ache  RESPIRATORY: No cough, wheezing, No shortness of breath  CARDIOVASCULAR: No chest pain, palpitations, dizziness  GASTROINTESTINAL: No abdominal pain. No nausea, vomiting, diarrhea  GENITOURINARY: No dysuria, increase frequency, hematuria, or incontinence  NEUROLOGICAL: No headaches, memory loss, loss of strength, numbness, or tremors, no weakness  EXTREMITY: No pedal edema BLE  SKIN: No itching, burning, rashes, or lesions     VITAL SIGNS:  T(C): 37.4 (02-14-18 @ 05:09), Max: 37.8 (02-13-18 @ 18:21)  T(F): 99.4 (02-14-18 @ 05:09), Max: 100 (02-13-18 @ 18:21)  HR: 115 (02-14-18 @ 05:09) (89 - 121)  BP: 117/74 (02-14-18 @ 05:09) (116/67 - 126/76)  RR: 16 (02-14-18 @ 05:09) (16 - 18)  SpO2: 97% (02-14-18 @ 05:09) (96% - 99%)  Wt(kg): --    PHYSICAL EXAM:    GENERAL: not in any distress, contracted   HEENT: Neck is supple, normocephalic, atraumatic   CHEST/LUNG: Clear to percussion bilaterally; No rales, rhonchi, wheezing  HEART: Regular rate and rhythm; No murmurs, rubs, or gallops  ABDOMEN: Soft, Nontender, Nondistended; Bowel sounds present, no rebound   EXTREMITIES:  2+ Peripheral Pulses, No clubbing, cyanosis, or edema  GENITOURINARY:   SKIN: No rashes or lesions  BACK: no pressor sore   NERVOUS SYSTEM:  Alert & Oriented X3, Good concentration  PSYCH: normal affect     LABS:                         9.1    7.74  )-----------( 408      ( 14 Feb 2018 08:03 )             27.6     02-14    136  |  103  |  7   ----------------------------<  96  4.0   |  28  |  0.35<L>    Ca    9.3      14 Feb 2018 08:03    TPro  8.3  /  Alb  1.9<L>  /  TBili  0.3  /  DBili  x   /  AST  15  /  ALT  12  /  AlkPhos  65  02-14    LIVER FUNCTIONS - ( 14 Feb 2018 08:03 )  Alb: 1.9 g/dL / Pro: 8.3 gm/dL / ALK PHOS: 65 U/L / ALT: 12 U/L / AST: 15 U/L / GGT: x                                   Culture Results:   No growth (02-09 @ 17:34)  Culture Results:   No growth to date. (02-09 @ 15:48)                Radiology:

## 2018-02-16 DIAGNOSIS — R13.10 DYSPHAGIA, UNSPECIFIED: ICD-10-CM

## 2018-02-16 DIAGNOSIS — A41.9 SEPSIS, UNSPECIFIED ORGANISM: ICD-10-CM

## 2018-02-16 DIAGNOSIS — Z16.29 RESISTANCE TO OTHER SINGLE SPECIFIED ANTIBIOTIC: ICD-10-CM

## 2018-02-16 DIAGNOSIS — E43 UNSPECIFIED SEVERE PROTEIN-CALORIE MALNUTRITION: ICD-10-CM

## 2018-02-16 DIAGNOSIS — E87.6 HYPOKALEMIA: ICD-10-CM

## 2018-02-16 DIAGNOSIS — N31.9 NEUROMUSCULAR DYSFUNCTION OF BLADDER, UNSPECIFIED: ICD-10-CM

## 2018-02-16 DIAGNOSIS — E11.9 TYPE 2 DIABETES MELLITUS WITHOUT COMPLICATIONS: ICD-10-CM

## 2018-02-16 DIAGNOSIS — M62.82 RHABDOMYOLYSIS: ICD-10-CM

## 2018-02-16 DIAGNOSIS — R33.8 OTHER RETENTION OF URINE: ICD-10-CM

## 2018-02-16 DIAGNOSIS — A41.51 SEPSIS DUE TO ESCHERICHIA COLI [E. COLI]: ICD-10-CM

## 2018-02-16 DIAGNOSIS — E86.0 DEHYDRATION: ICD-10-CM

## 2018-02-16 DIAGNOSIS — N28.9 DISORDER OF KIDNEY AND URETER, UNSPECIFIED: ICD-10-CM

## 2018-02-16 DIAGNOSIS — Z86.718 PERSONAL HISTORY OF OTHER VENOUS THROMBOSIS AND EMBOLISM: ICD-10-CM

## 2018-02-16 DIAGNOSIS — G81.91 HEMIPLEGIA, UNSPECIFIED AFFECTING RIGHT DOMINANT SIDE: ICD-10-CM

## 2018-02-16 DIAGNOSIS — N30.80 OTHER CYSTITIS WITHOUT HEMATURIA: ICD-10-CM

## 2018-02-16 DIAGNOSIS — G93.41 METABOLIC ENCEPHALOPATHY: ICD-10-CM

## 2018-02-16 DIAGNOSIS — D64.9 ANEMIA, UNSPECIFIED: ICD-10-CM

## 2018-02-16 DIAGNOSIS — B97.89 OTHER VIRAL AGENTS AS THE CAUSE OF DISEASES CLASSIFIED ELSEWHERE: ICD-10-CM

## 2018-02-16 DIAGNOSIS — L89.154 PRESSURE ULCER OF SACRAL REGION, STAGE 4: ICD-10-CM

## 2018-02-16 DIAGNOSIS — N13.6 PYONEPHROSIS: ICD-10-CM

## 2018-02-16 DIAGNOSIS — G10 HUNTINGTON'S DISEASE: ICD-10-CM

## 2018-02-16 DIAGNOSIS — J18.9 PNEUMONIA, UNSPECIFIED ORGANISM: ICD-10-CM

## 2018-05-21 NOTE — DISCHARGE NOTE ADULT - NSTOBACCOUSAGEY/N_GEN_A_CS
89 y/o female with HTN, HLD, NIDDM, Anemia, hypothyroidism,  afib on eliquis, systolic CHF (25%), known CAD with previous PCIs, b/l carotid endarterectomies, asthma and emphysema who presented to the ED c/o cough admitted for asthma exacerbation 2/2 rhinovirus.
No

## 2018-10-11 NOTE — PHYSICAL THERAPY INITIAL EVALUATION ADULT - LEVEL OF INDEPENDENCE: SIT/STAND, REHAB EVAL
PT thought to be in septic shock, but no source found and negative procal  Abx stopped  Likely hypovolemic and related to aspiration PNA  Resolved with IVF maximum assist (25% patients effort)

## 2019-01-07 NOTE — PHYSICAL THERAPY INITIAL EVALUATION ADULT - GAIT PATTERN USED, PT EVAL
Restorative Specialist Mobility Note       Pt refused ambulation at this time  Pt stated "I am tired as hell " Will continue to follow up  RN maya Flynn Restorative Technician, BS  3-point gait

## 2019-08-16 NOTE — PROGRESS NOTE ADULT - PROBLEM/PLAN-4
DISPLAY PLAN FREE TEXT
none

## 2019-09-27 NOTE — PRE-OP CHECKLIST - PATIENT PROBLEMS/NEEDS
Patient:   SHAZIA NUÑEZ            MRN: [id number]            FIN: [id number]              Age:   61 years     Sex:  MALE     :  54   Associated Diagnoses:   None   Author:   DIAB-HOLLIS HAGER     Chief Complaint   Chief complaints: Status post fall, chest pain, weakness, drowsiness.  Reason for consultation: Management of end-stage renal disease related complications.     History of Present Illness   Pt seen and examined  -170s  Had HD session yesterday with 3 L UF  Toletrated well          Health Status   Allergies: Allergies (ST)   Allergies (1) Active Reaction  Ceftin anaphylaxis    Current medications:    Medications (24) Active  Scheduled: (15)  AmLODIPine 5 mg tab  5 mg 1 tab, Oral, Daily  Aspirin 81 mg DR tab  81 mg 1 tab, Oral, Daily  Atorvastatin 40 mg tab  40 mg 1 tab, Oral, Daily  Cyanocobalamin 500 mcg tab  1,000 mcg 2 tab, Oral, Daily  gentamicin  Rx to Dose, IVPB, Per Pharmacist  Heparin 5,000 unit/1 mL inj  5,000 unit 1 mL, Subcutaneous, Q8H  Insulin human lispro 10 unit/0.1 mL inj  1-6 units, Subcutaneous, QID [with meals & HS]  Lisinopril 10 mg tab  10 mg 1 tab, Oral, Daily  Melatonin 3 mg tab  3 mg 1 tab, Oral, Q Bedtime  Pantoprazole 40 mg DR tab  40 mg 1 tab, Oral, BID  Primidone 50 mg tab  100 mg 2 tab, Oral, BID  Sertraline 100 mg tab  100 mg 1 tab, Oral, Q Bedtime  Sevelamer carbonate 800 mg tab  800 mg 1 tab, Oral, TID [with meals]  Umeclidinium 62.5 mcg inhalation powder 7s  1 puff, Inhaled, Daily  Venlafaxine 37.5 mg XR cap  37.5 mg 1 cap, Oral, Q Evening  Continuous: (0)  PRN: (9)  Acetaminophen 325 mg tab  650 mg 2 tab, Oral, Q4H  Albuterol HFA 90 mcg oral MDI 8 gm  180 mcg 2 puff, MDI/DPI, Q6H  Calcium carbonate 500 mg chew tab [Ca 200 mg]  500 mg 1 tab, Chewed, BID  Dextrose (glucose) 40% 15 gm/37.5 gm oral gel UD  15 gm, Oral, As Directed PRN  Dextrose (glucose) 50% 25 gm/50 mL syringe  12.5 gm 25 mL, IV Push, As Directed PRN  DiphenhydrAMINE 25 mg cap  50 mg 2  cap, Oral, Daily  Glucagon 1 mg/1 mL emergency kit SDV  1 mg 1 mL, IM, As Directed PRN  Hydrocodone-acetaminophen  mg tab  1 tab, Oral, Q4H  Ondansetron 4 mg/2 mL inj SDV  4 mg 2 mL, Slow IV Push, Q6H      Physical Examination   VS/Measurements     Vitals between:   12-JUN-2019 13:12:40   TO   13-JUN-2019 13:12:40                   LAST RESULT MINIMUM MAXIMUM  Temperature 36.3 36.3 36.8  Heart Rate 74 74 82  Respiratory Rate 18 18 20  NISBP           150 135 159  NIDBP           84 78 95  NIMBP           106 104 110  SpO2                    97 95 100    General:  Mild distress, Lethargic, weak, sleepy.    Eye:  Pupils are equal, round and reactive to light, Extraocular movements are intact.   HENT:  Normocephalic, Normal hearing, Oral mucosa is moist.    Neck:  Supple, Non-tender, No jugular venous distention.    Respiratory:  Breath sounds are equal, Symmetrical chest wall expansion,  decreased breath sounds at the bases .   Cardiovascular:  Normal rate, Regular rhythm, No murmur.    Gastrointestinal:  Soft, Non-tender, Non-distended.    Genitourinary:  No costovertebral angle tenderness, No inguinal tenderness.   Lymphatics:  No lymphadenopathy neck, axilla, groin.    Musculoskeletal:  Normal range of motion.    Integumentary:  Warm, Dry.    Neurologic:  Alert, Not oriented.    Psychiatric:  Not cooperative, Not appropriate mood & affect.      Review / Management   Laboratory results:     Labs between:  12-JUN-2019 13:12 to 13-JUN-2019 13:12  CBC:                 WBC  HgB  Hct  Plt  MCV  RDW   13-JUN-2019 9.0  13.4  41.8  149  93.3  (H) 20.0   BMP:                 Na  Cl  BUN  Glu   13-JUN-2019 (L) 131  (L) 96  (H) 26  (H) 105                              K  CO2  Cr  Ca                              4.3  24  (H) 3.71  8.9   CMP:                 AST  ALT  AlkPhos  Bili  Albumin   13-JUN-2019 (H) 51  (H) 111  (H) 161  (H) 1.1  (L) 2.8   Other Chem:             Mg  Phos  Triglycerides  GGTP  DirectBili                            2.0  (H) 4.8         POC GLU:                 Latest Result  Latest Date  Minimum  Min Date  Maximum  Max Date                             (H) 125  13-JUN-2019 (H) 125  13-JUN-2019 (H) 153  12-JUN-2019                 .    Lines and Tubes:    LINES  Peripheral Intravenous Forearm Right   Gauge: 20   Charted: 06/13/19 08:00  Inserted: 06/07/19   Days Since Insertion: 6 days  Indication of Use: Saline Lock   .                     Impression and Plan   ESRD, on HD MWF schedule  - ESRD most likely multifactorial, history of DM, HTN  - Patient's dialysis access is LUE AVF  - HD access appears to be functioning ok  - Plan for HD with UF tomorrow  Acute on Chronic systolic HF  Signs of fluid overload with prominant JVP, and CXR  HD with 3 L UF with ecah HD session  TTE now with LVEF 20-25% with Grade IIII DD  UF as tolerated  CVS  - Hisotory of hypertesion  - BP is UP  - UF as tolerated  Anemia  - Hemoglobin noted to be ~ 13 g/dl  - Continue to monitor H&H  Bone and Mineral Metabolism  -Monitoring calcium, phosphorus, magnesium level.  Other medical Problem  T2DM  Sepsis,   UTI, Discussed with ID, pt may need Gentamicin with dialysis, script will be faxed to HD unit, and PharmD will dose  Diabetes mellitus  Thank you for the consult, will continue to follow  Please, Page me on [employer]  for any question  David Bruner MD  Nephrology  [facility] /[facility]isy / [place]/ Dane   Patient expressed no known problems or needs

## 2020-05-13 NOTE — DIETITIAN INITIAL EVALUATION ADULT. - +GENDER
Rest, ice, ibuprofen, splint for comfort.  Return to the er if new or worsening symptoms.    Statement Selected

## 2020-06-11 NOTE — PROGRESS NOTE ADULT - SUBJECTIVE AND OBJECTIVE BOX
This RN was in the pts room while the IG medication was started till now. 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  Pt has been more lethargic.  no n/v (12 Jan 2018 14:07)      INTERVAL HPI/OVERNIGHT EVENTS:  No new c/o. Tmax 100    MEDICATIONS  (STANDING):  ascorbic acid 500 milliGRAM(s) Oral two times a day  cefepime  IVPB 1000 milliGRAM(s) IV Intermittent every 8 hours  dextrose 5%. 1000 milliLiter(s) (63 mL/Hr) IV Continuous <Continuous>  ferrous    sulfate 325 milliGRAM(s) Oral daily  hydrocortisone 2.5% Cream 1 Application(s) Topical two times a day  lactulose Syrup 10 Gram(s) Oral two times a day  mirtazapine 15 milliGRAM(s) Oral at bedtime  multivitamin/minerals 1 Tablet(s) Oral daily  potassium chloride   Powder 20 milliEquivalent(s) Oral daily  senna 2 Tablet(s) Oral at bedtime    MEDICATIONS  (PRN):  acetaminophen   Tablet 650 milliGRAM(s) Oral every 6 hours PRN For Temp greater than 38 C (100.4 F)  magnesium hydroxide Suspension 30 milliLiter(s) Oral daily PRN Constipation      FAMILY HISTORY:  No pertinent family history in first degree relatives      Allergies    No Known Allergies    Intolerances        PMH/PSH:  Decubital ulcer  Hemiparesis  Failure to thrive in adult  UTI (urinary tract infection)  Dehydration  Rhabdomyolysis  HTN (hypertension)  Neurogenic bladder disorder  Pressure ulcer of sacrum  UTI (urinary tract infection)  Embolism  DVT (deep venous thrombosis)  Anemia  Pneumonia  DM (diabetes mellitus)  High cholesterol  Ashli chorea  No pertinent past medical history  No significant past surgical history        REVIEW OF SYSTEMS:  CONSTITUTIONAL: No weight loss, or fatigue  EYES: No eye pain, visual disturbances, or discharge  ENMT:  No difficulty hearing, tinnitus, vertigo; No sinus or throat pain  NECK: No pain or stiffness  BREASTS: No pain, masses, or nipple discharge  RESPIRATORY: No cough, wheezing, chills or hemoptysis; No shortness of breath  CARDIOVASCULAR: No chest pain, palpitations, dizziness, or leg swelling  GASTROINTESTINAL: No abdominal or epigastric pain. No nausea, vomiting, or hematemesis; No diarrhea or constipation. No melena or hematochezia.  GENITOURINARY: No dysuria, frequency, hematuria, or incontinence  NEUROLOGICAL: No headaches, memory loss, loss of strength, numbness, or tremors  SKIN: No itching, burning, rashes, or lesions   LYMPH NODES: No enlarged glands  ENDOCRINE: No heat or cold intolerance; No hair loss  MUSCULOSKELETAL: No joint pain or swelling; No muscle, back, or extremity pain  PSYCHIATRIC: No depression, anxiety, mood swings, or difficulty sleeping  HEME/LYMPH: No easy bruising, or bleeding gums  ALLERGY AND IMMUNOLOGIC: No hives or eczema    Vital Signs Last 24 Hrs  T(C): 37.4 (12 Feb 2018 11:10), Max: 37.8 (11 Feb 2018 22:56)  T(F): 99.4 (12 Feb 2018 11:10), Max: 100 (11 Feb 2018 22:56)  HR: 108 (12 Feb 2018 11:10) (108 - 116)  BP: 115/75 (12 Feb 2018 11:10) (115/75 - 180/80)  BP(mean): --  RR: 16 (12 Feb 2018 11:10) (14 - 16)  SpO2: 100% (12 Feb 2018 11:10) (95% - 100%)    PHYSICAL EXAM:  GENERAL: NAD, well-groomed, well-developed  HEAD:  Atraumatic, Normocephalic  EYES: EOMI, PERRLA, conjunctiva and sclera clear  NECK: Supple, No JVD, Normal thyroid  NERVOUS SYSTEM:  Alert  CHEST/LUNG: Clear to percussion bilaterally; No rales, rhonchi, wheezing, or rubs  HEART: Regular rate and rhythm; No murmurs, rubs, or gallops  ABDOMEN: Soft, Nontender, Nondistended; Bowel sounds present  EXTREMITIES:  2+ Peripheral Pulses, No clubbing, cyanosis, or edema  LYMPH: No lymphadenopathy noted  SKIN: No rashes or lesions    LAB                          9.1    6.75  )-----------( 423      ( 11 Feb 2018 08:50 )             27.9       CBC:  02-11 @ 08:50  WBC 6.75   Hgb 9.1   Hct 27.9   Plts 423  MCV 76.6  02-09 @ 08:16  WBC 6.96   Hgb 9.3   Hct 28.2   Plts 410  MCV 76.6  02-08 @ 08:32  WBC 7.32   Hgb 8.5   Hct 26.1   Plts 399  MCV 76.3  02-07 @ 08:16  WBC 7.79   Hgb 9.2   Hct 27.5   Plts 418  MCV 76.2  02-06 @ 07:15  WBC 7.26   Hgb 8.6   Hct 25.9   Plts 392  MCV 75.7      Chemistry:  02-11 @ 08:50  Na+ 139  K+ 3.8  Cl- 106  CO2 24  BUN 6  Cr 0.33     02-09 @ 08:16  Na+ 138  K+ 3.7  Cl- 106  CO2 26  BUN 7  Cr 0.34     02-08 @ 08:32  Na+ 138  K+ 3.5  Cl- 106  CO2 26  BUN 6  Cr 0.40     02-07 @ 08:16  Na+ 137  K+ 3.5  Cl- 106  CO2 25  BUN 7  Cr 0.41     02-06 @ 07:15  Na+ 138  K+ 3.5  Cl- 105  CO2 26  BUN 8  Cr 0.36         Glucose, Serum: 90 mg/dL (02-11 @ 08:50)  Glucose, Serum: 99 mg/dL (02-09 @ 08:16)  Glucose, Serum: 91 mg/dL (02-08 @ 08:32)  Glucose, Serum: 103 mg/dL (02-07 @ 08:16)  Glucose, Serum: 91 mg/dL (02-06 @ 07:15)      11 Feb 2018 08:50    139    |  106    |  6      ----------------------------<  90     3.8     |  24     |  0.33   09 Feb 2018 08:16    138    |  106    |  7      ----------------------------<  99     3.7     |  26     |  0.34   08 Feb 2018 08:32    138    |  106    |  6      ----------------------------<  91     3.5     |  26     |  0.40   07 Feb 2018 08:16    137    |  106    |  7      ----------------------------<  103    3.5     |  25     |  0.41   06 Feb 2018 07:15    138    |  105    |  8      ----------------------------<  91     3.5     |  26     |  0.36     Ca    9.4        11 Feb 2018 08:50  Ca    9.4        09 Feb 2018 08:16  Ca    9.3        08 Feb 2018 08:32  Ca    9.5        07 Feb 2018 08:16  Ca    9.4        06 Feb 2018 07:15    TPro  8.5    /  Alb  1.9    /  TBili  0.2    /  DBili  x      /  AST  16     /  ALT  13     /  AlkPhos  68     11 Feb 2018 08:50  TPro  8.4    /  Alb  1.9    /  TBili  0.2    /  DBili  x      /  AST  23     /  ALT  19     /  AlkPhos  70     09 Feb 2018 08:16              CAPILLARY BLOOD GLUCOSE          C-Reactive Protein, Serum: 3.60 mg/dL (02-09 @ 10:02)  C-Reactive Protein, Serum: 4.70 mg/dL (02-07 @ 08:12)      RADIOLOGY & ADDITIONAL TESTS:    Imaging Personally Reviewed:  [ ] YES  [ ] NO    Consultant(s) Notes Reviewed:  [ ] YES  [ ] NO    Care Discussed with Consultants/Other Providers [ ] YES  [ ] NO

## 2021-01-31 NOTE — CONSULT NOTE ADULT - PROVIDER SPECIALTY LIST ADULT
[facility] [place] to call system/Instruct patient to call for assistance/Non-slip footwear when patient is off stretcher/Physically safe environment: no spills, clutter or unnecessary equipment/Stretcher in lowest position, wheels locked, appropriate side rails in place/Provide visual cue, wrist band, yellow gown, etc./Monitor gait and stability/Monitor for mental status changes and reorient to person, place, and time/Review medications for side effects contributing to fall risk/Reinforce activity limits and safety measures with patient and family/Provide visual clues: red socks

## 2021-05-10 NOTE — PROGRESS NOTE ADULT - ASSESSMENT
Addended by: FILI DEY on: 5/10/2021 09:48 AM     Modules accepted: Orders     RADIOLOGY & ADDITIONAL TESTS:  A/P  fever - cooling blanket  sepsis - + blood cx to e coli - suseptable to zosyn  rsv + supportive measures  10-49 k ecoli in urine - but this likley taken after pt on abx - continue zosyn - apprecitae id help  respiratory status - improved  dehydration - iv fluid d5 w, recheck bmp  renal insufficiency - improved on ivf  cv - felt stable by cardiology - echocardiogram pending  neuro - reported h/o of Ashli's chorea - neurology does not recommed retesting - felt stable by neurology - appreciate neurolgy help  prognsis - guarded  d/w with pt and daughter     Virgilio Broderick MD

## 2021-07-24 NOTE — PATIENT PROFILE ADULT. - TEACHING/LEARNING CULTURAL CONSIDERATIONS
none

## 2021-08-06 NOTE — ED ADULT TRIAGE NOTE - HEART RATE (BEATS/MIN)
Called and left message with call back number with patient to discuss and  update obstetric related and cancer surveillance history. This information will be used for medical decision making and planning for the upcoming surgical consultation in the [facility]) breast clinic on August 9, 2021 with Dr. Bernice Emerson.
134

## 2021-08-20 NOTE — ED ADULT TRIAGE NOTE - BMI (KG/M2)
SUBJECTIVE:  Chief Complaint   Patient presents with     Wrist Pain     R wrist pain X 2 weeks. Pt states he woke up in the middle of the night with the pain      Jerome Flanagan is a 43 year old male who presents with a chief complaint of right wrist pain.  Symptoms began 2 week(s) ago, are mild Injury:No.   States that woke up with wrist pain but does not remember any injury.  No hx of gout but patient is an alcoholic   Pain exacerbated by twisting and flexion/extension Relieved by rest.  He treated it initially with no therapy. No hx of wrist issues .     PMH HTN, alcoholism,  Reflux, anxiety/depression, hyperlipemia, neuropathy     Current Outpatient Medications   Medication Sig Dispense Refill     amLODIPine (NORVASC) 10 MG tablet Take 10 mg by mouth       citalopram (CELEXA) 20 MG tablet TAKE ONE TABLET BY MOUTH EVERY DAY       gabapentin (NEURONTIN) 300 MG capsule TAKE 2 CAPSULES BY MOUTH THREE TIMES DAILY       lisinopril (ZESTRIL) 20 MG tablet Take 20 mg by mouth       omeprazole (PRILOSEC) 20 MG DR capsule Take 20 mg by mouth       HYDROcodone-acetaminophen (NORCO) 5-325 MG tablet Take 1 tablet by mouth (Patient not taking: Reported on 8/20/2021)       naltrexone (DEPADE/REVIA) 50 MG tablet TAKE ONE TABLET BY MOUTH EVERY DAY (Patient not taking: Reported on 8/20/2021)       ranitidine (ZANTAC) 150 MG capsule Take 150 mg by mouth (Patient not taking: Reported on 8/20/2021)       Social History     Tobacco Use     Smoking status: Never Smoker     Smokeless tobacco: Never Used   Substance Use Topics     Alcohol use: Yes       ROS:  Review of systems negative except as stated below    EXAM:   BP (!) 145/90   Pulse 84   Resp 16   Wt 81.6 kg (180 lb)   SpO2 98%   BMI 29.05 kg/m    M/S Exam:wrist no swelling bruising, deformity or redness noted.  FROM and able to supinate.  No focal pain but generalized tenderness  Normal  strength and tendon function intact  Hand non tender  GENERAL APPEARANCE:  healthy, alert and no distress  EXTREMITIES: peripheral pulses normal  MS: no gross deformities noted, no evidence of inflammation in joints, FROM in all extremities.  SKIN: no suspicious lesions or rashes  NEURO: Normal strength and tone, sensory exam grossly normal, mentation intact and speech normal    X-RAY was done. Nom fracture noted per my read     assessment/plan:  (M25.531) Right wrist pain  (primary encounter diagnosis)  Comment:   Plan: XR Wrist Right G/E 3 Views, predniSONE         (DELTASONE) 20 MG tablet, diclofenac (CATAFLAM)        50 MG tablet          No clear fracture or injury noted but does admit that an alcoholic and could have been injury. Medication as directed and side affects discussed.  Follow-up with PCP as needed  Offered brace and declines         18.3

## 2022-04-21 NOTE — PROGRESS NOTE ADULT - SUBJECTIVE AND OBJECTIVE BOX
Can we burn a disc with the cath film from this week as well as the cath film from 1 year ago.  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  Pt has been more lethargic.  no n/v (12 Jan 2018 14:07)      INTERVAL HPI/OVERNIGHT EVENTS:  No new complaints. Fever (+) 101.4. MS at baseline    MEDICATIONS  (STANDING):  ascorbic acid 500 milliGRAM(s) Oral two times a day  cefepime  IVPB 1000 milliGRAM(s) IV Intermittent every 8 hours  dextrose 5%. 1000 milliLiter(s) (63 mL/Hr) IV Continuous <Continuous>  enoxaparin Injectable 40 milliGRAM(s) SubCutaneous daily  ferrous    sulfate 325 milliGRAM(s) Oral daily  hydrocortisone 2.5% Cream 1 Application(s) Topical two times a day  lactulose Syrup 10 Gram(s) Oral two times a day  mirtazapine 15 milliGRAM(s) Oral at bedtime  multivitamin/minerals 1 Tablet(s) Oral daily  potassium chloride   Powder 20 milliEquivalent(s) Oral daily  senna 2 Tablet(s) Oral at bedtime    MEDICATIONS  (PRN):  acetaminophen   Tablet 650 milliGRAM(s) Oral every 6 hours PRN For Temp greater than 38 C (100.4 F)  magnesium hydroxide Suspension 30 milliLiter(s) Oral daily PRN Constipation      FAMILY HISTORY:  No pertinent family history in first degree relatives      Allergies    No Known Allergies    Intolerances        PMH/PSH:  Decubital ulcer  Hemiparesis  Failure to thrive in adult  UTI (urinary tract infection)  Dehydration  Rhabdomyolysis  HTN (hypertension)  Neurogenic bladder disorder  Pressure ulcer of sacrum  UTI (urinary tract infection)  Embolism  DVT (deep venous thrombosis)  Anemia  Pneumonia  DM (diabetes mellitus)  High cholesterol  [place] chorea  No pertinent past medical history  No significant past surgical history        REVIEW OF SYSTEMS:  CONSTITUTIONAL: No  weight loss,   EYES: No eye pain, visual disturbances, or discharge  ENMT:  No difficulty hearing, tinnitus, vertigo; No sinus or throat pain  NECK: No pain or stiffness  BREASTS: No pain, masses, or nipple discharge  RESPIRATORY: No cough, wheezing, chills or hemoptysis; No shortness of breath  CARDIOVASCULAR: No chest pain, palpitations, dizziness, or leg swelling  GASTROINTESTINAL: No abdominal or epigastric pain. No nausea, vomiting, or hematemesis; No diarrhea or constipation. No melena or hematochezia.  GENITOURINARY: No dysuria, frequency, hematuria, or incontinence  NEUROLOGICAL: No headaches, memory loss, loss of strength, numbness, or tremors  SKIN: No itching, burning, rashes, or lesions   LYMPH NODES: No enlarged glands  ENDOCRINE: No heat or cold intolerance; No hair loss  MUSCULOSKELETAL: No joint pain or swelling; No muscle, back, or extremity pain  PSYCHIATRIC: No depression, anxiety, mood swings, or difficulty sleeping  HEME/LYMPH: No easy bruising, or bleeding gums  ALLERGY AND IMMUNOLOGIC: No hives or eczema    Vital Signs Last 24 Hrs  T(C): 38.6 (08 Feb 2018 17:23), Max: 38.6 (08 Feb 2018 17:23)  T(F): 101.4 (08 Feb 2018 17:23), Max: 101.4 (08 Feb 2018 17:23)  HR: 108 (08 Feb 2018 17:23) (95 - 108)  BP: 107/69 (08 Feb 2018 17:23) (105/65 - 149/83)  BP(mean): --  RR: 16 (08 Feb 2018 17:23) (16 - 18)  SpO2: 99% (08 Feb 2018 17:23) (97% - 99%)    PHYSICAL EXAM:  GENERAL: NAD, well-groomed, well-developed  HEAD:  Atraumatic, Normocephalic  EYES: EOMI, PERRLA, conjunctiva and sclera clear  NECK: Supple, No JVD, Normal thyroid  NERVOUS SYSTEM:  Alert & Oriented   CHEST/LUNG: Clear to percussion bilaterally; No rales, rhonchi, wheezing, or rubs  HEART: Regular rate and rhythm; No murmurs, rubs, or gallops  ABDOMEN: Soft, Nontender, Nondistended; Bowel sounds present  EXTREMITIES:  2+ Peripheral Pulses, No clubbing, cyanosis, or edema  LYMPH: No lymphadenopathy noted  SKIN: No rashes or lesions    LAB                          8.5    7.32  )-----------( 399      ( 08 Feb 2018 08:32 )             26.1       CBC:  02-08 @ 08:32  WBC 7.32   Hgb 8.5   Hct 26.1   Plts 399  MCV 76.3  02-07 @ 08:16  WBC 7.79   Hgb 9.2   Hct 27.5   Plts 418  MCV 76.2  02-06 @ 07:15  WBC 7.26   Hgb 8.6   Hct 25.9   Plts 392  MCV 75.7  02-05 @ 07:44  WBC 7.47   Hgb 9.5   Hct 28.6   Plts 394  MCV 75.7  02-04 @ 06:18  WBC 7.51   Hgb 9.3   Hct 27.2   Plts 398  MCV 74.9  02-02 @ 06:39  WBC 7.33   Hgb 7.5   Hct 22.4   Plts 373  MCV 73.9      Chemistry:  02-08 @ 08:32  Na+ 138  K+ 3.5  Cl- 106  CO2 26  BUN 6  Cr 0.40     02-07 @ 08:16  Na+ 137  K+ 3.5  Cl- 106  CO2 25  BUN 7  Cr 0.41     02-06 @ 07:15  Na+ 138  K+ 3.5  Cl- 105  CO2 26  BUN 8  Cr 0.36     02-05 @ 07:44  Na+ 138  K+ 3.6  Cl- 105  CO2 24  BUN 10  Cr 0.34     02-04 @ 06:18  Na+ 136  K+ 3.8  Cl- 104  CO2 25  BUN 8  Cr 0.41     02-02 @ 06:39  Na+ 139  K+ 3.7  Cl- 105  CO2 27  BUN 9  Cr 0.35         Glucose, Serum: 91 mg/dL (02-08 @ 08:32)  Glucose, Serum: 103 mg/dL (02-07 @ 08:16)  Glucose, Serum: 91 mg/dL (02-06 @ 07:15)  Glucose, Serum: 91 mg/dL (02-05 @ 07:44)  Glucose, Serum: 109 mg/dL (02-04 @ 06:18)  Glucose, Serum: 90 mg/dL (02-02 @ 06:39)      08 Feb 2018 08:32    138    |  106    |  6      ----------------------------<  91     3.5     |  26     |  0.40   07 Feb 2018 08:16    137    |  106    |  7      ----------------------------<  103    3.5     |  25     |  0.41   06 Feb 2018 07:15    138    |  105    |  8      ----------------------------<  91     3.5     |  26     |  0.36   05 Feb 2018 07:44    138    |  105    |  10     ----------------------------<  91     3.6     |  24     |  0.34   04 Feb 2018 06:18    136    |  104    |  8      ----------------------------<  109    3.8     |  25     |  0.41   02 Feb 2018 06:39    139    |  105    |  9      ----------------------------<  90     3.7     |  27     |  0.35     Ca    9.3        08 Feb 2018 08:32  Ca    9.5        07 Feb 2018 08:16  Ca    9.4        06 Feb 2018 07:15  Ca    9.9        05 Feb 2018 07:44  Ca    9.3        04 Feb 2018 06:18  Ca    9.7        02 Feb 2018 06:39    TPro  8.0    /  Alb  1.8    /  TBili  0.3    /  DBili  x      /  AST  18     /  ALT  18     /  AlkPhos  66     02 Feb 2018 06:39              CAPILLARY BLOOD GLUCOSE          C-Reactive Protein, Serum: 4.70 mg/dL (02-07 @ 08:12)      RADIOLOGY & ADDITIONAL TESTS:    Imaging Personally Reviewed:  [ ] YES  [ ] NO    Consultant(s) Notes Reviewed:  [ ] YES  [ ] NO    Care Discussed with Consultants/Other Providers [ ] YES  [ ] NO

## 2022-05-02 NOTE — ED PROVIDER NOTE - CARE PLAN
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

## 2022-08-16 NOTE — ED PROVIDER NOTE - EYES, MLM
Patient states that she is still experiencing burning with urination and frequency/urgency after treatment with macrobid on 7/25/2022. PCP noted on urine culture that mixed bacterial grzegorz were found and follow is needed if symptoms do not improve.     Patient is advised that PCP does not have appointments available today and Urgent Care is an option for evaluation today.     She is agreeable to go to urgent care this afternoon. Denies additional questions or concerns.    Clear bilaterally, pupils equal, round and reactive to light.

## 2022-08-18 NOTE — PROGRESS NOTE ADULT - PROBLEM SELECTOR PLAN 4
as per neurology, copper / Zinc levels pending as per neurology, copper  level is slightly elevatd  / Zinc levels normal yes

## 2022-12-19 NOTE — PROGRESS NOTE ADULT - ASSESSMENT
RADIOLOGY & ADDITIONAL TESTS:  A/P  fever - cooling blanket  sepsis - + blood cx to e coli - suseptable to zosyn  rsv + supportive measures  10-49 k ecoli in urine - but this likley taken after pt on abx - continue zosyn - apprecitae id help  respiratory status - improved  dehydration - iv fluid d5 w, recheck bmp  renal insufficiency - improved on ivf  cv - felt stable by cardiology - echocardiogram pending  neuro - reported h/o of Ashli's chorea - neurology does not recommed retesting - felt stable by neurology - appreciate neurolgy help  prognsis - guarded  d/w with pt and daughter     Virgilio Broderick MD [[email or web address]] : Mental status:\par Alert oriented x3; fluent clear speech \par Cranial nerves:\par CN I deferred. CN  II VFF; ; III, IV, VI: PERRLA, EOM IV: Facial sensation normal B/L to touch, pinprick and temperatureVII:Facial strength normal B/L. VIII: Gross hearing intact IX, X: palate midline and elevates symmetrically XI: Trapezius normal strength, XII: Tongue midline without atrophy or fasciculation\par Motor: Muscle tone no rigidity or resistance in all 4 extremities. No atrophy or fasciculation. Muscle strength: arms and legs, proximal and distal flexors and extensors are normal 5/5 . No UE drift.\par Sensory: intact to pinprick, temperature  sensation to vibration and cold. \par Reflexes: all present, normal, and symmetrical 1+ \par Coordination: finger to nose: normal. Heel to shin: normal\par Gait: steady normal based ; Romberg test negative \par \par

## 2023-06-07 NOTE — PHYSICAL THERAPY INITIAL EVALUATION ADULT - PHYSICAL ASSIST/NONPHYSICAL ASSIST: SUPINE/SIT, REHAB EVAL
C/o having blood in urine that started yesterday also states nausea and just no feeling well x 2 days, states has not taken bp meds  
nonverbal cues (demo/gestures)/2 person assist/verbal cues/1 person assist

## 2023-07-04 NOTE — DIETITIAN INITIAL EVALUATION ADULT. - NS AS NUTRI INTERV MEDICAL AND FOOD SUPPLEMENTS
Ensure Enlive x 3/day (provides 1050 kcal, 60 g protein) , Prosource x 1/day (provides 15 g protein), + Talha x 1/day/Commercial beverage
No

## 2023-08-08 NOTE — ED ADULT NURSE NOTE - OBJECTIVE STATEMENT
No Presented to the er s/p fall 2 days ago. Pt has an hematoma on the right  eye. ? As per boyfriend PT has not been acting herself since the fall  And PT PT doesn't walk anymore. PT has slurred speech (baseline) and is contracted bilaterally. PT has hx of [place] disease

## 2024-03-08 NOTE — PHYSICAL THERAPY INITIAL EVALUATION ADULT - IMPAIRED TRANSFERS: SIT/STAND, REHAB EVAL
no known precautions/limitations
decreased strength/impaired balance/impaired motor control/impaired postural control/abnormal muscle tone

## 2024-05-16 NOTE — PROGRESS NOTE ADULT - SUBJECTIVE AND OBJECTIVE BOX
- talk to Dr. Mcmillan about reducing vitamin D to monthly as your vitamin D is high normal   - continue Levothyroxine 125 mcg 6 days a week. separate iron supplements by at least 4 hours from Levothyroxine. If you happen to forget to take your Levothyroxine, take it as soon as you remember  - Take thyroid medication on an empty stomach, with at least half a glass of water, and to wait a half hour before eating. Common medications that can impair absorption of thyroid hormone includes calcium, iron. Separate these medications from thyroid hormone by at least 4 hours  - Limit carb intake of things like rice, pasta, potatoes, and bread. 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  Pt has been more lethargic.  no n/v (12 Jan 2018 14:07)      INTERVAL HPI/OVERNIGHT EVENTS:  No new c/o.    MEDICATIONS  (STANDING):  ascorbic acid 500 milliGRAM(s) Oral two times a day  dextrose 5%. 1000 milliLiter(s) (63 mL/Hr) IV Continuous <Continuous>  ferrous    sulfate 325 milliGRAM(s) Oral daily  hydrocortisone 2.5% Cream 1 Application(s) Topical two times a day  lactulose Syrup 10 Gram(s) Oral two times a day  meropenem IVPB      meropenem IVPB 500 milliGRAM(s) IV Intermittent every 8 hours  mirtazapine 15 milliGRAM(s) Oral at bedtime  multivitamin/minerals 1 Tablet(s) Oral daily  potassium chloride    Tablet ER 20 milliEquivalent(s) Oral daily  senna 2 Tablet(s) Oral at bedtime  simvastatin 20 milliGRAM(s) Oral at bedtime    MEDICATIONS  (PRN):  acetaminophen  Suppository 650 milliGRAM(s) Rectal every 6 hours PRN For Temp greater than 38 C (100.4 F)  magnesium hydroxide Suspension 30 milliLiter(s) Oral daily PRN Constipation      FAMILY HISTORY:  No pertinent family history in first degree relatives      Allergies    No Known Allergies    Intolerances        PMH/PSH:  Decubital ulcer  Hemiparesis  Failure to thrive in adult  UTI (urinary tract infection)  Dehydration  Rhabdomyolysis  HTN (hypertension)  Neurogenic bladder disorder  Pressure ulcer of sacrum  UTI (urinary tract infection)  Embolism  DVT (deep venous thrombosis)  Anemia  Pneumonia  DM (diabetes mellitus)  High cholesterol  [place] chorea  No pertinent past medical history  No significant past surgical history        REVIEW OF SYSTEMS:  CONSTITUTIONAL: No fever, weight loss  EYES: No eye pain, visual disturbances, or discharge  ENMT:  No difficulty hearing, tinnitus, vertigo; No sinus or throat pain  NECK: No pain or stiffness  BREASTS: No pain, masses, or nipple discharge  RESPIRATORY: No cough, wheezing, chills or hemoptysis; No shortness of breath  CARDIOVASCULAR: No chest pain, palpitations, dizziness, or leg swelling  GASTROINTESTINAL: No abdominal or epigastric pain. No nausea, vomiting, or hematemesis; No diarrhea or constipation. No melena or hematochezia.  GENITOURINARY:  [place] (+)  NEUROLOGICAL: No headaches, memory loss, loss of strength, numbness, or tremors  SKIN: No itching, burning, rashes, or lesions   LYMPH NODES: No enlarged glands  ENDOCRINE: No heat or cold intolerance; No hair loss  MUSCULOSKELETAL: No joint pain or swelling; No muscle, back, or extremity pain  PSYCHIATRIC: No depression, anxiety, mood swings, or difficulty sleeping  HEME/LYMPH: No easy bruising, or bleeding gums  ALLERGY AND IMMUNOLOGIC: No hives or eczema    Vital Signs Last 24 Hrs  T(C): 38.1 (19 Jan 2018 04:44), Max: 38.3 (18 Jan 2018 23:18)  T(F): 100.5 (19 Jan 2018 04:44), Max: 101 (18 Jan 2018 23:18)  HR: 116 (19 Jan 2018 04:44) (89 - 116)  BP: 152/78 (19 Jan 2018 04:44) (120/78 - 156/84)  BP(mean): --  RR: 15 (19 Jan 2018 04:44) (15 - 17)  SpO2: 98% (19 Jan 2018 04:44) (95% - 98%)    PHYSICAL EXAM:  GENERAL: NAD, well-groomed, well-developed  HEAD:  Atraumatic, Normocephalic  EYES: EOMI, PERRLA, conjunctiva and sclera clear  NECK: Supple, No JVD, Normal thyroid  NERVOUS SYSTEM:  Alert , no changes  CHEST/LUNG: Clear to percussion bilaterally; No rales, rhonchi, wheezing, or rubs  HEART: Regular rate and rhythm; No murmurs, rubs, or gallops  ABDOMEN: Soft, Nontender, Nondistended; Bowel sounds present  EXTREMITIES:  2+ Peripheral Pulses, No clubbing, cyanosis, or edema  LYMPH: No lymphadenopathy noted  SKIN: No rashes or lesions    LAB                          8.7    11.6  )-----------( 268      ( 18 Jan 2018 08:01 )             25.7       CBC:  01-18 @ 08:01  WBC 11.6   Hgb 8.7   Hct 25.7   Plts 268  MCV 78.4  01-17 @ 07:35  WBC 13.2   Hgb 8.7   Hct 26.7   Plts 231  MCV 77.7  01-16 @ 08:53  WBC 15.4   Hgb 8.8   Hct 26.5   Plts 193  MCV 78.6  01-15 @ 06:15  WBC 14.5   Hgb 9.8   Hct 30.7   Plts 168  MCV 79.9  01-14 @ 04:21  WBC 13.4   Hgb 8.8   Hct 26.8   Plts 171  MCV 79.8  01-13 @ 05:39  WBC 12.5   Hgb 8.7   Hct 28.1   Plts 175  MCV 79.8  01-13 @ 01:37  WBC 11.2   Hgb 9.5   Hct 29.1   Plts 165  MCV 79.2  01-12 @ 14:18  WBC 13.2   Hgb 9.8   Hct 30.6   Plts 182  MCV 79.8      Chemistry:  01-18 @ 08:01  Na+ 141  K+ 3.6  Cl- 107  CO2 26  BUN 8  Cr 0.54     01-17 @ 07:35  Na+ 140  K+ 3.0  Cl- 107  CO2 24  BUN 10  Cr 0.56     01-16 @ 08:53  Na+ 142  K+ 3.1  Cl- 107  CO2 26  BUN 11  Cr 0.77     01-15 @ 06:15  Na+ 144  K+ 3.4  Cl- 111  CO2 25  BUN 11  Cr 0.69     01-14 @ 04:21  Na+ 159  K+ 3.0  Cl- 130  CO2 23  BUN 23  Cr 0.87     01-13 @ 05:39  Na+ 165  K+ 3.3  Cl- 135  CO2 23  BUN 37  Cr 1.05     01-13 @ 01:37  Na+ 166  K+ 3.2  Cl- 136  CO2 23  BUN 38  Cr 1.11         Glucose, Serum: 121 mg/dL (01-18 @ 08:01)  Glucose, Serum: 183 mg/dL (01-17 @ 07:35)  Glucose, Serum: 152 mg/dL (01-16 @ 08:53)  Glucose, Serum: 124 mg/dL (01-15 @ 06:15)  Glucose, Serum: 141 mg/dL (01-14 @ 04:21)  Glucose, Serum: 168 mg/dL (01-13 @ 05:39)  Glucose, Serum: 146 mg/dL (01-13 @ 01:37)      18 Jan 2018 08:01    141    |  107    |  8      ----------------------------<  121    3.6     |  26     |  0.54   17 Jan 2018 07:35    140    |  107    |  10     ----------------------------<  183    3.0     |  24     |  0.56   16 Jan 2018 08:53    142    |  107    |  11     ----------------------------<  152    3.1     |  26     |  0.77   15 Kelvin 2018 06:15    144    |  111    |  11     ----------------------------<  124    3.4     |  25     |  0.69   14 Jan 2018 04:21    159    |  130    |  23     ----------------------------<  141    3.0     |  23     |  0.87   13 Jan 2018 05:39    165    |  135    |  37     ----------------------------<  168    3.3     |  23     |  1.05   13 Jan 2018 01:37    166    |  136    |  38     ----------------------------<  146    3.2     |  23     |  1.11     Ca    9.4        18 Jan 2018 08:01  Ca    9.0        17 Jan 2018 07:35  Ca    8.6        16 Jan 2018 08:53  Ca    8.9        15 Kelvin 2018 06:15  Ca    9.2        14 Jan 2018 04:21  Ca    8.8        13 Jan 2018 05:39  Ca    8.1        13 Jan 2018 01:37  Mg     2.1       18 Jan 2018 08:01  Mg     1.7       16 Jan 2018 08:53    TPro  7.8    /  Alb  2.1    /  TBili  0.3    /  DBili  x      /  AST  24     /  ALT  23     /  AlkPhos  58     13 Jan 2018 05:39  TPro  7.8    /  Alb  2.1    /  TBili  0.4    /  DBili  x      /  AST  17     /  ALT  25     /  AlkPhos  60     13 Jan 2018 01:37      PT/INR - ( 18 Jan 2018 08:01 )   PT: 17.0 sec;   INR: 1.55 ratio         PTT - ( 18 Jan 2018 08:01 )  PTT:29.3 sec        CAPILLARY BLOOD GLUCOSE              RADIOLOGY & ADDITIONAL TESTS:    Imaging Personally Reviewed:  [ ] YES  [ ] NO    Consultant(s) Notes Reviewed:  [ ] YES  [ ] NO    Care Discussed with Consultants/Other Providers [ ] YES  [ ] NO    Assessment and Plan:   · Assessment		    RADIOLOGY & ADDITIONAL TESTS:  A/P  fever - cooling blanket  sepsis - + blood cx to e coli - suseptable to zosyn  rsv + supportive measures  10-49 k E coli in urine - but this likley taken after pt on abx - continue zosyn - apprecitae id help  respiratory status - improved  dehydration - iv fluid d5 w, recheck bmp  renal insufficiency - improved on ivf  cv - felt stable by cardiology - echocardiogram pending  neuro - reported h/o of [place]'s chorea - neurology does not recommed retesting - felt stable by neurology - appreciate neurolgy help  prognsis - guarded  d/w with pt and daughter     Virgilio Broderick MD         Problem/Plan - 1:  ·  Problem: Urinary tract infection without hematuria, site unspecified.  Plan: Zosyn changed to Merrem, fever 101, as per ID,  WBC 11.6, for cystoscopy today ( postponed from yesterday ).      Problem/Plan - 2:  ·  Problem: Anemia, unspecified type.  Plan: supportive care.      Problem/Plan - 3:  ·  Problem: Hypertension, unspecified type.  Plan: 156/84.      Problem/Plan - 4:  ·  Problem: [place] chorea.  Plan: as per neurology, copper / Zinc levels pending.      Problem/Plan - 5:  ·  Problem: Kidney stones.  Plan: for cystoscopy today.      Problem/Plan - 6:  Problem: Hypokalemia. Plan: nml.     Problem/Plan - 7:  ·  Problem: Pneumonia due to infectious organism, unspecified laterality, unspecified part of lung.  Plan: On Merrem, appreciate Pulmonary consult ( Dr Pantoja ) , repeat c x ray negative.     Attending Attestation:   I was physically present for the key portions of the evaluation and management (E/M) service provided.  I agree with the above history, physical, and plan which I have reviewed and edited where appropriate.     25 minutes spent on total encounter; more than 50% of the visit was spent counseling and/or coordinating care by the attending physician.
